# Patient Record
Sex: MALE | Race: WHITE
[De-identification: names, ages, dates, MRNs, and addresses within clinical notes are randomized per-mention and may not be internally consistent; named-entity substitution may affect disease eponyms.]

---

## 2017-01-03 ENCOUNTER — HOSPITAL ENCOUNTER (OUTPATIENT)
Dept: HOSPITAL 75 - SDC | Age: 72
Discharge: HOME | End: 2017-01-03
Attending: SURGERY
Payer: MEDICARE

## 2017-01-03 VITALS — DIASTOLIC BLOOD PRESSURE: 77 MMHG | SYSTOLIC BLOOD PRESSURE: 126 MMHG

## 2017-01-03 VITALS — DIASTOLIC BLOOD PRESSURE: 86 MMHG | SYSTOLIC BLOOD PRESSURE: 103 MMHG

## 2017-01-03 VITALS — HEIGHT: 70 IN | WEIGHT: 208.2 LBS | BODY MASS INDEX: 29.81 KG/M2

## 2017-01-03 VITALS — DIASTOLIC BLOOD PRESSURE: 91 MMHG | SYSTOLIC BLOOD PRESSURE: 126 MMHG

## 2017-01-03 DIAGNOSIS — R19.5: Primary | ICD-10-CM

## 2017-01-03 DIAGNOSIS — Z86.010: ICD-10-CM

## 2017-01-03 DIAGNOSIS — K59.00: ICD-10-CM

## 2017-01-03 PROCEDURE — 74176 CT ABD & PELVIS W/O CONTRAST: CPT

## 2017-01-03 NOTE — DIAGNOSTIC IMAGING REPORT
PROCEDURE: CT abdomen and pelvis without contrast.



TECHNIQUE: Multiple contiguous axial images were obtained

through the abdomen and pelvis without the use of intravenous

contrast.



INDICATION: Incomplete colonoscopy. History of constipation.

Occult positive stool.



COMPARISON: 07/04/2014.



FINDINGS: Included portions of the lung bases show areas of

air-trapping consistent with background of COPD. There are

subtle micronodular densities within the posterior left lower

lobe. The appearance is suggestive of tree-in-bud distribution.



CT abdomen: Rectal contrast was administered. There is

opacification of the colon to the cecum. Intraluminal filling

defects are seen within the cecum with supine positioning. This

is however felt to be on the basis of intraluminal stool

content. There is otherwise no focal high-grade stricture of the

colon. There is no extravasation of contrast to suggest leak.

Please note, small polyps or small sessile masses may be

inconspicuous with routine CT technique.



There is moderate hiatal hernia. Small bowel loops are

nondistended. Normal appendix cannot be adequately identified,

but there is no pericecal inflammation.



Multiple nonobstructive renal calculi are noted, bilaterally.

There is also probable hypodense cyst on the right. No ureteral

calculi are seen on either side. Additionally, there is no

hydroureteronephrosis or other evidence of obstruction.



The liver, spleen, pancreas, and adrenal glands have an

unremarkable noncontrast CT appearance. There is no loculated

fluid collection, free fluid, or free air within the abdomen. No

abnormal mesenteric or retroperitoneal adenopathy is seen. There

is diffuse calcified aortic and arterial atherosclerosis. Bony

structures show no acute abnormalities.



CT pelvis: Urinary bladder is unopacified. No calculi are seen

within the urinary bladder. There is no loculated fluid

collection, free fluid, or free air within the pelvis. No

abnormal lymph nodes are seen. Bony structures show no acute

abnormalities.



IMPRESSION:

1. Filling defects within the cecum. Given their differences in

positioning between supine and prone positioning, this is felt

to most likely represent intraluminal stool. There is otherwise

no focal stricture, obstruction, or evidence of leak. Please

note, small polyps or sessile masses may be inconspicuous with

CT imaging.

2. Moderate hiatal hernia.

3. Multiple nonobstructive bilateral renal calculi.

4. Diffuse calcified aortic and arterial atherosclerosis.



Dictated by:



Dictated on workstation # [id number]

## 2017-01-03 NOTE — PROGRESS NOTE-POST OPERATIVE
Post-Operative Progess Note


Pre-Operative Diagnosis


occult positive stools, history of colon polyps





Post-Operative Diagnosis





incomplete colonoscopy





Post-Op Procedure Note


Date of Procedure:  Francisco 3, 2017


Name of Procedure:  


incomplete colonoscopy


Procedure Note/Findings


see note


Anesthesia Type


per crna


Estimated blood loss (mL):  minimal


Specimen(s) collected


none








MICH COLLIER DO Francisoc 3, 2017 2:18 pm

## 2017-01-03 NOTE — DISCHARGE INST-SIMPLE/STANDARD
Discharge Inst-Standard


Patient Instructions/Follow Up


Plan of Care/Instructions/FU:  


Follow up with Dr. Ramachandran in 2-3 weeks


Activity as Tolerated:  Yes


Discharge Diet:  No Restrictions








TALIB GASTON Francisco 3, 2017 15:26

## 2017-01-03 NOTE — OPERATIVE REPORT
PROCEDURE PHYSICIAN:   MICH COLLIER

 

DATE OF PROCEDURE:  

01/03/2017

 

PREOPERATIVE DIAGNOSIS:  

1.   Occult positive stool. 

2.   History of polyps. 

 

POSTOPERATIVE DIAGNOSIS:

Incomplete colonoscopy. 

 

PROCEDURE:

Incomplete colonoscopy. 

 

SURGEON:

Madie. 

 

ANESTHESIA:

Per CRNA. 

 

ESTIMATED BLOOD LOSS:

None. 

 

COMPLICATIONS:

None. 

 

INDICATIONS:

The patient is a 71-year-old male with occult positive stools. He

has a questionable history of history of colon polyps. He was

explained risk and benefits of the procedure and wishes to

proceed with procedure. Consent was signed on the chart. 

 

PROCEDURE:

The patient was taken to the endoscopy suite, placed in left

lower recumbent position. Timeout was performed. Digital rectal

exam was performed. There were no palpable polyps, masses,

ulcerations. The scope was inserted in the rectum and advanced

all the way to the hepatic flexure.  There was still a

significant amount of soft and liquid stool present within the

colon. Copious amounts of irrigation were used to irrigate and

suction.  The scope was not able to be further past the hepatic

flexure. The patient was repositioned multiple times in order to

try to advance the scope and had the scope withdrawn a portion

and advanced several times without ever being able to get past

the hepatic flexure. It was decided to discontinue the

colonoscopy at that time. The scope was then slowly retracted

continuously trying to avoid soft and liquid stool. This was

continued be suctioned and irrigated. There were no polyps,

masses or ulcerations visualized on the mucosal surface that was

able to be visualized. The scope was then slowly retracted until

completely removed noting no further pathology. 

 

RECOMMENDATIONS:

The patient will go for CT of the abdomen and pelvis with rectal

contrast to further evaluate the colon.  After this is performed,

would reevaluate colon and repeat colonoscopy in 5 years. If he

has any problems prior to that, he should be reevaluated at that

time. Further recommendations pending. CT abdomen and pelvis with

rectal contrast.

 

 

Job ID: [id number]

Dictated Date: 01/03/2017 14:22:21 

Transcription Date: 01/03/2017 14:30:01 / isaias
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
## 2017-01-03 NOTE — PROGRESS NOTE-PRE OPERATIVE
Pre-Operative Progress Note


H&P Reviewed


The H&P was reviewed, patient examined and no changes noted.


Date H&P Reviewed:  Francisco 3, 2017


Time H&P Reviewed:  11:11


Pre-Operative Diagnosis:  occult positive stools, history of colon polyps








MICH COLLIER DO Francisco 3, 2017 11:11 am

## 2017-02-08 ENCOUNTER — HOSPITAL ENCOUNTER (OUTPATIENT)
Dept: HOSPITAL 75 - CARD | Age: 72
End: 2017-02-08
Attending: PHYSICIAN ASSISTANT
Payer: MEDICARE

## 2017-02-08 VITALS — SYSTOLIC BLOOD PRESSURE: 102 MMHG | DIASTOLIC BLOOD PRESSURE: 66 MMHG

## 2017-02-08 DIAGNOSIS — I25.10: Primary | ICD-10-CM

## 2017-02-08 DIAGNOSIS — G45.9: ICD-10-CM

## 2017-02-08 DIAGNOSIS — G47.33: ICD-10-CM

## 2017-02-08 DIAGNOSIS — I65.23: ICD-10-CM

## 2017-02-08 PROCEDURE — 78452 HT MUSCLE IMAGE SPECT MULT: CPT

## 2017-02-08 PROCEDURE — 93017 CV STRESS TEST TRACING ONLY: CPT
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
## 2017-02-09 NOTE — STRESS TEST
PROCEDURE PHYSICIAN:   NOHELIA AUGUST

 

DATE OF PROCEDURE:  02/08/2017

 

LEXISCAN MYOVIEW STRESS TEST REPORT: 

 

REFERRING PHYSICIAN:

Dr. Velasquez. 

 

INDICATION:

Coronary artery disease. 

 

BASELINE HEART RATE: 

65 

 

BASELINE BLOOD PRESSURE: 

129/60  

 

BASELINE EKG: 

Sinus rhythm with no ischemic changes. 

 

IN SUMMARY:

The patient was injected with 10.36 mCi of technetium 99 Myoview

and the resting images were obtained. Then the patient received

0.4 mg of Lexiscan followed by 28.7 mCi of technetium 99 Myoview.

Throughout the test, there were minimal nondiagnostic EKG

changes. 

 

The resting and stress images were reviewed and compared in the

short axis, horizontal long axis, and vertical long axis views.

Review of the images showed good radiotracer uptake with no

significant ischemia or infarction on SPECT images. SSS is 2, SDS

1, TID value 1.21. On the gated images, the left ventricle

appeared to be normal size with normal contractility. Calculated

ejection fraction 57%. 

 

IN CONCLUSION:

1. The patient tolerated Lexiscan well. 

2. TID value is in the upper limits of 1.21. 

3. Normal left ventricular size with normal contractility.

Calculated ejection fraction 57%. 

 

 

 

 

Job ID: [id number]

Dictated Date: 02/08/2017 16:02:57 

Transcription Date: 02/09/2017 08:42:56 / sharonda

## 2017-02-22 ENCOUNTER — HOSPITAL ENCOUNTER (OUTPATIENT)
Dept: HOSPITAL 75 - CATH | Age: 72
Discharge: HOME | End: 2017-02-22
Attending: INTERNAL MEDICINE
Payer: MEDICARE

## 2017-02-22 VITALS — DIASTOLIC BLOOD PRESSURE: 91 MMHG | SYSTOLIC BLOOD PRESSURE: 151 MMHG

## 2017-02-22 VITALS — DIASTOLIC BLOOD PRESSURE: 74 MMHG | SYSTOLIC BLOOD PRESSURE: 111 MMHG

## 2017-02-22 VITALS — DIASTOLIC BLOOD PRESSURE: 85 MMHG | SYSTOLIC BLOOD PRESSURE: 137 MMHG

## 2017-02-22 VITALS — DIASTOLIC BLOOD PRESSURE: 92 MMHG | SYSTOLIC BLOOD PRESSURE: 144 MMHG

## 2017-02-22 VITALS — WEIGHT: 207 LBS | BODY MASS INDEX: 28.98 KG/M2 | HEIGHT: 71 IN

## 2017-02-22 VITALS — SYSTOLIC BLOOD PRESSURE: 124 MMHG | DIASTOLIC BLOOD PRESSURE: 85 MMHG

## 2017-02-22 VITALS — DIASTOLIC BLOOD PRESSURE: 87 MMHG | SYSTOLIC BLOOD PRESSURE: 134 MMHG

## 2017-02-22 VITALS — SYSTOLIC BLOOD PRESSURE: 140 MMHG | DIASTOLIC BLOOD PRESSURE: 89 MMHG

## 2017-02-22 VITALS — DIASTOLIC BLOOD PRESSURE: 79 MMHG | SYSTOLIC BLOOD PRESSURE: 140 MMHG

## 2017-02-22 VITALS — DIASTOLIC BLOOD PRESSURE: 86 MMHG | SYSTOLIC BLOOD PRESSURE: 141 MMHG

## 2017-02-22 VITALS — SYSTOLIC BLOOD PRESSURE: 145 MMHG | DIASTOLIC BLOOD PRESSURE: 85 MMHG

## 2017-02-22 DIAGNOSIS — Z86.73: ICD-10-CM

## 2017-02-22 DIAGNOSIS — Z79.899: ICD-10-CM

## 2017-02-22 DIAGNOSIS — I34.0: ICD-10-CM

## 2017-02-22 DIAGNOSIS — Z72.0: ICD-10-CM

## 2017-02-22 DIAGNOSIS — E78.5: ICD-10-CM

## 2017-02-22 DIAGNOSIS — Z95.5: ICD-10-CM

## 2017-02-22 DIAGNOSIS — R94.39: Primary | ICD-10-CM

## 2017-02-22 DIAGNOSIS — G47.33: ICD-10-CM

## 2017-02-22 DIAGNOSIS — I25.10: ICD-10-CM

## 2017-02-22 DIAGNOSIS — I10: ICD-10-CM

## 2017-02-22 DIAGNOSIS — I65.23: ICD-10-CM

## 2017-02-22 DIAGNOSIS — I25.84: ICD-10-CM

## 2017-02-22 LAB
ALBUMIN SERPL-MCNC: 4.1 G/DL (ref 3.2–4.5)
ALT SERPL-CCNC: 22 U/L (ref 0–55)
ANION GAP SERPL CALC-SCNC: 11 MMOL/L (ref 5–14)
APTT BLD: 29 SEC (ref 24–35)
AST SERPL-CCNC: 17 U/L (ref 5–34)
BILIRUB SERPL-MCNC: 0.4 MG/DL (ref 0.1–1)
BILIRUB UR QL STRIP: NEGATIVE
BUN SERPL-MCNC: 18 MG/DL (ref 7–18)
BUN/CREAT SERPL: 14
CALCIUM SERPL-MCNC: 9.6 MG/DL (ref 8.5–10.1)
CHLORIDE SERPL-SCNC: 109 MMOL/L (ref 98–107)
CHOLEST SERPL-MCNC: 150 MG/DL (ref ?–200)
CO2 SERPL-SCNC: 21 MMOL/L (ref 21–32)
CREAT SERPL-MCNC: 1.32 MG/DL (ref 0.6–1.3)
ERYTHROCYTE [DISTWIDTH] IN BLOOD BY AUTOMATED COUNT: 14.9 % (ref 10–14.5)
GFR SERPLBLD BASED ON 1.73 SQ M-ARVRAT: 54 ML/MIN
GLUCOSE SERPL-MCNC: 100 MG/DL (ref 70–105)
INR PPP: 1.1 (ref 0.8–1.4)
KETONES UR QL STRIP: NEGATIVE
LDLC SERPL DIRECT ASSAY-MCNC: 78 MG/DL (ref 1–129)
LEUKOCYTE ESTERASE UR QL STRIP: NEGATIVE
MCH RBC QN AUTO: 34 PG (ref 25–34)
MCHC RBC AUTO-ENTMCNC: 34 G/DL (ref 32–36)
MCV RBC AUTO: 99 FL (ref 80–99)
NITRITE UR QL STRIP: NEGATIVE
PH UR STRIP: 5 [PH] (ref 5–9)
PLATELET # BLD: 230 10^3/UL (ref 130–400)
PMV BLD AUTO: 8.9 FL (ref 7.4–10.4)
POTASSIUM SERPL-SCNC: 4.1 MMOL/L (ref 3.6–5)
PROT SERPL-MCNC: 6.8 G/DL (ref 6.4–8.2)
PROT UR QL STRIP: NEGATIVE
PROTHROMBIN TIME: 14.1 SEC (ref 12.2–14.7)
RBC # BLD AUTO: 4.06 10^6/UL (ref 4.35–5.85)
SODIUM SERPL-SCNC: 141 MMOL/L (ref 135–145)
SP GR UR STRIP: 1.02 (ref 1.02–1.02)
SQUAMOUS #/AREA URNS HPF: (no result) /HPF
TRIGL SERPL-MCNC: 88 MG/DL (ref ?–150)
UROBILINOGEN UR-MCNC: NORMAL MG/DL
VLDLC SERPL CALC-MCNC: 18 MG/DL (ref 5–40)
WBC # BLD AUTO: 6.8 10^3/UL (ref 4.3–11)
WBC #/AREA URNS HPF: (no result) /HPF

## 2017-02-22 PROCEDURE — 93458 L HRT ARTERY/VENTRICLE ANGIO: CPT

## 2017-02-22 PROCEDURE — 36415 COLL VENOUS BLD VENIPUNCTURE: CPT

## 2017-02-22 PROCEDURE — 71010: CPT

## 2017-02-22 PROCEDURE — 81000 URINALYSIS NONAUTO W/SCOPE: CPT

## 2017-02-22 PROCEDURE — 85730 THROMBOPLASTIN TIME PARTIAL: CPT

## 2017-02-22 PROCEDURE — 85027 COMPLETE CBC AUTOMATED: CPT

## 2017-02-22 PROCEDURE — 80061 LIPID PANEL: CPT

## 2017-02-22 PROCEDURE — 80053 COMPREHEN METABOLIC PANEL: CPT

## 2017-02-22 PROCEDURE — 36221 PLACE CATH THORACIC AORTA: CPT

## 2017-02-22 PROCEDURE — 87081 CULTURE SCREEN ONLY: CPT

## 2017-02-22 PROCEDURE — 85610 PROTHROMBIN TIME: CPT
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
## 2017-02-22 NOTE — DIAGNOSTIC IMAGING REPORT
INDICATION: Coronary artery disease.



Frontal chest obtained at 9:29 a.m. and compared to 12/28/15.



FINDINGS: Heart is borderline enlarged. Mediastinal silhouette

is unremarkable. There are mild chronic appearing increased

basilar markings. There is resolution of previous bibasilar

infiltrate seen on 12/28/15. There is no pneumothorax or pleural

fluid.



IMPRESSION:



Mild cardiomegaly with chronic appearing increased interstitial

markings. Resolution of bibasilar infiltrates compared with

12/28/15.



Dictated by:



Dictated on workstation # [id number]
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
## 2017-02-22 NOTE — DISCHARGE INST-POST CATH
Discharge Inst-CATH


Post Cardiac Cath D/C Inst


Follow Up/Plan


Appointment with Dr Stewart's office in 2-4 weeks


CARDIAC CATH DISCHARGE INSTRUCTIONS





*Hold Metformin for 48 hours post heart cath.





ACTIVITY





* Go Home directly and rest.


* Limit activity of the leg (or wrist if it was used) for 7 days including 

aerobics, swimming,


   jogging, bicycling, etc.


* Restrict stair-climbing for 7 days if possible, if not, climb up with your non

-cath leg, then


   bring together on the same step.


* Avoid lifting, pushing, pulling or excessive movement of the affected 

extremity for 7 days.


* Customary sexual activity may be resumed after 2 days-use caution not to use 

a position  


   that strains or causes pain to the affected extremity.


* No driving for 24 hours.


* NO SMOKING. 


* Avoid straining for bowel movements for 7 days.


* Gentle walking on level ground is allowed.


* Returning to work will depend on the type of procedure and the results. Your 

doctor will discuss


   this with you.





CALL YOUR DOCTOR FOR ANY OF THE FOLLOWING:





*If bleeding from the puncture site occurs- Apply gentle pressure to site with 

clean cloth and call


   your doctor or EMS.


* If a knot or lump forms under the skin, increases in size, or causes pain.


* If bruising appears to be worsening or moving further down your leg instead 

of disappearing.


* Temperature above 101 F.





CARE OF YOUR GROIN INCISION;





* Bruising or purple discoloration of the skin near the puncture site is common.


* You may shower only, no bathtub bathing for 5 days.  Be careful to avoid 

slipping as your


   leg may feel stiff.


* If a closure device was used on your femoral artery, please see the attached 

guide regarding


   care of the device and your leg.


* REMOVE the dressing from your groin the next day after your procedure in the 

shower.





CARE OF YOUR WRIST INCISION;





* Bruising or purple discoloration of the skin near the puncture site is common.


* You may shower.


* DO NOT submerge wrist.


* Remove dressing in 24 hours.








NOHELIA STEWART MD Feb 22, 2017 11:13

## 2017-02-23 NOTE — CARDIAC CATHETERIZATION
PROCEDURE PHYSICIAN:   NOHELIA AUGUST

 

DATE OF PROCEDURE:  

02/22/2017 

 

INDICATION:

Coronary artery disease. 

 

SUMMARY:

Mr. Coles is a 71-year-old gentleman with a history of coronary

artery disease. He had a history of stent in the past.  He had an

abnormal stress test, scheduled for left heart catheterization,

possible PTCA. 

 

PROCEDURE NOTE:

After explaining the procedure to the patient, all pros and cons

were explained. All questions were answered. The patient signed a

consent, then he was placed on the cardiac catheterization

laboratory. The right groin was prepped in a sterile fashion.

Local anesthesia applied to the right groin. 6-Turkish sheath was

placed in the right femoral artery. Garcia left 4.0 was used; I

had to upgrade it to 4.5 diagnostic catheter, advanced to the

left coronary system. Angiogram was done in multiple views then

Garcia right was placed in the right coronary artery. Multiple

views were obtained. Pigtail catheter advanced to the left

ventricular cavity. Pressure was measured. No left ventriculogram

was done. Pullback LV to aorta was done. The aortic arch

angiogram was done. At the end of the procedure, sheath was

removed. Mynx device deployed. Hemostasis achieved. 

 

FINDINGS:

 

HEMODYNAMICS:

LV pressure 127/11, end-diastolic pressure of 11, aortic pressure

130/73, mean of 94. No significant gradient across the aortic

valve.

 

ANATOMY:  

1.   Left main coronary artery is bifurcating to left anterior

descending artery and left circumflex artery. Mildly calcified

with no obstructive disease. 

2.   Left anterior descending artery, calcified artery with 50 to

60% stenosis proximally. First diagonal branch has patent stent,

small vessel disease distally. 

3.   Left circumflex artery is moderate in size with no

significant obstructive disease. 

4.   Right coronary artery is dominant artery, moderate in size

50% stenosis at the midportion distally. The right posterior

descending artery has 70% stenosis in a smaller artery about 1.5

mm in diameter.

5.   No left ventriculogram was done. Normal left ventricular end

diastolic pressure. 

6.   Aortic arch angiogram appears to be prominent in size.

Origin of the innominate artery, carotid artery and subclavian

artery appeared normal. 

 

CONCLUSION:

1.   Calcified coronary system with 50 to 60% proximal LAD

stenosis, nonobstructive disease patent stent in the first

diagonal branch. 50 to 60% stenosis at the mid right coronary

artery and 70% stenosis at the mid right posterior descending

branch. That branch is a fairly small about 1.5 mm. 

2.   Normal left ventricular end diastolic pressure. 

3.   Prominent aortic arch with normal great neck vessels. 

 

DISCUSSION AND RECOMMENDATION:  

I recommend evaluating CT angiogram of the chest as an

outpatient, continue maximizing medical therapy. No intervention

is warranted at this time.

 

 

 

 

Job ID: [id number]

Dictated Date: 02/22/2017 11:18:07 

Transcription Date: 02/23/2017 11:23:15 / isaias

## 2017-02-23 NOTE — DISCHARGE SUMMARY
PROCEDURE PHYSICIAN:   NOHELIA AUGUST

 

DATE OF PROCEDURE:  

02/22/2017 

 

INDICATION:

Coronary artery disease. 

 

SUMMARY:

Mr. Coles is a 71-year-old gentleman with a history of coronary

artery disease. He had a history of stent in the past.  He had an

abnormal stress test, scheduled for left heart catheterization,

possible PTCA. 

 

PROCEDURE NOTE:

After explaining the procedure to the patient, all pros and cons

were explained. All questions were answered. The patient signed a

consent, then he was placed on the cardiac catheterization

laboratory. The right groin was prepped in a sterile fashion.

Local anesthesia applied to the right groin. 6-Setswana sheath was

placed in the right femoral artery. Garcia left 4.0 was used; I

had to upgrade it to 4.5 diagnostic catheter, advanced to the

left coronary system. Angiogram was done in multiple views then

Garcia right was placed in the right coronary artery. Multiple

views were obtained. Pigtail catheter advanced to the left

ventricular cavity. Pressure was measured. No left ventriculogram

was done. Pullback LV to aorta was done. The aortic arch

angiogram was done. At the end of the procedure, sheath was

removed. Mynx device deployed. Hemostasis achieved. 

 

FINDINGS:

 

HEMODYNAMICS:

LV pressure 127/11, end-diastolic pressure of 11, aortic pressure

130/73, mean of 94. No significant gradient across the aortic

valve.

 

ANATOMY:  

1.   Left main coronary artery is bifurcating to left anterior

descending artery and left circumflex artery. Mildly calcified

with no obstructive disease. 

2.   Left anterior descending artery, calcified artery with 50 to

60% stenosis proximally. First diagonal branch has patent stent,

small vessel disease distally. 

3.   Left circumflex artery is moderate in size with no

significant obstructive disease. 

4.   Right coronary artery is dominant artery, moderate in size

50% stenosis at the midportion distally. The right posterior

descending artery has 70% stenosis in a smaller artery about 1.5

mm in diameter.

5.   No left ventriculogram was done. Normal left ventricular end

diastolic pressure. 

6.   Aortic arch angiogram appears to be prominent in size.

Origin of the innominate artery, carotid artery and subclavian

artery appeared normal. 

 

CONCLUSION:

1.   Calcified coronary system with 50 to 60% proximal LAD

stenosis, nonobstructive disease patent stent in the first

diagonal branch. 50 to 60% stenosis at the mid right coronary

artery and 70% stenosis at the mid right posterior descending

branch. That branch is a fairly small about 1.5 mm. 

2.   Normal left ventricular end diastolic pressure. 

3.   Prominent aortic arch with normal great neck vessels. 

 

DISCUSSION AND RECOMMENDATION:  

I recommend evaluating CT angiogram of the chest as an

outpatient, continue maximizing medical therapy. No intervention

is warranted at this time.

 

FINAL DIAGNOSIS:

1.   Coronary artery disease. 

2.   Hypertension. 

3.   Hyperlipidemia. 

4.   Prominent aortic arch. 

 

 

 

Job ID: [id number] 

Dictated Date: 02/22/2017 11:18:07 

Transcription Date: 02/23/2017 11:29:49/isaias

## 2017-07-19 ENCOUNTER — HOSPITAL ENCOUNTER (OUTPATIENT)
Dept: HOSPITAL 75 - RT | Age: 72
End: 2017-07-19
Attending: INTERNAL MEDICINE
Payer: MEDICARE

## 2017-07-19 DIAGNOSIS — R06.02: Primary | ICD-10-CM

## 2017-07-19 DIAGNOSIS — Z87.891: ICD-10-CM

## 2017-07-19 PROCEDURE — 94729 DIFFUSING CAPACITY: CPT

## 2017-07-19 PROCEDURE — 94640 AIRWAY INHALATION TREATMENT: CPT

## 2017-07-19 PROCEDURE — 94060 EVALUATION OF WHEEZING: CPT

## 2017-07-19 PROCEDURE — 94726 PLETHYSMOGRAPHY LUNG VOLUMES: CPT

## 2018-04-06 ENCOUNTER — HOSPITAL ENCOUNTER (OUTPATIENT)
Dept: HOSPITAL 75 - RAD | Age: 73
End: 2018-04-06
Attending: INTERNAL MEDICINE
Payer: MEDICARE

## 2018-04-06 DIAGNOSIS — N20.0: ICD-10-CM

## 2018-04-06 DIAGNOSIS — J43.9: ICD-10-CM

## 2018-04-06 DIAGNOSIS — I34.0: ICD-10-CM

## 2018-04-06 DIAGNOSIS — Z72.0: ICD-10-CM

## 2018-04-06 DIAGNOSIS — I71.2: Primary | ICD-10-CM

## 2018-04-06 DIAGNOSIS — R41.0: ICD-10-CM

## 2018-04-06 DIAGNOSIS — I25.10: ICD-10-CM

## 2018-04-06 DIAGNOSIS — G47.33: ICD-10-CM
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
## 2018-04-06 LAB
ALBUMIN SERPL-MCNC: 4.2 GM/DL (ref 3.2–4.5)
ALP SERPL-CCNC: 86 U/L (ref 40–136)
ALT SERPL-CCNC: 22 U/L (ref 0–55)
BILIRUB SERPL-MCNC: 0.3 MG/DL (ref 0.1–1)
BUN/CREAT SERPL: 16
CALCIUM SERPL-MCNC: 11.4 MG/DL (ref 8.5–10.1)
CHLORIDE SERPL-SCNC: 106 MMOL/L (ref 98–107)
CHOLEST SERPL-MCNC: 125 MG/DL (ref ?–200)
CO2 SERPL-SCNC: 30 MMOL/L (ref 21–32)
CREAT SERPL-MCNC: 1.4 MG/DL (ref 0.6–1.3)
GFR SERPLBLD BASED ON 1.73 SQ M-ARVRAT: 50 ML/MIN
GLUCOSE SERPL-MCNC: 105 MG/DL (ref 70–105)
HDLC SERPL-MCNC: 52 MG/DL (ref 40–60)
POTASSIUM SERPL-SCNC: 4.4 MMOL/L (ref 3.6–5)
PROT SERPL-MCNC: 6.9 GM/DL (ref 6.4–8.2)
SODIUM SERPL-SCNC: 142 MMOL/L (ref 135–145)
TRIGL SERPL-MCNC: 74 MG/DL (ref ?–150)
VLDLC SERPL CALC-MCNC: 15 MG/DL (ref 5–40)

## 2018-04-06 PROCEDURE — 80061 LIPID PANEL: CPT

## 2018-04-06 PROCEDURE — 71275 CT ANGIOGRAPHY CHEST: CPT

## 2018-04-06 PROCEDURE — 36415 COLL VENOUS BLD VENIPUNCTURE: CPT

## 2018-04-06 PROCEDURE — 80053 COMPREHEN METABOLIC PANEL: CPT

## 2018-04-06 NOTE — DIAGNOSTIC IMAGING REPORT
PROCEDURE: CT angiography of the chest with contrast.



TECHNIQUE: Multiple contiguous axial images were obtained through

the chest after uneventful bolus administration of intravenous

contrast. Reconstructed CTA MIP acquisitions were also performed.

 

INDICATION: Recent fall. Headache. Nausea. Dizziness.



COMPARISON: 1/1/2016



FINDINGS: There is no evidence of acute pulmonary embolus to the

segmental division of the pulmonary arteries. Evaluation beyond

this is suboptimal secondary to suboptimal opacification by the

contrast bolus. Note is made that the bilateral pulmonary

arteries are somewhat prominent in appearance suggestive of

underlying pulmonary arterial hypertension.



Heart size is within normal limits. Ascending thoracic aorta is

aneurysmally dilated measuring 4.3 cm in diameter. This is stable

compared to 1/1/2016. Descending thoracic aorta is within normal

limits at 3.1 cm in diameter. There is moderate diffuse calcified

and noncalcified aortic atherosclerosis. Note is also made of

significant calcified coronary atherosclerosis. There is no large

pericardial effusion. Prominent right hilar lymph node measures

2.8 x 1.5 cm. This is stable compared to 1/1/2016. No other

pathologically enlarged or morphologically abnormal adenopathy is

seen within the mediastinum, left theodore, nor bilateral axillae.



Note is also made of hiatal hernia with somewhat prominent

thickened appearance of the wall of the distal esophagus.

Esophageal wall measures approximately 1 cm in thickness.



Evaluation of lung fields demonstrates mild to moderate air

trapping consistent with background of emphysematous disease.

There is no focal consolidation, pleural effusion, nor

pneumothorax. No suspicious pulmonary nodules or masses are

identified.



Osseous structures show no acute abnormalities. No lytic or

blastic bony lesions are seen.



Included portions of the upper abdomen show early excretion of

contrast versus partially visualized renal calculi on the left.



IMPRESSION:

1. No evidence of acute pulmonary embolus to the segmental

division of the pulmonary arteries.

2. Findings suspicious for pulmonary arterial hypertension.

3. Stable aneurysmal dilatation of the ascending thoracic aorta.

4. Significant calcified coronary and moderate aortic

atherosclerosis.

5. Thickened appearance to the wall of distal esophagus. This

does raise suspicion for potential esophagitis. Esophageal

neoplasm, however, could not be excluded. Not mentioned above,

there is a prominent paraesophageal lymph node near the hiatus

that measures 1.3 x 1.1 cm. Further evaluation with direct

visualization is recommended.

6. Mild to moderate emphysematous disease.

7. Prominent, yet stable right hilar lymph node.

8. There is excretion of contrast versus partially visualized

left renal calculi.



Dictated by: 



  Dictated on workstation # [id number]

## 2018-04-09 ENCOUNTER — HOSPITAL ENCOUNTER (OUTPATIENT)
Dept: HOSPITAL 75 - 4TH | Age: 73
Setting detail: OBSERVATION
LOS: 1 days | Discharge: HOME | End: 2018-04-10
Attending: INTERNAL MEDICINE | Admitting: INTERNAL MEDICINE
Payer: MEDICARE

## 2018-04-09 VITALS — WEIGHT: 207 LBS | HEIGHT: 71 IN | BODY MASS INDEX: 28.98 KG/M2

## 2018-04-09 VITALS — DIASTOLIC BLOOD PRESSURE: 74 MMHG | SYSTOLIC BLOOD PRESSURE: 121 MMHG

## 2018-04-09 VITALS — SYSTOLIC BLOOD PRESSURE: 121 MMHG | DIASTOLIC BLOOD PRESSURE: 74 MMHG

## 2018-04-09 DIAGNOSIS — E78.5: ICD-10-CM

## 2018-04-09 DIAGNOSIS — I25.10: ICD-10-CM

## 2018-04-09 DIAGNOSIS — R19.7: ICD-10-CM

## 2018-04-09 DIAGNOSIS — I10: ICD-10-CM

## 2018-04-09 DIAGNOSIS — I65.29: ICD-10-CM

## 2018-04-09 DIAGNOSIS — G47.9: ICD-10-CM

## 2018-04-09 DIAGNOSIS — N28.9: ICD-10-CM

## 2018-04-09 DIAGNOSIS — R06.02: ICD-10-CM

## 2018-04-09 DIAGNOSIS — R21: ICD-10-CM

## 2018-04-09 DIAGNOSIS — J44.9: ICD-10-CM

## 2018-04-09 DIAGNOSIS — R53.83: ICD-10-CM

## 2018-04-09 DIAGNOSIS — Z79.899: ICD-10-CM

## 2018-04-09 DIAGNOSIS — F17.210: ICD-10-CM

## 2018-04-09 DIAGNOSIS — D64.9: ICD-10-CM

## 2018-04-09 DIAGNOSIS — Z86.73: ICD-10-CM

## 2018-04-09 DIAGNOSIS — K63.89: Primary | ICD-10-CM

## 2018-04-09 LAB
ALBUMIN SERPL-MCNC: 3.8 GM/DL (ref 3.2–4.5)
ALP SERPL-CCNC: 56 U/L (ref 40–136)
ALT SERPL-CCNC: 16 U/L (ref 0–55)
APTT BLD: 29 SEC (ref 24–35)
BILIRUB SERPL-MCNC: 0.5 MG/DL (ref 0.1–1)
BUN/CREAT SERPL: 38
CALCIUM SERPL-MCNC: 9.3 MG/DL (ref 8.5–10.1)
CHLORIDE SERPL-SCNC: 111 MMOL/L (ref 98–107)
CO2 SERPL-SCNC: 18 MMOL/L (ref 21–32)
CREAT SERPL-MCNC: 1.48 MG/DL (ref 0.6–1.3)
ERYTHROCYTE [DISTWIDTH] IN BLOOD BY AUTOMATED COUNT: 13.9 % (ref 10–14.5)
GFR SERPLBLD BASED ON 1.73 SQ M-ARVRAT: 47 ML/MIN
GLUCOSE SERPL-MCNC: 94 MG/DL (ref 70–105)
HCT VFR BLD CALC: 32 % (ref 40–54)
HGB BLD-MCNC: 11 G/DL (ref 13.3–17.7)
INR PPP: 1.2 (ref 0.8–1.4)
MCH RBC QN AUTO: 36 PG (ref 25–34)
MCHC RBC AUTO-ENTMCNC: 34 G/DL (ref 32–36)
MCV RBC AUTO: 104 FL (ref 80–99)
PLATELET # BLD: 223 10^3/UL (ref 130–400)
PMV BLD AUTO: 9.5 FL (ref 7.4–10.4)
POTASSIUM SERPL-SCNC: 4.1 MMOL/L (ref 3.6–5)
PROT SERPL-MCNC: 5.9 GM/DL (ref 6.4–8.2)
PROTHROMBIN TIME: 15.2 SEC (ref 12.2–14.7)
RBC # BLD AUTO: 3.1 10^6/UL (ref 4.35–5.85)
SODIUM SERPL-SCNC: 140 MMOL/L (ref 135–145)
WBC # BLD AUTO: 7.6 10^3/UL (ref 4.3–11)

## 2018-04-09 PROCEDURE — 85027 COMPLETE CBC AUTOMATED: CPT

## 2018-04-09 PROCEDURE — 94760 N-INVAS EAR/PLS OXIMETRY 1: CPT

## 2018-04-09 PROCEDURE — 94640 AIRWAY INHALATION TREATMENT: CPT

## 2018-04-09 PROCEDURE — 94761 N-INVAS EAR/PLS OXIMETRY MLT: CPT

## 2018-04-09 PROCEDURE — 85730 THROMBOPLASTIN TIME PARTIAL: CPT

## 2018-04-09 PROCEDURE — 85018 HEMOGLOBIN: CPT

## 2018-04-09 PROCEDURE — 84443 ASSAY THYROID STIM HORMONE: CPT

## 2018-04-09 PROCEDURE — 36415 COLL VENOUS BLD VENIPUNCTURE: CPT

## 2018-04-09 PROCEDURE — 85014 HEMATOCRIT: CPT

## 2018-04-09 PROCEDURE — 83880 ASSAY OF NATRIURETIC PEPTIDE: CPT

## 2018-04-09 PROCEDURE — 80053 COMPREHEN METABOLIC PANEL: CPT

## 2018-04-09 PROCEDURE — 93005 ELECTROCARDIOGRAM TRACING: CPT

## 2018-04-09 PROCEDURE — 99211 OFF/OP EST MAY X REQ PHY/QHP: CPT

## 2018-04-09 PROCEDURE — 84484 ASSAY OF TROPONIN QUANT: CPT

## 2018-04-09 PROCEDURE — 85610 PROTHROMBIN TIME: CPT

## 2018-04-09 RX ADMIN — IPRATROPIUM BROMIDE AND ALBUTEROL SULFATE SCH ML: .5; 3 SOLUTION RESPIRATORY (INHALATION) at 21:32

## 2018-04-09 RX ADMIN — SODIUM CHLORIDE SCH MLS/HR: 900 INJECTION, SOLUTION INTRAVENOUS at 15:30

## 2018-04-09 RX ADMIN — FLUTICASONE PROPIONATE AND SALMETEROL XINAFOATE SCH PUFF: 115; 21 AEROSOL, METERED RESPIRATORY (INHALATION) at 21:32

## 2018-04-09 RX ADMIN — GABAPENTIN SCH MG: 300 CAPSULE ORAL at 21:09

## 2018-04-09 RX ADMIN — FAMOTIDINE SCH MG: 20 TABLET, FILM COATED ORAL at 21:09
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
## 2018-04-09 NOTE — XMS REPORT
Continuity of Care Document

 Created on: 2018



EDUAR OSORIO

External Reference #: [id number]

: 1945

Sex: Male



Demographics







 Address  [address] [place]  [address]

 

 Home Phone  [phone] x

 

 Preferred Language  Unknown

 

 Marital Status  Unknown

 

 Anglican Affiliation  Unknown

 

 Race  Unknown

 

 Ethnic Group  Unknown





Author







 Author  [facility]

 

 Organization  [facility]

 

 Address  Unknown

 

 Phone  Unavailable



              



Allergies

      





 Active            Description            Code            Type            
Severity            Reaction            Onset            Reported/Identified   
         Relationship to Patient            Clinical Status        

 

 Yes            No Known Drug Allergies            [id number]            Drug 
Allergy            Unknown            N/A                         2017   
                               



                  



Medications

      



There is no data.                  



Problems

      





 Date Dx Coded            Attending            Type            Code            
Diagnosis            Diagnosed By        

 

 2014            EDUAR JOHNSON DO            Ot            038.9   
         SEPTICEMIA NOS                     

 

 2014            EDUAR JOHNSON DO            Ot            250.00  
          DIAB NIEVES WO COMPL, TYPE II OR UNSPEC TY                     

 

 2014            EDUAR JOHNSON DO            Ot            272.4   
         HYPERLIPIDEMIA NEC/NOS                     

 

 2014            EDUAR JOHNSON DO            Ot            276.52  
          HYPOVOLEMIA                     

 

 2014            EDUAR JOHNSON DO            Ot            305.1   
         TOBACCO USE DISORDER                     

 

 2014            EDUAR JOHNSON DO            Ot            414.01  
          CORONARY ATHEROSCLEROSIS OF NATIVE CORON                     

 

 2014            EDUAR JOHNSON DO            Ot            486     
       PNEUMONIA, ORGANISM NOS                     

 

 2014            EDUAR JOHNSON DO            Ot            510.9   
         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                     

 

 2014            EDUAR JOHNSON DO            Ot            511.9   
         PLEURAL EFFUSION NOS                     

 

 2014            EDUAR JOHNSON DO            Ot            518.81  
          ACUTE RESPIRATORY FAILURE                     

 

 2014            EDUAR JOHNSON DO            Ot            584.9   
         ACUTE RENAL FAILURE, UNSPECIFIED                     

 

 2014            EDUAR JOHNSON DO            Ot            785.50  
          SHOCK NOS                     

 

 2014            EDUAR JOHNSON DO            Ot            785.52  
          SEPTIC SHOCK                     

 

 2014            EDUAR JOHNSON DO            Ot            995.92  
          SEVERE SEPSIS                     

 

 2014            EDUAR JOHNSON DO, Ot            V45.82  
          PERCUTANEOUS TRANSLUM CORON ANGIOPLASTY                      

 

 2014            HARRY MATIAS, SHIRLENE DAVID            Ot            266.2        
    B-COMPLEX DEFIC NEC                     

 

 2014            SHIRLENE MCDONALD MD            Ot            268.9        
    VITAMIN D DEFICIENCY NOS                     

 

 2014            SHIRLENE MCDONALD MD            Ot            272.4        
    HYPERLIPIDEMIA NEC/NOS                     

 

 2014            SHIRLENE MCDONALD MD E            Ot            274.9        
    GOUT NOS                     

 

 2014            SIHRLENE MCDONALD MD            Ot            305.1        
    TOBACCO USE DISORDER                     

 

 2014            SHIRLENE MCDONALD MD            Ot            401.9        
    HYPERTENSION NOS                     

 

 2014            HARRY MATIAS, SHIRLENE E            Ot            564.00       
     UNSPEC CONSTIPATION                     

 

 2014            SHIRLENE MCDONALD MD E            Ot            722.11       
     THORACIC DISC DISPLACMNT                     

 

 2014            SHIRLENE MCDONALD MD            Ot            733.00       
     OSTEOPOROSIS NOS                     

 

 2014            SHIRLENE MCDONALD MD E            Ot            780.79       
     OTH MALAISE FATIGUE                     

 

 2014            SHIRLENE MCDONALD MD E            Ot            790.29       
     OTHER ABNORMAL GLUCOSE                     

 

 2014            SHIRLENE MCDONALD MD            Ot            V57.89       
     REHABILITATION PROC NEC                     

 

 2014            LETTY DO ARLENE F            Ot            726.0    
                              

 

 2014            LETTY DO, ARLENE F            Ot            718.91   
                               

 

 2014            LETTY DO ARLENE F            Ot            726.0    
                              

 

 2014            LETTY DAVIS ARLENE F            Ot            726.0    
        ADHESIVE CAPSULIT SHLDER                     

 

 2014            LETTY DAVIS ARLENE F            Ot            V57.1    
        PHYSICAL THERAPY NEC                     

 

 2014            LETTY DAVIS ARLENE F            Ot            718.91   
                               

 

 2014            LETTY DAVIS ARLENE F            Ot            726.0    
                              

 

 2014            EDUAR JOHNSON DO            Ot            250.00  
          DIAB NIEEVS WO COMPL, TYPE II OR UNSPEC TY                     

 

 2014            EDUAR JOHNSON DO            Ot            266.2   
         B-COMPLEX DEFIC NEC                     

 

 2014            EDUAR JOHNSON DO            Ot            268.9   
         VITAMIN D DEFICIENCY NOS                     

 

 2014            EDUAR JOHNSON DO            Ot            272.4   
         HYPERLIPIDEMIA NEC/NOS                     

 

 2014            EDUAR JOHNSON DO            Ot            403.90  
          HYPTNSV CHR KID DIS, UNSPEC, W CHR KD ST                     

 

 2014            EDUAR JOHNSON DO            Ot            414.01  
          CORONARY ATHEROSCLEROSIS OF NATIVE CORON                     

 

 2014            EDUAR JOHNSON DO            Ot            435.9   
         TRANS CEREB ISCHEMIA NOS                     

 

 2014            EDUAR JOHNSON DO            Ot            496     
       CHR AIRWAY OBSTRUCT NEC                     

 

 2014            EDUAR JOHNSON DO            Ot            585.2   
         CHRONIC KIDNEY DISEASE, STAGE II (MILD)                     

 

 2014            EDUAR JOHNSON DO            Ot            722.11  
          THORACIC DISC DISPLACMNT                     

 

 2014            EDUAR JOHNSON DO            Ot            799.02  
          HYPOXEMIA                     

 

 2014            EDUAR JOHNSON DO            Ot            V45.82  
          PERCUTANEOUS TRANSLUM CORON ANGIOPLASTY                      

 

 2014            LETTY DAVIS ARLENE MCKEON            Ot            718.91   
                               

 

 2014            LETTY DAVIS ARLENE MCKEON            Ot            726.0    
                              

 

 2015                         Ot            733.00                       
           

 

 2015                         Ot            733.00                       
           

 

 2015                         Ot            733.00                       
           

 

 2015            EDUAR JOHNSON DO            Ot            733.00  
                                

 

 2015            DANA DILLON DO            Ot            492.8       
                           

 

 2015            DANA DILLON DO            Ot            511.9       
                           

 

 2015            DANA DILLON DO            Ot            586         
                         

 

 2015            LETTY DAVIS ARLENE MCKEON            Ot            726.0    
                              

 

 2015            LETTY DAVIS ARLENE MCKEON            Ot            718.91   
                               

 

 2015            LETTY DAVIS ARLENE MCKEON            Ot            726.0    
                              

 

 2015            LETTY  ARLENE MCKEON            Ot            840.4    
                              

 

 2015            LETTY  ARLENE MCKEON            Ot            E000.8   
                               

 

 2015            LETTY  ARLENE MCKEON            Ot            E928.9   
                               

 

 2015            LETTY DAVIS ARLENE MCKEON            Ot            V72.84   
                               

 

 2015            LETTY ARLENE DAVIS            Ot            840.4    
        SPRAIN ROTATOR CUFF                     

 

 2015            LETTY DAVIS ARLENE MCKEON            Ot            E000.8   
         OTHER EXTERNAL CAUSE STATUS                     

 

 2015            LETTY DAVIS ARLENE MCKEON            Ot            E928.9   
         ACCIDENT NOS                     

 

 2015            LETTY DAVIS ARLENE MCKEON            Ot            V58.61   
         ANTICOAGULANTS,LT,CURRENT USE                     

 

 2015            LETTY  ARLENE MCKEON            Ot            V64.1    
        NO PROC/CONTRAINDICATION                     

 

 2015            LETTY DAVIS ARLENE MCKEON            Ot            715.31   
         LOC OSTEOARTH NOS-SHLDER                     

 

 2015            LETTY DAVIS ARLENE MCKEON            Ot            727.61   
         ROTATOR CUFF RUPTURE                     

 

 2015            LETTY DAVIS ARLENE MCKEON            Ot            840.7    
        (SLAP) SUPERIOR GLENOID LABRUM LESIONS                     

 

 2015            NOHELIA AUGUST MD            Ot            414.01      
                            

 

 2015            NOHELIA AUGUST MD            Ot            786.09      
                            

 

 2015                         Ot            733.00                       
           

 

 2015                         Ot            733.00                       
           

 

 2015                         Ot            733.00                       
           

 

 2015            EDUAR JOHNSON DO            Ot            733.00  
                                

 

 2015            DANA DILLON DO            Ot            492.8       
                           

 

 2015            WILMA DANA DAVIS            Ot            511.9       
                           

 

 2015            WILMA DAVIS DANA M            Ot            586         
                         

 

 2015            LETTY DO, ARLENE MCKEON            Ot            726.0    
                              

 

 2015            LETTY DO, ARLENE F            Ot            718.91   
                               

 

 2015            LETTY DO, ARLENE F            Ot            726.0    
                              

 

 2015            LETTY DO, ARLENE F            Ot            840.4    
                              

 

 2015            LETTY DO, ARLENE MCKEON            Ot            E000.8   
                               

 

 2015            LETTY DO ARLENE MCKEON            Ot            E928.9   
                               

 

 2015            LETTY DO ARLENE MCKEON            Ot            V72.84   
                               

 

 2015            LETTY DO ARLENE MCKEON            Ot            727.61   
                               

 

 2015            LETTY DO ARLENE MCKEON            Ot            V72.84   
                               

 

 2015            NOHELIA AUGUST MD            Ot            414.00      
                            

 

 2015            MATA MATIAS, NOHELIA CLARK            Ot            424.0       
                           

 

 2015            NOHELIA AUGUST MD            Ot            433.10      
                            

 

 2015            NOHELIA AUGUST MD            Ot            786.09      
                            

 

 2015            NOHELIA AUGUST MD            Ot            414.01      
                            

 

 2015            NOHELIA AUGUST MD            Ot            786.09      
                            

 

 2015            LETTY DO ARLENE MCKEON            Ot            840.4    
                              

 

 2015            LETTY DO ARLENE MCKEON            Ot            E000.8   
                               

 

 2015            LETTY DO ARLENE MCKEON            Ot            E928.9   
                               

 

 2015            LETTY DO, ARLENE F            Ot            V72.84   
                               

 

 2015            NOHELIA AGUUST MD            Ot            414.01      
                            

 

 2015            NOHELIA AUGUST MD            Ot            786.09      
                            

 

 2015            LETTY DO ARLENE MCKEON            Ot            719.41   
         JOINT PAIN-SHLDER                     

 

 2015            LETTY DO ARLENE F            Ot            V57.1    
        PHYSICAL THERAPY NEC                     

 

 2015            LETTY DO ARLENE F            Ot            V58.49   
         OTHER SPECIFIED AFTERCARE FOLLOWING SURG                     

 

 2015            LETTY DO, ARLENE MCKEON            Ot            840.4    
                              

 

 2015            LETTY DO, ARLNEE MCKEON            Ot            E000.8   
                               

 

 2015            LETTY DO, ARLENE MCKEON            Ot            E928.9   
                               

 

 2015            LETTY DO, ARLENE MCKEON            Ot            V72.84   
                               

 

 2015            LETTY DO, ARLENE MCKEON            Ot            840.4    
                              

 

 2015            LETTY DO, ARLENE MCKEON            Ot            E000.8   
                               

 

 2015            LETTY DO, ARLENE MCKEON            Ot            E928.9   
                               

 

 2015            LETTY DO, ARLENE MCKEON            Ot            V72.84   
                               

 

 2015            LETTY DO, ARLENE MCKEON            Ot            727.61   
                               

 

 2015            LETTY DO, ARLENE MCKEON            Ot            V72.84   
                               

 

 2015            LETTY DO, ARLENE MCKEON            Ot            727.61   
                               

 

 2015            LETTY DO, ARLENE MCKEON            Ot            V72.84   
                               

 

 2015                         Ot            733.00                       
           

 

 2015                         Ot            733.00                       
           

 

 2015            LETTY DO, ARLENE MCKEON            Ot            722.4    
                              

 

 2015            LETTY DO, ARLENE MCKEON            Ot            722.4    
                              

 

 2015            BELKIS WEST MD            Ot            721.0        
                          

 

 2015            BELKIS WEST MD            Ot            V57.1        
                          

 

 2015            BELKIS WEST MD            Ot            721.0        
                          

 

 2015            BELKIS WEST MD            Ot            V57.1        
                          

 

 2015            BELKIS WEST MD            Ot            721.0        
    CERVICAL SPONDYLOSIS                     

 

 2015            BELKIS WEST MD            Ot            V57.1        
    PHYSICAL THERAPY NEC                     

 

 2015            EDUAR JOHNSON DO            Ot            A41.9   
                               

 

 2015            JOHNSONEDUAR HILL DO            Ot            E53.8   
                               

 

 2015            JOHNSONEDUAR HILL DO            Ot            E55.9   
                               

 

 2015            JOHNSONEDUAR HILL DO            Ot            E78.5   
                               

 

 2015            EDUAR JOHNSON DO            Ot            F17.200 
                                 

 

 2015            EDUAR JOHNSON DO            Ot            I12.9   
                               

 

 2015            JOHNSONEDUAR HILL DO            Ot            J15.1   
                               

 

 2015            JOHNSONEDUAR HILL DO            Ot            J15.4   
                               

 

 2015            EDUAR JOHNSON DO            Ot            J44.0   
                               

 

 2015            JOHNSON DO, EDUAR CLARK            Ot            J44.1   
                               

 

 2015            JOHNSON DO, EDUAR CLARK            Ot            M51.14  
                                

 

 2015            JOHNSON DO, EDUAR CLARK            Ot            N18.2   
                               

 

 2015            JOHNSON DO, EDUAR CLARK            Ot            R90.82  
                                

 

 2015            JOHNSON DO, EDUAR CLARK            Ot            Z66     
                             

 

 2015            JOHNSON DO, EDUAR CLARK            Ot            A41.9   
                               

 

 2015            JOHNSON DO, EDUAR CLARK            Ot            E53.8   
                               

 

 2015            JOHNSON DO, EDUAR CLARK            Ot            E55.9   
                               

 

 2015            JOHNSON DO, EDUAR CLARK            Ot            E78.5   
                               

 

 2015            JOHNSON DO, EDUAR CLARK            Ot            F17.200 
                                 

 

 2015            JOHNSON DO, EDUAR CLARK            Ot            I12.9   
                               

 

 2015            JOHNSON DO, EDUAR CLARK            Ot            J15.1   
                               

 

 2015            JOHNSON DO, EDUAR CLARK            Ot            J15.4   
                               

 

 2015            JOHNSON DO, EDUAR CLARK            Ot            J44.0   
                               

 

 2015            JOHNSON DO, EDUAR CLARK            Ot            J44.1   
                               

 

 2015            JOHNSON DO, EDUAR CLARK            Ot            M51.14  
                                

 

 2015            JOHNSON DO, EDUAR CLARK            Ot            N18.2   
                               

 

 2015            JOHNSON DO, EDUAR CLARK            Ot            R90.82  
                                

 

 2015            JOHNSON DO, EDUAR CLARK            Ot            Z66     
                             

 

 2016            JOHNSON DO, EDUAR CLARK            Ot            A41.9   
                               

 

 2016            JOHNSON DO, EDUAR CLARK            Ot            E53.8   
                               

 

 2016            JOHNSON DO, EDUAR CLARK            Ot            E55.9   
                               

 

 2016            JOHNSON DO, EDUAR CLARK            Ot            E78.5   
                               

 

 2016            JOHNSON DO, EDUAR CLARK            Ot            F17.200 
                                 

 

 2016            JOHNSON DO, EDUAR CLARK            Ot            I12.9   
                               

 

 2016            JOHNSON DO, EDUAR CLARK            Ot            J15.1   
                               

 

 2016            JOHNSON DO EDUAR CLARK            Ot            J15.4   
                               

 

 2016            JOHNSON DO, EDUAR CLARK            Ot            J44.0   
                               

 

 2016            JOHNSON DO, EDUAR CLARK            Ot            J44.1   
                               

 

 2016            JOHNSON DO, EDUAR CLARK            Ot            M51.14  
                                

 

 2016            JOHNSON DO, EDUAR CLARK            Ot            N18.2   
                               

 

 2016            JOHNSON DO, EDUAR CLARK            Ot            R90.82  
                                

 

 2016            JOHNSON DO, EDUAR CLARK            Ot            Z66     
                             

 

 2016            JOHNSON DO, EDUAR CLARK            Ot            A41.9   
                               

 

 2016            JOHNSON DO, EDUAR CLARK            Ot            E53.8   
                               

 

 2016            JOHNSON DO, EDUAR CLARK            Ot            E55.9   
                               

 

 2016            JOHNSON DO, EDUAR CLARK            Ot            E78.5   
                               

 

 2016            JOHNSON DO, EDUAR CLARK            Ot            F17.200 
                                 

 

 2016            JOHNSON DO, EDUAR CLARK            Ot            I12.9   
                               

 

 2016            JOHNSON DO, EDUAR CLARK            Ot            J15.1   
                               

 

 2016            JOHNSON DO, EDUAR CLARK            Ot            J15.4   
                               

 

 2016            JOHNSON DO, EDUAR CLARK            Ot            J44.0   
                               

 

 2016            JOHNSON DO, EDUAR CLARK            Ot            J44.1   
                               

 

 2016            JOHNSON DO, EDUAR CLARK            Ot            M51.14  
                                

 

 2016            JOHNSON DO, EDUAR CLARK            Ot            N18.2   
                               

 

 2016            JOHNSON DO, EDUAR CLARK            Ot            R90.82  
                                

 

 2016            JOHNSON DO, EDUAR CLARK            Ot            Z66     
                             

 

 2016            JOHNSON DO, EDUAR CLARK            Ot            A41.9   
                               

 

 2016            JOHNSON DO, EDUAR CLARK            Ot            E53.8   
                               

 

 2016            JOHNSON DO, EDUAR CLARK            Ot            E55.9   
                               

 

 2016            JOHNSON DO, EDUAR CLARK            Ot            E78.5   
                               

 

 2016            JOHNSON DO, EDUAR CLARK            Ot            F17.200 
                                 

 

 2016            JOHNSON DO, EDUAR CLARK            Ot            I12.9   
                               

 

 2016            JOHNSON DO, EDUAR CLARK            Ot            J15.1   
                               

 

 2016            JOHNSON DO, EDUAR CLARK            Ot            J15.4   
                               

 

 2016            JOHNSON DO, EDUAR CLARK            Ot            J44.0   
                               

 

 2016            JOHNSON DO, EDUAR CLARK            Ot            J44.1   
                               

 

 2016            JOHNSON DO, EDUAR CLARK            Ot            M51.14  
                                

 

 2016            JOHNSON DO, EDUAR CLARK            Ot            N18.2   
                               

 

 2016            JOHNSON DO, EDUAR CLARK            Ot            R90.82  
                                

 

 2016            JOHNSON DO, EDUAR CLARK            Ot            Z66     
                             

 

 2016            JOHNSON DO, EDUAR CLARK            Ot            A41.9   
                               

 

 2016            JOHNSON DO, EDUAR CLARK            Ot            E53.8   
                               

 

 2016            JOHNSON DO, EDUAR CLARK            Ot            E55.9   
                               

 

 2016            JOHNSON DO, EDUAR CLARK            Ot            E78.5   
                               

 

 2016            JOHNSON DO, EDUAR CLARK            Ot            F17.200 
                                 

 

 2016            JOHNSON DO, EDUAR CLARK            Ot            I12.9   
                               

 

 2016            JOHNSON DO, EDUAR CLARK            Ot            J15.1   
                               

 

 2016            JOHNSON DO, EDUAR CLARK            Ot            J15.4   
                               

 

 2016            JOHNSON DO, EDUAR CLARK            Ot            J44.0   
                               

 

 2016            JOHNSON DO, EDUAR CLARK            Ot            J44.1   
                               

 

 2016            JOHNSON DO, EDUAR CLARK            Ot            M51.14  
                                

 

 2016            JOHNSON DO, EDUAR CLARK            Ot            N18.2   
                               

 

 2016            JOHNSON DO, EDUAR CLARK            Ot            R90.82  
                                

 

 2016            JOHNSON DO, EDUAR CLARK            Ot            Z66     
                             

 

 2016            JOHNSON DO, EDUAR CLARK            Ot            A41.9   
                               

 

 2016            JOHNSON DO, EDUAR CLARK            Ot            E53.8   
                               

 

 2016            JOHNSON DO, EDUAR CLARK            Ot            E55.9   
                               

 

 2016            JOHNSON DO, EDUAR CLARK            Ot            E78.5   
                               

 

 2016            JOHNSON DO, EDUAR CLARK            Ot            F17.200 
                                 

 

 2016            JOHNSON DO, EDUAR CLARK            Ot            I12.9   
                               

 

 2016            JOHNSON DO, EDUAR CLARK            Ot            J15.1   
                               

 

 2016            JOHNSON DO, EDUAR CLARK            Ot            J15.4   
                               

 

 2016            JOHNSON DO, EDUAR CLARK            Ot            J44.0   
                               

 

 2016            JOHNSON DO, EDUAR CLARK            Ot            J44.1   
                               

 

 2016            JOHNSON DO, EDUAR CLARK            Ot            M51.14  
                                

 

 2016            JOHNSON DO, EDUAR CLARK            Ot            N18.2   
                               

 

 2016            JOHNSON DO, EDUAR CLARK            Ot            R90.82  
                                

 

 2016            JOHNSON DO, EDUAR CLARK            Ot            Z66     
                             

 

 2016            JOHNSON DO, EDUAR CLARK            Ot            A41.9   
                               

 

 2016            JOHNSON DO, EDUAR CLARK            Ot            E53.8   
                               

 

 2016            JOHNSON DO, EDUAR CLARK            Ot            E55.9   
                               

 

 2016            JOHNSON DO, EDUAR CLARK            Ot            E78.5   
                               

 

 2016            JOHNSON DO, EDUAR CLARK            Ot            F17.200 
                                 

 

 2016            JOHNSON DO, EDUAR CLARK            Ot            I12.9   
                               

 

 2016            JOHNSON DO, EDUAR CLARK            Ot            J15.1   
                               

 

 2016            JOHNSON DO, EDUAR CLARK            Ot            J15.4   
                               

 

 2016            JOHNSON DO, EDUAR CLARK            Ot            J44.0   
                               

 

 2016            JOHNSON DO, EDUAR CLARK            Ot            J44.1   
                               

 

 2016            JOHNSON DO, EDUAR CLARK            Ot            M51.14  
                                

 

 2016            JOHNSON DO, EDUAR CLARK            Ot            N18.2   
                               

 

 2016            JOHNSON DO, EDUAR CLARK            Ot            R90.82  
                                

 

 2016            JOHNSON DO, EDUAR CLARK            Ot            Z66     
                             

 

 2016            JOHNSON DO, EDUAR CLARK            Ot            A41.9   
                               

 

 2016            JOHNSON DO, EDUAR CLARK            Ot            E53.8   
                               

 

 2016            JOHNSON DO, EDUAR CLARK            Ot            E55.9   
                               

 

 2016            JOHNSON DO, EDUAR CLARK            Ot            E78.5   
                               

 

 2016            JOHNSON DO, EDUAR CLARK            Ot            F17.200 
                                 

 

 2016            JOHNSON DO, EDUAR CLARK            Ot            I12.9   
                               

 

 2016            JOHNSON DO, EDUAR CLARK            Ot            J15.1   
                               

 

 2016            JOHNSON DO, EDUAR CLARK            Ot            J15.4   
                               

 

 2016            JOHNSON DO, EDUAR CLARK            Ot            J44.0   
                               

 

 2016            JOHNSON DO, EDUAR CLARK            Ot            J44.1   
                               

 

 2016            JOHNSON DO, EDUAR CLARK            Ot            M51.14  
                                

 

 2016            JOHNSON DO, EDUAR CLARK            Ot            N18.2   
                               

 

 2016            JOHNSON DO, EDUAR CLARK            Ot            R90.82  
                                

 

 2016            JOHNSON DO, EDUAR CLARK            Ot            Z66     
                             

 

 2016            JOHNSON DO, EDUAR CLARK            Ot            A41.9   
         SEPSIS, UNSPECIFIED ORGANISM                     

 

 2016            EDUAR JOHNSON DO, Ot            E11.22  
          TYPE 2 DIABETES MELLITUS W DIABETIC                      

 

 2016            EDUAR JOHNSON DO, Ot            E53.8   
         DEFICIENCY OF OTHER SPECIFIED B GROUP VI                     

 

 2016            EDUAR JOHNSON DO, Ot            E55.9   
         VITAMIN D DEFICIENCY, UNSPECIFIED                     

 

 2016            EDUAR JOHNSON DO, Ot            E78.5   
         HYPERLIPIDEMIA, UNSPECIFIED                     

 

 2016            EDUAR JOHNSON DO, Ot            F17.210 
           NICOTINE DEPENDENCE, CIGARETTES, UNCOMPL                     

 

 2016            EDUAR JOHNSON DO, Ot            I12.9   
         HYPERTENSIVE CHRONIC KIDNEY DISEASE W ST                     

 

 2016            EDUAR JOHNSON DO, Ot            I25.10  
          ATHSCL HEART DISEASE OF NATIVE CORONARY                      

 

 2016            EDUAR JOHNSON DO, Ot            J15.1   
         PNEUMONIA DUE TO PSEUDOMONAS                     

 

 2016            EDUAR JOHNSON DO, Ot            J15.4   
         PNEUMONIA DUE TO OTHER STREPTOCOCCI                     

 

 2016            EDUAR JOHNSON DO, Ot            J44.0   
         CHRONIC OBSTRUCTIVE PULMON DISEASE W ACU                     

 

 2016            EDUAR JOHNSON DO, Ot            J44.1   
         CHRONIC OBSTRUCTIVE PULMONARY DISEASE W                      

 

 2016            EDUAR JOHNSON DO, Ot            M51.14  
          INTVRT DISC DISORDERS W RADICULOPATHY, T                     

 

 2016            EDUAR JOHNSON DO, Ot            N18.2   
         CHRONIC KIDNEY DISEASE, STAGE 2 (MILD)                     

 

 2016            EDUAR JOHNSON DO, Ot            R90.82  
          WHITE MATTER DISEASE, UNSPECIFIED                     

 

 2016            EDUAR JOHNSON DO, Ot            Z66     
       DO NOT RESUSCITATE                     

 

 2016                         Ot            733.00                       
           

 

 2016                         Ot            733.00                       
           

 

 2016                         Ot            733.00                       
           

 

 2016            EDUAR JOHNSON DO, Ot            733.00  
                                

 

 2016            DANA DILLON DO            Ot            492.8       
                           

 

 2016            DANA DILLON DO            Ot            511.9       
                           

 

 2016            DANA DILLON DO            Ot            586         
                         

 

 2016            ARLENE SAENZ DO            Ot            726.0    
                              

 

 2016            ARLENE SAENZ DO            Ot            718.91   
                               

 

 2016            ARLENE SAENZ DO            Ot            726.0    
                              

 

 2016            ARLENE SAENZ DO            Ot            840.4    
                              

 

 2016            LETTY DO, ARLENE MCKEON            Ot            E000.8   
                               

 

 2016            LETTY DO, ARLENE MCKEON            Ot            E928.9   
                               

 

 2016            LETTY DO, ARLENE MCKEON            Ot            V72.84   
                               

 

 2016            LETTY DO, ARLENE MCKEON            Ot            727.61   
                               

 

 2016            LETTY DO, ARLENE MCKEON            Ot            V72.84   
                               

 

 2016            MATA MATIAS, NOHELIA CLARK            Ot            414.00      
                            

 

 2016            MATA MATIAS, NOHELIA J            Ot            424.0       
                           

 

 2016            MATA MATIAS, NOHELIA J            Ot            433.10      
                            

 

 2016            MATA MATIAS, NOHELIA J            Ot            786.09      
                            

 

 2016            MATA MATIAS, NOHELIA CLARK            Ot            414.01      
                            

 

 2016            MATA MATIAS, NOHELIA CLARK            Ot            786.09      
                            

 

 2016            LETTY DO, ARLENE MCKEON            Ot            840.4    
                              

 

 2016            LETTY DO, ARLENE MCKEON            Ot            E000.8   
                               

 

 2016            LETTY DO, ARLENE MCKEON            Ot            E928.9   
                               

 

 2016            LETTY DO, ARLENE MCKEON            Ot            V72.84   
                               

 

 2016                         Ot            733.00                       
           

 

 2016            LETTY DO, ARLENE MCKEON            Ot            722.4    
                              

 

 2016                         Ot            J44.9                        
          

 

 2016            EDUAR JOHNSON DO            Ot            J44.9   
                               

 

 2016                         Ot            J44.9                        
          

 

 2016            JOHNSONEDUAR HILL DO            Ot            K59.00  
                                

 

 2016                         Ot            J44.9                        
          

 

 2016            EDUAR JOHNSON DO            Ot            K59.00  
                                

 

 2016            EDUAR JOHNSON DO            Ot            J44.9   
                               

 

 2016            EDUAR JOHNSON DO            Ot            J44.9   
                               

 

 2016            JOHNSONEDUAR HILL DO            Ot            J44.9   
         CHRONIC OBSTRUCTIVE PULMONARY DISEASE, U                     

 

 2016            EDUAR JOHNSON DO            Ot            J44.9   
         CHRONIC OBSTRUCTIVE PULMONARY DISEASE, U                     

 

 2016            MICH COLLIER DO            Ot            R19.5       
     OTHER FECAL ABNORMALITIES                     

 

 2016            MICH COLLIER DO            Ot            Z01.818     
       ENCOUNTER FOR OTHER PREPROCEDURAL EXAMIN                     

 

 2016            MICH COLLIER DO            Ot            R19.5       
     OTHER FECAL ABNORMALITIES                     

 

 2016            MICH COLLIER DO            Ot            Z01.818     
       ENCOUNTER FOR OTHER PREPROCEDURAL EXAMIN                     

 

 2017                         Ot            733.00            
OSTEOPOROSIS NOS                     

 

 2017                         Ot            733.00            
OSTEOPOROSIS NOS                     

 

 2017            EDUAR JOHNSON DO            Ot            733.00  
          OSTEOPOROSIS NOS                     

 

 2017            DANA DILLON DO            Ot            492.8       
     EMPHYSEMA NEC                     

 

 2017            DANA DILLON DO            Ot            511.9       
     PLEURAL EFFUSION NOS                     

 

 2017            DANA DILLON DO            Ot            586         
   RENAL FAILURE NOS                     

 

 2017            LETTY DO ARLENE MCKEON            Ot            726.0    
        ADHESIVE CAPSULIT SHLDER                     

 

 2017            LETTY DO ARLENE MCKEON            Ot            718.91   
         JT DERANGMENT NOS-SHLDER                     

 

 2017            LETTY DO ARLENE MCKEON            Ot            726.0    
        ADHESIVE CAPSULIT SHLDER                     

 

 2017            LETTY DAVIS ARLENE MCKEON            Ot            840.4    
        SPRAIN ROTATOR CUFF                     

 

 2017            LETTY DAVIS ARLENE MCKEON            Ot            E000.8   
         OTHER EXTERNAL CAUSE STATUS                     

 

 2017            LETTY DAVIS ARLENE MCKEON            Ot            E928.9   
         ACCIDENT NOS                     

 

 2017            LETTY DO, ARLENE MCKEON            Ot            V72.84   
         EXAM PRE-OPERATIVE NOS                     

 

 2017            LETTY DAVIS ARLENE MCKEON            Ot            727.61   
         ROTATOR CUFF RUPTURE                     

 

 2017            LETTY DAVIS ARLENE MCKEON            Ot            V72.84   
         EXAM PRE-OPERATIVE NOS                     

 

 2017            MATA MATIAS, NOHELIA CLARK            Ot            414.00      
      CORON ATHEROSCLER NOS TYPE VESSEL, NATIV                     

 

 2017            NOHELIA AUGUST MD            Ot            424.0       
     MITRAL VALVE DISORDER                     

 

 2017            NOHELIA AUGUST MD            Ot            433.10      
      CAROTID ARTERY OCCLUSION W O CEREBRAL IN                     

 

 2017            NOHELIA AUGUST MD            Ot            786.09      
      RESPIRATORY ABNORM NEC                     

 

 2017            NOHELIA AUGUST MD            Ot            414.01      
      CORONARY ATHEROSCLEROSIS OF NATIVE CORON                     

 

 2017            NOHELIA AUGUST MD            Ot            786.09      
      RESPIRATORY ABNORM NEC                     

 

 2017            LETTY DAVIS ARLENE MCKEON            Ot            840.4    
        SPRAIN ROTATOR CUFF                     

 

 2017            LETTY DAVIS ARLENE MCKEON            Ot            E000.8   
         OTHER EXTERNAL CAUSE STATUS                     

 

 2017            LETTY DAVIS ARLENE MCKEON            Ot            E928.9   
         ACCIDENT NOS                     

 

 2017            ARLENE SAENZ DO            Ot            V72.84   
         EXAM PRE-OPERATIVE NOS                     

 

 2017                         Ot            733.00            
OSTEOPOROSIS NOS                     

 

 2017            ARLENE SAENZ DO            Ot            722.4    
        CERVICAL DISC DEGEN                     

 

 2017                         Ot            J44.9            CHRONIC 
OBSTRUCTIVE PULMONARY DISEASE, U                     

 

 2017            JOHNSONLIZ DAVIS EDUAR EDUARDO            Ot            K59.00  
          CONSTIPATION, UNSPECIFIED                     

 

 2017            JOHNSONEDUAR HILL DO            Ot            J44.9   
         CHRONIC OBSTRUCTIVE PULMONARY DISEASE, U                     

 

 2017            COLLIERMICH GALEANO DO            Ot            K59.00      
      CONSTIPATION, UNSPECIFIED                     

 

 2017            COLLIER MICH DAVIS            Ot            R19.5       
     OTHER FECAL ABNORMALITIES                     

 

 2017            COLLIERMICH GALEANO DO            Ot            Z86.010     
       PERSONAL HISTORY OF COLONIC POLYPS                     

 

 2017            COLLIERMICH GALEANO DO            Ot            K59.00      
      CONSTIPATION, UNSPECIFIED                     

 

 2017            COLLIER MICH DAVIS            Ot            R19.5       
     OTHER FECAL ABNORMALITIES                     

 

 2017            MICH COLLIER DO            Ot            Z86.010     
       PERSONAL HISTORY OF COLONIC POLYPS                     

 

 2017            TAE CHRISTIANSEN            Ot            
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                     

 

 2017            TAE CHRISTIANSEN            Ot            
G47.33            OBSTRUCTIVE SLEEP APNEA (ADULT) (PEDIATR                     

 

 2017            TAE CHRISTIANSEN            Ot            
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                      

 

 2017            TAE CHRISTIANSEN            Ot            
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                     

 

 2017            TAE CHRISTIANSEN            Ot            
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                     

 

 2017            TAE CHRISTIANSEN            Ot            
G47.33            OBSTRUCTIVE SLEEP APNEA (ADULT) (PEDIATR                     

 

 2017            TAE CHRISTIANSEN            Ot            
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                      

 

 2017            TAE CHRISTIANSEN            Ot            
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                     

 

 2017            MATA MATIAS, NOHELIA CLARK            Ot            E78.5       
     HYPERLIPIDEMIA, UNSPECIFIED                     

 

 2017            NOHELIA AUGUST MD            Ot            G47.33      
      OBSTRUCTIVE SLEEP APNEA (ADULT) (PEDIATR                     

 

 2017            NOHELIA AUGUST MD            Ot            I10         
   ESSENTIAL (PRIMARY) HYPERTENSION                     

 

 2017            NOHELIA AUGUST MD, Ot            I25.10      
      ATHSCL HEART DISEASE OF NATIVE CORONARY                      

 

 2017            NOHELIA AUGUST MD, Ot            I25.84      
      CORONARY ATHEROSCLEROSIS DUE TO CALCIFIE                     

 

 2017            NOHELIA AUGUST MD            Ot            I34.0       
     NONRHEUMATIC MITRAL (VALVE) INSUFFICIENC                     

 

 2017            NOHELIA AUGUST MD            Ot            I65.23      
      OCCLUSION AND STENOSIS OF BILATERAL MCKEON                     

 

 2017            NOHELIA AUGUST MD            Ot            R94.39      
      ABNORMAL RESULT OF OTHER CARDIOVASCULAR                      

 

 2017            NOHELIA AUGUST MD, Ot            Z72.0       
     TOBACCO USE                     

 

 2017            NOHELIA AUGUST MD, Ot            Z79.899     
       OTHER LONG TERM (CURRENT) DRUG THERAPY                     

 

 2017            NOHELIA AUGUST MD, Ot            Z86.73      
      PRSNL HX OF TIA (TIA), AND CEREB INFRC W                     

 

 2017            NOHELIA AUGUST MD            Ot            Z95.5       
     PRESENCE OF CORONARY ANGIOPLASTY IMPLANT                     

 

 2017            TAE CHRISTIANSEN            Ot            
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                     

 

 2017            TAE CHRISTIANSEN            Ot            
G47.33            OBSTRUCTIVE SLEEP APNEA (ADULT) (PEDIATR                     

 

 2017            TAE CHRISTIANSEN            Ot            
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                      

 

 2017            TAE CHRISTIANSEN            Ot            
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                     

 

 2017            TAE CHRISTIANSEN            Ot            
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                     

 

 2017            TAE CHRISTIANSEN            Ot            
G47.33            OBSTRUCTIVE SLEEP APNEA (ADULT) (PEDIATR                     

 

 2017            TAE CHRISTIANSEN            Ot            
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                      

 

 2017            TAE CHRISTIANSEN            Ot            
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                     

 

 2017            NOHELIA AUGUST MD            Ot            E78.5       
     HYPERLIPIDEMIA, UNSPECIFIED                     

 

 2017            NOHELIA AUGUST MD, Ot            G47.33      
      OBSTRUCTIVE SLEEP APNEA (ADULT) (PEDIATR                     

 

 2017            NOHELIA AUGUST MD            Ot            I10         
   ESSENTIAL (PRIMARY) HYPERTENSION                     

 

 2017            NOHELIA AUGUST MD, Ot            I25.10      
      ATHSCL HEART DISEASE OF NATIVE CORONARY                      

 

 2017            NOHELIA AUGUST MD, Ot            I25.84      
      CORONARY ATHEROSCLEROSIS DUE TO CALCIFIE                     

 

 2017            NOHELIA AUGUST MD            Ot            I34.0       
     NONRHEUMATIC MITRAL (VALVE) INSUFFICIENC                     

 

 2017            NOHELIA AUGUST MD, Ot            I65.23      
      OCCLUSION AND STENOSIS OF BILATERAL MCKEON                     

 

 2017            NOHELIA AUGUST MD            Ot            R94.39      
      ABNORMAL RESULT OF OTHER CARDIOVASCULAR                      

 

 2017            NOHELIA AUGUST MD, Ot            Z72.0       
     TOBACCO USE                     

 

 2017            NOHELIA AUGUST MD, Ot            Z79.899     
       OTHER LONG TERM (CURRENT) DRUG THERAPY                     

 

 2017            NOHELIA AUGUST MD, Ot            Z86.73      
      PRSNL HX OF TIA (TIA), AND CEREB INFRC W                     

 

 2017            NOHELIA AUGUST MD            Ot            Z95.5       
     PRESENCE OF CORONARY ANGIOPLASTY IMPLANT                     

 

 2017            DANA DILLON DO            Ot            R06.02      
      SHORTNESS OF BREATH                     

 

 2017            DANA DILLON DO            Ot            Z87.891     
       PERSONAL HISTORY OF NICOTINE DEPENDENCE                     

 

 2017            DANA DILLON DO            Ot            R06.02      
      SHORTNESS OF BREATH                     

 

 2017            DANA DILLON DO            Ot            Z87.891     
       PERSONAL HISTORY OF NICOTINE DEPENDENCE                     

 

 2017            DANA DILLON DO            Ot            R06.02      
      SHORTNESS OF BREATH                     

 

 2017            DANA DILLON DO            Ot            Z87.891     
       PERSONAL HISTORY OF NICOTINE DEPENDENCE                     

 

 2017            DANA DILLON DO            Ot            R06.02      
      SHORTNESS OF BREATH                     

 

 2017            DANA DILLON DO            Ot            Z87.891     
       PERSONAL HISTORY OF NICOTINE DEPENDENCE                     

 

 2017            DANA DILLON DO            Ot            R06.02      
      SHORTNESS OF BREATH                     

 

 2017            DANA DILLON DO            Ot            Z87.891     
       PERSONAL HISTORY OF NICOTINE DEPENDENCE                     

 

 2017            DANA DILLON DO            Ot            R06.02      
      SHORTNESS OF BREATH                     

 

 2017            DANA DILLON DO            Ot            Z87.891     
       PERSONAL HISTORY OF NICOTINE DEPENDENCE                     

 

 2017            DANA DILLON DO            Ot            R06.02      
      SHORTNESS OF BREATH                     

 

 2017            DANA DILLON DO            Ot            Z87.891     
       PERSONAL HISTORY OF NICOTINE DEPENDENCE                     

 

 2017            DANA DILLON DO            Ot            R06.02      
      SHORTNESS OF BREATH                     

 

 2017            DANA DILLON DO            Ot            Z87.891     
       PERSONAL HISTORY OF NICOTINE DEPENDENCE                     

 

 2018                         Ot            733.00            
OSTEOPOROSIS NOS                     

 

 2018            JOHNSONEDUAR HILL DO            Ot            733.00  
          OSTEOPOROSIS NOS                     

 

 2018            DANA DILLON DO            Ot            492.8       
     EMPHYSEMA NEC                     

 

 2018            DANA DILLON DO            Ot            511.9       
     PLEURAL EFFUSION NOS                     

 

 2018            WILMADANA BELTRÁN DO            Ot            586         
   RENAL FAILURE NOS                     

 

 2018            LETTY DO ARLENE MCKEON            Ot            726.0    
        ADHESIVE CAPSULIT SHLDER                     

 

 2018            LETTY DO ARLENE MCKEON            Ot            718.91   
         JT DERANGMENT NOS-SHLDER                     

 

 2018            LETTY DO ARLENE MCKEON            Ot            726.0    
        ADHESIVE CAPSULIT SHLDER                     

 

 2018            LETTY DO ARLENE MCKEON            Ot            840.4    
        SPRAIN ROTATOR CUFF                     

 

 2018            LETTY DO ARLENE MCKEON            Ot            E000.8   
         OTHER EXTERNAL CAUSE STATUS                     

 

 2018            LETTY DO ARLENE MCKEON            Ot            E928.9   
         ACCIDENT NOS                     

 

 2018            LETTY DAVIS ARLENE MCKEON            Ot            V72.84   
         EXAM PRE-OPERATIVE NOS                     

 

 2018            LETTY DO ARLENE MCKEON            Ot            727.61   
         ROTATOR CUFF RUPTURE                     

 

 2018            LETTY DAVIS ARLENE MCKEON            Ot            V72.84   
         EXAM PRE-OPERATIVE NOS                     

 

 2018            MATA MATIAS, NOHELIA CLARK            Ot            414.00      
      CORON ATHEROSCLER NOS TYPE VESSEL, NATIV                     

 

 2018            NOHELIA AUGUST MD            Ot            424.0       
     MITRAL VALVE DISORDER                     

 

 2018            NOHELIA AUGUST MD            Ot            433.10      
      CAROTID ARTERY OCCLUSION W O CEREBRAL IN                     

 

 2018            NOHELIA AUGUST MD            Ot            786.09      
      RESPIRATORY ABNORM NEC                     

 

 2018            NOHELIA AUGUST MD            Ot            414.01      
      CORONARY ATHEROSCLEROSIS OF NATIVE CORON                     

 

 2018            NOHELIA AUGUST MD            Ot            786.09      
      RESPIRATORY ABNORM NEC                     

 

 2018            ARLENE SAENZ DO            Ot            840.4    
        SPRAIN ROTATOR CUFF                     

 

 2018            ARLENE SAENZ DO            Ot            E000.8   
         OTHER EXTERNAL CAUSE STATUS                     

 

 2018            ARLENE SAENZ DO            Ot            E928.9   
         ACCIDENT NOS                     

 

 2018            ARLENE SAENZ DO            Ot            V72.84   
         EXAM PRE-OPERATIVE NOS                     

 

 2018                         Ot            733.00            
OSTEOPOROSIS NOS                     

 

 2018            ARLENE SAENZ DO            Ot            722.4    
        CERVICAL DISC DEGEN                     

 

 2018                         Ot            J44.9            CHRONIC 
OBSTRUCTIVE PULMONARY DISEASE, U                     

 

 2018            EDUAR JOHNSON DO            Ot            K59.00  
          CONSTIPATION, UNSPECIFIED                     

 

 2018            EDUAR JOHNSON DO, Ot            J44.9   
         CHRONIC OBSTRUCTIVE PULMONARY DISEASE, U                     

 

 2018            TAE CHRISTIANSEN Ot            
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                     

 

 2018            TAE CHRISTIANSEN Ot            
G47.33            OBSTRUCTIVE SLEEP APNEA (ADULT) (PEDIATR                     

 

 2018            TAE CHRISTIANSEN Ot            
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                      

 

 2018            TAE CHRISTIANSEN Ot            
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                     

 

 2018            DANA DILLON DO, Ot            R06.02      
      SHORTNESS OF BREATH                     

 

 2018            DANA DILLON DO, Ot            Z87.891     
       PERSONAL HISTORY OF NICOTINE DEPENDENCE                     



                                                                               
                                                                               
                                                                               
                                                                               
                                                                               
                                                                               
                                                                               
                                                                               
                                                                               
                                                                               
                                                                               
                   



Procedures

      





 Code            Description            Performed By            Performed On   
     

 

             38.93                                  VENOUS CATHETERIZATION NEC 
                                  2014        

 

             96.04                                  INSERT ENDOTRACHEAL TUBE   
                                2014        

 

             96.71                                  CONTINUOUS INVASIVE 
MECHANICAL VENTILATI                                   2014        



                      



Results

      





 Test            Result            Range        









 Complete urinalysis with reflex to culture - 17 09:12         









 Urine color determination            YELLOW             NRG        

 

 Urine clarity determination            CLEAR             NRG        

 

 Urine pH measurement by test strip            5             5-9        

 

 Specific gravity of urine by test strip            1.025             1.016-
1.022        

 

 Urine protein assay by test strip, semi-quantitative            NEGATIVE      
       NEGATIVE        

 

 Urine glucose detection by automated test strip            NEGATIVE           
  NEGATIVE        

 

 Erythrocytes detection in urine sediment by light microscopy            
NEGATIVE             NEGATIVE        

 

 Urine ketones detection by automated test strip            NEGATIVE           
  NEGATIVE        

 

 Urine nitrite detection by test strip            NEGATIVE             NEGATIVE
        

 

 Urine total bilirubin detection by test strip            NEGATIVE             
NEGATIVE        

 

 Urine urobilinogen measurement by automated test strip (mass/volume)          
  NORMAL             NORMAL        

 

 Urine leukocyte esterase detection by dipstick            NEGATIVE             
NEGATIVE        

 

 Automated urine sediment erythrocyte count by microscopy (number/high power 
field)            NONE             NRG        

 

 Automated urine sediment leukocyte count by microscopy (number/high power field
)            NONE             NRG        

 

 Bacteria detection in urine sediment by light microscopy            NEGATIVE  
           NRG        

 

 Squamous epithelial cells detection in urine sediment by light microscopy     
       2-5             NRG        

 

 Crystals detection in urine sediment by light microscopy            NONE      
       NRG        

 

 Casts detection in urine sediment by light microscopy            NONE         
    NRG        

 

 Mucus detection in urine sediment by light microscopy            NEGATIVE     
        NRG        

 

 Complete urinalysis with reflex to culture            NO             NRG      
  









 Automated blood complete blood count (hemogram) panel - 17 09:25         









 Blood leukocytes automated count (number/volume)            6.8 10*3/uL       
     4.3-11.0        

 

 Blood erythrocytes automated count (number/volume)            4.06 10*6/uL    
        4.35-5.85        

 

 Venous blood hemoglobin measurement (mass/volume)            13.7 g/dL        
    13.3-17.7        

 

 Blood hematocrit (volume fraction)            40 %            40-54        

 

 Automated erythrocyte mean corpuscular volume            99 [foz_us]          
  80-99        

 

 Automated erythrocyte mean corpuscular hemoglobin (mass per erythrocyte)      
      34 pg            25-34        

 

 Automated erythrocyte mean corpuscular hemoglobin concentration measurement (
mass/volume)            34 g/dL            32-36        

 

 Automated erythrocyte distribution width ratio            14.9 %            
10.0-14.5        

 

 Automated blood platelet count (count/volume)            230 10*3/uL          
  130-400        

 

 Automated blood platelet mean volume measurement            8.9 [foz_us]      
      7.4-10.4        









 PT panel in platelet poor plasma by coagulation assay - 17 09:25         









 Prothrombin time (PT) in platelet poor plasma by coagulation assay            
14.1 s            12.2-14.7        

 

 INR in platelet poor plasma or blood by coagulation assay            1.1      
       0.8-1.4        









 Activated partial thromboplastin time (aPTT) in platelet poor plasma 
bycoagulation assay - 17 09:25         









 Activated partial thromboplastin time (aPTT) in platelet poor plasma 
bycoagulation assay            29 s            24-35        









 Comprehensive metabolic panel - 17 09:25         









 Serum or plasma sodium measurement (moles/volume)            141 mmol/L       
     135-145        

 

 Serum or plasma potassium measurement (moles/volume)            4.1 mmol/L    
        3.6-5.0        

 

 Serum or plasma chloride measurement (moles/volume)            109 mmol/L     
               

 

 Carbon dioxide            21 mmol/L            21-32        

 

 Serum or plasma anion gap determination (moles/volume)            11 mmol/L   
         5-14        

 

 Serum or plasma urea nitrogen measurement (mass/volume)            18 mg/dL   
         7-18        

 

 Serum or plasma creatinine measurement (mass/volume)            1.32 mg/dL    
        0.60-1.30        

 

 Serum or plasma urea nitrogen/creatinine mass ratio            14             
NRG        

 

 Serum or plasma creatinine measurement with calculation of estimated 
glomerular filtration rate            54             NRG        

 

 Serum or plasma glucose measurement (mass/volume)            100 mg/dL        
            

 

 Serum or plasma calcium measurement (mass/volume)            9.6 mg/dL        
    8.5-10.1        

 

 Serum or plasma total bilirubin measurement (mass/volume)            0.4 mg/dL
            0.1-1.0        

 

 Serum or plasma alkaline phosphatase measurement (enzymatic activity/volume)  
          75 U/L                    

 

 Serum or plasma aspartate aminotransferase measurement (enzymatic activity/
volume)            17 U/L            5-34        

 

 Serum or plasma alanine aminotransferase measurement (enzymatic activity/volume
)            22 U/L            0-55        

 

 Serum or plasma protein measurement (mass/volume)            6.8 g/dL         
   6.4-8.2        

 

 Serum or plasma albumin measurement (mass/volume)            4.1 g/dL         
   3.2-4.5        









 Lipid 1996 panel - 17 09:25         









 Serum or plasma triglyceride measurement (mass/volume)            88 mg/dL    
        <150        

 

 Serum or plasma cholesterol measurement (mass/volume)            150 mg/dL    
        < 200        

 

 Serum or plasma cholesterol in HDL measurement (mass/volume)            56 mg/
dL            40-60        

 

 Cholesterol in LDL [mass/volume] in serum or plasma by direct assay            
78 mg/dL            1-129        

 

 Serum or plasma cholesterol in VLDL measurement (mass/volume)            18 mg/
dL            5-40        









 Methicillin resistant Staphylococcus aureus (MRSA) screening culture -  09:25         









 MRSA SCREEN RESULT            MRSA ISOLATED             NRG        



                            



Encounters

      





 ACCT No.            Visit Date/Time            Discharge            Status    
        Pt. Type            Provider            Facility            Loc./Unit  
          Complaint        

 

 [id number]            2018 10:39:00            2018 23:59:59    
        CLS            Outpatient            NOHELIA AUGUST MD            [facility]            RAD            CAD,CONFUSION        

 

 [id number]            2017 15:06:00            2017 23:59:59    
        CLS            Outpatient            DANA DILLON DO            [facility]            RT            DYSPNEA R06.02        

 

 [id number]            2017 16:45:00            2017 16:45:00    
        CAN            Preadmit            DANA DILLON DO            [facility]            RT            DYSPNEA,HX OF TOBACCO USE,
MARYANA ON CPAP        

 

 [id number]            2017 08:48:00            2017 15:30:00    
        DIS            Outpatient            NOHELIA AUGUST MD            [facility]            CATH            ANM STRESS TEST,CAD    
    

 

 [id number]            2017 08:13:00            2017 23:59:59    
        CLS            Outpatient            TAE CHRISTIANSEN    
        [facility]            CARD            CAD,MARYANA ON 
CPAP,TIA        

 

 [id number]            2017 08:51:00            2017 15:30:00    
        DIS            Outpatient            MICH COLLIER DO            [facility]            SDC            OCCULT STOOLS        

 

 [id number]            2016 05:47:00            2016 13:50:00    
        DIS            Outpatient            MICH COLLIER DO            [facility]            PREOP            OCCULT STOOLS        

 

 [id number]            2016 10:15:00            2016 23:59:59    
        CLS            Preadmit            EDUAR JOHNSON DO            
[facility]            PULM            COPD        

 

 [id number]            2016 10:15:00            2016 00:01:00    
        DIS            Outpatient            EDUAR JOHNSON DO            
[facility]            PULM            COPD        

 

 [id number]            2016 14:31:00            2016 23:59:59    
        CLS            Outpatient            EDUAR JOHNSON DO            
[facility]            RAD            ABD PAIN        

 

 [id number]            2015 14:00:00            2016 13:50:00    
        DIS            Inpatient            EDUAR JOHNSON DO            
[facility]            4TH            PNEUMONIA        

 

 [id number]            2015 09:57:00            2015 10:38:00    
        DIS            Outpatient            BELKIS WEST MD            [facility]            REHAB            CERVICAL SPONDYLOSIS  
      

 

 [id number]            06/15/2015 14:36:00            06/15/2015 23:59:59    
        CLS            Outpatient            ARLENE SAENZ DO            
[facility]            RAD            DDD        

 

 [id number]            2015 09:53:00            2015 11:51:00    
        DIS            Outpatient            ARLENE SAENZ DO            
[facility]            REHAB            S/P R SHLD SCOPE 
WITH SAD AND DEBRIDEMENT        

 

 [id number]            2015 12:27:00            2015 23:59:59    
        CLS            Outpatient            ARLENE SAENZ DO            
[facility]            RIGHT SHOULDER TORN 
ROTATOR CUFF        

 

 [id number]            2015 06:15:00            2015 23:59:59    
        CLS            Outpatient            LETTY ARLENE DAVIS            
[facility]            PREOP            RIGHT SHOULDER 
TORN ROTATOR CUFF        

 

 [id number]            2015 08:58:00            2015 11:36:00    
        DIS            Outpatient            LETTY ARLENE DAVIS            
[facility]            RIGHT SHOULDER 
ROTATOR CUFF TEAR        

 

 [id number]            2015 05:50:00            2015 23:59:59    
        CLS            Outpatient            LETTY ARLENE DAVIS            
[facility]            PREOP            RIGHT SHOULDER 
ROTATOR CUFF TEAR        

 

 [id number]            2015 12:14:00            2015 23:59:59    
        CLS            Outpatient            NOHELIA AUGUST MD            [facility]            CARD            CAD,MATT,DYSPNEA        

 

 [id number]            2015 09:05:00            2015 23:59:59    
        CLS            Outpatient            NOHELIA AUGUST MD            [facility]            CARD            CAD        

 

 [id number]            12/10/2014 05:50:00            12/10/2014 23:59:59    
        CLS            Outpatient            LETTY DAVIS ARLENE MCKEON            
[facility]            PREOP            RIGHT SHOULDER 
ROTATOR  CUFF TEAR        

 

 [id number]            2014 17:15:00            2014 10:00:00    
        DIS            Inpatient            EDUAR JOHNSON DO            
[facility]            4TH            TIA CONFUSION 
DIFFICULT WORD FINDING        

 

 [id number]            2014 13:45:00            2014 11:55:00    
        DIS            Outpatient            ARLENE SAENZ DO            
[facility]            REHAB            R SHOULDER 
ADHESIVE CAPSULITIS        

 

 [id number]            2014 07:52:00            2014 23:59:59    
        CLS            Outpatient            ARLENE SAENZ DO            
[facility]            RAD            RT SHOULDER 
ADHEISIVE CAPSULITIS        

 

 [id number]            11/10/2014 12:37:00            11/10/2014 23:59:59    
        CLS            Outpatient            ARLENE SAENZ DO            
[facility]            RAD            RT SHOULDER 
ADHEISIVE CAPSULITIS        

 

 [id number]            09/10/2014 10:59:00            09/10/2014 23:59:59    
        CLS            Outpatient            DANA DILLON DO            [facility]            RAD            DYSPNEA,PE,RENAL FAILURE
        

 

 [id number]            2014 13:57:00            2014 15:35:00    
        DIS            Inpatient            SHIRLENE MCDONALD MD            [facility]            IRF            WEAKNESS        

 

 [id number]            2014 10:19:00            2014 13:15:00    
        DIS            Inpatient            EDUAR JOHNSON DO            
[facility]            ICU            PROBABLE RL 
PNEUMONIA        

 

 [id number]            2014 12:50:00            2014 23:59:59    
        CLS            Outpatient            EDUAR JOHNSON DO            
[facility]            SDC            OSTEO        

 

 [id number]            2016 10:15:00                                   
   Document Registration                                                       
     

 

 [id number]            2015 13:24:00                                   
   Document Registration                                                       
     

 

 [id number]            2013 13:05:00                                   
   Document Registration                                                       
     

 

 [id number]            2012 12:32:00                                   
   Document Registration                                                       
     

 

 [id number]            03/15/2011 12:55:00                                   
   Document Registration                                                       
     

 

 [id number]            2015 09:57:51                         [place]            
Document Registration
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
## 2018-04-10 VITALS — SYSTOLIC BLOOD PRESSURE: 100 MMHG | DIASTOLIC BLOOD PRESSURE: 65 MMHG

## 2018-04-10 VITALS — SYSTOLIC BLOOD PRESSURE: 111 MMHG | DIASTOLIC BLOOD PRESSURE: 65 MMHG

## 2018-04-10 VITALS — SYSTOLIC BLOOD PRESSURE: 105 MMHG | DIASTOLIC BLOOD PRESSURE: 70 MMHG

## 2018-04-10 VITALS — SYSTOLIC BLOOD PRESSURE: 106 MMHG | DIASTOLIC BLOOD PRESSURE: 67 MMHG

## 2018-04-10 VITALS — DIASTOLIC BLOOD PRESSURE: 69 MMHG | SYSTOLIC BLOOD PRESSURE: 106 MMHG

## 2018-04-10 LAB
ALBUMIN SERPL-MCNC: 3.4 GM/DL (ref 3.2–4.5)
ALP SERPL-CCNC: 46 U/L (ref 40–136)
ALT SERPL-CCNC: 14 U/L (ref 0–55)
BILIRUB SERPL-MCNC: 0.5 MG/DL (ref 0.1–1)
BUN/CREAT SERPL: 30
CALCIUM SERPL-MCNC: 8.6 MG/DL (ref 8.5–10.1)
CHLORIDE SERPL-SCNC: 113 MMOL/L (ref 98–107)
CO2 SERPL-SCNC: 24 MMOL/L (ref 21–32)
CREAT SERPL-MCNC: 1.2 MG/DL (ref 0.6–1.3)
ERYTHROCYTE [DISTWIDTH] IN BLOOD BY AUTOMATED COUNT: 13.9 % (ref 10–14.5)
GFR SERPLBLD BASED ON 1.73 SQ M-ARVRAT: 60 ML/MIN
GLUCOSE SERPL-MCNC: 94 MG/DL (ref 70–105)
HCT VFR BLD CALC: 27 % (ref 40–54)
HCT VFR BLD CALC: 28 % (ref 40–54)
HGB BLD-MCNC: 8.9 G/DL (ref 13.3–17.7)
HGB BLD-MCNC: 9.3 G/DL (ref 13.3–17.7)
MCH RBC QN AUTO: 35 PG (ref 25–34)
MCHC RBC AUTO-ENTMCNC: 34 G/DL (ref 32–36)
MCV RBC AUTO: 105 FL (ref 80–99)
PLATELET # BLD: 200 10^3/UL (ref 130–400)
PMV BLD AUTO: 9.7 FL (ref 7.4–10.4)
POTASSIUM SERPL-SCNC: 4 MMOL/L (ref 3.6–5)
PROT SERPL-MCNC: 5.3 GM/DL (ref 6.4–8.2)
RBC # BLD AUTO: 2.66 10^6/UL (ref 4.35–5.85)
SODIUM SERPL-SCNC: 141 MMOL/L (ref 135–145)
WBC # BLD AUTO: 6.2 10^3/UL (ref 4.3–11)

## 2018-04-10 RX ADMIN — SODIUM CHLORIDE SCH MLS/HR: 900 INJECTION, SOLUTION INTRAVENOUS at 12:00

## 2018-04-10 RX ADMIN — SODIUM CHLORIDE SCH MLS/HR: 900 INJECTION, SOLUTION INTRAVENOUS at 01:40

## 2018-04-10 RX ADMIN — IPRATROPIUM BROMIDE AND ALBUTEROL SULFATE SCH ML: .5; 3 SOLUTION RESPIRATORY (INHALATION) at 09:59

## 2018-04-10 RX ADMIN — FLUTICASONE PROPIONATE AND SALMETEROL XINAFOATE SCH PUFF: 115; 21 AEROSOL, METERED RESPIRATORY (INHALATION) at 10:04

## 2018-04-10 RX ADMIN — FAMOTIDINE SCH MG: 20 TABLET, FILM COATED ORAL at 09:16

## 2018-04-10 RX ADMIN — IPRATROPIUM BROMIDE AND ALBUTEROL SULFATE SCH ML: .5; 3 SOLUTION RESPIRATORY (INHALATION) at 02:46

## 2018-04-10 RX ADMIN — GABAPENTIN SCH MG: 300 CAPSULE ORAL at 09:16

## 2018-04-10 RX ADMIN — IPRATROPIUM BROMIDE AND ALBUTEROL SULFATE SCH ML: .5; 3 SOLUTION RESPIRATORY (INHALATION) at 15:58
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
## 2018-04-10 NOTE — PROGRESS NOTE
Subjective


Date Seen by Provider:  Apr 10, 2018


Time Seen by Provider:  11:12


Subjective/Events-last exam


Patient feeling better today.  No abdominal pain or discomfort.  Hgb slight 

drop likely dilutional.


Tolerating clear liquids.  Denies n/v fever sweats chills shortness of breath 

or chest pain.





Objective


Exam





Vital Signs








  Date Time  Temp Pulse Resp B/P (MAP) Pulse Ox O2 Delivery O2 Flow Rate FiO2


 


4/10/18 12:00 98.5 71 20 105/70 (82) 95 Nasal Cannula 2.00 


 


4/10/18 09:59     83 Room Air  


 


4/10/18 08:15      NIV CPAP  


 


4/10/18 08:00 98.8 64 18 100/65 (77) 94 NIV CPAP  


 


4/10/18 04:00 98.1 61 18 106/69 (81) 98 NIV CPAP  


 


4/10/18 02:46     92 NIV CPAP  


 


4/10/18 00:00 97.7 66 18 106/67 (80) 96 NIV CPAP  


 


18 21:32     90 NIV CPAP  


 


18 21:32     90 Room Air  


 


18 21:00      NIV CPAP  


 


18 19:04 99.3 77 24 121/74 (90) 97 Nasal Cannula 2.00 


 


18 16:51      Nasal Cannula 2.00 


 


18 15:43     88 Room Air  


 


18 15:05  89   94   


 


18 14:58 99.4 88 24 121/74 (90) 95 Nasal Cannula 2.00 














I & O 


 


 4/10/18





 07:00


 


Intake Total 745 ml


 


Output Total 1150 ml


 


Balance -405 ml





Capillary Refill : Less Than 3 Seconds


General Appearance:  No Apparent Distress, WD/WN


HEENT:  PERRL/EOMI, TMs Normal, Normal ENT Inspection, Pharynx Normal


Neck:  Full Range of Motion, Normal Inspection, Non Tender


Respiratory:  Chest Non Tender, No Accessory Muscle Use, No Respiratory Distress


Cardiovascular:  Regular Rate, Rhythm


Gastrointestinal:  non tender, soft, no organomegaly


Extremity:  Normal Capillary Refill, Normal Inspection, Normal Range of Motion, 

Non Tender, No Calf Tenderness, No Pedal Edema


Neurologic/Psychiatric:  Alert, Oriented x3, No Motor/Sensory Deficits, Normal 

Mood/Affect


Skin:  Normal Color, Warm/Dry


Lymphatic:  No Adenopathy





Results


Lab


Laboratory Tests


18 15:25: 


White Blood Count 7.6, Red Blood Count 3.10L, Hemoglobin 11.0L, Hematocrit 32L, 

Mean Corpuscular Volume 104H, Mean Corpuscular Hemoglobin 36H, Mean Corpuscular 

Hemoglobin Concent 34, Red Cell Distribution Width 13.9, Platelet Count 223, 

Mean Platelet Volume 9.5, Prothrombin Time 15.2H, INR Comment 1.2, Activated 

Partial Thromboplast Time 29, Sodium Level 140, Potassium Level 4.1, Chloride 

Level 111H, Carbon Dioxide Level 18L, Anion Gap 11, Blood Urea Nitrogen 56H, 

Creatinine 1.48H, Estimat Glomerular Filtration Rate 47, BUN/Creatinine Ratio 38

, Glucose Level 94, Calcium Level 9.3, Total Bilirubin 0.5, Aspartate Amino 

Transf (AST/SGOT) 13, Alanine Aminotransferase (ALT/SGPT) 16, Alkaline 

Phosphatase 56, Troponin I < 0.30, B-Type Natriuretic Peptide 23.9, Total 

Protein 5.9L, Albumin 3.8, Thyroid Stimulating Hormone (TSH) 1.75


4/10/18 05:46: 


White Blood Count 6.2, Red Blood Count 2.66L, Hemoglobin 9.3L, Hematocrit 28L, 

Mean Corpuscular Volume 105H, Mean Corpuscular Hemoglobin 35H, Mean Corpuscular 

Hemoglobin Concent 34, Red Cell Distribution Width 13.9, Platelet Count 200, 

Mean Platelet Volume 9.7, Sodium Level 141, Potassium Level 4.0, Chloride Level 

113H, Carbon Dioxide Level 24, Anion Gap 4L, Blood Urea Nitrogen 36H, 

Creatinine 1.20, Estimat Glomerular Filtration Rate 60, BUN/Creatinine Ratio 30

, Glucose Level 94, Calcium Level 8.6, Total Bilirubin 0.5, Aspartate Amino 

Transf (AST/SGOT) 13, Alanine Aminotransferase (ALT/SGPT) 14, Alkaline 

Phosphatase 46, Total Protein 5.3L, Albumin 3.4


4/10/18 11:45: 


Hemoglobin 8.9L, Hematocrit 27L





Assessment/Plan


melena


abnormal cta chest


patient with thickening and node near GE junction.  Will need EGD to further 

investigate.


Patient on plavix and asa which is on hold at this time.  Will plan egd on 

friday.


Patient plavix asa on hold


repeat hgb if stable okay with dc home and egd on Friday.





Clinical Quality Measures


DVT/VTE Risk/Contraindication:


Risk Factor Score Per Nursin


RFS Level Per Nursing on Admit:  4+=Very High











MICH COLLIER DO Apr 10, 2018 13:14

## 2018-04-11 ENCOUNTER — HOSPITAL ENCOUNTER (OUTPATIENT)
Dept: HOSPITAL 75 - PREOP | Age: 73
End: 2018-04-11
Attending: SURGERY
Payer: MEDICARE

## 2018-04-11 VITALS — WEIGHT: 207 LBS | BODY MASS INDEX: 28.98 KG/M2 | HEIGHT: 71 IN

## 2018-04-11 DIAGNOSIS — Z01.818: Primary | ICD-10-CM

## 2018-04-11 DIAGNOSIS — R93.5: ICD-10-CM

## 2018-04-13 ENCOUNTER — HOSPITAL ENCOUNTER (OUTPATIENT)
Dept: HOSPITAL 75 - ENDO | Age: 73
Discharge: HOME | End: 2018-04-13
Attending: SURGERY
Payer: MEDICARE

## 2018-04-13 VITALS — DIASTOLIC BLOOD PRESSURE: 87 MMHG | SYSTOLIC BLOOD PRESSURE: 133 MMHG

## 2018-04-13 VITALS — BODY MASS INDEX: 28.98 KG/M2 | HEIGHT: 71 IN | WEIGHT: 207 LBS

## 2018-04-13 VITALS — SYSTOLIC BLOOD PRESSURE: 115 MMHG | DIASTOLIC BLOOD PRESSURE: 71 MMHG

## 2018-04-13 VITALS — SYSTOLIC BLOOD PRESSURE: 123 MMHG | DIASTOLIC BLOOD PRESSURE: 75 MMHG

## 2018-04-13 DIAGNOSIS — Z79.899: ICD-10-CM

## 2018-04-13 DIAGNOSIS — F17.210: ICD-10-CM

## 2018-04-13 DIAGNOSIS — N18.6: ICD-10-CM

## 2018-04-13 DIAGNOSIS — K44.9: ICD-10-CM

## 2018-04-13 DIAGNOSIS — I25.10: ICD-10-CM

## 2018-04-13 DIAGNOSIS — E03.9: ICD-10-CM

## 2018-04-13 DIAGNOSIS — K21.9: Primary | ICD-10-CM

## 2018-04-13 DIAGNOSIS — I12.0: ICD-10-CM

## 2018-04-13 DIAGNOSIS — Z95.5: ICD-10-CM

## 2018-04-13 DIAGNOSIS — Z99.2: ICD-10-CM

## 2018-04-13 DIAGNOSIS — J43.9: ICD-10-CM

## 2018-04-13 DIAGNOSIS — E11.22: ICD-10-CM

## 2018-04-13 DIAGNOSIS — F32.9: ICD-10-CM

## 2018-04-13 DIAGNOSIS — Z86.73: ICD-10-CM

## 2018-04-13 DIAGNOSIS — G47.33: ICD-10-CM

## 2018-04-13 PROCEDURE — 88312 SPECIAL STAINS GROUP 1: CPT

## 2018-04-13 PROCEDURE — 88305 TISSUE EXAM BY PATHOLOGIST: CPT

## 2018-04-13 NOTE — OPERATIVE REPORT
DATE OF SERVICE:  04/13/2018



PREOPERATIVE DIAGNOSIS:

Abnormal finding on CT scan of the GE junction, occult positive stool.



POSTOPERATIVE DIAGNOSES:

Hiatal hernia and esophageal thickening and erythema.



PROCEDURE:

EGD with biopsies.



SURGEON:

Mich Ramachandran DO



ANESTHESIA:

Per CRNA.



ESTIMATED BLOOD LOSS:

Scant.



INDICATIONS:

The patient is a 72-year-old male, who had an abnormal CT scan demonstrating

thickening near the GE junction and also lymph node present.  He is also found

to have occult positive stool.  He understands risks and benefits of procedure

and wished to proceed with procedure.  Consent was on chart.



DESCRIPTION OF PROCEDURE:

The patient was taken to the endoscopy suite, placed in left lateral recumbent

position.  Timeout was performed.  Scope inserted into the mouth, down the

esophagus, stomach and into the duodenum without difficulty.  There were no

polyps mass or ulcerations within the duodenum.  Scope was then slowly retracted

back into the stomach was further insufflated.  A biopsy of the antrum was

obtained.  The body had some mucosal changes, but no polyps, masses or

ulcerations or erythema.  Biopsy of this area was obtained.  Scope was

retroflexed noting a hiatal hernia, no other pathology.  Scope was returned to

its normal position, slowly withdrawn into the distal esophagus.  In the distal

esophagus there was an area of thickening and erythema noted.  Multiple biopsies

of this area was obtained.  It felt slightly hardened by biopsy could be either

mass versus chronic inflammation.  Scope was then slowly retracted back until

completely removed, noting no other pathology.  The patient tolerated procedure

well without any complications.



RECOMMENDATIONS:

The patient will start will be started on Carafate 1 gram four times a day.  We

will follow up in approximately 2 weeks to discuss pathology results.  Further

recommendations pending.





Job ID: [id number]

DocumentID: [id number]

Dictated Date:  04/13/2018 15:11:46

Transcription Date: 04/13/2018 21:39:02

Dictated By: MICH RAMACHANDRAN DO

## 2018-04-13 NOTE — PROGRESS NOTE-PRE OPERATIVE
Pre-Operative Progress Note


H&P Reviewed


The H&P was reviewed, patient examined and no changes noted.


Date Seen by Provider:  Apr 13, 2018


Time Seen by Provider:  13:29


Date H&P Reviewed:  Apr 13, 2018


Time H&P Reviewed:  13:29


Pre-Operative Diagnosis:  abnormal ct scan GE junction, occult + stool











MICH COLLIER DO Apr 13, 2018 13:30

## 2018-04-13 NOTE — ANESTHESIA-GENERAL POST-OP
MAC


Patient Condition


Mental Status/LOC:  Same as Preop


Cardiovascular:  Satisfactory


Nausea/Vomiting:  Absent


Respiratory:  Satisfactory


Pain:  Controlled


Complications:  Absent





Post Op Complications


Complications


None





Follow Up Care/Instructions


Patient Instructions


None needed.





Anesthesiology Discharge Order


Discharge Order


Patient is doing well, no complaints, stable vital signs, no apparent adverse 

anesthesia problems.   


No complications reported per nursing.











LEWIS CAMPOS CRNA Apr 13, 2018 14:40

## 2018-04-13 NOTE — DISCHARGE INST-SIMPLE/STANDARD
Discharge Inst-Standard


Discharge Medications


New, Converted or Re-Newed RX:  RX on Chart





Patient Instructions/Follow Up


Plan of Care/Instructions/FU:  


2 weeks Madie





Restart palvix in 2 days


Activity as Tolerated:  Yes


Discharge Diet:  Regular Diet











MICH COLLIER DO Apr 13, 2018 15:07

## 2018-04-13 NOTE — PROGRESS NOTE-POST OPERATIVE
Post-Operative Progess Note


Surgeon (s)/Assistant (s)


Surgeon


MICH COLLIER DO


Assistant:  na





Pre-Operative Diagnosis


abnormal ct scan GE junction, occult + stool





Post-Operative Diagnosis





esophageal thickening and erythema, hiatal hernia





Procedure & Operative Findings


Date of Procedure


4/13/18


Procedure Performed/Findings


egd c biopsies


Anesthesia Type


per crna





Estimated Blood Loss


Estimated blood loss (mL):  scant





Specimens/Packing


Specimens Removed


antrum, body, distal esophagus











MICH COLLIER DO Apr 13, 2018 15:04

## 2018-04-15 NOTE — XMS REPORT
Continuity of Care Document

 Created on: 04/15/2018



EDUAR OSORIO

External Reference #: [id number]

: 1945

Sex: Male



Demographics







 Address  [address] [place]  [address]

 

 Home Phone  [phone]

 

 Preferred Language  Unknown

 

 Marital Status  Unknown

 

 Jehovah's witness Affiliation  Unknown

 

 Race  Unknown

 

 Ethnic Group  Unknown





Author







 Author  [facility]

 

 Organization  [facility]

 

 Address  Unknown

 

 Phone  Unavailable



              



Allergies

      





 Active            Description            Code            Type            
Severity            Reaction            Onset            Reported/Identified   
         Relationship to Patient            Clinical Status        

 

 Yes            No Known Drug Allergies            [id number]            Drug 
Allergy            Unknown            N/A                         2018   
                               



                  



Medications

      



There is no data.                  



Problems

      





 Date Dx Coded            Attending            Type            Code            
Diagnosis            Diagnosed By        

 

 2014            EDUAR JOHSNON DO            Ot            038.9   
         SEPTICEMIA NOS                     

 

 2014            EDUAR JOHNSON DO            Ot            250.00  
          DIAB NIEVES WO COMPL, TYPE II OR UNSPEC TY                     

 

 2014            EDUAR JOHNSON DO            Ot            272.4   
         HYPERLIPIDEMIA NEC/NOS                     

 

 2014            EDUAR JOHNSON DO            Ot            276.52  
          HYPOVOLEMIA                     

 

 2014            EDUAR JOHNSON DO            Ot            305.1   
         TOBACCO USE DISORDER                     

 

 2014            EDUAR JOHNSON DO            Ot            414.01  
          CORONARY ATHEROSCLEROSIS OF NATIVE CORON                     

 

 2014            EDURA JOHNSON DO            Ot            486     
       PNEUMONIA, ORGANISM NOS                     

 

 2014            EDUAR JOHNSON DO            Ot            510.9   
         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                     

 

 2014            EDUAR JOHNSON DO            Ot            511.9   
         PLEURAL EFFUSION NOS                     

 

 2014            EDUAR JOHNSON DO            Ot            518.81  
          ACUTE RESPIRATORY FAILURE                     

 

 2014            EDUAR JOHNSON DO            Ot            584.9   
         ACUTE RENAL FAILURE, UNSPECIFIED                     

 

 2014            EDUAR JOHNSON DO            Ot            785.50  
          SHOCK NOS                     

 

 2014            EDUAR JOHNSON DO, Ot            785.52  
          SEPTIC SHOCK                     

 

 2014            EDUAR JOHNSON DO            Ot            995.92  
          SEVERE SEPSIS                     

 

 2014            EDUAR JOHNSON DO, Ot            V45.82  
          PERCUTANEOUS TRANSLUM CORON ANGIOPLASTY                      

 

 2014            HARRY MATIAS, SHIRLENE DAVID            Ot            266.2        
    B-COMPLEX DEFIC NEC                     

 

 2014            SHIRLENE MCDONALD MD            Ot            268.9        
    VITAMIN D DEFICIENCY NOS                     

 

 2014            SHIRLENE MCDONALD MD            Ot            272.4        
    HYPERLIPIDEMIA NEC/NOS                     

 

 2014            SHIRLENE MCDONALD MD E            Ot            274.9        
    GOUT NOS                     

 

 2014            SHIRLENE MCDONALD MD            Ot            305.1        
    TOBACCO USE DISORDER                     

 

 2014            SHIRLENE MCDONALD MD            Ot            401.9        
    HYPERTENSION NOS                     

 

 2014            HARRY MATIAS, SHIRLENE E            Ot            564.00       
     UNSPEC CONSTIPATION                     

 

 2014            SHIRLENE MCDONALD MD E            Ot            722.11       
     THORACIC DISC DISPLACMNT                     

 

 2014            SHIRLENE MCDONALD MD            Ot            733.00       
     OSTEOPOROSIS NOS                     

 

 2014            SHIRLENE MCDONALD MD E            Ot            780.79       
     OTH MALAISE FATIGUE                     

 

 2014            SHIRLENE MCDONALD MD E            Ot            790.29       
     OTHER ABNORMAL GLUCOSE                     

 

 2014            SHIRLENE MCDONALD MD            Ot            V57.89       
     REHABILITATION PROC NEC                     

 

 2014            LETTY DO ARLENE F            Ot            726.0    
                              

 

 2014            LETTY DO, ARLENE F            Ot            718.91   
                               

 

 2014            LETTY DO ARLENE F            Ot            726.0    
                              

 

 2014            LETTY DAVIS ARLENE F            Ot            726.0    
        ADHESIVE CAPSULIT SHLDER                     

 

 2014            LETTY DAVIS ARLENE F            Ot            V57.1    
        PHYSICAL THERAPY NEC                     

 

 2014            LETTY DAVIS ARLENE F            Ot            718.91   
                               

 

 2014            LETTY DAVIS ARLENE F            Ot            726.0    
                              

 

 2014            EDUAR JOHNSON DO            Ot            250.00  
          DIAB NIEVES WO COMPL, TYPE II OR UNSPEC TY                     

 

 2014            EDUAR JOHNSON DO            Ot            266.2   
         B-COMPLEX DEFIC NEC                     

 

 2014            EDUAR JOHNSON DO            Ot            268.9   
         VITAMIN D DEFICIENCY NOS                     

 

 2014            EDUAR JOHNSON DO            Ot            272.4   
         HYPERLIPIDEMIA NEC/NOS                     

 

 2014            EDUAR JOHNSON DO            Ot            403.90  
          HYPTNSV CHR KID DIS, UNSPEC, W CHR KD ST                     

 

 2014            EDUAR JOHNSON DO            Ot            414.01  
          CORONARY ATHEROSCLEROSIS OF NATIVE CORON                     

 

 2014            EDUAR JOHNSON DO            Ot            435.9   
         TRANS CEREB ISCHEMIA NOS                     

 

 2014            EDUAR JOHNSON DO            Ot            496     
       CHR AIRWAY OBSTRUCT NEC                     

 

 2014            EDUAR JOHNSON DO            Ot            585.2   
         CHRONIC KIDNEY DISEASE, STAGE II (MILD)                     

 

 2014            EDUAR JOHNSON DO            Ot            722.11  
          THORACIC DISC DISPLACMNT                     

 

 2014            EDUAR JOHNSON DO            Ot            799.02  
          HYPOXEMIA                     

 

 2014            EDUAR JOHNSON DO            Ot            V45.82  
          PERCUTANEOUS TRANSLUM CORON ANGIOPLASTY                      

 

 2014            LETTY DAVIS ARLENE MCKEON            Ot            718.91   
                               

 

 2014            LETTY DAVIS ARLENE MCKEON            Ot            726.0    
                              

 

 2015                         Ot            733.00                       
           

 

 2015                         Ot            733.00                       
           

 

 2015                         Ot            733.00                       
           

 

 2015            EDUAR JOHNSON DO            Ot            733.00  
                                

 

 2015            DANA DILLON DO            Ot            492.8       
                           

 

 2015            DANA DILLON DO            Ot            511.9       
                           

 

 2015            DANA DILLON DO            Ot            586         
                         

 

 2015            LETTY DAVIS ARLENE MCKEON            Ot            726.0    
                              

 

 2015            LETTY DAVIS ARLENE MCKEON            Ot            718.91   
                               

 

 2015            LETTY DAVIS ARLENE MCKEON            Ot            726.0    
                              

 

 2015            LETTY  ARLENE MCKEON            Ot            840.4    
                              

 

 2015            LETTY  ARLENE MCKEON            Ot            E000.8   
                               

 

 2015            LETTY  ARLENE MCKEON            Ot            E928.9   
                               

 

 2015            LETTY DAVIS ARLENE MCKEON            Ot            V72.84   
                               

 

 2015            LETTY ARLENE DAVIS            Ot            840.4    
        SPRAIN ROTATOR CUFF                     

 

 2015            LETTY DAVIS ARLENE MCKEON            Ot            E000.8   
         OTHER EXTERNAL CAUSE STATUS                     

 

 2015            LETTY DAVIS ARLENE MCKEON            Ot            E928.9   
         ACCIDENT NOS                     

 

 2015            LETTY DAVIS ARLENE MCKEON            Ot            V58.61   
         ANTICOAGULANTS,LT,CURRENT USE                     

 

 2015            LETTY  ARLENE MCKEON            Ot            V64.1    
        NO PROC/CONTRAINDICATION                     

 

 2015            LETTY DAVIS ARLENE MCKEON            Ot            715.31   
         LOC OSTEOARTH NOS-SHLDER                     

 

 2015            LETTY DAVIS ARLENE MCKEON            Ot            727.61   
         ROTATOR CUFF RUPTURE                     

 

 2015            LETTY DAVIS ARLENE MCKEON            Ot            840.7    
        (SLAP) SUPERIOR GLENOID LABRUM LESIONS                     

 

 2015            NOHELIA AUGUST MD            Ot            414.01      
                            

 

 2015            NOHELIA AUGUST MD            Ot            786.09      
                            

 

 2015                         Ot            733.00                       
           

 

 2015                         Ot            733.00                       
           

 

 2015                         Ot            733.00                       
           

 

 2015            EDUAR JOHNSON DO            Ot            733.00  
                                

 

 2015            DANA DILLON DO            Ot            492.8       
                           

 

 2015            WILMA DANA DAVIS            Ot            511.9       
                           

 

 2015            WILMA DAVIS DANA M            Ot            586         
                         

 

 2015            LETTY DO, ARLENE MCKEON            Ot            726.0    
                              

 

 2015            LETTY DO, ARLENE F            Ot            718.91   
                               

 

 2015            LETTY DO, ARLENE F            Ot            726.0    
                              

 

 2015            LETTY DO, ARLENE F            Ot            840.4    
                              

 

 2015            LETTY DO, ARLENE MCKEON            Ot            E000.8   
                               

 

 2015            LETTY DO ARLENE MCEKON            Ot            E928.9   
                               

 

 2015            LETTY DO ARLENE MCKEON            Ot            V72.84   
                               

 

 2015            LETTY DO ARLENE MCKEON            Ot            727.61   
                               

 

 2015            LETTY DO ARLENE MCKEON            Ot            V72.84   
                               

 

 2015            NOHELIA AUGUST MD            Ot            414.00      
                            

 

 2015            MATA MATIAS, NOHELIA CLARK            Ot            424.0       
                           

 

 2015            NOHELIA AUGUST MD            Ot            433.10      
                            

 

 2015            NOHELIA AUGUST MD            Ot            786.09      
                            

 

 2015            NOHELIA AUGUST MD            Ot            414.01      
                            

 

 2015            NOHELIA AUGUST MD            Ot            786.09      
                            

 

 2015            LETTY DO ARLENE MCKEON            Ot            840.4    
                              

 

 2015            LETTY DO ARLENE MCKEON            Ot            E000.8   
                               

 

 2015            LETTY DO ARLENE MCKEON            Ot            E928.9   
                               

 

 2015            LETTY DO, ARLENE F            Ot            V72.84   
                               

 

 2015            NOHELIA AUGUST MD            Ot            414.01      
                            

 

 2015            NOHELIA AUGUST MD            Ot            786.09      
                            

 

 2015            LETTY DO ARLENE MCKEON            Ot            719.41   
         JOINT PAIN-SHLDER                     

 

 2015            LETTY DO ARLENE F            Ot            V57.1    
        PHYSICAL THERAPY NEC                     

 

 2015            LETTY DO ARLENE F            Ot            V58.49   
         OTHER SPECIFIED AFTERCARE FOLLOWING SURG                     

 

 2015            LETTY DO, ARLENE MCKEON            Ot            840.4    
                              

 

 2015            LETTY DO, ARLENE MCKEON            Ot            E000.8   
                               

 

 2015            LETTY DO, ARLENE MCKEON            Ot            E928.9   
                               

 

 2015            LETTY DO, ARLENE MCKEON            Ot            V72.84   
                               

 

 2015            LETTY DO, ARLENE MCKEON            Ot            840.4    
                              

 

 2015            LETTY DO, ARLENE MCKEON            Ot            E000.8   
                               

 

 2015            LETTY DO, ARLENE MCKEON            Ot            E928.9   
                               

 

 2015            LETTY DO, ARLENE MCKEON            Ot            V72.84   
                               

 

 2015            LETTY DO, ARLENE MCKEON            Ot            727.61   
                               

 

 2015            LETTY DO, ARLENE MCKEON            Ot            V72.84   
                               

 

 2015            LETTY DO, ARLENE MCKEON            Ot            727.61   
                               

 

 2015            LETTY DO, ARLENE MCKEON            Ot            V72.84   
                               

 

 2015                         Ot            733.00                       
           

 

 2015                         Ot            733.00                       
           

 

 2015            LETTY DO, ARLENE MCKEON            Ot            722.4    
                              

 

 2015            LETTY DO, ARLENE MCKEON            Ot            722.4    
                              

 

 2015            BELKIS WEST MD            Ot            721.0        
                          

 

 2015            BELKIS WEST MD            Ot            V57.1        
                          

 

 2015            BELKIS WEST MD            Ot            721.0        
                          

 

 2015            BELKIS WEST MD            Ot            V57.1        
                          

 

 2015            BELKIS WEST MD            Ot            721.0        
    CERVICAL SPONDYLOSIS                     

 

 2015            BELKIS WEST MD            Ot            V57.1        
    PHYSICAL THERAPY NEC                     

 

 2015            EDUAR JOHNSON DO            Ot            A41.9   
                               

 

 2015            JOHNSONEDUAR HILL DO            Ot            E53.8   
                               

 

 2015            JOHNSONEDUAR HILL DO            Ot            E55.9   
                               

 

 2015            JOHNSONEDUAR HILL DO            Ot            E78.5   
                               

 

 2015            EDUAR JOHNSON DO            Ot            F17.200 
                                 

 

 2015            EDUAR JOHNSON DO            Ot            I12.9   
                               

 

 2015            JOHNSONEDUAR HILL DO            Ot            J15.1   
                               

 

 2015            JOHNSONEDUAR HILL DO            Ot            J15.4   
                               

 

 2015            EDUAR JOHNSON DO            Ot            J44.0   
                               

 

 2015            JOHNSON DO, EDUAR CLARK            Ot            J44.1   
                               

 

 2015            JOHNSON DO, EDUAR CLARK            Ot            M51.14  
                                

 

 2015            JOHNSON DO, EDUAR CLARK            Ot            N18.2   
                               

 

 2015            JOHNSON DO, EDUAR CLARK            Ot            R90.82  
                                

 

 2015            JOHNSON DO, EDUAR CLARK            Ot            Z66     
                             

 

 2015            JOHNSON DO, EDUAR CLARK            Ot            A41.9   
                               

 

 2015            JOHNSON DO, EDUAR CLARK            Ot            E53.8   
                               

 

 2015            JOHNSON DO, EDUAR CLARK            Ot            E55.9   
                               

 

 2015            JOHNSON DO, EDUAR CLARK            Ot            E78.5   
                               

 

 2015            JOHNSON DO, EDUAR CLARK            Ot            F17.200 
                                 

 

 2015            JOHNSON DO, EDUAR CLARK            Ot            I12.9   
                               

 

 2015            JOHNSON DO, EDUAR CLARK            Ot            J15.1   
                               

 

 2015            JOHNSON DO, EDUAR CLARK            Ot            J15.4   
                               

 

 2015            JOHNSON DO, EDUAR CLARK            Ot            J44.0   
                               

 

 2015            JOHNSON DO, EDUAR CLARK            Ot            J44.1   
                               

 

 2015            JOHNSON DO, EDUAR CLARK            Ot            M51.14  
                                

 

 2015            JOHNSON DO, EDUAR CLARK            Ot            N18.2   
                               

 

 2015            JOHNSON DO, EDUAR CLARK            Ot            R90.82  
                                

 

 2015            JOHNSON DO, EDUAR CLARK            Ot            Z66     
                             

 

 2016            JOHNSON DO, EDUAR CLARK            Ot            A41.9   
                               

 

 2016            JOHNSON DO, EDUAR CLARK            Ot            E53.8   
                               

 

 2016            JOHNSON DO, EDUAR CLARK            Ot            E55.9   
                               

 

 2016            JOHNSON DO, EDUAR CLARK            Ot            E78.5   
                               

 

 2016            JOHNSON DO, EDUAR CLARK            Ot            F17.200 
                                 

 

 2016            JOHNSON DO, EDUAR CLARK            Ot            I12.9   
                               

 

 2016            JOHNSON DO, EDUAR CLARK            Ot            J15.1   
                               

 

 2016            JOHNSON DO EDUAR CLARK            Ot            J15.4   
                               

 

 2016            JOHNSON DO, EDUAR CLARK            Ot            J44.0   
                               

 

 2016            JOHNSON DO, EDUAR CLARK            Ot            J44.1   
                               

 

 2016            JOHNSON DO, EDUAR CLARK            Ot            M51.14  
                                

 

 2016            JOHNSON DO, EDUAR CALRK            Ot            N18.2   
                               

 

 2016            JOHNSON DO, EDUAR CLARK            Ot            R90.82  
                                

 

 2016            JOHNSON DO, EDUAR CLARK            Ot            Z66     
                             

 

 2016            JOHNSON DO, EDUAR CLARK            Ot            A41.9   
                               

 

 2016            JOHNSON DO, EDUAR CLARK            Ot            E53.8   
                               

 

 2016            JOHNSON DO, EDUAR CLARK            Ot            E55.9   
                               

 

 2016            JOHNSON DO, EDUAR CLARK            Ot            E78.5   
                               

 

 2016            JOHNSON DO, EDUAR CLARK            Ot            F17.200 
                                 

 

 2016            JOHNSON DO, EDUAR CLARK            Ot            I12.9   
                               

 

 2016            JOHNSON DO, EDUAR CLARK            Ot            J15.1   
                               

 

 2016            JOHNSON DO, EDUAR CLARK            Ot            J15.4   
                               

 

 2016            JOHNSON DO, EDUAR CLARK            Ot            J44.0   
                               

 

 2016            JOHNSON DO, EDUAR CLARK            Ot            J44.1   
                               

 

 2016            JOHNSON DO, EDUAR CLARK            Ot            M51.14  
                                

 

 2016            JOHNSON DO, EDUAR CLARK            Ot            N18.2   
                               

 

 2016            JOHNSON DO, EDUAR CLARK            Ot            R90.82  
                                

 

 2016            JOHNSON DO, EDUAR CLARK            Ot            Z66     
                             

 

 2016            JOHNSON DO, EDUAR CLARK            Ot            A41.9   
                               

 

 2016            JOHNSON DO, EDUAR CLARK            Ot            E53.8   
                               

 

 2016            JOHNSON DO, EDUAR CLARK            Ot            E55.9   
                               

 

 2016            JOHNSON DO, EDUAR CLARK            Ot            E78.5   
                               

 

 2016            JOHNSON DO, EDUAR CLARK            Ot            F17.200 
                                 

 

 2016            JOHNSON DO, EDUAR CLARK            Ot            I12.9   
                               

 

 2016            JOHNSON DO, EDUAR CLARK            Ot            J15.1   
                               

 

 2016            JOHNSON DO, EDUAR CLARK            Ot            J15.4   
                               

 

 2016            JOHNSON DO, EDUAR CLARK            Ot            J44.0   
                               

 

 2016            JOHNSON DO, EDUAR CLARK            Ot            J44.1   
                               

 

 2016            JOHNSON DO, EDUAR CLARK            Ot            M51.14  
                                

 

 2016            JOHNSON DO, EDUAR CLARK            Ot            N18.2   
                               

 

 2016            JOHNSON DO, EDUAR CLARK            Ot            R90.82  
                                

 

 2016            JOHNSON DO, EDUAR CLARK            Ot            Z66     
                             

 

 2016            JOHNSON DO, EDUAR CLARK            Ot            A41.9   
                               

 

 2016            JOHNSON DO, EDUAR CLARK            Ot            E53.8   
                               

 

 2016            JOHNSON DO, EDUAR CLARK            Ot            E55.9   
                               

 

 2016            JOHNSON DO, EDUAR CLARK            Ot            E78.5   
                               

 

 2016            JOHNSON DO, EDUAR CLARK            Ot            F17.200 
                                 

 

 2016            JOHNSON DO, EDUAR CLARK            Ot            I12.9   
                               

 

 2016            JOHNSON DO, EDUAR CLARK            Ot            J15.1   
                               

 

 2016            JOHNSON DO, EDUAR CLARK            Ot            J15.4   
                               

 

 2016            JOHNSON DO, EDUAR CLARK            Ot            J44.0   
                               

 

 2016            JOHNSON DO, EDUAR CLARK            Ot            J44.1   
                               

 

 2016            JOHNSON DO, EDUAR CLARK            Ot            M51.14  
                                

 

 2016            JOHNSON DO, EDUAR CLARK            Ot            N18.2   
                               

 

 2016            JOHNSON DO, EDUAR CLARK            Ot            R90.82  
                                

 

 2016            JOHNSON DO, EDUAR CLARK            Ot            Z66     
                             

 

 2016            JOHNSON DO, EDUAR CLARK            Ot            A41.9   
                               

 

 2016            JOHNSON DO, EDUAR CLARK            Ot            E53.8   
                               

 

 2016            JOHNSON DO, EDUAR CLARK            Ot            E55.9   
                               

 

 2016            JOHNSON DO, EDUAR CLARK            Ot            E78.5   
                               

 

 2016            JOHNSON DO, EDUAR CLARK            Ot            F17.200 
                                 

 

 2016            JOHNSON DO, EDUAR CLARK            Ot            I12.9   
                               

 

 2016            JOHNSON DO, EDUAR CLARK            Ot            J15.1   
                               

 

 2016            JOHNSON DO, EDUAR CLARK            Ot            J15.4   
                               

 

 2016            JOHNSON DO, EDUAR CLARK            Ot            J44.0   
                               

 

 2016            JOHNSON DO, EDUAR CLARK            Ot            J44.1   
                               

 

 2016            JOHNSON DO, EDUAR CLARK            Ot            M51.14  
                                

 

 2016            JOHNSON DO, EDUAR CLARK            Ot            N18.2   
                               

 

 2016            JOHNSON DO, EDUAR CLARK            Ot            R90.82  
                                

 

 2016            JOHNSON DO, EDUAR CLARK            Ot            Z66     
                             

 

 2016            JOHNSON DO, EDUAR CLARK            Ot            A41.9   
                               

 

 2016            JOHNSON DO, EDUAR CLARK            Ot            E53.8   
                               

 

 2016            JOHNSON DO, EDUAR CLARK            Ot            E55.9   
                               

 

 2016            JOHNSON DO, EDUAR CLARK            Ot            E78.5   
                               

 

 2016            JOHNSON DO, EDUAR CLARK            Ot            F17.200 
                                 

 

 2016            JOHNSON DO, EDUAR CLARK            Ot            I12.9   
                               

 

 2016            JOHNSON DO, EDUAR CLARK            Ot            J15.1   
                               

 

 2016            JOHNSON DO, EDUAR CLARK            Ot            J15.4   
                               

 

 2016            JOHNSON DO, EDUAR CLARK            Ot            J44.0   
                               

 

 2016            JOHNSON DO, EDUAR CLARK            Ot            J44.1   
                               

 

 2016            JOHNSON DO, EDUAR CLARK            Ot            M51.14  
                                

 

 2016            JOHNSON DO, EDUAR CLARK            Ot            N18.2   
                               

 

 2016            JOHNSON DO, EDUAR CLARK            Ot            R90.82  
                                

 

 2016            JOHNSON DO, EDUAR CLARK            Ot            Z66     
                             

 

 2016            JOHNSON DO, EDUAR CLARK            Ot            A41.9   
                               

 

 2016            JOHNSON DO, EDUAR CLARK            Ot            E53.8   
                               

 

 2016            JOHNSON DO, EDUAR CLARK            Ot            E55.9   
                               

 

 2016            JOHNSON DO, EDUAR CLARK            Ot            E78.5   
                               

 

 2016            JOHNSON DO, EDUAR CLARK            Ot            F17.200 
                                 

 

 2016            JOHNSON DO, EDUAR CLARK            Ot            I12.9   
                               

 

 2016            JOHNSON DO, EDUAR CLARK            Ot            J15.1   
                               

 

 2016            JOHNSON DO, EDUAR CLARK            Ot            J15.4   
                               

 

 2016            JOHNSON DO, EDUAR CLARK            Ot            J44.0   
                               

 

 2016            JOHNSON DO, EDUAR CLARK            Ot            J44.1   
                               

 

 2016            JOHNSON DO, EDUAR CLARK            Ot            M51.14  
                                

 

 2016            JOHNSON DO, EDUAR CLARK            Ot            N18.2   
                               

 

 2016            JOHNSON DO, EDUAR CLARK            Ot            R90.82  
                                

 

 2016            JOHNSON DO, EDUAR CLARK            Ot            Z66     
                             

 

 2016            JOHNSON DO, EDUAR CLARK            Ot            A41.9   
         SEPSIS, UNSPECIFIED ORGANISM                     

 

 2016            EDAUR JOHNSON DO, Ot            E11.22  
          TYPE 2 DIABETES MELLITUS W DIABETIC                      

 

 2016            EDUAR JOHNSON DO, Ot            E53.8   
         DEFICIENCY OF OTHER SPECIFIED B GROUP VI                     

 

 2016            EDUAR JOHNSON DO, Ot            E55.9   
         VITAMIN D DEFICIENCY, UNSPECIFIED                     

 

 2016            EDUAR JOHNSON DO, Ot            E78.5   
         HYPERLIPIDEMIA, UNSPECIFIED                     

 

 2016            EDUAR JOHNSON DO, Ot            F17.210 
           NICOTINE DEPENDENCE, CIGARETTES, UNCOMPL                     

 

 2016            EDUAR JOHNSON DO, Ot            I12.9   
         HYPERTENSIVE CHRONIC KIDNEY DISEASE W ST                     

 

 2016            EDUAR JOHNSON DO, Ot            I25.10  
          ATHSCL HEART DISEASE OF NATIVE CORONARY                      

 

 2016            EDUAR JOHNSON DO, Ot            J15.1   
         PNEUMONIA DUE TO PSEUDOMONAS                     

 

 2016            EDUAR JOHNSON DO, Ot            J15.4   
         PNEUMONIA DUE TO OTHER STREPTOCOCCI                     

 

 2016            EDUAR JOHNSON DO, Ot            J44.0   
         CHRONIC OBSTRUCTIVE PULMON DISEASE W ACU                     

 

 2016            EDUAR JOHNSON DO, Ot            J44.1   
         CHRONIC OBSTRUCTIVE PULMONARY DISEASE W                      

 

 2016            EDUAR JOHNSON DO, Ot            M51.14  
          INTVRT DISC DISORDERS W RADICULOPATHY, T                     

 

 2016            EDUAR JOHNSON DO, Ot            N18.2   
         CHRONIC KIDNEY DISEASE, STAGE 2 (MILD)                     

 

 2016            EDUAR JOHNSON DO, Ot            R90.82  
          WHITE MATTER DISEASE, UNSPECIFIED                     

 

 2016            EDUAR JOHNSON DO, Ot            Z66     
       DO NOT RESUSCITATE                     

 

 2016                         Ot            733.00                       
           

 

 2016                         Ot            733.00                       
           

 

 2016                         Ot            733.00                       
           

 

 2016            EDUAR JOHNSON DO, Ot            733.00  
                                

 

 2016            DANA DILLON DO            Ot            492.8       
                           

 

 2016            DANA DILLON DO            Ot            511.9       
                           

 

 2016            DANA DILLON DO            Ot            586         
                         

 

 2016            ARLENE SAENZ DO            Ot            726.0    
                              

 

 2016            ARLENE SAENZ DO            Ot            718.91   
                               

 

 2016            ARLENE SAENZ DO            Ot            726.0    
                              

 

 2016            ARLENE SAENZ DO            Ot            840.4    
                              

 

 2016            LETTY DO, ARLENE MCKEON            Ot            E000.8   
                               

 

 2016            LETTY DO, ARLENE MCKEON            Ot            E928.9   
                               

 

 2016            LETTY DO, ARLENE MCKEON            Ot            V72.84   
                               

 

 2016            LETTY DO, ARLENE MCKEON            Ot            727.61   
                               

 

 2016            LETTY DO, ARLENE MCKEON            Ot            V72.84   
                               

 

 2016            MATA MATIAS, NOHELIA CLARK            Ot            414.00      
                            

 

 2016            MATA MATIAS, NOHELIA J            Ot            424.0       
                           

 

 2016            MATA MATIAS, NOHELIA J            Ot            433.10      
                            

 

 2016            MATA MATIAS, NOHELIA J            Ot            786.09      
                            

 

 2016            MATA MATIAS, NOHELIA CLARK            Ot            414.01      
                            

 

 2016            MATA MATIAS, NOHELIA CLARK            Ot            786.09      
                            

 

 2016            LETTY DO, ARLENE MCKEON            Ot            840.4    
                              

 

 2016            LETTY DO, ARLENE MCKEON            Ot            E000.8   
                               

 

 2016            LETTY DO, ARLENE MCKEON            Ot            E928.9   
                               

 

 2016            LETTY DO, ARLENE MCKEON            Ot            V72.84   
                               

 

 2016                         Ot            733.00                       
           

 

 2016            LETTY DO, ARLENE MCKEON            Ot            722.4    
                              

 

 2016                         Ot            J44.9                        
          

 

 2016            EDUAR JOHNSON DO            Ot            J44.9   
                               

 

 2016                         Ot            J44.9                        
          

 

 2016            JOHNSONEDUAR HILL DO            Ot            K59.00  
                                

 

 2016                         Ot            J44.9                        
          

 

 2016            EDUAR JOHNSON DO            Ot            K59.00  
                                

 

 2016            EDUAR JOHNSON DO            Ot            J44.9   
                               

 

 2016            EDUAR JOHNSON DO            Ot            J44.9   
                               

 

 2016            JOHNSONEDUAR HILL DO            Ot            J44.9   
         CHRONIC OBSTRUCTIVE PULMONARY DISEASE, U                     

 

 2016            EDUAR JOHNSON DO            Ot            J44.9   
         CHRONIC OBSTRUCTIVE PULMONARY DISEASE, U                     

 

 2016            MICH COLLIER DO            Ot            R19.5       
     OTHER FECAL ABNORMALITIES                     

 

 2016            MICH COLLIER DO            Ot            Z01.818     
       ENCOUNTER FOR OTHER PREPROCEDURAL EXAMIN                     

 

 2016            MICH COLLIER DO            Ot            R19.5       
     OTHER FECAL ABNORMALITIES                     

 

 2016            MICH COLLIER DO            Ot            Z01.818     
       ENCOUNTER FOR OTHER PREPROCEDURAL EXAMIN                     

 

 2017                         Ot            733.00            
OSTEOPOROSIS NOS                     

 

 2017                         Ot            733.00            
OSTEOPOROSIS NOS                     

 

 2017            EDUAR JOHNSON DO            Ot            733.00  
          OSTEOPOROSIS NOS                     

 

 2017            DANA DILLON DO            Ot            492.8       
     EMPHYSEMA NEC                     

 

 2017            DANA DILLON DO            Ot            511.9       
     PLEURAL EFFUSION NOS                     

 

 2017            DANA DILLON DO            Ot            586         
   RENAL FAILURE NOS                     

 

 2017            LETTY DO ARLENE MCKEON            Ot            726.0    
        ADHESIVE CAPSULIT SHLDER                     

 

 2017            LETTY DO ARLENE MCKEON            Ot            718.91   
         JT DERANGMENT NOS-SHLDER                     

 

 2017            LETTY DO ARLENE MCKEON            Ot            726.0    
        ADHESIVE CAPSULIT SHLDER                     

 

 2017            LETTY DAVIS ARLENE MCKEON            Ot            840.4    
        SPRAIN ROTATOR CUFF                     

 

 2017            LETTY DAVIS ARLENE MCKEON            Ot            E000.8   
         OTHER EXTERNAL CAUSE STATUS                     

 

 2017            LETTY DAVIS ARLENE MCKEON            Ot            E928.9   
         ACCIDENT NOS                     

 

 2017            LETTY DO, ARLENE MCKEON            Ot            V72.84   
         EXAM PRE-OPERATIVE NOS                     

 

 2017            LETTY DAVIS ARLENE MCKEON            Ot            727.61   
         ROTATOR CUFF RUPTURE                     

 

 2017            LETTY DAVIS RALENE MCKEON            Ot            V72.84   
         EXAM PRE-OPERATIVE NOS                     

 

 2017            MATA MATIAS, NOHELIA CLARK            Ot            414.00      
      CORON ATHEROSCLER NOS TYPE VESSEL, NATIV                     

 

 2017            NOHELIA AUGUST MD            Ot            424.0       
     MITRAL VALVE DISORDER                     

 

 2017            NOHELIA AUGUST MD            Ot            433.10      
      CAROTID ARTERY OCCLUSION W O CEREBRAL IN                     

 

 2017            NOHELIA AUGUST MD            Ot            786.09      
      RESPIRATORY ABNORM NEC                     

 

 2017            NOHELIA AUGUST MD            Ot            414.01      
      CORONARY ATHEROSCLEROSIS OF NATIVE CORON                     

 

 2017            NOHELIA AUGUST MD            Ot            786.09      
      RESPIRATORY ABNORM NEC                     

 

 2017            LETTY DAVIS ARLENE MCKEON            Ot            840.4    
        SPRAIN ROTATOR CUFF                     

 

 2017            LETTY DAVIS ARLENE MCKEON            Ot            E000.8   
         OTHER EXTERNAL CAUSE STATUS                     

 

 2017            LETTY DAVIS ARLENE MCKEON            Ot            E928.9   
         ACCIDENT NOS                     

 

 2017            ARLENE SAENZ DO            Ot            V72.84   
         EXAM PRE-OPERATIVE NOS                     

 

 2017                         Ot            733.00            
OSTEOPOROSIS NOS                     

 

 2017            ARLENE SAENZ DO            Ot            722.4    
        CERVICAL DISC DEGEN                     

 

 2017                         Ot            J44.9            CHRONIC 
OBSTRUCTIVE PULMONARY DISEASE, U                     

 

 2017            JOHNSONLIZ DAVIS EDUAR EDUARDO            Ot            K59.00  
          CONSTIPATION, UNSPECIFIED                     

 

 2017            JOHNSONEDUAR HILL DO            Ot            J44.9   
         CHRONIC OBSTRUCTIVE PULMONARY DISEASE, U                     

 

 2017            COLLIERMICH GALEANO DO            Ot            K59.00      
      CONSTIPATION, UNSPECIFIED                     

 

 2017            COLLIER MICH DAVIS            Ot            R19.5       
     OTHER FECAL ABNORMALITIES                     

 

 2017            COLLIERMICH GALEANO DO            Ot            Z86.010     
       PERSONAL HISTORY OF COLONIC POLYPS                     

 

 2017            COLLIERMICH GALEANO DO            Ot            K59.00      
      CONSTIPATION, UNSPECIFIED                     

 

 2017            COLLIER MICH DAVIS            Ot            R19.5       
     OTHER FECAL ABNORMALITIES                     

 

 2017            MICH COLLIER DO            Ot            Z86.010     
       PERSONAL HISTORY OF COLONIC POLYPS                     

 

 2017            TAE CHRISTIANSEN            Ot            
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                     

 

 2017            TAE CHRISTIANSEN            Ot            
G47.33            OBSTRUCTIVE SLEEP APNEA (ADULT) (PEDIATR                     

 

 2017            TAE CHRISTIANSEN            Ot            
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                      

 

 2017            TAE CHRISTIANSEN            Ot            
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                     

 

 2017            TAE CHRISTIANSEN            Ot            
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                     

 

 2017            TAE CHRISTIANSEN            Ot            
G47.33            OBSTRUCTIVE SLEEP APNEA (ADULT) (PEDIATR                     

 

 2017            TAE CHRISTIANSEN            Ot            
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                      

 

 2017            TAE CHRISTIANSEN            Ot            
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                     

 

 2017            MATA MATIAS, NOHELIA CLARK            Ot            E78.5       
     HYPERLIPIDEMIA, UNSPECIFIED                     

 

 2017            NOHELIA AUGUST MD            Ot            G47.33      
      OBSTRUCTIVE SLEEP APNEA (ADULT) (PEDIATR                     

 

 2017            NOHELIA AUGUST MD            Ot            I10         
   ESSENTIAL (PRIMARY) HYPERTENSION                     

 

 2017            NOHELIA AUGUST MD, Ot            I25.10      
      ATHSCL HEART DISEASE OF NATIVE CORONARY                      

 

 2017            NOHELIA AUGUST MD, Ot            I25.84      
      CORONARY ATHEROSCLEROSIS DUE TO CALCIFIE                     

 

 2017            NOHELIA AUGUST MD            Ot            I34.0       
     NONRHEUMATIC MITRAL (VALVE) INSUFFICIENC                     

 

 2017            NOHELIA AUGUST MD            Ot            I65.23      
      OCCLUSION AND STENOSIS OF BILATERAL MCKEON                     

 

 2017            NOHELIA AUGUST MD            Ot            R94.39      
      ABNORMAL RESULT OF OTHER CARDIOVASCULAR                      

 

 2017            NOHELIA AUGUST MD, Ot            Z72.0       
     TOBACCO USE                     

 

 2017            NOHELIA AUGUST MD, Ot            Z79.899     
       OTHER LONG TERM (CURRENT) DRUG THERAPY                     

 

 2017            NOHELIA AUGUST MD, Ot            Z86.73      
      PRSNL HX OF TIA (TIA), AND CEREB INFRC W                     

 

 2017            NOHELIA AUGUST MD            Ot            Z95.5       
     PRESENCE OF CORONARY ANGIOPLASTY IMPLANT                     

 

 2017            TAE CHRISTIANSEN            Ot            
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                     

 

 2017            TAE CHRISTIANSEN            Ot            
G47.33            OBSTRUCTIVE SLEEP APNEA (ADULT) (PEDIATR                     

 

 2017            TAE CHRISTIANSEN            Ot            
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                      

 

 2017            TAE CHRISTIANSEN            Ot            
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                     

 

 2017            TAE CHRISTIANSEN            Ot            
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                     

 

 2017            TAE CHRISTIANSEN            Ot            
G47.33            OBSTRUCTIVE SLEEP APNEA (ADULT) (PEDIATR                     

 

 2017            TAE CHRISTIANSEN            Ot            
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                      

 

 2017            TAE CHRISTIANSEN            Ot            
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                     

 

 2017            NOHELIA AUGUST MD            Ot            E78.5       
     HYPERLIPIDEMIA, UNSPECIFIED                     

 

 2017            NOHELIA AUGUST MD, Ot            G47.33      
      OBSTRUCTIVE SLEEP APNEA (ADULT) (PEDIATR                     

 

 2017            NOHELIA AUGUST MD            Ot            I10         
   ESSENTIAL (PRIMARY) HYPERTENSION                     

 

 2017            NOHELIA AUGUST MD, Ot            I25.10      
      ATHSCL HEART DISEASE OF NATIVE CORONARY                      

 

 2017            NOHELIA AUGUST MD, Ot            I25.84      
      CORONARY ATHEROSCLEROSIS DUE TO CALCIFIE                     

 

 2017            NOHELIA AUGUST MD            Ot            I34.0       
     NONRHEUMATIC MITRAL (VALVE) INSUFFICIENC                     

 

 2017            NOHELIA AUGUST MD, Ot            I65.23      
      OCCLUSION AND STENOSIS OF BILATERAL MCKEON                     

 

 2017            NOHELIA AUGUST MD            Ot            R94.39      
      ABNORMAL RESULT OF OTHER CARDIOVASCULAR                      

 

 2017            NOHELIA AUGUST MD, Ot            Z72.0       
     TOBACCO USE                     

 

 2017            NOHELIA AUGUST MD, Ot            Z79.899     
       OTHER LONG TERM (CURRENT) DRUG THERAPY                     

 

 2017            NOHELIA AUGUST MD, Ot            Z86.73      
      PRSNL HX OF TIA (TIA), AND CEREB INFRC W                     

 

 2017            NOHELIA AUGUST MD            Ot            Z95.5       
     PRESENCE OF CORONARY ANGIOPLASTY IMPLANT                     

 

 2017            DANA DILLON DO            Ot            R06.02      
      SHORTNESS OF BREATH                     

 

 2017            DANA DILLON DO            Ot            Z87.891     
       PERSONAL HISTORY OF NICOTINE DEPENDENCE                     

 

 2017            DANA DILLON DO            Ot            R06.02      
      SHORTNESS OF BREATH                     

 

 2017            DANA DILLON DO            Ot            Z87.891     
       PERSONAL HISTORY OF NICOTINE DEPENDENCE                     

 

 2017            DANA DILLON DO            Ot            R06.02      
      SHORTNESS OF BREATH                     

 

 2017            DANA DILLON DO            Ot            Z87.891     
       PERSONAL HISTORY OF NICOTINE DEPENDENCE                     

 

 2017            DANA DILLON DO            Ot            R06.02      
      SHORTNESS OF BREATH                     

 

 2017            DANA DILLON DO            Ot            Z87.891     
       PERSONAL HISTORY OF NICOTINE DEPENDENCE                     

 

 2017            DANA DILLON DO            Ot            R06.02      
      SHORTNESS OF BREATH                     

 

 2017            DANA DILLON DO            Ot            Z87.891     
       PERSONAL HISTORY OF NICOTINE DEPENDENCE                     

 

 2017            DANA DILLON DO            Ot            R06.02      
      SHORTNESS OF BREATH                     

 

 2017            DANA DILLON DO            Ot            Z87.891     
       PERSONAL HISTORY OF NICOTINE DEPENDENCE                     

 

 2017            DANA DILLON DO            Ot            R06.02      
      SHORTNESS OF BREATH                     

 

 2017            DANA DILLON DO            Ot            Z87.891     
       PERSONAL HISTORY OF NICOTINE DEPENDENCE                     

 

 2017            DANA DILLON DO            Ot            R06.02      
      SHORTNESS OF BREATH                     

 

 2017            DANA DILLON DO            Ot            Z87.891     
       PERSONAL HISTORY OF NICOTINE DEPENDENCE                     

 

 2018                         Ot            733.00            
OSTEOPOROSIS NOS                     

 

 2018            JOHNSONEDUAR HILL DO            Ot            733.00  
          OSTEOPOROSIS NOS                     

 

 2018            DANA DILLON DO            Ot            492.8       
     EMPHYSEMA NEC                     

 

 2018            DANA DILLON DO            Ot            511.9       
     PLEURAL EFFUSION NOS                     

 

 2018            WILMADANA BELTRÁN DO            Ot            586         
   RENAL FAILURE NOS                     

 

 2018            LETTY DO ARLENE MCKEON            Ot            726.0    
        ADHESIVE CAPSULIT SHLDER                     

 

 2018            LETTY DO ARLENE MCKEON            Ot            718.91   
         JT DERANGMENT NOS-SHLDER                     

 

 2018            LETTY DO ARLENE MCKEON            Ot            726.0    
        ADHESIVE CAPSULIT SHLDER                     

 

 2018            LETTY DO ARLENE MCKEON            Ot            840.4    
        SPRAIN ROTATOR CUFF                     

 

 2018            LETTY DO ARLENE MCKEON            Ot            E000.8   
         OTHER EXTERNAL CAUSE STATUS                     

 

 2018            LETTY DO ARLENE MCKEON            Ot            E928.9   
         ACCIDENT NOS                     

 

 2018            LETTY DAVIS ARLENE MCKEON            Ot            V72.84   
         EXAM PRE-OPERATIVE NOS                     

 

 2018            LETTY DO ARLENE MCKEON            Ot            727.61   
         ROTATOR CUFF RUPTURE                     

 

 2018            LETTY DAVIS ARLENE MCKEON            Ot            V72.84   
         EXAM PRE-OPERATIVE NOS                     

 

 2018            MATA MATIAS, NOHELIA CLARK            Ot            414.00      
      CORON ATHEROSCLER NOS TYPE VESSEL, NATIV                     

 

 2018            NOHELIA AUGUST MD            Ot            424.0       
     MITRAL VALVE DISORDER                     

 

 2018            NOHELIA AUGUST MD            Ot            433.10      
      CAROTID ARTERY OCCLUSION W O CEREBRAL IN                     

 

 2018            NOHELIA AUGUST MD            Ot            786.09      
      RESPIRATORY ABNORM NEC                     

 

 2018            NOHELIA AUGUST MD            Ot            414.01      
      CORONARY ATHEROSCLEROSIS OF NATIVE CORON                     

 

 2018            NOHELIA AUGUST MD            Ot            786.09      
      RESPIRATORY ABNORM NEC                     

 

 2018            ARLENE SAENZ DO            Ot            840.4    
        SPRAIN ROTATOR CUFF                     

 

 2018            ARLENE SAENZ DO            Ot            E000.8   
         OTHER EXTERNAL CAUSE STATUS                     

 

 2018            ARLENE SAENZ DO            Ot            E928.9   
         ACCIDENT NOS                     

 

 2018            ARLENE SAENZ DO            Ot            V72.84   
         EXAM PRE-OPERATIVE NOS                     

 

 2018                         Ot            733.00            
OSTEOPOROSIS NOS                     

 

 2018            ARLENE SAENZ DO            Ot            722.4    
        CERVICAL DISC DEGEN                     

 

 2018                         Ot            J44.9            CHRONIC 
OBSTRUCTIVE PULMONARY DISEASE, U                     

 

 2018            EDUAR JOHNSON DO            Ot            K59.00  
          CONSTIPATION, UNSPECIFIED                     

 

 2018            EDUAR JOHNSON DO, Ot            J44.9   
         CHRONIC OBSTRUCTIVE PULMONARY DISEASE, U                     

 

 2018            TAE CHRISTIANSEN Ot            
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                     

 

 2018            TAE CHRISTIANSEN Ot            
G47.33            OBSTRUCTIVE SLEEP APNEA (ADULT) (PEDIATR                     

 

 2018            TAE CHRISTIANSEN Ot            
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                      

 

 2018            TAE CHRISTIANSEN            Ot            
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                     

 

 2018            DANA DILLON DO            Ot            R06.02      
      SHORTNESS OF BREATH                     

 

 2018            DANA DILLON DO            Ot            Z87.891     
       PERSONAL HISTORY OF NICOTINE DEPENDENCE                     

 

 2018            NOHELIA AUGUST MD, Ot            G47.33      
      OBSTRUCTIVE SLEEP APNEA (ADULT) (PEDIATR                     

 

 2018            NOHELIA AUGUST MD, Ot            I25.10      
      ATHSCL HEART DISEASE OF NATIVE CORONARY                      

 

 2018            NOHELIA AUGUST MD, Ot            I34.0       
     NONRHEUMATIC MITRAL (VALVE) INSUFFICIENC                     

 

 2018            NOHELIA AUGUST MD, Ot            I71.2       
     THORACIC AORTIC ANEURYSM, WITHOUT RUPTUR                     

 

 2018            NOHELIA AUGUST MD            Ot            J43.9       
     EMPHYSEMA, UNSPECIFIED                     

 

 2018            NOHELIA AUGUST MD            Ot            N20.0       
     CALCULUS OF KIDNEY                     

 

 2018            NOHELIA AUGUST MD            Ot            R41.0       
     DISORIENTATION, UNSPECIFIED                     

 

 2018            NOHELIA AUGUST MD            Ot            W19.XXXA    
        UNSPECIFIED FALL, INITIAL ENCOUNTER                     

 

 2018            NOHELIA AUGUST MD, Ot            Z72.0       
     TOBACCO USE                     

 

 2018            EDUAR JOHNSON DO            Ot            J44.9   
         CHRONIC OBSTRUCTIVE PULMONARY DISEASE, U                     

 

 04/10/2018            NOHELIA AUGUST MD, Ot            D64.9       
     ANEMIA, UNSPECIFIED                     

 

 04/10/2018            NOHELIA AUGUST MD, Ot            E78.5       
     HYPERLIPIDEMIA, UNSPECIFIED                     

 

 04/10/2018            NOHELIA AUGUST MD            Ot            F17.210     
       NICOTINE DEPENDENCE, CIGARETTES, UNCOMPL                     

 

 04/10/2018            NOHELIA AUGUST MD, Ot            G47.9       
     SLEEP DISORDER, UNSPECIFIED                     

 

 04/10/2018            NOHELIA AUGUST MD            Ot            I10         
   ESSENTIAL (PRIMARY) HYPERTENSION                     

 

 04/10/2018            NOHELIA AUGUST MD, Ot            I25.10      
      ATHSCL HEART DISEASE OF NATIVE CORONARY                      

 

 04/10/2018            NOHELIA AUGUST MD, Ot            I65.29      
      OCCLUSION AND STENOSIS OF UNSPECIFIED CA                     

 

 04/10/2018            NOHELIA AUGUST MD, Ot            J44.9       
     CHRONIC OBSTRUCTIVE PULMONARY DISEASE, U                     

 

 04/10/2018            NOHELIA AUGUST MD            Ot            K63.89      
      OTHER SPECIFIED DISEASES OF INTESTINE                     

 

 04/10/2018            NOHELIA AUGUST MD            Ot            N28.9       
     DISORDER OF KIDNEY AND URETER, UNSPECIFI                     

 

 04/10/2018            NOHELIA AUGUST MD            Ot            R06.02      
      SHORTNESS OF BREATH                     

 

 04/10/2018            NOHELIA AUGUST MD            Ot            R19.7       
     DIARRHEA, UNSPECIFIED                     

 

 04/10/2018            NOHELIA AUGUST MD            Ot            R21         
   RASH AND OTHER NONSPECIFIC SKIN ERUPTION                     

 

 04/10/2018            NOHELIA AUGUST MD            Ot            R53.83      
      OTHER FATIGUE                     

 

 04/10/2018            NOHELIA AUGUST MD, Ot            Z79.899     
       OTHER LONG TERM (CURRENT) DRUG THERAPY                     

 

 04/10/2018            NOHELIA AUGUST MD            Ot            Z86.73      
      PRSNL HX OF TIA (TIA), AND CEREB INFRC W                     

 

 2018                         Ot            733.00            
OSTEOPOROSIS NOS                     

 

 2018            EDUAR JOHNSON DO            Ot            733.00  
          OSTEOPOROSIS NOS                     

 

 2018            DANA DILLON DO            Ot            492.8       
     EMPHYSEMA NEC                     

 

 2018            DANA DILLON DO            Ot            511.9       
     PLEURAL EFFUSION NOS                     

 

 2018            WILMA DO, DANA M            Ot            586         
   RENAL FAILURE NOS                     

 

 2018            ARLENE SAENZ DO            Ot            726.0    
        ADHESIVE CAPSULIT SHLDER                     

 

 2018            ARLENE SAENZ DO            Ot            718.91   
         JT DERANGMENT NOS-SHLDER                     

 

 2018            LETTY DAVIS, ARLENE MCKEON            Ot            726.0    
        ADHESIVE CAPSULIT SHLDER                     

 

 2018            ARLENE SAENZ DO            Ot            840.4    
        SPRAIN ROTATOR CUFF                     

 

 2018            ARLENE SAENZ DO            Ot            E000.8   
         OTHER EXTERNAL CAUSE STATUS                     

 

 2018            ARLENE SAENZ DO            Ot            E928.9   
         ACCIDENT NOS                     

 

 2018            ARLENE SAENZ DO            Ot            V72.84   
         EXAM PRE-OPERATIVE NOS                     

 

 2018            ARLENE SAENZ DO            Ot            727.61   
         ROTATOR CUFF RUPTURE                     

 

 2018            ARLENE SAENZ DO            Ot            V72.84   
         EXAM PRE-OPERATIVE NOS                     

 

 2018            MATA MATIAS, NOHELIA CLARK            Ot            414.00      
      CORON ATHEROSCLER NOS TYPE VESSEL, NATIV                     

 

 2018            NOHELIA AUGSUT MD            Ot            424.0       
     MITRAL VALVE DISORDER                     

 

 2018            MATA AMTIAS, NOHELIA CLARK            Ot            433.10      
      CAROTID ARTERY OCCLUSION W O CEREBRAL IN                     

 

 2018            MATA MATIAS, NOHELIA CLARK            Ot            786.09      
      RESPIRATORY ABNORM NEC                     

 

 2018            MATA MATIAS, NOHELIA CLARK            Ot            414.01      
      CORONARY ATHEROSCLEROSIS OF NATIVE CORON                     

 

 2018            NOHELIA AUGUST MD            Ot            786.09      
      RESPIRATORY ABNORM NEC                     

 

 2018            LETTY DAVIS ARLENE MCKEON            Ot            840.4    
        SPRAIN ROTATOR CUFF                     

 

 2018            LETTY DAVIS ARLENE MCKEON            Ot            E000.8   
         OTHER EXTERNAL CAUSE STATUS                     

 

 2018            ARLENE SAENZ DO            Ot            E928.9   
         ACCIDENT NOS                     

 

 2018            ARLENE SAENZ DO            Ot            V72.84   
         EXAM PRE-OPERATIVE NOS                     

 

 2018                         Ot            733.00            
OSTEOPOROSIS NOS                     

 

 2018            LETTY DAVIS ARLENE MCKEON            Ot            722.4    
        CERVICAL DISC DEGEN                     

 

 2018                         Ot            J44.9            CHRONIC 
OBSTRUCTIVE PULMONARY DISEASE, U                     

 

 2018            EDUAR JOHNSON DO            Ot            K59.00  
          CONSTIPATION, UNSPECIFIED                     

 

 2018            EDUAR JOHNSON DO            Ot            J44.9   
         CHRONIC OBSTRUCTIVE PULMONARY DISEASE, U                     

 

 2018            NIMESH NAILS, TAE COELLO            Ot            
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                     

 

 2018            TAE CHRISTIANSEN Ot            
G47.33            OBSTRUCTIVE SLEEP APNEA (ADULT) (PEDIATR                     

 

 2018            TAE CHRISTIANSEN            Ot            
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                      

 

 2018            TAE CHRISTIANSEN            Ot            
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                     

 

 2018            DANA DILLON DO            Ot            R06.02      
      SHORTNESS OF BREATH                     

 

 2018            DANA DILLON DO            Ot            Z87.891     
       PERSONAL HISTORY OF NICOTINE DEPENDENCE                     

 

 2018            NOHELIA AUGUST MD, Ot            G47.33      
      OBSTRUCTIVE SLEEP APNEA (ADULT) (PEDIATR                     

 

 2018            NOHELIA AUGUST MD, Ot            I25.10      
      ATHSCL HEART DISEASE OF NATIVE CORONARY                      

 

 2018            NOHELIA AUGUST MD            Ot            I34.0       
     NONRHEUMATIC MITRAL (VALVE) INSUFFICIENC                     

 

 2018            NOHELIA AUGUST MD            Ot            I71.2       
     THORACIC AORTIC ANEURYSM, WITHOUT RUPTUR                     

 

 2018            NOHELIA AUGUST MD, Ot            J43.9       
     EMPHYSEMA, UNSPECIFIED                     

 

 2018            NOHELIA AUGUST MD            Ot            N20.0       
     CALCULUS OF KIDNEY                     

 

 2018            NOHELIA AUGUST MD            Ot            R41.0       
     DISORIENTATION, UNSPECIFIED                     

 

 2018            NOHELIA AUGUST MD            Ot            W19.XXXA    
        UNSPECIFIED FALL, INITIAL ENCOUNTER                     

 

 2018            NOHELIA AUGUST MD            Ot            Z72.0       
     TOBACCO USE                     

 

 2018            MICH COLLIER DO            Ot            R93.5       
     ABN FINDINGS ON DX IMAGING OF ABD REGION                     

 

 2018            MICH COLLIER DO            Ot            Z01.818     
       ENCOUNTER FOR OTHER PREPROCEDURAL EXAMIN                     

 

 2018            NOHELIA AUGUST MD, Ot            G47.33      
      OBSTRUCTIVE SLEEP APNEA (ADULT) (PEDIATR                     

 

 2018            NOHELIA AUGUST MD            Ot            I25.10      
      ATHSCL HEART DISEASE OF NATIVE CORONARY                      

 

 2018            NOHELIA AUGUST MD            Ot            I34.0       
     NONRHEUMATIC MITRAL (VALVE) INSUFFICIENC                     

 

 2018            NOHELIA AUGUST MD            Ot            I71.2       
     THORACIC AORTIC ANEURYSM, WITHOUT RUPTUR                     

 

 2018            MATA MATIAS, NOHELIA CLARK            Ot            J43.9       
     EMPHYSEMA, UNSPECIFIED                     

 

 2018            NOHELIA AUGUST MD            Ot            N20.0       
     CALCULUS OF KIDNEY                     

 

 2018            NOHELIA AUGUST MD            Ot            R41.0       
     DISORIENTATION, UNSPECIFIED                     

 

 2018            NOHELIA AUGUST MD            Ot            W19.XXXA    
        UNSPECIFIED FALL, INITIAL ENCOUNTER                     

 

 2018            NOHELIA AUGUST MD            Ot            Z72.0       
     TOBACCO USE                     

 

 2018                         Ot            733.00            
OSTEOPOROSIS NOS                     

 

 2018            JOHNSONEDUAR HILL DO            Ot            733.00  
          OSTEOPOROSIS NOS                     

 

 2018            DANA DILLON DO            Ot            492.8       
     EMPHYSEMA NEC                     

 

 2018            DANA DILLON DO            Ot            511.9       
     PLEURAL EFFUSION NOS                     

 

 2018            DANA DILLON DO            Ot            586         
   RENAL FAILURE NOS                     

 

 2018            LETTY , ARLENE F            Ot            726.0    
        ADHESIVE CAPSULIT SHLDER                     

 

 2018            LETTY , ARLENE F            Ot            718.91   
         JT DERANGMENT NOS-SHLDER                     

 

 2018            LETTY , ARLENE LINDA            Ot            726.0    
        ADHESIVE CAPSULIT SHLDER                     

 

 2018            LETTY , ARLENE F            Ot            840.4    
        SPRAIN ROTATOR CUFF                     

 

 2018            LETTY DAVIS, ARLENE F            Ot            E000.8   
         OTHER EXTERNAL CAUSE STATUS                     

 

 2018            LETTY DO ARLENE LINDA            Ot            E928.9   
         ACCIDENT NOS                     

 

 2018            LETTY , ARLNEE MCKEON            Ot            V72.84   
         EXAM PRE-OPERATIVE NOS                     

 

 2018            LETTY DO ARLENE F            Ot            727.61   
         ROTATOR CUFF RUPTURE                     

 

 2018            LETTY DO ARLENE LINDA            Ot            V72.84   
         EXAM PRE-OPERATIVE NOS                     

 

 2018            NOHELIA AUGUST MD            Ot            414.00      
      CORON ATHEROSCLER NOS TYPE VESSEL, NATIV                     

 

 2018            NOHELIA AUGUST MD            Ot            424.0       
     MITRAL VALVE DISORDER                     

 

 2018            NOHELIA AUGUST MD            Ot            433.10      
      CAROTID ARTERY OCCLUSION W O CEREBRAL IN                     

 

 2018            NOHELIA AUGUST MD            Ot            786.09      
      RESPIRATORY ABNORM NEC                     

 

 2018            NOHELIA AUGUST MD            Ot            414.01      
      CORONARY ATHEROSCLEROSIS OF NATIVE CORON                     

 

 2018            NOHELIA AUGUST MD            Ot            786.09      
      RESPIRATORY ABNORM NEC                     

 

 2018            ARLENE SAENZ DO            Ot            840.4    
        SPRAIN ROTATOR CUFF                     

 

 2018            ARLENE SAENZ DO            Ot            E000.8   
         OTHER EXTERNAL CAUSE STATUS                     

 

 2018            ARLENE SAENZ DO            Ot            E928.9   
         ACCIDENT NOS                     

 

 2018            ARLENE SAENZ DO            Ot            V72.84   
         EXAM PRE-OPERATIVE NOS                     

 

 2018                         Ot            733.00            
OSTEOPOROSIS NOS                     

 

 2018            ARLENE SAENZ DO            Ot            722.4    
        CERVICAL DISC DEGEN                     

 

 2018                         Ot            J44.9            CHRONIC 
OBSTRUCTIVE PULMONARY DISEASE, U                     

 

 2018            EDUAR JOHNSON DO            Ot            K59.00  
          CONSTIPATION, UNSPECIFIED                     

 

 2018            EDUAR JOHNSON DO, Ot            J44.9   
         CHRONIC OBSTRUCTIVE PULMONARY DISEASE, U                     

 

 2018            TAE CHRISTIANSEN            Ot            
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                     

 

 2018            TAE CHRISTIANSEN Ot            
G47.33            OBSTRUCTIVE SLEEP APNEA (ADULT) (PEDIATR                     

 

 2018            TAE CHRISTIANSEN Ot            
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                      

 

 2018            TAE CHRISTIANSEN            Ot            
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                     

 

 2018            DANA DILLON DO            Ot            R06.02      
      SHORTNESS OF BREATH                     

 

 2018            DANA DILLON DO            Ot            Z87.891     
       PERSONAL HISTORY OF NICOTINE DEPENDENCE                     

 

 2018            NOHELIA AUGUST MD, Ot            G47.33      
      OBSTRUCTIVE SLEEP APNEA (ADULT) (PEDIATR                     

 

 2018            NOHELIA AUGUST MD, Ot            I25.10      
      ATHSCL HEART DISEASE OF NATIVE CORONARY                      

 

 2018            NOHELIA AUGUST MD            Ot            I34.0       
     NONRHEUMATIC MITRAL (VALVE) INSUFFICIENC                     

 

 2018            NHOELIA AUGUST MD            Ot            I71.2       
     THORACIC AORTIC ANEURYSM, WITHOUT RUPTUR                     

 

 2018            NOHELIA AUGUST MD            Ot            J43.9       
     EMPHYSEMA, UNSPECIFIED                     

 

 2018            NOHELIA AUGUST MD            Ot            N20.0       
     CALCULUS OF KIDNEY                     

 

 2018            NOHELIA AUGUST MD            Ot            R41.0       
     DISORIENTATION, UNSPECIFIED                     

 

 2018            MATA MATIAS, NOHELIA CLARK            Ot            W19.XXXA    
        UNSPECIFIED FALL, INITIAL ENCOUNTER                     

 

 2018            MATA MATIAS, NOHELIA CLARK            Ot            Z72.0       
     TOBACCO USE                     

 

 2018                         Ot            733.00            
OSTEOPOROSIS NOS                     

 

 2018            JOHNSON DO EDUAR CLARK            Ot            733.00  
          OSTEOPOROSIS NOS                     

 

 2018            DANA DILLON DO            Ot            492.8       
     EMPHYSEMA NEC                     

 

 2018            DANA DILLON DO M            Ot            511.9       
     PLEURAL EFFUSION NOS                     

 

 2018            DANA DILLON DO            Ot            586         
   RENAL FAILURE NOS                     

 

 2018            LETTY DO, ARLENE F            Ot            726.0    
        ADHESIVE CAPSULIT SHLDER                     

 

 2018            LETTY DO, ARLENE F            Ot            718.91   
         JT DERANGMENT NOS-SHLDER                     

 

 2018            LETTY DO, ARLENE F            Ot            726.0    
        ADHESIVE CAPSULIT SHLDER                     

 

 2018            LETTY DO, ARLENE F            Ot            840.4    
        SPRAIN ROTATOR CUFF                     

 

 2018            LETTY , ARLENE F            Ot            E000.8   
         OTHER EXTERNAL CAUSE STATUS                     

 

 2018            LETTY DO, ARLENE F            Ot            E928.9   
         ACCIDENT NOS                     

 

 2018            LETTY DO, ARLENE LINDA            Ot            V72.84   
         EXAM PRE-OPERATIVE NOS                     

 

 2018            LETTY , ARLENE F            Ot            727.61   
         ROTATOR CUFF RUPTURE                     

 

 2018            LETTY , ARLENE F            Ot            V72.84   
         EXAM PRE-OPERATIVE NOS                     

 

 2018            MATA MATIAS, NOHELIA CLARK            Ot            414.00      
      CORON ATHEROSCLER NOS TYPE VESSEL, NATIV                     

 

 2018            NOHELIA AUGUST MD            Ot            424.0       
     MITRAL VALVE DISORDER                     

 

 2018            NOHELIA AUGUST MD            Ot            433.10      
      CAROTID ARTERY OCCLUSION W O CEREBRAL IN                     

 

 2018            NOHELIA AUGUST MD            Ot            786.09      
      RESPIRATORY ABNORM NEC                     

 

 2018            NOHELIA AUGUST MD            Ot            414.01      
      CORONARY ATHEROSCLEROSIS OF NATIVE CORON                     

 

 2018            NOHELIA AUGUST MD            Ot            786.09      
      RESPIRATORY ABNORM NEC                     

 

 2018            LETTY DO ARLENE LINDA            Ot            840.4    
        SPRAIN ROTATOR CUFF                     

 

 2018            LETTY , ARLENE F            Ot            E000.8   
         OTHER EXTERNAL CAUSE STATUS                     

 

 2018            LETTY DO, ARLENE F            Ot            E928.9   
         ACCIDENT NOS                     

 

 2018            LETTY ARLENE            Ot            V72.84   
         EXAM PRE-OPERATIVE NOS                     

 

 2018                         Ot            733.00            
OSTEOPOROSIS NOS                     

 

 2018            ARLENE SAENZ DO            Ot            722.4    
        CERVICAL DISC DEGEN                     

 

 2018                         Ot            J44.9            CHRONIC 
OBSTRUCTIVE PULMONARY DISEASE, U                     

 

 2018            EDUAR JOHNSON DO            Ot            K59.00  
          CONSTIPATION, UNSPECIFIED                     

 

 2018            EDUAR JOHNSON DO            Ot            J44.9   
         CHRONIC OBSTRUCTIVE PULMONARY DISEASE, U                     

 

 2018            TAE CHRISTIANSEN Ot            
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                     

 

 2018            TAE CHRISTIANSEN Ot            
G47.33            OBSTRUCTIVE SLEEP APNEA (ADULT) (PEDIATR                     

 

 2018            TAE CHRISTIANSEN Ot            
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                      

 

 2018            TAE CHRISTIANSEN            Ot            
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                     

 

 2018            DANA DILLON DO            Ot            R06.02      
      SHORTNESS OF BREATH                     

 

 2018            DANA DILLON DO            Ot            Z87.891     
       PERSONAL HISTORY OF NICOTINE DEPENDENCE                     

 

 2018            NOHELIA AUGUST MD, Ot            G47.33      
      OBSTRUCTIVE SLEEP APNEA (ADULT) (PEDIATR                     

 

 2018            NOHELIA AUGUST MD, Ot            I25.10      
      ATHSCL HEART DISEASE OF NATIVE CORONARY                      

 

 2018            NOHELIA AUGUST MD            Ot            I34.0       
     NONRHEUMATIC MITRAL (VALVE) INSUFFICIENC                     

 

 2018            NOHELIA AUGUST MD, Ot            I71.2       
     THORACIC AORTIC ANEURYSM, WITHOUT RUPTUR                     

 

 2018            NOHELIA AUGUST MD, Ot            J43.9       
     EMPHYSEMA, UNSPECIFIED                     

 

 2018            NOHELIA AUGUST MD            Ot            N20.0       
     CALCULUS OF KIDNEY                     

 

 2018            NOHELIA AUGUST MD, Ot            R41.0       
     DISORIENTATION, UNSPECIFIED                     

 

 2018            NOHELIA AUGUST MD            Ot            W19.XXXA    
        UNSPECIFIED FALL, INITIAL ENCOUNTER                     

 

 2018            NOHELIA AUGUST MD, Ot            Z72.0       
     TOBACCO USE                     

 

 2018            MICH COLLIER DO            Ot            R93.5       
     ABN FINDINGS ON DX IMAGING OF ABD REGION                     

 

 2018            MICH COLLIER DO            Ot            Z01.818     
       ENCOUNTER FOR OTHER PREPROCEDURAL EXAMIN                     

 

 2018                         Ot            733.00            
OSTEOPOROSIS NOS                     

 

 2018            ELIZABETH DAVIS EDUAR CLARK            Ot            733.00  
          OSTEOPOROSIS NOS                     

 

 2018            DANA DILLON DO            Ot            492.8       
     EMPHYSEMA NEC                     

 

 2018            DANA DILLON DO            Ot            511.9       
     PLEURAL EFFUSION NOS                     

 

 2018            DANA DILLON DO            Ot            586         
   RENAL FAILURE NOS                     

 

 2018            LETTY , ARLENE MCKEON            Ot            726.0    
        ADHESIVE CAPSULIT SHLDER                     

 

 2018            LETTY , ARLENE MCKEON            Ot            718.91   
         JT DERANGMENT NOS-SHLDER                     

 

 2018            LETTY DO ARLENE [place]ON            Ot            726.0    
        ADHESIVE CAPSULIT SHLDER                     

 

 2018            LETTY , ARLENE MCKEON            Ot            840.4    
        SPRAIN ROTATOR CUFF                     

 

 2018            LETTY DO ARLENE MCKEON            Ot            E000.8   
         OTHER EXTERNAL CAUSE STATUS                     

 

 2018            LETTY DO ARLENE [place]ON            Ot            E928.9   
         ACCIDENT NOS                     

 

 2018            LETTY , ARLENE MCKEON            Ot            V72.84   
         EXAM PRE-OPERATIVE NOS                     

 

 2018            LETTY DO ARLENE [place]KEON            Ot            727.61   
         ROTATOR CUFF RUPTURE                     

 

 2018            LETTY DO ARLENE MCKEON            Ot            V72.84   
         EXAM PRE-OPERATIVE NOS                     

 

 2018            NOHELIA AUGUST MD            Ot            414.00      
      CORON ATHEROSCLER NOS TYPE VESSEL, NATIV                     

 

 2018            NOHELIA AUGUST MD            Ot            424.0       
     MITRAL VALVE DISORDER                     

 

 2018            NOHELIA AUGUST MD            Ot            433.10      
      CAROTID ARTERY OCCLUSION W O CEREBRAL IN                     

 

 2018            NOHELIA AUGUST MD            Ot            786.09      
      RESPIRATORY ABNORM NEC                     

 

 2018            NOHELIA AUGUST MD            Ot            414.01      
      CORONARY ATHEROSCLEROSIS OF NATIVE CORON                     

 

 2018            NOHELIA AUGUST MD            Ot            786.09      
      RESPIRATORY ABNORM NEC                     

 

 2018            ARLENE SAENZ DO            Ot            840.4    
        SPRAIN ROTATOR CUFF                     

 

 2018            LETTY DO ARLENE MCKEON            Ot            E000.8   
         OTHER EXTERNAL CAUSE STATUS                     

 

 2018            LETTY DO ARLENE [place]KEON            Ot            E928.9   
         ACCIDENT NOS                     

 

 2018            LETTY DO ARLENE [place]KEON            Ot            V72.84   
         EXAM PRE-OPERATIVE NOS                     

 

 2018                         Ot            733.00            
OSTEOPOROSIS NOS                     

 

 2018            LETTY ARLENE            Ot            722.4    
        CERVICAL DISC DEGEN                     

 

 2018                         Ot            J44.9            CHRONIC 
OBSTRUCTIVE PULMONARY DISEASE, U                     

 

 2018            EDUAR JOHNSON DO            Ot            K59.00  
          CONSTIPATION, UNSPECIFIED                     

 

 2018            EDUAR JOHNSON DO            Ot            J44.9   
         CHRONIC OBSTRUCTIVE PULMONARY DISEASE, U                     

 

 2018            TAE CHRISTIANSEN            Ot            
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                     

 

 2018            ATE CHRISTIANSEN Ot            
G47.33            OBSTRUCTIVE SLEEP APNEA (ADULT) (PEDIATR                     

 

 2018            TAE CHRISTIANSEN Ot            
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                      

 

 2018            TAE CHRISTIANSEN            Ot            
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                     

 

 2018            DANA DILLON DO            Ot            R06.02      
      SHORTNESS OF BREATH                     

 

 2018            DANA DILLON DO            Ot            Z87.891     
       PERSONAL HISTORY OF NICOTINE DEPENDENCE                     

 

 2018            NOHELIA AUGUST MD, Ot            G47.33      
      OBSTRUCTIVE SLEEP APNEA (ADULT) (PEDIATR                     

 

 2018            NOHELIA AUGUST MD, Ot            I25.10      
      ATHSCL HEART DISEASE OF NATIVE CORONARY                      

 

 2018            NOHELIA AUGUST MD, Ot            I34.0       
     NONRHEUMATIC MITRAL (VALVE) INSUFFICIENC                     

 

 2018            NOHELIA AUGUST MD, Ot            I71.2       
     THORACIC AORTIC ANEURYSM, WITHOUT RUPTUR                     

 

 2018            NOHELIA AUGUST MD, Ot            J43.9       
     EMPHYSEMA, UNSPECIFIED                     

 

 2018            NOHELIA AUGUST MD, Ot            N20.0       
     CALCULUS OF KIDNEY                     

 

 2018            NOHELIA AUGUST MD            Ot            R41.0       
     DISORIENTATION, UNSPECIFIED                     

 

 2018            NOHELIA AUGUST MD            Ot            W19.XXXA    
        UNSPECIFIED FALL, INITIAL ENCOUNTER                     

 

 2018            NOHELIA AUGUST MD, Ot            Z72.0       
     TOBACCO USE                     



                                                                               
                                                                               
                                                                               
                                                                               
                                                                               
                                                                               
                                                                               
                                                                               
                                                                               
                                                                               
                                                                               
                                                                               
                                                                               
                                                                               
                                                                               
                                                                               
                                                      



Procedures

      





 Code            Description            Performed By            Performed On   
     

 

             38.93                                  VENOUS CATHETERIZATION NEC 
                                  2014        

 

             96.04                                  INSERT ENDOTRACHEAL TUBE   
                                2014        

 

             96.71                                  CONTINUOUS INVASIVE 
MECHANICAL VENTILATI                                   2014        



                      



Results

      





 Test            Result            Range        









 Complete urinalysis with reflex to culture - 17 09:12         









 Urine color determination            YELLOW             NRG        

 

 Urine clarity determination            CLEAR             NRG        

 

 Urine pH measurement by test strip            5             5-9        

 

 Specific gravity of urine by test strip            1.025             1.016-
1.022        

 

 Urine protein assay by test strip, semi-quantitative            NEGATIVE      
       NEGATIVE        

 

 Urine glucose detection by automated test strip            NEGATIVE           
  NEGATIVE        

 

 Erythrocytes detection in urine sediment by light microscopy            
NEGATIVE             NEGATIVE        

 

 Urine ketones detection by automated test strip            NEGATIVE           
  NEGATIVE        

 

 Urine nitrite detection by test strip            NEGATIVE             NEGATIVE
        

 

 Urine total bilirubin detection by test strip            NEGATIVE             
NEGATIVE        

 

 Urine urobilinogen measurement by automated test strip (mass/volume)          
  NORMAL             NORMAL        

 

 Urine leukocyte esterase detection by dipstick            NEGATIVE             
NEGATIVE        

 

 Automated urine sediment erythrocyte count by microscopy (number/high power 
field)            NONE             NRG        

 

 Automated urine sediment leukocyte count by microscopy (number/high power field
)            NONE             NRG        

 

 Bacteria detection in urine sediment by light microscopy            NEGATIVE  
           NRG        

 

 Squamous epithelial cells detection in urine sediment by light microscopy     
       2-5             NRG        

 

 Crystals detection in urine sediment by light microscopy            NONE      
       NRG        

 

 Casts detection in urine sediment by light microscopy            NONE         
    NRG        

 

 Mucus detection in urine sediment by light microscopy            NEGATIVE     
        NRG        

 

 Complete urinalysis with reflex to culture            NO             NRG      
  









 Automated blood complete blood count (hemogram) panel - 17 09:25         









 Blood leukocytes automated count (number/volume)            6.8 10*3/uL       
     4.3-11.0        

 

 Blood erythrocytes automated count (number/volume)            4.06 10*6/uL    
        4.35-5.85        

 

 Venous blood hemoglobin measurement (mass/volume)            13.7 g/dL        
    13.3-17.7        

 

 Blood hematocrit (volume fraction)            40 %            40-54        

 

 Automated erythrocyte mean corpuscular volume            99 [foz_us]          
  80-99        

 

 Automated erythrocyte mean corpuscular hemoglobin (mass per erythrocyte)      
      34 pg            25-34        

 

 Automated erythrocyte mean corpuscular hemoglobin concentration measurement (
mass/volume)            34 g/dL            32-36        

 

 Automated erythrocyte distribution width ratio            14.9 %            
10.0-14.5        

 

 Automated blood platelet count (count/volume)            230 10*3/uL          
  130-400        

 

 Automated blood platelet mean volume measurement            8.9 [foz_us]      
      7.4-10.4        









 PT panel in platelet poor plasma by coagulation assay - 17 09:25         









 Prothrombin time (PT) in platelet poor plasma by coagulation assay            
14.1 s            12.2-14.7        

 

 INR in platelet poor plasma or blood by coagulation assay            1.1      
       0.8-1.4        









 Activated partial thromboplastin time (aPTT) in platelet poor plasma 
bycoagulation assay - 17 09:25         









 Activated partial thromboplastin time (aPTT) in platelet poor plasma 
bycoagulation assay            29 s            24-35        









 Comprehensive metabolic panel - 17 09:25         









 Serum or plasma sodium measurement (moles/volume)            141 mmol/L       
     135-145        

 

 Serum or plasma potassium measurement (moles/volume)            4.1 mmol/L    
        3.6-5.0        

 

 Serum or plasma chloride measurement (moles/volume)            109 mmol/L     
               

 

 Carbon dioxide            21 mmol/L            21-32        

 

 Serum or plasma anion gap determination (moles/volume)            11 mmol/L   
         5-14        

 

 Serum or plasma urea nitrogen measurement (mass/volume)            18 mg/dL   
         7-18        

 

 Serum or plasma creatinine measurement (mass/volume)            1.32 mg/dL    
        0.60-1.30        

 

 Serum or plasma urea nitrogen/creatinine mass ratio            14             
NRG        

 

 Serum or plasma creatinine measurement with calculation of estimated 
glomerular filtration rate            54             NRG        

 

 Serum or plasma glucose measurement (mass/volume)            100 mg/dL        
            

 

 Serum or plasma calcium measurement (mass/volume)            9.6 mg/dL        
    8.5-10.1        

 

 Serum or plasma total bilirubin measurement (mass/volume)            0.4 mg/dL
            0.1-1.0        

 

 Serum or plasma alkaline phosphatase measurement (enzymatic activity/volume)  
          75 U/L                    

 

 Serum or plasma aspartate aminotransferase measurement (enzymatic activity/
volume)            17 U/L            5-34        

 

 Serum or plasma alanine aminotransferase measurement (enzymatic activity/volume
)            22 U/L            0-55        

 

 Serum or plasma protein measurement (mass/volume)            6.8 g/dL         
   6.4-8.2        

 

 Serum or plasma albumin measurement (mass/volume)            4.1 g/dL         
   3.2-4.5        









 Lipid 1996 panel - 17 09:25         









 Serum or plasma triglyceride measurement (mass/volume)            88 mg/dL    
        <150        

 

 Serum or plasma cholesterol measurement (mass/volume)            150 mg/dL    
        < 200        

 

 Serum or plasma cholesterol in HDL measurement (mass/volume)            56 mg/
dL            40-60        

 

 Cholesterol in LDL [mass/volume] in serum or plasma by direct assay            
78 mg/dL            1-129        

 

 Serum or plasma cholesterol in VLDL measurement (mass/volume)            18 mg/
dL            5-40        









 Methicillin resistant Staphylococcus aureus (MRSA) screening culture -  09:25         









 MRSA SCREEN RESULT            MRSA ISOLATED             [facility]        









 Automated blood complete blood count (hemogram) panel - 18 15:25         









 Blood leukocytes automated count (number/volume)            7.6 10*3/uL       
     4.3-11.0        

 

 Blood erythrocytes automated count (number/volume)            3.10 10*6/uL    
        4.35-5.85        

 

 Venous blood hemoglobin measurement (mass/volume)            11.0 g/dL        
    13.3-17.7        

 

 Blood hematocrit (volume fraction)            32 %            40-54        

 

 Automated erythrocyte mean corpuscular volume            104 [foz_us]         
   80-99        

 

 Automated erythrocyte mean corpuscular hemoglobin (mass per erythrocyte)      
      36 pg            25-34        

 

 Automated erythrocyte mean corpuscular hemoglobin concentration measurement (
mass/volume)            34 g/dL            32-36        

 

 Automated erythrocyte distribution width ratio            13.9 %            
10.0-14.5        

 

 Automated blood platelet count (count/volume)            223 10*3/uL          
  130-400        

 

 Automated blood platelet mean volume measurement            9.5 [foz_us]      
      7.4-10.4        









 PT panel in platelet poor plasma by coagulation assay - 18 15:25         









 Prothrombin time (PT) in platelet poor plasma by coagulation assay            
15.2 s            12.2-14.7        

 

 INR in platelet poor plasma or blood by coagulation assay            1.2      
       0.8-1.4        









 Activated partial thromboplastin time (aPTT) in platelet poor plasma 
bycoagulation assay - 18 15:25         









 Activated partial thromboplastin time (aPTT) in platelet poor plasma 
bycoagulation assay            29 s            24-35        









 Comprehensive metabolic panel - 18 15:25         









 Serum or plasma sodium measurement (moles/volume)            140 mmol/L       
     135-145        

 

 Serum or plasma potassium measurement (moles/volume)            4.1 mmol/L    
        3.6-5.0        

 

 Serum or plasma chloride measurement (moles/volume)            111 mmol/L     
               

 

 Carbon dioxide            18 mmol/L            21-32        

 

 Serum or plasma anion gap determination (moles/volume)            11 mmol/L   
         5-14        

 

 Serum or plasma urea nitrogen measurement (mass/volume)            56 mg/dL   
         7-18        

 

 Serum or plasma creatinine measurement (mass/volume)            1.48 mg/dL    
        0.60-1.30        

 

 Serum or plasma urea nitrogen/creatinine mass ratio            38             
NRG        

 

 Serum or plasma creatinine measurement with calculation of estimated 
glomerular filtration rate            47             NRG        

 

 Serum or plasma glucose measurement (mass/volume)            94 mg/dL         
           

 

 Serum or plasma calcium measurement (mass/volume)            9.3 mg/dL        
    8.5-10.1        

 

 Serum or plasma total bilirubin measurement (mass/volume)            0.5 mg/dL
            0.1-1.0        

 

 Serum or plasma alkaline phosphatase measurement (enzymatic activity/volume)  
          56 U/L                    

 

 Serum or plasma aspartate aminotransferase measurement (enzymatic activity/
volume)            13 U/L            5-34        

 

 Serum or plasma alanine aminotransferase measurement (enzymatic activity/volume
)            16 U/L            0-55        

 

 Serum or plasma protein measurement (mass/volume)            5.9 g/dL         
   6.4-8.2        

 

 Serum or plasma albumin measurement (mass/volume)            3.8 g/dL         
   3.2-4.5        









 Serum or plasma lithium measurement (moles/volume) - 18 15:25         









 BNP level            23.9 pg/mL            <100.0        









 Serum or plasma troponin i.cardiac measurement (mass/volume) - 18 15:25 
        









 Serum or plasma troponin i.cardiac measurement (mass/volume)            < ng/
mL            <0.30        









 THYROID STIMULATING HORMONE - 18 15:25         









 THYROID STIMULATING HORMONE            1.75 u[iU]/mL            0.35-4.94     
   









 Automated blood complete blood count (hemogram) panel - 04/10/18 05:46         









 Blood leukocytes automated count (number/volume)            6.2 10*3/uL       
     4.3-11.0        

 

 Blood erythrocytes automated count (number/volume)            2.66 10*6/uL    
        4.35-5.85        

 

 Venous blood hemoglobin measurement (mass/volume)            9.3 g/dL         
   13.3-17.7        

 

 Blood hematocrit (volume fraction)            28 %            40-54        

 

 Automated erythrocyte mean corpuscular volume            105 [foz_us]         
   80-99        

 

 Automated erythrocyte mean corpuscular hemoglobin (mass per erythrocyte)      
      35 pg            25-34        

 

 Automated erythrocyte mean corpuscular hemoglobin concentration measurement (
mass/volume)            34 g/dL            32-36        

 

 Automated erythrocyte distribution width ratio            13.9 %            
10.0-14.5        

 

 Automated blood platelet count (count/volume)            200 10*3/uL          
  130-400        

 

 Automated blood platelet mean volume measurement            9.7 [foz_us]      
      7.4-10.4        









 Comprehensive metabolic panel - 04/10/18 05:46         









 Serum or plasma sodium measurement (moles/volume)            141 mmol/L       
     135-145        

 

 Serum or plasma potassium measurement (moles/volume)            4.0 mmol/L    
        3.6-5.0        

 

 Serum or plasma chloride measurement (moles/volume)            113 mmol/L     
               

 

 Carbon dioxide            24 mmol/L            21-32        

 

 Serum or plasma anion gap determination (moles/volume)            4 mmol/L    
        5-14        

 

 Serum or plasma urea nitrogen measurement (mass/volume)            36 mg/dL   
         7-18        

 

 Serum or plasma creatinine measurement (mass/volume)            1.20 mg/dL    
        0.60-1.30        

 

 Serum or plasma urea nitrogen/creatinine mass ratio            30             
NRG        

 

 Serum or plasma creatinine measurement with calculation of estimated 
glomerular filtration rate            60             NRG        

 

 Serum or plasma glucose measurement (mass/volume)            94 mg/dL         
           

 

 Serum or plasma calcium measurement (mass/volume)            8.6 mg/dL        
    8.5-10.1        

 

 Serum

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
## 2018-05-24 ENCOUNTER — HOSPITAL ENCOUNTER (OUTPATIENT)
Dept: HOSPITAL 75 - PREOP | Age: 73
Discharge: HOME | End: 2018-05-24
Attending: SURGERY
Payer: MEDICARE

## 2018-05-24 VITALS — BODY MASS INDEX: 30.8 KG/M2 | WEIGHT: 220 LBS | HEIGHT: 71 IN

## 2018-05-24 VITALS — DIASTOLIC BLOOD PRESSURE: 84 MMHG | SYSTOLIC BLOOD PRESSURE: 129 MMHG

## 2018-05-24 DIAGNOSIS — Z11.2: ICD-10-CM

## 2018-05-24 DIAGNOSIS — Z01.812: Primary | ICD-10-CM

## 2018-05-24 DIAGNOSIS — K42.9: ICD-10-CM

## 2018-05-24 LAB
BASOPHILS # BLD AUTO: 0 10^3/UL (ref 0–0.1)
BASOPHILS NFR BLD AUTO: 0 % (ref 0–10)
BUN/CREAT SERPL: 11
CALCIUM SERPL-MCNC: 10.5 MG/DL (ref 8.5–10.1)
CHLORIDE SERPL-SCNC: 108 MMOL/L (ref 98–107)
CO2 SERPL-SCNC: 22 MMOL/L (ref 21–32)
CREAT SERPL-MCNC: 1.29 MG/DL (ref 0.6–1.3)
EOSINOPHIL # BLD AUTO: 0.3 10^3/UL (ref 0–0.3)
EOSINOPHIL NFR BLD AUTO: 4 % (ref 0–10)
ERYTHROCYTE [DISTWIDTH] IN BLOOD BY AUTOMATED COUNT: 14.7 % (ref 10–14.5)
GFR SERPLBLD BASED ON 1.73 SQ M-ARVRAT: 55 ML/MIN
GLUCOSE SERPL-MCNC: 97 MG/DL (ref 70–105)
HCT VFR BLD CALC: 34 % (ref 40–54)
HGB BLD-MCNC: 11.2 G/DL (ref 13.3–17.7)
LYMPHOCYTES # BLD AUTO: 1.2 X 10^3 (ref 1–4)
LYMPHOCYTES NFR BLD AUTO: 17 % (ref 12–44)
MANUAL DIFFERENTIAL PERFORMED BLD QL: NO
MCH RBC QN AUTO: 33 PG (ref 25–34)
MCHC RBC AUTO-ENTMCNC: 33 G/DL (ref 32–36)
MCV RBC AUTO: 99 FL (ref 80–99)
MONOCYTES # BLD AUTO: 0.6 X 10^3 (ref 0–1)
MONOCYTES NFR BLD AUTO: 8 % (ref 0–12)
NEUTROPHILS # BLD AUTO: 4.8 X 10^3 (ref 1.8–7.8)
NEUTROPHILS NFR BLD AUTO: 70 % (ref 42–75)
PLATELET # BLD: 268 10^3/UL (ref 130–400)
PMV BLD AUTO: 9.1 FL (ref 7.4–10.4)
POTASSIUM SERPL-SCNC: 4.4 MMOL/L (ref 3.6–5)
RBC # BLD AUTO: 3.44 10^6/UL (ref 4.35–5.85)
SODIUM SERPL-SCNC: 140 MMOL/L (ref 135–145)
WBC # BLD AUTO: 6.9 10^3/UL (ref 4.3–11)

## 2018-05-24 PROCEDURE — 36415 COLL VENOUS BLD VENIPUNCTURE: CPT

## 2018-05-24 PROCEDURE — 80048 BASIC METABOLIC PNL TOTAL CA: CPT

## 2018-05-24 PROCEDURE — 87081 CULTURE SCREEN ONLY: CPT

## 2018-05-24 PROCEDURE — 85025 COMPLETE CBC W/AUTO DIFF WBC: CPT

## 2018-05-31 ENCOUNTER — HOSPITAL ENCOUNTER (OUTPATIENT)
Dept: HOSPITAL 75 - SDC | Age: 73
Discharge: HOME | End: 2018-05-31
Attending: SURGERY
Payer: MEDICARE

## 2018-05-31 VITALS — DIASTOLIC BLOOD PRESSURE: 76 MMHG | SYSTOLIC BLOOD PRESSURE: 130 MMHG

## 2018-05-31 VITALS — WEIGHT: 220 LBS | BODY MASS INDEX: 30.8 KG/M2 | HEIGHT: 71 IN

## 2018-05-31 VITALS — DIASTOLIC BLOOD PRESSURE: 95 MMHG | SYSTOLIC BLOOD PRESSURE: 131 MMHG

## 2018-05-31 VITALS — DIASTOLIC BLOOD PRESSURE: 73 MMHG | SYSTOLIC BLOOD PRESSURE: 119 MMHG

## 2018-05-31 VITALS — SYSTOLIC BLOOD PRESSURE: 129 MMHG | DIASTOLIC BLOOD PRESSURE: 88 MMHG

## 2018-05-31 VITALS — SYSTOLIC BLOOD PRESSURE: 131 MMHG | DIASTOLIC BLOOD PRESSURE: 95 MMHG

## 2018-05-31 VITALS — DIASTOLIC BLOOD PRESSURE: 77 MMHG | SYSTOLIC BLOOD PRESSURE: 134 MMHG

## 2018-05-31 DIAGNOSIS — I08.1: ICD-10-CM

## 2018-05-31 DIAGNOSIS — Z79.899: ICD-10-CM

## 2018-05-31 DIAGNOSIS — J44.9: ICD-10-CM

## 2018-05-31 DIAGNOSIS — Z79.02: ICD-10-CM

## 2018-05-31 DIAGNOSIS — I25.10: ICD-10-CM

## 2018-05-31 DIAGNOSIS — K42.0: Primary | ICD-10-CM

## 2018-05-31 DIAGNOSIS — G47.33: ICD-10-CM

## 2018-05-31 DIAGNOSIS — J45.909: ICD-10-CM

## 2018-05-31 DIAGNOSIS — F17.210: ICD-10-CM

## 2018-05-31 DIAGNOSIS — I10: ICD-10-CM

## 2018-05-31 PROCEDURE — 94664 DEMO&/EVAL PT USE INHALER: CPT

## 2018-05-31 RX ADMIN — ALBUTEROL SULFATE NR MG: 2.5 SOLUTION RESPIRATORY (INHALATION) at 09:27

## 2018-05-31 RX ADMIN — SODIUM CHLORIDE, SODIUM LACTATE, POTASSIUM CHLORIDE, AND CALCIUM CHLORIDE PRN MLS/HR: 600; 310; 30; 20 INJECTION, SOLUTION INTRAVENOUS at 08:45

## 2018-05-31 RX ADMIN — ALBUTEROL SULFATE NR MG: 2.5 SOLUTION RESPIRATORY (INHALATION) at 10:11

## 2018-05-31 RX ADMIN — SODIUM CHLORIDE, SODIUM LACTATE, POTASSIUM CHLORIDE, AND CALCIUM CHLORIDE PRN MLS/HR: 600; 310; 30; 20 INJECTION, SOLUTION INTRAVENOUS at 06:47

## 2018-05-31 NOTE — DISCHARGE INST-SIMPLE/STANDARD
Discharge Inst-Standard


Discharge Medications


New, Converted or Re-Newed RX:  RX on Chart





Patient Instructions/Follow Up


Plan of Care/Instructions/FU:  


2 weeks Madie


Activity as Tolerated:  No


Discharge Diet:  Regular Diet


Other Inst to Patient


Follow up Appt:


Make appointment for 2 week.





Instructions:


No lifting greater than 10 pounds.


No strenuous activity. 


May shower in 24 hours, no tub bath or soaking.


Use incentive spirometer at home as directed.


No Smoking





Skin/Wound Care:


May remove bandages in 48 hours.  You have special glue over incisions it will 

fall off on its own.





Symptoms to Report:


Appetite Changes, Extremity Discoloration, Numbness/Tingling, Swelling Increased

, Bleeding Excessive, Eyesight Changes, Pain Increased, Urine Color Change, 

Constipation(Persistent), Fever over 101 degree F, Pain/Pressure in chest, 

Urinating Difficulty, Cough Up/Vomit Blood, Heart Beat Irreg/Pounding, Pain/

Pressure in jaw, Vaginal Bleeding Increase, Cramps in feet or legs, 

Lightheadedness, Pain/Pressure in shoulder, Diarrhea(Persistent), Memory 

Changes Suddenly, Questions/Concerns, Weight gain consecutive days, Dizziness/

Fainting, Nausea/Vomiting, Shortness of Breath, Weight gain over 2 pounds








If questions or concerns contact your physician 


Or seek help at emergency department.











MICH COLLIER DO May 31, 2018 08:59

## 2018-05-31 NOTE — ANESTHESIA-GENERAL POST-OP
General


Patient Condition


Mental Status/LOC:  Same as Preop


Cardiovascular:  Satisfactory


Nausea/Vomiting:  Absent


Respiratory:  Satisfactory


Pain:  Controlled


Complications:  Absent





Post Op Complications


Complications


None





Follow Up Care/Instructions


Patient Instructions


None needed.





Anesthesia/Patient Condition


Patient Condition


Patient is doing well, no complaints, stable vital signs, no apparent adverse 

anesthesia problems.   


No complications reported per nursing.











LEWIS CAMPOS CRNA May 31, 2018 10:12

## 2018-05-31 NOTE — PROGRESS NOTE-POST OPERATIVE
Post-Operative Progess Note


Surgeon (s)/Assistant (s)


Surgeon


MICH COLLIER DO


Assistant:  Dr. Mckeon





Pre-Operative Diagnosis


umbilical hernia





Post-Operative Diagnosis





incarcerated umbilical hernia





Procedure & Operative Findings


Date of Procedure


5/31/18


Procedure Performed/Findings


laparoscopic incarcerated umbilical hernia repair with 4.5 " ventralight echo 

mesh


Anesthesia Type


gen





Estimated Blood Loss


Estimated blood loss (mL):  min





Specimens/Packing


Specimens Removed


none











MICH COLLIER DO May 31, 2018 09:01

## 2018-05-31 NOTE — OPERATIVE REPORT
DATE OF SERVICE:  05/31/2018



PREOPERATIVE DIAGNOSIS:

Umbilical hernia.



POSTOPERATIVE DIAGNOSIS:

Incarcerated umbilical hernia.



PROCEDURE:

Laparoscopic incarcerated umbilical hernia repair.



SURGEON:

Martínez Ramachandran DO.



ASSISTANT:

Dr. Mckeon, assisted in retraction, dissection and closure.



ANESTHESIA:

General.



ESTIMATED BLOOD LOSS:

Minimal.



COMPLICATIONS:

None.



INDICATIONS:

The patient is a 72-year-old male with an umbilical hernia.  He was explained

risks and benefits of procedure and wished to proceed with procedure.  Consent

was signed in the chart.



PROCEDURE:

The patient was taken to the operating suite, was prepped and draped in sterile

fashion.  Surgical pause was performed.  A 15 blade scalpel was used to make a

stab incision in the left upper quadrant.  Veress needle inserted into the

abdomen.  Pneumoperitoneum was achieved.  Under direct visualization of the

laparoscope, a 5 mm trocar was then placed.  The patient had multiple adhesions

along the right portion of the abdomen.  The umbilical hernia had incarcerated

omentum through it.  A 12 mm trocar was placed in left lower quadrant.  A 5 mm

trocar was placed in the right lower quadrant, all under direct visualization. 

A LigaSure was then used to take down the incarcerated fat from the umbilical

hernia.  The adhesions were then begun to be taken down for mesh placement.  The

fat pad at the umbilicus was removed.  A stab incision was made at the

umbilicus.  A 0 Vicryl was placed in a figure-of-eight fashion to close the

defect.  An ECHO 4-1/2 inch [place] Ventralight mesh was inserted in the abdomen

and grasped with the Devyn-Jovi and then a circumferential tacking was

performed on the periphery using SecureStrap Tacker.  The balloon was then

removed and the inner crown was created as well.  The mesh had adequate coverage

and adherence.  The 12 mm trocar was then removed.  The fascial defect was

closed using 0 Vicryl with Devyn-Jovi.  The abdomen was then desufflated,

the trocars were removed.  The skin was then closed using 4-0 Monocryl in a

subcuticular fashion.  The incisions had a SwiftSet placed over the incisions. 

The patient tolerated the procedure well without any complications.  He was

taken to recovery room in stable condition.





Job ID: [id number]

DocumentID: [id number]

Dictated Date:  05/31/2018 09:14:42

Transcription Date: 05/31/2018 15:07:28

Dictated By: DO MONY CASH

## 2018-06-18 ENCOUNTER — HOSPITAL ENCOUNTER (OUTPATIENT)
Dept: HOSPITAL 75 - PULM | Age: 73
LOS: 90 days | Discharge: HOME | End: 2018-09-16
Attending: NURSE PRACTITIONER
Payer: MEDICARE

## 2018-06-18 DIAGNOSIS — R09.02: ICD-10-CM

## 2018-06-18 DIAGNOSIS — J44.9: Primary | ICD-10-CM

## 2018-06-18 PROCEDURE — 99211 OFF/OP EST MAY X REQ PHY/QHP: CPT

## 2018-06-20 ENCOUNTER — HOSPITAL ENCOUNTER (OUTPATIENT)
Dept: HOSPITAL 75 - RAD | Age: 73
End: 2018-06-20
Attending: NURSE PRACTITIONER
Payer: MEDICARE

## 2018-06-20 DIAGNOSIS — J44.9: ICD-10-CM

## 2018-06-20 DIAGNOSIS — R59.0: Primary | ICD-10-CM

## 2018-06-20 LAB
BUN/CREAT SERPL: 11
CREAT SERPL-MCNC: 1.59 MG/DL (ref 0.6–1.3)
GFR SERPLBLD BASED ON 1.73 SQ M-ARVRAT: 43 ML/MIN

## 2018-06-20 PROCEDURE — 82565 ASSAY OF CREATININE: CPT

## 2018-06-20 PROCEDURE — 84520 ASSAY OF UREA NITROGEN: CPT

## 2018-06-20 PROCEDURE — 36415 COLL VENOUS BLD VENIPUNCTURE: CPT

## 2018-06-20 PROCEDURE — 71260 CT THORAX DX C+: CPT

## 2018-06-20 NOTE — DIAGNOSTIC IMAGING REPORT
PROCEDURE: CT chest with contrast only.



TECHNIQUE: Multiple contiguous axial images were obtained through

the chest after administration of intravenous contrast.



INDICATION: Lymph node enlargement.



COMPARISON: Correlation is made with prior CT from 04/06/2018.



FINDINGS: No axillary lymphadenopathy is detected. Mild soft

tissue fullness in the right hilum is stable at 2.9 x 2.0 cm

compared with 2.8 x 2.1 cm when measured by the same technique.

Left hilum is unremarkable. The mediastinum is unremarkable.

Coronary arterial calcifications are noted. The paraesophageal

lymph node near the hiatus previously described appears stable.

No pericardial or pleural fluid is identified. Parenchymal

evaluation does show some centrilobular emphysematous changes in

both lungs. There is some scarring or atelectasis in the lingula.

Otherwise, the lungs appear to be clear. Upper abdomen is

unremarkable.



IMPRESSION:

1. Stable right hilar and left paraesophageal lymph nodes when

compared with prior CT from 04/06/2018. There is also stable

atelectasis or scarring in the lingula. No new abnormality is

detected.



Dictated by: 



  Dictated on workstation # [id number]

## 2018-07-20 ENCOUNTER — HOSPITAL ENCOUNTER (OUTPATIENT)
Dept: HOSPITAL 75 - RAD | Age: 73
End: 2018-07-20
Attending: INTERNAL MEDICINE
Payer: MEDICARE

## 2018-07-20 DIAGNOSIS — M16.0: Primary | ICD-10-CM

## 2018-07-20 DIAGNOSIS — M47.817: ICD-10-CM

## 2018-07-20 PROCEDURE — 73523 X-RAY EXAM HIPS BI 5/> VIEWS: CPT

## 2018-07-20 NOTE — DIAGNOSTIC IMAGING REPORT
EXAMINATION: Pelvis, single view. Right hip, two additional

views. Left hip, two additional views.



COMPARISON: CT abdomen and pelvis January 3, 2017. 



HISTORY: 72-year-old male, bilateral hip pain. 



FINDINGS: There is a right-sided os acetabula. There are

degenerative changes at the pubic symphysis. There are disc and

facet degenerative changes at L4-L5 and L5-S1. Bowel gas

overlying the sacrum does limit its evaluation. There are

vascular calcifications. The right hip is not dislocated. There

is mild joint space loss of the right hip. There is no prominent

osteophyte formation. There is no subchondral cystic change. The

left hip is not dislocated. There is mild joint space loss of the

left hip. There is no prominent osteophyte formation or

subchondral cystic change. There is no identified acute fracture.

There is no radiographically apparent bone lesion. There are mild

bilateral sacroiliac degenerative changes.



IMPRESSION: 

1. Mild osteoarthritis of both hips.

2. Mild bilateral sacroiliac degenerative changes.

3. Disc and facet degenerative changes at L4-L5 and L5-S1. 



Dictated by: 



  Dictated on workstation # [id number]

## 2018-07-24 VITALS — DIASTOLIC BLOOD PRESSURE: 60 MMHG | SYSTOLIC BLOOD PRESSURE: 130 MMHG

## 2018-07-24 VITALS — DIASTOLIC BLOOD PRESSURE: 65 MMHG | SYSTOLIC BLOOD PRESSURE: 110 MMHG

## 2018-07-26 VITALS — DIASTOLIC BLOOD PRESSURE: 60 MMHG | SYSTOLIC BLOOD PRESSURE: 145 MMHG

## 2018-07-26 VITALS — DIASTOLIC BLOOD PRESSURE: 60 MMHG | SYSTOLIC BLOOD PRESSURE: 128 MMHG

## 2018-08-02 VITALS — DIASTOLIC BLOOD PRESSURE: 67 MMHG | SYSTOLIC BLOOD PRESSURE: 100 MMHG

## 2018-08-02 VITALS — SYSTOLIC BLOOD PRESSURE: 130 MMHG | DIASTOLIC BLOOD PRESSURE: 60 MMHG

## 2018-08-07 VITALS — DIASTOLIC BLOOD PRESSURE: 70 MMHG | SYSTOLIC BLOOD PRESSURE: 123 MMHG

## 2018-08-07 VITALS — DIASTOLIC BLOOD PRESSURE: 60 MMHG | SYSTOLIC BLOOD PRESSURE: 160 MMHG

## 2018-08-09 VITALS — DIASTOLIC BLOOD PRESSURE: 60 MMHG | SYSTOLIC BLOOD PRESSURE: 118 MMHG

## 2018-08-09 VITALS — DIASTOLIC BLOOD PRESSURE: 60 MMHG | SYSTOLIC BLOOD PRESSURE: 140 MMHG

## 2018-08-14 VITALS — SYSTOLIC BLOOD PRESSURE: 130 MMHG | DIASTOLIC BLOOD PRESSURE: 60 MMHG

## 2018-08-14 VITALS — DIASTOLIC BLOOD PRESSURE: 60 MMHG | SYSTOLIC BLOOD PRESSURE: 150 MMHG

## 2018-08-16 VITALS — SYSTOLIC BLOOD PRESSURE: 162 MMHG | DIASTOLIC BLOOD PRESSURE: 60 MMHG

## 2018-08-16 VITALS — SYSTOLIC BLOOD PRESSURE: 140 MMHG | DIASTOLIC BLOOD PRESSURE: 60 MMHG

## 2018-08-21 VITALS — SYSTOLIC BLOOD PRESSURE: 130 MMHG | DIASTOLIC BLOOD PRESSURE: 50 MMHG

## 2018-08-21 VITALS — DIASTOLIC BLOOD PRESSURE: 60 MMHG | SYSTOLIC BLOOD PRESSURE: 140 MMHG

## 2018-08-23 VITALS — SYSTOLIC BLOOD PRESSURE: 140 MMHG | DIASTOLIC BLOOD PRESSURE: 60 MMHG

## 2018-08-23 VITALS — SYSTOLIC BLOOD PRESSURE: 141 MMHG | DIASTOLIC BLOOD PRESSURE: 60 MMHG

## 2018-08-28 VITALS — DIASTOLIC BLOOD PRESSURE: 60 MMHG | SYSTOLIC BLOOD PRESSURE: 127 MMHG

## 2018-08-28 VITALS — DIASTOLIC BLOOD PRESSURE: 70 MMHG | SYSTOLIC BLOOD PRESSURE: 119 MMHG

## 2018-08-30 VITALS — SYSTOLIC BLOOD PRESSURE: 119 MMHG | DIASTOLIC BLOOD PRESSURE: 60 MMHG

## 2018-08-30 VITALS — DIASTOLIC BLOOD PRESSURE: 60 MMHG | SYSTOLIC BLOOD PRESSURE: 141 MMHG

## 2018-09-06 VITALS — SYSTOLIC BLOOD PRESSURE: 100 MMHG | DIASTOLIC BLOOD PRESSURE: 58 MMHG

## 2018-09-06 VITALS — SYSTOLIC BLOOD PRESSURE: 130 MMHG | DIASTOLIC BLOOD PRESSURE: 60 MMHG

## 2018-09-11 VITALS — SYSTOLIC BLOOD PRESSURE: 120 MMHG | DIASTOLIC BLOOD PRESSURE: 60 MMHG

## 2018-09-11 VITALS — SYSTOLIC BLOOD PRESSURE: 128 MMHG | DIASTOLIC BLOOD PRESSURE: 60 MMHG

## 2018-09-13 VITALS — DIASTOLIC BLOOD PRESSURE: 76 MMHG | SYSTOLIC BLOOD PRESSURE: 120 MMHG

## 2018-09-13 VITALS — DIASTOLIC BLOOD PRESSURE: 60 MMHG | SYSTOLIC BLOOD PRESSURE: 130 MMHG

## 2018-09-17 ENCOUNTER — HOSPITAL ENCOUNTER (OUTPATIENT)
Dept: HOSPITAL 75 - PULM | Age: 73
LOS: 90 days | Discharge: HOME | End: 2018-12-16
Attending: NURSE PRACTITIONER
Payer: MEDICARE

## 2018-09-17 DIAGNOSIS — J44.9: Primary | ICD-10-CM

## 2018-09-17 DIAGNOSIS — R09.02: ICD-10-CM

## 2018-09-18 VITALS — SYSTOLIC BLOOD PRESSURE: 150 MMHG | DIASTOLIC BLOOD PRESSURE: 50 MMHG

## 2018-09-18 VITALS — DIASTOLIC BLOOD PRESSURE: 68 MMHG | SYSTOLIC BLOOD PRESSURE: 122 MMHG

## 2018-09-20 VITALS — SYSTOLIC BLOOD PRESSURE: 161 MMHG | DIASTOLIC BLOOD PRESSURE: 45 MMHG

## 2018-09-20 VITALS — SYSTOLIC BLOOD PRESSURE: 160 MMHG | DIASTOLIC BLOOD PRESSURE: 60 MMHG

## 2018-12-10 ENCOUNTER — HOSPITAL ENCOUNTER (OUTPATIENT)
Dept: HOSPITAL 75 - RAD | Age: 73
End: 2018-12-10
Attending: NURSE PRACTITIONER
Payer: MEDICARE

## 2018-12-10 DIAGNOSIS — Z87.891: ICD-10-CM

## 2018-12-10 DIAGNOSIS — I25.10: ICD-10-CM

## 2018-12-10 DIAGNOSIS — I71.9: ICD-10-CM

## 2018-12-10 DIAGNOSIS — J43.9: ICD-10-CM

## 2018-12-10 DIAGNOSIS — R59.9: Primary | ICD-10-CM

## 2018-12-10 LAB
BUN/CREAT SERPL: 13
CREAT SERPL-MCNC: 1.34 MG/DL (ref 0.6–1.3)
GFR SERPLBLD BASED ON 1.73 SQ M-ARVRAT: 52 ML/MIN

## 2018-12-10 PROCEDURE — 36415 COLL VENOUS BLD VENIPUNCTURE: CPT

## 2018-12-10 PROCEDURE — 82565 ASSAY OF CREATININE: CPT

## 2018-12-10 PROCEDURE — 84520 ASSAY OF UREA NITROGEN: CPT

## 2018-12-10 PROCEDURE — 71260 CT THORAX DX C+: CPT

## 2018-12-10 NOTE — DIAGNOSTIC IMAGING REPORT
PROCEDURE: CT chest with contrast only.



TECHNIQUE: Multiple contiguous axial images were obtained through

the chest after administration of intravenous contrast.



INDICATION:  Enlarged lymph nodes.



FINDINGS: The CT of the chest exam performed on 06/20/2018 noted

a 2.0 x 2.9 cm lymph node in the right hilum as well as a 1.0 x

1.3 cm lymph node in the paraesophageal region on the left near

the gastroesophageal junction. On this exam, those nodes do not

appear to have changed significantly. The height of the right

hilar node now measures 1.7 x 1.0 x 2.3 cm while the

paraesophageal node is estimated to be 1.1 x 1.3 cm. These

findings also seem similar to the prior CT of the chest exam of

01/01/2016. Consequently I do suspect that these nodes are not

related to a neoplastic process.



There is no new mediastinal or hilar adenopathy. The heart size

is stable. Extensive coronary artery calcifications are again

noted. As noted on the previous exam of 01/01/2016 there is mild

aneurysmal dilatation of the ascending aorta. The aorta measures

4.1 x 4.2 cm in maximum transverse and AP diameters. There is no

evidence for a dissection. There is no definite defect within the

pulmonary arteries to indicate a pulmonary embolus. The thyroid

gland was not well visualized.



There are emphysematous changes involving both lungs. The scar

formation in the lingula seen previously is again evident and no

different.



The sections through the upper abdomen show a 6 mm nonobstructive

calculus within the left kidney. The gallbladder is also

surgically absent. The bone windows are unremarkable for an acute

fracture. There is anterior wedging of two of the lower thoracic

vertebrae. This finding is stable when compared to the prior

exam.



IMPRESSION:



1. The right hilar node and left paraesophageal node seen

previously are again evident and no different. The stability of

these findings over a nearly three year period would suggest that

they are not related to an aggressive process.



2. There is no acute cardiopulmonary abnormality identified.



3. There is coronary artery disease and mild stable aneurysmal

dilatation of the aorta.



4. There are emphysematous changes involving both lungs.





Dictated by: 



  Dictated on workstation # [id number]

## 2019-01-03 ENCOUNTER — HOSPITAL ENCOUNTER (INPATIENT)
Dept: HOSPITAL 75 - 4TH | Age: 74
LOS: 2 days | Discharge: HOME | DRG: 871 | End: 2019-01-05
Attending: FAMILY MEDICINE | Admitting: FAMILY MEDICINE
Payer: MEDICARE

## 2019-01-03 VITALS — WEIGHT: 218.03 LBS | HEIGHT: 71 IN | BODY MASS INDEX: 30.52 KG/M2

## 2019-01-03 VITALS — SYSTOLIC BLOOD PRESSURE: 137 MMHG | DIASTOLIC BLOOD PRESSURE: 75 MMHG

## 2019-01-03 VITALS — DIASTOLIC BLOOD PRESSURE: 60 MMHG | SYSTOLIC BLOOD PRESSURE: 122 MMHG

## 2019-01-03 VITALS — DIASTOLIC BLOOD PRESSURE: 79 MMHG | SYSTOLIC BLOOD PRESSURE: 176 MMHG

## 2019-01-03 VITALS — SYSTOLIC BLOOD PRESSURE: 158 MMHG | DIASTOLIC BLOOD PRESSURE: 78 MMHG

## 2019-01-03 DIAGNOSIS — J18.9: ICD-10-CM

## 2019-01-03 DIAGNOSIS — Z91.5: ICD-10-CM

## 2019-01-03 DIAGNOSIS — Z66: ICD-10-CM

## 2019-01-03 DIAGNOSIS — M54.9: ICD-10-CM

## 2019-01-03 DIAGNOSIS — I10: ICD-10-CM

## 2019-01-03 DIAGNOSIS — Z95.5: ICD-10-CM

## 2019-01-03 DIAGNOSIS — F17.210: ICD-10-CM

## 2019-01-03 DIAGNOSIS — E89.0: ICD-10-CM

## 2019-01-03 DIAGNOSIS — N17.9: ICD-10-CM

## 2019-01-03 DIAGNOSIS — G47.30: ICD-10-CM

## 2019-01-03 DIAGNOSIS — I25.10: ICD-10-CM

## 2019-01-03 DIAGNOSIS — H93.19: ICD-10-CM

## 2019-01-03 DIAGNOSIS — K46.9: ICD-10-CM

## 2019-01-03 DIAGNOSIS — K59.09: ICD-10-CM

## 2019-01-03 DIAGNOSIS — Z86.73: ICD-10-CM

## 2019-01-03 DIAGNOSIS — F32.9: ICD-10-CM

## 2019-01-03 DIAGNOSIS — K21.9: ICD-10-CM

## 2019-01-03 DIAGNOSIS — J30.2: ICD-10-CM

## 2019-01-03 DIAGNOSIS — A41.9: Primary | ICD-10-CM

## 2019-01-03 DIAGNOSIS — J43.9: ICD-10-CM

## 2019-01-03 DIAGNOSIS — M81.0: ICD-10-CM

## 2019-01-03 DIAGNOSIS — H40.9: ICD-10-CM

## 2019-01-03 DIAGNOSIS — R09.02: ICD-10-CM

## 2019-01-03 DIAGNOSIS — M19.91: ICD-10-CM

## 2019-01-03 LAB
ALBUMIN SERPL-MCNC: 3.8 GM/DL (ref 3.2–4.5)
ALP SERPL-CCNC: 86 U/L (ref 40–136)
ALT SERPL-CCNC: 17 U/L (ref 0–55)
ANISOCYTOSIS BLD QL SMEAR: SLIGHT
ARTERIAL PATENCY WRIST A: (no result)
BASE EXCESS STD BLDA CALC-SCNC: -0.9 MMOL/L (ref -2.5–2.5)
BASOPHILS # BLD AUTO: 0 10^3/UL (ref 0–0.1)
BASOPHILS NFR BLD AUTO: 0 % (ref 0–10)
BASOPHILS NFR BLD MANUAL: 1 %
BDY SITE: (no result)
BILIRUB SERPL-MCNC: 1.1 MG/DL (ref 0.1–1)
BODY TEMPERATURE: 102.3
BUN/CREAT SERPL: 14
CALCIUM SERPL-MCNC: 9.3 MG/DL (ref 8.5–10.1)
CHLORIDE SERPL-SCNC: 105 MMOL/L (ref 98–107)
CO2 BLDA CALC-SCNC: 23.5 MMOL/L (ref 21–31)
CO2 SERPL-SCNC: 24 MMOL/L (ref 21–32)
CREAT SERPL-MCNC: 1.33 MG/DL (ref 0.6–1.3)
EOSINOPHIL # BLD AUTO: 0 10^3/UL (ref 0–0.3)
EOSINOPHIL NFR BLD AUTO: 0 % (ref 0–10)
EOSINOPHIL NFR BLD MANUAL: 0 %
ERYTHROCYTE [DISTWIDTH] IN BLOOD BY AUTOMATED COUNT: 16.2 % (ref 10–14.5)
GFR SERPLBLD BASED ON 1.73 SQ M-ARVRAT: 53 ML/MIN
GLUCOSE SERPL-MCNC: 111 MG/DL (ref 70–105)
HCT VFR BLD CALC: 38 % (ref 40–54)
HGB BLD-MCNC: 12.6 G/DL (ref 13.3–17.7)
INHALED O2 FLOW RATE: (no result) L/MIN
LYMPHOCYTES # BLD AUTO: 1.1 X 10^3 (ref 1–4)
LYMPHOCYTES NFR BLD AUTO: 7 % (ref 12–44)
MANUAL DIFFERENTIAL PERFORMED BLD QL: YES
MCH RBC QN AUTO: 32 PG (ref 25–34)
MCHC RBC AUTO-ENTMCNC: 33 G/DL (ref 32–36)
MCV RBC AUTO: 98 FL (ref 80–99)
MONOCYTES # BLD AUTO: 0.9 X 10^3 (ref 0–1)
MONOCYTES NFR BLD AUTO: 5 % (ref 0–12)
MONOCYTES NFR BLD: 6 %
NEUTROPHILS # BLD AUTO: 14.2 X 10^3 (ref 1.8–7.8)
NEUTROPHILS NFR BLD AUTO: 88 % (ref 42–75)
NEUTS BAND NFR BLD MANUAL: 77 %
NEUTS BAND NFR BLD: 12 %
PCO2 BLDA: 36 MMHG (ref 35–45)
PH BLDA: 7.42 [PH] (ref 7.37–7.43)
PLATELET # BLD: 207 10^3/UL (ref 130–400)
PMV BLD AUTO: 9.6 FL (ref 7.4–10.4)
PO2 BLDA: 74 MMHG (ref 79–93)
POTASSIUM SERPL-SCNC: 4 MMOL/L (ref 3.6–5)
PROT SERPL-MCNC: 6.8 GM/DL (ref 6.4–8.2)
RBC # BLD AUTO: 3.89 10^6/UL (ref 4.35–5.85)
SAO2 % BLDA FROM PO2: 91 % (ref 94–100)
SODIUM SERPL-SCNC: 137 MMOL/L (ref 135–145)
VARIANT LYMPHS NFR BLD MANUAL: 4 %
VENTILATION MODE VENT: NO
WBC # BLD AUTO: 16.2 10^3/UL (ref 4.3–11)

## 2019-01-03 PROCEDURE — 80048 BASIC METABOLIC PNL TOTAL CA: CPT

## 2019-01-03 PROCEDURE — 83605 ASSAY OF LACTIC ACID: CPT

## 2019-01-03 PROCEDURE — 85610 PROTHROMBIN TIME: CPT

## 2019-01-03 PROCEDURE — 87205 SMEAR GRAM STAIN: CPT

## 2019-01-03 PROCEDURE — 71045 X-RAY EXAM CHEST 1 VIEW: CPT

## 2019-01-03 PROCEDURE — 94640 AIRWAY INHALATION TREATMENT: CPT

## 2019-01-03 PROCEDURE — 94760 N-INVAS EAR/PLS OXIMETRY 1: CPT

## 2019-01-03 PROCEDURE — 87077 CULTURE AEROBIC IDENTIFY: CPT

## 2019-01-03 PROCEDURE — 82805 BLOOD GASES W/O2 SATURATION: CPT

## 2019-01-03 PROCEDURE — 94761 N-INVAS EAR/PLS OXIMETRY MLT: CPT

## 2019-01-03 PROCEDURE — 85025 COMPLETE CBC W/AUTO DIFF WBC: CPT

## 2019-01-03 PROCEDURE — 85027 COMPLETE CBC AUTOMATED: CPT

## 2019-01-03 PROCEDURE — 87040 BLOOD CULTURE FOR BACTERIA: CPT

## 2019-01-03 PROCEDURE — 36415 COLL VENOUS BLD VENIPUNCTURE: CPT

## 2019-01-03 PROCEDURE — 87804 INFLUENZA ASSAY W/OPTIC: CPT

## 2019-01-03 PROCEDURE — 80053 COMPREHEN METABOLIC PANEL: CPT

## 2019-01-03 PROCEDURE — 36600 WITHDRAWAL OF ARTERIAL BLOOD: CPT

## 2019-01-03 PROCEDURE — 85007 BL SMEAR W/DIFF WBC COUNT: CPT

## 2019-01-03 PROCEDURE — 87186 SC STD MICRODIL/AGAR DIL: CPT

## 2019-01-03 PROCEDURE — 87070 CULTURE OTHR SPECIMN AEROBIC: CPT

## 2019-01-03 RX ADMIN — ACETAMINOPHEN PRN MG: 325 TABLET ORAL at 21:15

## 2019-01-03 RX ADMIN — ALUMINUM HYDROXIDE, MAGNESIUM HYDROXIDE, AND DIMETHICONE PRN ML: 400; 400; 40 SUSPENSION ORAL at 21:15

## 2019-01-03 RX ADMIN — Medication SCH ML: at 22:27

## 2019-01-03 RX ADMIN — IPRATROPIUM BROMIDE AND ALBUTEROL SULFATE SCH ML: .5; 3 SOLUTION RESPIRATORY (INHALATION) at 20:57

## 2019-01-03 RX ADMIN — BENZONATATE PRN MG: 100 CAPSULE ORAL at 15:42

## 2019-01-03 RX ADMIN — BENZONATATE PRN MG: 100 CAPSULE ORAL at 21:15

## 2019-01-03 RX ADMIN — SODIUM CHLORIDE SCH MLS/HR: 900 INJECTION INTRAVENOUS at 21:15

## 2019-01-03 RX ADMIN — ACETAMINOPHEN PRN MG: 325 TABLET ORAL at 11:58

## 2019-01-03 RX ADMIN — ALUMINUM HYDROXIDE, MAGNESIUM HYDROXIDE, AND DIMETHICONE PRN ML: 400; 400; 40 SUSPENSION ORAL at 15:42

## 2019-01-03 RX ADMIN — IPRATROPIUM BROMIDE AND ALBUTEROL SULFATE SCH ML: .5; 3 SOLUTION RESPIRATORY (INHALATION) at 21:20

## 2019-01-03 RX ADMIN — Medication SCH ML: at 15:31

## 2019-01-03 NOTE — XMS REPORT
Continuity of Care Document

 Created on: 2019



EDUAR OSORIO

External Reference #: [id number]

: 1945

Sex: Male



Demographics







 Address  [address] [place]  [address]

 

 Home Phone  [phone]

 

 Preferred Language  Unknown

 

 Marital Status  Unknown

 

 Sabianist Affiliation  Unknown

 

 Race  Unknown

 

 Ethnic Group  Unknown





Author







 Author  [facility]

 

 Organization  [facility]

 

 Address  Unknown

 

 Phone  Unavailable



              



Allergies

      





 Active            Description            Code            Type            
Severity            Reaction            Onset            Reported/Identified   
         Relationship to Patient            Clinical Status        

 

 Yes            No Known Drug Allergies            [id number]            Drug 
Allergy            Unknown            N/A                         2018   
                               



                  



Medications

      



There is no data.                  



Problems

      





 Date Dx Coded            Attending            Type            Code            
Diagnosis            Diagnosed By        

 

 2014            EDUAR JOHNSON DO            Ot            038.9   
         SEPTICEMIA NOS                     

 

 2014            EDUAR JOHNSON DO            Ot            250.00  
          DIAB NIEVES WO COMPL, TYPE II OR UNSPEC TY                     

 

 2014            EDUAR JOHNSON DO            Ot            272.4   
         HYPERLIPIDEMIA NEC/NOS                     

 

 2014            EDUAR JOHNSON DO            Ot            276.52  
          HYPOVOLEMIA                     

 

 2014            EDUAR JOHNSON DO            Ot            305.1   
         TOBACCO USE DISORDER                     

 

 2014            EDUAR JOHNSON DO            Ot            414.01  
          CORONARY ATHEROSCLEROSIS OF NATIVE CORON                     

 

 2014            EDUAR JOHNSON DO            Ot            486     
       PNEUMONIA, ORGANISM NOS                     

 

 2014            EDUAR JOHNSON DO            Ot            510.9   
         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                     

 

 2014            EDUAR JOHNSON DO            Ot            511.9   
         PLEURAL EFFUSION NOS                     

 

 2014            EDUAR JOHNSON DO            Ot            518.81  
          ACUTE RESPIRATORY FAILURE                     

 

 2014            EDUAR JOHNSON DO            Ot            584.9   
         ACUTE RENAL FAILURE, UNSPECIFIED                     

 

 2014            EDUAR JOHNSON DO            Ot            785.50  
          SHOCK NOS                     

 

 2014            EDUAR JOHNSON DO, Ot            785.52  
          SEPTIC SHOCK                     

 

 2014            EDUAR JOHNSON DO            Ot            995.92  
          SEVERE SEPSIS                     

 

 2014            EDUAR JOHNSON DO, Ot            V45.82  
          PERCUTANEOUS TRANSLUM CORON ANGIOPLASTY                      

 

 2014            HRARY MATIAS, SHIRLENE DAVID            Ot            266.2        
    B-COMPLEX DEFIC NEC                     

 

 2014            SHIRLENE MCDONALD MD            Ot            268.9        
    VITAMIN D DEFICIENCY NOS                     

 

 2014            SHIRLENE MCDONALD MD            Ot            272.4        
    HYPERLIPIDEMIA NEC/NOS                     

 

 2014            SHIRLENE MCDONALD MD E            Ot            274.9        
    GOUT NOS                     

 

 2014            SHIRLENE MCDONALD MD            Ot            305.1        
    TOBACCO USE DISORDER                     

 

 2014            SHIRLENE MCDONALD MD            Ot            401.9        
    HYPERTENSION NOS                     

 

 2014            HARRY MATIAS, SHIRLENE E            Ot            564.00       
     UNSPEC CONSTIPATION                     

 

 2014            SHIRLENE MCDONALD MD E            Ot            722.11       
     THORACIC DISC DISPLACMNT                     

 

 2014            SHIRLENE MCDONALD MD            Ot            733.00       
     OSTEOPOROSIS NOS                     

 

 2014            SHIRLENE MCDONALD MD E            Ot            780.79       
     OTH MALAISE FATIGUE                     

 

 2014            SHIRLENE MCDONALD MD E            Ot            790.29       
     OTHER ABNORMAL GLUCOSE                     

 

 2014            SHIRLENE MCDONALD MD            Ot            V57.89       
     REHABILITATION PROC NEC                     

 

 2014            LETTY DO ARLENE F            Ot            726.0    
                              

 

 2014            LETTY DO, ARLENE F            Ot            718.91   
                               

 

 2014            LETTY DO ARLENE F            Ot            726.0    
                              

 

 2014            LETTY DAVIS ARLENE F            Ot            726.0    
        ADHESIVE CAPSULIT SHLDER                     

 

 2014            LETTY DAVIS ARLENE F            Ot            V57.1    
        PHYSICAL THERAPY NEC                     

 

 2014            LETTY DAVIS ARLENE F            Ot            718.91   
                               

 

 2014            LETTY DAVIS ARLENE F            Ot            726.0    
                              

 

 2014            EDUAR JOHNSON DO            Ot            250.00  
          DIAB NIEVES WO COMPL, TYPE II OR UNSPEC TY                     

 

 2014            EDUAR JOHNSON DO            Ot            266.2   
         B-COMPLEX DEFIC NEC                     

 

 2014            EDUAR JOHNSON DO            Ot            268.9   
         VITAMIN D DEFICIENCY NOS                     

 

 2014            EDUAR JOHNSON DO            Ot            272.4   
         HYPERLIPIDEMIA NEC/NOS                     

 

 2014            EDUAR JOHNSON DO            Ot            403.90  
          HYPTNSV CHR KID DIS, UNSPEC, W CHR KD ST                     

 

 2014            EDUAR JOHNSON DO            Ot            414.01  
          CORONARY ATHEROSCLEROSIS OF NATIVE CORON                     

 

 2014            EDUAR JOHNSON DO            Ot            435.9   
         TRANS CEREB ISCHEMIA NOS                     

 

 2014            EDUAR JOHNSON DO            Ot            496     
       CHR AIRWAY OBSTRUCT NEC                     

 

 2014            EDUAR JOHNSON DO            Ot            585.2   
         CHRONIC KIDNEY DISEASE, STAGE II (MILD)                     

 

 2014            EDUAR JOHNSON DO            Ot            722.11  
          THORACIC DISC DISPLACMNT                     

 

 2014            EDUAR JOHNSON DO            Ot            799.02  
          HYPOXEMIA                     

 

 2014            EDUAR JOHNSON DO            Ot            V45.82  
          PERCUTANEOUS TRANSLUM CORON ANGIOPLASTY                      

 

 2014            LETTY DAVIS ARLENE MCKEON            Ot            718.91   
                               

 

 2014            LETTY DAVIS ARLENE MCKEON            Ot            726.0    
                              

 

 2015                         Ot            733.00                       
           

 

 2015                         Ot            733.00                       
           

 

 2015                         Ot            733.00                       
           

 

 2015            EDUAR JOHNSON DO            Ot            733.00  
                                

 

 2015            DANA DILLON DO            Ot            492.8       
                           

 

 2015            DANA DILLON DO            Ot            511.9       
                           

 

 2015            DNAA DILLON DO            Ot            586         
                         

 

 2015            LETTY DAVIS ARLENE MCKEON            Ot            726.0    
                              

 

 2015            LETTY DAVIS ARLENE MCKEON            Ot            718.91   
                               

 

 2015            LETTY DAVIS ARLENE MCKEON            Ot            726.0    
                              

 

 2015            LETTY  ARLENE MCKEON            Ot            840.4    
                              

 

 2015            LETTY  ARLENE MCKEON            Ot            E000.8   
                               

 

 2015            LETTY  ARLENE MCKEON            Ot            E928.9   
                               

 

 2015            LETTY DAVIS ARLENE MCKEON            Ot            V72.84   
                               

 

 2015            LETTY ARLENE DAVIS            Ot            840.4    
        SPRAIN ROTATOR CUFF                     

 

 2015            LETTY DAVIS ARLENE MCKEON            Ot            E000.8   
         OTHER EXTERNAL CAUSE STATUS                     

 

 2015            LETTY DAVIS ARLENE MCKEON            Ot            E928.9   
         ACCIDENT NOS                     

 

 2015            LETTY DAVIS ARLENE MCKEON            Ot            V58.61   
         ANTICOAGULANTS,LT,CURRENT USE                     

 

 2015            LETTY  ARLENE MCKEON            Ot            V64.1    
        NO PROC/CONTRAINDICATION                     

 

 2015            LETTY DAVIS ARLENE MCKEON            Ot            715.31   
         LOC OSTEOARTH NOS-SHLDER                     

 

 2015            LETTY DAVIS ARLENE MCKEON            Ot            727.61   
         ROTATOR CUFF RUPTURE                     

 

 2015            LETTY DAVIS ARLENE MCKEON            Ot            840.7    
        (SLAP) SUPERIOR GLENOID LABRUM LESIONS                     

 

 2015            NOHELIA AUGUST MD            Ot            414.01      
                            

 

 2015            NOHELIA AUGUST MD            Ot            786.09      
                            

 

 2015                         Ot            733.00                       
           

 

 2015                         Ot            733.00                       
           

 

 2015                         Ot            733.00                       
           

 

 2015            EDUAR JOHNSON DO            Ot            733.00  
                                

 

 2015            DANA DILLON DO            Ot            492.8       
                           

 

 2015            WILMA DANA DAVIS            Ot            511.9       
                           

 

 2015            WILMA DAVIS DANA M            Ot            586         
                         

 

 2015            LETTY DO, ARLENE MCKEON            Ot            726.0    
                              

 

 2015            LETTY DO, ARLENE F            Ot            718.91   
                               

 

 2015            LETTY DO, ARLENE F            Ot            726.0    
                              

 

 2015            LETTY DO, ARLENE F            Ot            840.4    
                              

 

 2015            LETTY DO, ARLENE MCKEON            Ot            E000.8   
                               

 

 2015            LETTY DO ARLENE MCKEON            Ot            E928.9   
                               

 

 2015            LETTY DO ARLENE MCKEON            Ot            V72.84   
                               

 

 2015            LETTY DO ARLENE MCKEON            Ot            727.61   
                               

 

 2015            LETTY DO ARLENE MCKEON            Ot            V72.84   
                               

 

 2015            NOHELIA AUGUST MD            Ot            414.00      
                            

 

 2015            MATA MATIAS, NOHELIA CLARK            Ot            424.0       
                           

 

 2015            NOHELIA AUGUST MD            Ot            433.10      
                            

 

 2015            NOHELIA AUGUST MD            Ot            786.09      
                            

 

 2015            NOHELIA AUGUST MD            Ot            414.01      
                            

 

 2015            NOHELIA AUGUST MD            Ot            786.09      
                            

 

 2015            LETTY DO ARLENE MCKEON            Ot            840.4    
                              

 

 2015            LETTY DO ARLENE MCKEON            Ot            E000.8   
                               

 

 2015            LETTY DO ARLENE MCKEON            Ot            E928.9   
                               

 

 2015            LETTY DO, ARLENE F            Ot            V72.84   
                               

 

 2015            NOHELIA AUGUST MD            Ot            414.01      
                            

 

 2015            NOHELIA AUGUST MD            Ot            786.09      
                            

 

 2015            LETTY DO ARLENE MCKEON            Ot            719.41   
         JOINT PAIN-SHLDER                     

 

 2015            LETTY DO ARLENE F            Ot            V57.1    
        PHYSICAL THERAPY NEC                     

 

 2015            LETTY DO ARLENE F            Ot            V58.49   
         OTHER SPECIFIED AFTERCARE FOLLOWING SURG                     

 

 2015            LETTY DO, ARLENE MCKEON            Ot            840.4    
                              

 

 2015            LETTY DO, ARLENE MCKEON            Ot            E000.8   
                               

 

 2015            LETTY DO, ARLENE MCKEON            Ot            E928.9   
                               

 

 2015            LETTY DO, ARLENE MCKEON            Ot            V72.84   
                               

 

 2015            LETTY DO, ARLENE MCKEON            Ot            840.4    
                              

 

 2015            LETTY DO, ARLENE MCKEON            Ot            E000.8   
                               

 

 2015            LETTY DO, ARLENE MCKEON            Ot            E928.9   
                               

 

 2015            LETTY DO, ARLENE MCKEON            Ot            V72.84   
                               

 

 2015            LETTY DO, ARLENE MCKEON            Ot            727.61   
                               

 

 2015            LETTY DO, ARLENE MCKEON            Ot            V72.84   
                               

 

 2015            LETTY DO, ARLENE MCKEON            Ot            727.61   
                               

 

 2015            LETTY DO, ARLENE MCKEON            Ot            V72.84   
                               

 

 2015                         Ot            733.00                       
           

 

 2015                         Ot            733.00                       
           

 

 2015            LETTY DO, ARLENE MCKEON            Ot            722.4    
                              

 

 2015            LETTY DO, ARLENE MCKEON            Ot            722.4    
                              

 

 2015            BELKIS WEST MD            Ot            721.0        
                          

 

 2015            BELKIS WEST MD            Ot            V57.1        
                          

 

 2015            BELKIS WEST MD            Ot            721.0        
                          

 

 2015            BELKIS WEST MD            Ot            V57.1        
                          

 

 2015            BELKIS WEST MD            Ot            721.0        
    CERVICAL SPONDYLOSIS                     

 

 2015            BELKIS WEST MD            Ot            V57.1        
    PHYSICAL THERAPY NEC                     

 

 2015            EDUAR JOHNSON DO            Ot            A41.9   
                               

 

 2015            JOHNSONEDUAR HILL DO            Ot            E53.8   
                               

 

 2015            JOHNSONEDUAR HILL DO            Ot            E55.9   
                               

 

 2015            JOHNSONEDUAR HILL DO            Ot            E78.5   
                               

 

 2015            EDUAR JOHNSON DO            Ot            F17.200 
                                 

 

 2015            EDUAR JOHNSON DO            Ot            I12.9   
                               

 

 2015            JOHNSONEDUAR HILL DO            Ot            J15.1   
                               

 

 2015            JOHNSONEDUAR HILL DO            Ot            J15.4   
                               

 

 2015            EDUAR JOHNSON DO            Ot            J44.0   
                               

 

 2015            JOHNSON DO, EDUAR CLARK            Ot            J44.1   
                               

 

 2015            JOHNSON DO, EDUAR CLARK            Ot            M51.14  
                                

 

 2015            JOHNSON DO, EDUAR CLARK            Ot            N18.2   
                               

 

 2015            JOHNSON DO, EDUAR CLARK            Ot            R90.82  
                                

 

 2015            JOHNSON DO, EDUAR CLARK            Ot            Z66     
                             

 

 2015            JOHNSON DO, EDUAR CLARK            Ot            A41.9   
                               

 

 2015            JOHNSON DO, EDUAR CLARK            Ot            E53.8   
                               

 

 2015            JOHNSON DO, EDUAR CLARK            Ot            E55.9   
                               

 

 2015            JOHNSON DO, EDUAR CLARK            Ot            E78.5   
                               

 

 2015            JOHNSON DO, EDUAR CLARK            Ot            F17.200 
                                 

 

 2015            JOHNSON DO, EDUAR CLARK            Ot            I12.9   
                               

 

 2015            JOHNSON DO, EDUAR CLARK            Ot            J15.1   
                               

 

 2015            JOHNSON DO, EDUAR CLARK            Ot            J15.4   
                               

 

 2015            JOHNSON DO, EDUAR CLARK            Ot            J44.0   
                               

 

 2015            JOHNSON DO, EDUAR CLARK            Ot            J44.1   
                               

 

 2015            JOHNSON DO, EDUAR CLARK            Ot            M51.14  
                                

 

 2015            JOHNSON DO, EDUAR CLARK            Ot            N18.2   
                               

 

 2015            JOHNSON DO, EDUAR CLARK            Ot            R90.82  
                                

 

 2015            JOHNSON DO, EDUAR CLARK            Ot            Z66     
                             

 

 2016            JOHNSON DO, EDUAR CLARK            Ot            A41.9   
                               

 

 2016            JOHNSON DO, EDUAR CLARK            Ot            E53.8   
                               

 

 2016            JOHNSON DO, EDUAR CLARK            Ot            E55.9   
                               

 

 2016            JOHNSON DO, EDUAR CLARK            Ot            E78.5   
                               

 

 2016            JOHNSON DO, EDUAR CLARK            Ot            F17.200 
                                 

 

 2016            JOHNSON DO, EDUAR CLARK            Ot            I12.9   
                               

 

 2016            JOHNSON DO, EDUAR CLARK            Ot            J15.1   
                               

 

 2016            JOHNSON DO EDUAR CLARK            Ot            J15.4   
                               

 

 2016            JOHNSON DO, EDUAR CLARK            Ot            J44.0   
                               

 

 2016            JOHNSON DO, EDUAR CLARK            Ot            J44.1   
                               

 

 2016            JOHNSON DO, EDUAR CLARK            Ot            M51.14  
                                

 

 2016            JOHNSON DO, EDUAR CLARK            Ot            N18.2   
                               

 

 2016            JOHNSON DO, EDUAR CLARK            Ot            R90.82  
                                

 

 2016            JOHNSON DO, EDUAR CLARK            Ot            Z66     
                             

 

 2016            JOHNSON DO, EDUAR CLARK            Ot            A41.9   
                               

 

 2016            JOHNSON DO, EDUAR CLARK            Ot            E53.8   
                               

 

 2016            JOHNSON DO, EDUAR CLARK            Ot            E55.9   
                               

 

 2016            JOHNSON DO, EDUAR CLARK            Ot            E78.5   
                               

 

 2016            JOHNOSN DO, EDUAR CLARK            Ot            F17.200 
                                 

 

 2016            JOHNSON DO, EDUAR CLARK            Ot            I12.9   
                               

 

 2016            JOHNSON DO, EDUAR CLARK            Ot            J15.1   
                               

 

 2016            JOHNSON DO, EDUAR CLARK            Ot            J15.4   
                               

 

 2016            JOHNSON DO, EDUAR CLARK            Ot            J44.0   
                               

 

 2016            JOHNSON DO, EDUAR CLARK            Ot            J44.1   
                               

 

 2016            JOHNSON DO, EDUAR CLARK            Ot            M51.14  
                                

 

 2016            JOHNSON DO, EDUAR CLARK            Ot            N18.2   
                               

 

 2016            JOHNSON DO, EDUAR CLARK            Ot            R90.82  
                                

 

 2016            JOHNSON DO, EDUAR CLARK            Ot            Z66     
                             

 

 2016            JOHNSON DO, EDUAR CLARK            Ot            A41.9   
                               

 

 2016            JOHNSON DO, EDUAR CLARK            Ot            E53.8   
                               

 

 2016            JOHNSON DO, EDUAR CLARK            Ot            E55.9   
                               

 

 2016            JOHNSON DO, EDUAR CLARK            Ot            E78.5   
                               

 

 2016            JOHNSON DO, EDUAR CLARK            Ot            F17.200 
                                 

 

 2016            JOHNSON DO, EDUAR CLARK            Ot            I12.9   
                               

 

 2016            JOHNSON DO, EDUAR CLARK            Ot            J15.1   
                               

 

 2016            JOHNSON DO, EDUAR CLARK            Ot            J15.4   
                               

 

 2016            JOHNSON DO, EDUAR CLARK            Ot            J44.0   
                               

 

 2016            JOHNSON DO, EDUAR CLARK            Ot            J44.1   
                               

 

 2016            JOHNSON DO, EDUAR CLARK            Ot            M51.14  
                                

 

 2016            JOHNSON DO, EDUAR CLARK            Ot            N18.2   
                               

 

 2016            JOHNSON DO, EDUAR CLARK            Ot            R90.82  
                                

 

 2016            JOHNSON DO, EDUAR CLARK            Ot            Z66     
                             

 

 2016            JOHNSON DO, EDUAR CLARK            Ot            A41.9   
                               

 

 2016            JOHNSON DO, EDUAR CLARK            Ot            E53.8   
                               

 

 2016            JOHNSON DO, EDUAR CLARK            Ot            E55.9   
                               

 

 2016            JOHNSON DO, EDUAR CLARK            Ot            E78.5   
                               

 

 2016            JOHNSON DO, EDUAR LCARK            Ot            F17.200 
                                 

 

 2016            JOHNSON DO, EDUAR CLARK            Ot            I12.9   
                               

 

 2016            JOHNSON DO, EDUAR CLARK            Ot            J15.1   
                               

 

 2016            JOHNSON DO, EDUAR CLARK            Ot            J15.4   
                               

 

 2016            JOHNSON DO, EDUAR CLARK            Ot            J44.0   
                               

 

 2016            JOHNSON DO, EDUAR CLARK            Ot            J44.1   
                               

 

 2016            JOHNSON DO, EDUAR CLARK            Ot            M51.14  
                                

 

 2016            JOHNSON DO, EDUAR CLARK            Ot            N18.2   
                               

 

 2016            JOHNSON DO, EDUAR CLARK            Ot            R90.82  
                                

 

 2016            JOHNSON DO, EDUAR CLARK            Ot            Z66     
                             

 

 2016            JOHNSON DO, EDUAR CLARK            Ot            A41.9   
                               

 

 2016            JOHNSON DO, EDUAR CLARK            Ot            E53.8   
                               

 

 2016            JOHNSON DO, EDUAR CLARK            Ot            E55.9   
                               

 

 2016            JOHNSON DO, EDUAR CLARK            Ot            E78.5   
                               

 

 2016            JOHNSON DO, EDUAR CLARK            Ot            F17.200 
                                 

 

 2016            JOHNSON DO, EDUAR CLARK            Ot            I12.9   
                               

 

 2016            JOHNSON DO, EDUAR CLARK            Ot            J15.1   
                               

 

 2016            JOHNSON DO, EDUAR CLARK            Ot            J15.4   
                               

 

 2016            JOHNSON DO, EDUAR CLARK            Ot            J44.0   
                               

 

 2016            JOHNSON DO, EDUAR CLARK            Ot            J44.1   
                               

 

 2016            JOHNSON DO, EDUAR CLARK            Ot            M51.14  
                                

 

 2016            JOHNSON DO, EDUAR CLARK            Ot            N18.2   
                               

 

 2016            JOHNSON DO, EDUAR CLARK            Ot            R90.82  
                                

 

 2016            JOHNSON DO, EDUAR CLARK            Ot            Z66     
                             

 

 2016            JOHNSON DO, EDUAR CLARK            Ot            A41.9   
                               

 

 2016            JOHNSON DO, EDUAR CLARK            Ot            E53.8   
                               

 

 2016            JOHNSON DO, EDUAR CLARK            Ot            E55.9   
                               

 

 2016            JOHNSON DO, EDUAR CLARK            Ot            E78.5   
                               

 

 2016            JOHNSON DO, EDUAR CLARK            Ot            F17.200 
                                 

 

 2016            JOHNSON DO, EDUAR CLARK            Ot            I12.9   
                               

 

 2016            JOHNSON DO, EDUAR CLARK            Ot            J15.1   
                               

 

 2016            JOHNSON DO, EDUAR CLARK            Ot            J15.4   
                               

 

 2016            JOHNSON DO, EDUAR CLARK            Ot            J44.0   
                               

 

 2016            JOHNSON DO, EDUAR CLARK            Ot            J44.1   
                               

 

 2016            JOHNSON DO, EDUAR CLARK            Ot            M51.14  
                                

 

 2016            JOHNSON DO, EDUAR CLARK            Ot            N18.2   
                               

 

 2016            JOHNSON DO, EDUAR CLARK            Ot            R90.82  
                                

 

 2016            JOHNSON DO, EDUAR CLARK            Ot            Z66     
                             

 

 2016            JOHNSON DO, EDUAR CLARK            Ot            A41.9   
                               

 

 2016            JOHNSON DO, EDUAR CLARK            Ot            E53.8   
                               

 

 2016            JOHNSON DO, EDUAR CLARK            Ot            E55.9   
                               

 

 2016            JOHNSON DO, EDUAR CLARK            Ot            E78.5   
                               

 

 2016            JOHNSON DO, EDUAR CLARK            Ot            F17.200 
                                 

 

 2016            JOHNSON DO, EDUAR CLARK            Ot            I12.9   
                               

 

 2016            JOHNSON DO, EDUAR CLARK            Ot            J15.1   
                               

 

 2016            JOHNSON DO, EDUAR CLARK            Ot            J15.4   
                               

 

 2016            JOHNSON DO, EDUAR CLARK            Ot            J44.0   
                               

 

 2016            JOHNSON DO, EDUAR CLARK            Ot            J44.1   
                               

 

 2016            JOHNSON DO, EDUAR CLARK            Ot            M51.14  
                                

 

 2016            JOHNSON DO, EDUAR CLARK            Ot            N18.2   
                               

 

 2016            JOHNSON DO, EDUAR CLARK            Ot            R90.82  
                                

 

 2016            JOHNSON DO, EDUAR CLARK            Ot            Z66     
                             

 

 2016            JOHNSON DO, EDUAR CLARK            Ot            A41.9   
         SEPSIS, UNSPECIFIED ORGANISM                     

 

 2016            EDUAR JOHNSON DO, Ot            E11.22  
          TYPE 2 DIABETES MELLITUS W DIABETIC                      

 

 2016            EDUAR JOHNSON DO, Ot            E53.8   
         DEFICIENCY OF OTHER SPECIFIED B GROUP VI                     

 

 2016            EDUAR JOHNSON DO, Ot            E55.9   
         VITAMIN D DEFICIENCY, UNSPECIFIED                     

 

 2016            EDUAR JOHNSON DO, Ot            E78.5   
         HYPERLIPIDEMIA, UNSPECIFIED                     

 

 2016            EDUAR JOHNSON DO, Ot            F17.210 
           NICOTINE DEPENDENCE, CIGARETTES, UNCOMPL                     

 

 2016            EDUAR JOHNSON DO, Ot            I12.9   
         HYPERTENSIVE CHRONIC KIDNEY DISEASE W ST                     

 

 2016            EDUAR JOHNSON DO, Ot            I25.10  
          ATHSCL HEART DISEASE OF NATIVE CORONARY                      

 

 2016            EDUAR JOHNSON DO, Ot            J15.1   
         PNEUMONIA DUE TO PSEUDOMONAS                     

 

 2016            EDUAR JOHNSON DO, Ot            J15.4   
         PNEUMONIA DUE TO OTHER STREPTOCOCCI                     

 

 2016            EDUAR JOHNSON DO, Ot            J44.0   
         CHRONIC OBSTRUCTIVE PULMON DISEASE W ACU                     

 

 2016            EDUAR JOHNSON DO, Ot            J44.1   
         CHRONIC OBSTRUCTIVE PULMONARY DISEASE W                      

 

 2016            EDUAR JOHNSON DO, Ot            M51.14  
          INTVRT DISC DISORDERS W RADICULOPATHY, T                     

 

 2016            EDUAR JOHNSON DO, Ot            N18.2   
         CHRONIC KIDNEY DISEASE, STAGE 2 (MILD)                     

 

 2016            EDUAR JOHNSON DO, Ot            R90.82  
          WHITE MATTER DISEASE, UNSPECIFIED                     

 

 2016            EDUAR JOHNSON DO, Ot            Z66     
       DO NOT RESUSCITATE                     

 

 2016                         Ot            733.00                       
           

 

 2016                         Ot            733.00                       
           

 

 2016                         Ot            733.00                       
           

 

 2016            EDUAR JOHNSON DO, Ot            733.00  
                                

 

 2016            DANA DILLON DO            Ot            492.8       
                           

 

 2016            DANA DILLON DO            Ot            511.9       
                           

 

 2016            DANA DILLON DO            Ot            586         
                         

 

 2016            ARLENE SAENZ DO            Ot            726.0    
                              

 

 2016            ARLENE SAENZ DO            Ot            718.91   
                               

 

 2016            ARLENE SAENZ DO            Ot            726.0    
                              

 

 2016            ARLENE SAENZ DO            Ot            840.4    
                              

 

 2016            LETTY DO, ARLENE MCKEON            Ot            E000.8   
                               

 

 2016            LETTY DO, ARLENE MCKEON            Ot            E928.9   
                               

 

 2016            LETTY DO, ARLENE MCKEON            Ot            V72.84   
                               

 

 2016            LETTY DO, ARLENE MCKEON            Ot            727.61   
                               

 

 2016            LETTY DO, ARLENE MCKEON            Ot            V72.84   
                               

 

 2016            MATA MATIAS, NOHELIA CLARK            Ot            414.00      
                            

 

 2016            MATA MATIAS, NOHELIA J            Ot            424.0       
                           

 

 2016            MATA MATIAS, NOHELIA J            Ot            433.10      
                            

 

 2016            MATA MATIAS, NOHELIA J            Ot            786.09      
                            

 

 2016            MATA MATIAS, NOHELIA CLARK            Ot            414.01      
                            

 

 2016            MATA MATIAS, NOHELIA CLARK            Ot            786.09      
                            

 

 2016            LETTY DO, ARLENE MCKEON            Ot            840.4    
                              

 

 2016            LETTY DO, ARLENE MCKEON            Ot            E000.8   
                               

 

 2016            LETTY DO, ARLENE MCKEON            Ot            E928.9   
                               

 

 2016            LETTY DO, ARLENE MCKEON            Ot            V72.84   
                               

 

 2016                         Ot            733.00                       
           

 

 2016            LETTY DO, ARLENE MCKEON            Ot            722.4    
                              

 

 2016                         Ot            J44.9                        
          

 

 2016            EDUAR JOHNSON DO            Ot            J44.9   
                               

 

 2016                         Ot            J44.9                        
          

 

 2016            JOHNSONEDUAR HILL DO            Ot            K59.00  
                                

 

 2016                         Ot            J44.9                        
          

 

 2016            EDUAR JOHNSON DO            Ot            K59.00  
                                

 

 2016            EDUAR JOHNSON DO            Ot            J44.9   
                               

 

 2016            EDUAR JOHNSON DO            Ot            J44.9   
                               

 

 2016            JOHNSONEDUAR HILL DO            Ot            J44.9   
         CHRONIC OBSTRUCTIVE PULMONARY DISEASE, U                     

 

 2016            EDUAR JOHNSON DO            Ot            J44.9   
         CHRONIC OBSTRUCTIVE PULMONARY DISEASE, U                     

 

 2016            MICH COLLIER DO            Ot            R19.5       
     OTHER FECAL ABNORMALITIES                     

 

 2016            MICH COLLIER DO            Ot            Z01.818     
       ENCOUNTER FOR OTHER PREPROCEDURAL EXAMIN                     

 

 2016            MICH COLLIER DO            Ot            R19.5       
     OTHER FECAL ABNORMALITIES                     

 

 2016            MICH COLLIER DO            Ot            Z01.818     
       ENCOUNTER FOR OTHER PREPROCEDURAL EXAMIN                     

 

 2017                         Ot            733.00            
OSTEOPOROSIS NOS                     

 

 2017                         Ot            733.00            
OSTEOPOROSIS NOS                     

 

 2017            EDUAR JOHNSON DO            Ot            733.00  
          OSTEOPOROSIS NOS                     

 

 2017            DANA DILLON DO            Ot            492.8       
     EMPHYSEMA NEC                     

 

 2017            DANA DILLON DO            Ot            511.9       
     PLEURAL EFFUSION NOS                     

 

 2017            DANA DILLON DO            Ot            586         
   RENAL FAILURE NOS                     

 

 2017            LETTY DO ARLENE MCKEON            Ot            726.0    
        ADHESIVE CAPSULIT SHLDER                     

 

 2017            LETTY DO ARLENE MCKEON            Ot            718.91   
         JT DERANGMENT NOS-SHLDER                     

 

 2017            LETTY DO ARLENE MCKEON            Ot            726.0    
        ADHESIVE CAPSULIT SHLDER                     

 

 2017            LETTY DAVIS ARLENE MCKEON            Ot            840.4    
        SPRAIN ROTATOR CUFF                     

 

 2017            LETTY DAVIS ARLENE MCKEON            Ot            E000.8   
         OTHER EXTERNAL CAUSE STATUS                     

 

 2017            LETTY DAVIS ARLENE MCKEON            Ot            E928.9   
         ACCIDENT NOS                     

 

 2017            LETTY DO, ARLENE MCKEON            Ot            V72.84   
         EXAM PRE-OPERATIVE NOS                     

 

 2017            LETTY DAVIS ARLENE MCKEON            Ot            727.61   
         ROTATOR CUFF RUPTURE                     

 

 2017            LETTY DAVIS ARLENE MCKEON            Ot            V72.84   
         EXAM PRE-OPERATIVE NOS                     

 

 2017            MATA MATIAS, NOHELIA CLARK            Ot            414.00      
      CORON ATHEROSCLER NOS TYPE VESSEL, NATIV                     

 

 2017            NOHELIA AUGUST MD            Ot            424.0       
     MITRAL VALVE DISORDER                     

 

 2017            NOHELIA AUGUST MD            Ot            433.10      
      CAROTID ARTERY OCCLUSION W O CEREBRAL IN                     

 

 2017            NOHELIA AUGUST MD            Ot            786.09      
      RESPIRATORY ABNORM NEC                     

 

 2017            NOHELIA AUGUST MD            Ot            414.01      
      CORONARY ATHEROSCLEROSIS OF NATIVE CORON                     

 

 2017            NOHELIA AUGUST MD            Ot            786.09      
      RESPIRATORY ABNORM NEC                     

 

 2017            LETTY DAVIS ARLENE MCKEON            Ot            840.4    
        SPRAIN ROTATOR CUFF                     

 

 2017            LETTY DAVIS ARLENE MCKEON            Ot            E000.8   
         OTHER EXTERNAL CAUSE STATUS                     

 

 2017            LETTY DAVIS ARLENE MCKEON            Ot            E928.9   
         ACCIDENT NOS                     

 

 2017            ARLENE SAENZ DO            Ot            V72.84   
         EXAM PRE-OPERATIVE NOS                     

 

 2017                         Ot            733.00            
OSTEOPOROSIS NOS                     

 

 2017            ARLENE SAENZ DO            Ot            722.4    
        CERVICAL DISC DEGEN                     

 

 2017                         Ot            J44.9            CHRONIC 
OBSTRUCTIVE PULMONARY DISEASE, U                     

 

 2017            JOHNSONLIZ DAVIS EDUAR EDUARDO            Ot            K59.00  
          CONSTIPATION, UNSPECIFIED                     

 

 2017            JOHNSONEDUAR HILL DO            Ot            J44.9   
         CHRONIC OBSTRUCTIVE PULMONARY DISEASE, U                     

 

 2017            COLLIERMICH GALEANO DO            Ot            K59.00      
      CONSTIPATION, UNSPECIFIED                     

 

 2017            COLLIER MICH DAVIS            Ot            R19.5       
     OTHER FECAL ABNORMALITIES                     

 

 2017            COLILERMICH GALEANO DO            Ot            Z86.010     
       PERSONAL HISTORY OF COLONIC POLYPS                     

 

 2017            COLLIERMICH GALEANO DO            Ot            K59.00      
      CONSTIPATION, UNSPECIFIED                     

 

 2017            COLLIER MICH DAVIS            Ot            R19.5       
     OTHER FECAL ABNORMALITIES                     

 

 2017            MICH COLLIER DO            Ot            Z86.010     
       PERSONAL HISTORY OF COLONIC POLYPS                     

 

 2017            TAE CHRISTIANSEN            Ot            
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                     

 

 2017            TAE CHRISTIANSEN            Ot            
G47.33            OBSTRUCTIVE SLEEP APNEA (ADULT) (PEDIATR                     

 

 2017            TAE CHRISTIANSEN            Ot            
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                      

 

 2017            TAE CHRISTIANSEN            Ot            
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                     

 

 2017            TAE CHRISTIANSEN            Ot            
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                     

 

 2017            TAE CHRISTIANSEN            Ot            
G47.33            OBSTRUCTIVE SLEEP APNEA (ADULT) (PEDIATR                     

 

 2017            TAE CHRISTIANSEN            Ot            
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                      

 

 2017            TAE CHRISTIANSEN            Ot            
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                     

 

 2017            MATA MATIAS, NOHELIA CLARK            Ot            E78.5       
     HYPERLIPIDEMIA, UNSPECIFIED                     

 

 2017            NOHELIA AUGUST MD            Ot            G47.33      
      OBSTRUCTIVE SLEEP APNEA (ADULT) (PEDIATR                     

 

 2017            NOHELIA AUGUST MD            Ot            I10         
   ESSENTIAL (PRIMARY) HYPERTENSION                     

 

 2017            NOHELIA AUGUST MD, Ot            I25.10      
      ATHSCL HEART DISEASE OF NATIVE CORONARY                      

 

 2017            NOHELIA AUGUST MD, Ot            I25.84      
      CORONARY ATHEROSCLEROSIS DUE TO CALCIFIE                     

 

 2017            NOHELIA AUGUST MD            Ot            I34.0       
     NONRHEUMATIC MITRAL (VALVE) INSUFFICIENC                     

 

 2017            NOHELIA AUGUST MD            Ot            I65.23      
      OCCLUSION AND STENOSIS OF BILATERAL MCKEON                     

 

 2017            NOHELIA AUGUST MD            Ot            R94.39      
      ABNORMAL RESULT OF OTHER CARDIOVASCULAR                      

 

 2017            NOHELIA AUGUST MD, Ot            Z72.0       
     TOBACCO USE                     

 

 2017            NOHELIA AUGUST MD, Ot            Z79.899     
       OTHER LONG TERM (CURRENT) DRUG THERAPY                     

 

 2017            NOHELIA AUGUST MD, Ot            Z86.73      
      PRSNL HX OF TIA (TIA), AND CEREB INFRC W                     

 

 2017            NOHELIA AUGUST MD            Ot            Z95.5       
     PRESENCE OF CORONARY ANGIOPLASTY IMPLANT                     

 

 2017            TAE CHRISTIANSEN            Ot            
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                     

 

 2017            TAE CHRISTIANSEN            Ot            
G47.33            OBSTRUCTIVE SLEEP APNEA (ADULT) (PEDIATR                     

 

 2017            TAE CHRISTIANSEN            Ot            
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                      

 

 2017            TAE CHRISTIANSEN            Ot            
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                     

 

 2017            TAE CHRISTIANSEN            Ot            
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                     

 

 2017            TAE CHRISTIANSEN            Ot            
G47.33            OBSTRUCTIVE SLEEP APNEA (ADULT) (PEDIATR                     

 

 2017            TAE CHRISTIANSEN            Ot            
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                      

 

 2017            TAE CHRISTIANSEN            Ot            
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                     

 

 2017            NOHELIA AUGUST MD            Ot            E78.5       
     HYPERLIPIDEMIA, UNSPECIFIED                     

 

 2017            NOHELIA AUGUST MD, Ot            G47.33      
      OBSTRUCTIVE SLEEP APNEA (ADULT) (PEDIATR                     

 

 2017            NOHELIA AUGUST MD            Ot            I10         
   ESSENTIAL (PRIMARY) HYPERTENSION                     

 

 2017            NOHELIA AUGUST MD, Ot            I25.10      
      ATHSCL HEART DISEASE OF NATIVE CORONARY                      

 

 2017            NOHELIA AUGUST MD, Ot            I25.84      
      CORONARY ATHEROSCLEROSIS DUE TO CALCIFIE                     

 

 2017            NOHELIA AUGUST MD            Ot            I34.0       
     NONRHEUMATIC MITRAL (VALVE) INSUFFICIENC                     

 

 2017            NOHELIA AUGUST MD, Ot            I65.23      
      OCCLUSION AND STENOSIS OF BILATERAL MCKEON                     

 

 2017            NOHELIA AUGUST MD            Ot            R94.39      
      ABNORMAL RESULT OF OTHER CARDIOVASCULAR                      

 

 2017            NOHELIA AUGUST MD, Ot            Z72.0       
     TOBACCO USE                     

 

 2017            NOHELIA AUGUST MD, Ot            Z79.899     
       OTHER LONG TERM (CURRENT) DRUG THERAPY                     

 

 2017            NOHELIA AUGUST MD, Ot            Z86.73      
      PRSNL HX OF TIA (TIA), AND CEREB INFRC W                     

 

 2017            NOHELIA AUGUST MD            Ot            Z95.5       
     PRESENCE OF CORONARY ANGIOPLASTY IMPLANT                     

 

 2017            DANA DILLON DO            Ot            R06.02      
      SHORTNESS OF BREATH                     

 

 2017            DANA DILLON DO            Ot            Z87.891     
       PERSONAL HISTORY OF NICOTINE DEPENDENCE                     

 

 2017            DANA DILLON DO            Ot            R06.02      
      SHORTNESS OF BREATH                     

 

 2017            DANA DILLON DO            Ot            Z87.891     
       PERSONAL HISTORY OF NICOTINE DEPENDENCE                     

 

 2017            DANA DILLON DO            Ot            R06.02      
      SHORTNESS OF BREATH                     

 

 2017            DANA DILLON DO            Ot            Z87.891     
       PERSONAL HISTORY OF NICOTINE DEPENDENCE                     

 

 2017            DANA DILLON DO            Ot            R06.02      
      SHORTNESS OF BREATH                     

 

 2017            DANA DILLON DO            Ot            Z87.891     
       PERSONAL HISTORY OF NICOTINE DEPENDENCE                     

 

 2017            DANA DILLON DO            Ot            R06.02      
      SHORTNESS OF BREATH                     

 

 2017            DANA DILLON DO            Ot            Z87.891     
       PERSONAL HISTORY OF NICOTINE DEPENDENCE                     

 

 2017            DANA DILLON DO            Ot            R06.02      
      SHORTNESS OF BREATH                     

 

 2017            DANA DILLON DO            Ot            Z87.891     
       PERSONAL HISTORY OF NICOTINE DEPENDENCE                     

 

 2017            DANA DILLON DO            Ot            R06.02      
      SHORTNESS OF BREATH                     

 

 2017            DANA DILLON DO            Ot            Z87.891     
       PERSONAL HISTORY OF NICOTINE DEPENDENCE                     

 

 2017            DANA DILLON DO            Ot            R06.02      
      SHORTNESS OF BREATH                     

 

 2017            DANA DILLON DO            Ot            Z87.891     
       PERSONAL HISTORY OF NICOTINE DEPENDENCE                     

 

 2018                         Ot            733.00            
OSTEOPOROSIS NOS                     

 

 2018            JOHNSONEDUAR HILL DO            Ot            733.00  
          OSTEOPOROSIS NOS                     

 

 2018            DANA DILLON DO            Ot            492.8       
     EMPHYSEMA NEC                     

 

 2018            DANA DILLON DO            Ot            511.9       
     PLEURAL EFFUSION NOS                     

 

 2018            WILMADANA BELTRÁN DO            Ot            586         
   RENAL FAILURE NOS                     

 

 2018            LETTY DO ARLENE MCKEON            Ot            726.0    
        ADHESIVE CAPSULIT SHLDER                     

 

 2018            LETTY DO ARLENE MCKEON            Ot            718.91   
         JT DERANGMENT NOS-SHLDER                     

 

 2018            LETTY DO ARLENE MCKEON            Ot            726.0    
        ADHESIVE CAPSULIT SHLDER                     

 

 2018            LETTY DO ARLENE MCKEON            Ot            840.4    
        SPRAIN ROTATOR CUFF                     

 

 2018            LETTY DO ARLENE MCKEON            Ot            E000.8   
         OTHER EXTERNAL CAUSE STATUS                     

 

 2018            LETTY DO ARLENE MCKEON            Ot            E928.9   
         ACCIDENT NOS                     

 

 2018            LETTY DAVIS ARLENE MCKEON            Ot            V72.84   
         EXAM PRE-OPERATIVE NOS                     

 

 2018            LETTY DO ARLENE MCKEON            Ot            727.61   
         ROTATOR CUFF RUPTURE                     

 

 2018            LETTY DAVIS ARLENE MCKEON            Ot            V72.84   
         EXAM PRE-OPERATIVE NOS                     

 

 2018            MATA MATIAS, NOHELIA CLARK            Ot            414.00      
      CORON ATHEROSCLER NOS TYPE VESSEL, NATIV                     

 

 2018            NOHELIA AUGUST MD            Ot            424.0       
     MITRAL VALVE DISORDER                     

 

 2018            NOHELIA AUGUST MD            Ot            433.10      
      CAROTID ARTERY OCCLUSION W O CEREBRAL IN                     

 

 2018            NOHELIA AUGUST MD            Ot            786.09      
      RESPIRATORY ABNORM NEC                     

 

 2018            NOHELIA AUGUST MD            Ot            414.01      
      CORONARY ATHEROSCLEROSIS OF NATIVE CORON                     

 

 2018            NOHELIA AUGUST MD            Ot            786.09      
      RESPIRATORY ABNORM NEC                     

 

 2018            ARLENE SAENZ DO            Ot            840.4    
        SPRAIN ROTATOR CUFF                     

 

 2018            ARLENE SAENZ DO            Ot            E000.8   
         OTHER EXTERNAL CAUSE STATUS                     

 

 2018            ARLENE SAENZ DO            Ot            E928.9   
         ACCIDENT NOS                     

 

 2018            ARLENE SAENZ DO            Ot            V72.84   
         EXAM PRE-OPERATIVE NOS                     

 

 2018                         Ot            733.00            
OSTEOPOROSIS NOS                     

 

 2018            ARLENE SAENZ DO            Ot            722.4    
        CERVICAL DISC DEGEN                     

 

 2018                         Ot            J44.9            CHRONIC 
OBSTRUCTIVE PULMONARY DISEASE, U                     

 

 2018            EDUAR JOHNSON DO            Ot            K59.00  
          CONSTIPATION, UNSPECIFIED                     

 

 2018            EDUAR JOHNSON DO, Ot            J44.9   
         CHRONIC OBSTRUCTIVE PULMONARY DISEASE, U                     

 

 2018            TAE CHRISTIANSEN Ot            
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                     

 

 2018            TAE CHRISTIANSEN Ot            
G47.33            OBSTRUCTIVE SLEEP APNEA (ADULT) (PEDIATR                     

 

 2018            TAE CHRISTIANSEN Ot            
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                      

 

 2018            TAE CHRISTIANSEN            Ot            
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                     

 

 2018            DANA DILLON DO            Ot            R06.02      
      SHORTNESS OF BREATH                     

 

 2018            DANA DILLON DO            Ot            Z87.891     
       PERSONAL HISTORY OF NICOTINE DEPENDENCE                     

 

 2018            NOHELIA AUGUST MD, Ot            G47.33      
      OBSTRUCTIVE SLEEP APNEA (ADULT) (PEDIATR                     

 

 2018            NOHELIA AUGUST MD, Ot            I25.10      
      ATHSCL HEART DISEASE OF NATIVE CORONARY                      

 

 2018            NOHELIA AUGUST MD, Ot            I34.0       
     NONRHEUMATIC MITRAL (VALVE) INSUFFICIENC                     

 

 2018            NOHELIA AUGUST MD, Ot            I71.2       
     THORACIC AORTIC ANEURYSM, WITHOUT RUPTUR                     

 

 2018            NOHELIA AUGUST MD            Ot            J43.9       
     EMPHYSEMA, UNSPECIFIED                     

 

 2018            NOHELIA AUGUST MD            Ot            N20.0       
     CALCULUS OF KIDNEY                     

 

 2018            NOHELIA AUGUST MD            Ot            R41.0       
     DISORIENTATION, UNSPECIFIED                     

 

 2018            NOHELIA AUGUST MD            Ot            W19.XXXA    
        UNSPECIFIED FALL, INITIAL ENCOUNTER                     

 

 2018            NOHELIA AUGUST MD, Ot            Z72.0       
     TOBACCO USE                     

 

 2018            EDUAR JOHNSON DO            Ot            J44.9   
         CHRONIC OBSTRUCTIVE PULMONARY DISEASE, U                     

 

 04/10/2018            NOHELIA AUGUST MD, Ot            D64.9       
     ANEMIA, UNSPECIFIED                     

 

 04/10/2018            NOHELIA AUGUST MD, Ot            E78.5       
     HYPERLIPIDEMIA, UNSPECIFIED                     

 

 04/10/2018            NOHELIA AUGUST MD            Ot            F17.210     
       NICOTINE DEPENDENCE, CIGARETTES, UNCOMPL                     

 

 04/10/2018            NOHELIA AUGUST MD, Ot            G47.9       
     SLEEP DISORDER, UNSPECIFIED                     

 

 04/10/2018            NOHELIA AUGUST MD            Ot            I10         
   ESSENTIAL (PRIMARY) HYPERTENSION                     

 

 04/10/2018            NOHELIA AUGUST MD, Ot            I25.10      
      ATHSCL HEART DISEASE OF NATIVE CORONARY                      

 

 04/10/2018            NOHELIA AUGUST MD, Ot            I65.29      
      OCCLUSION AND STENOSIS OF UNSPECIFIED CA                     

 

 04/10/2018            NOHELIA AUGUST MD, Ot            J44.9       
     CHRONIC OBSTRUCTIVE PULMONARY DISEASE, U                     

 

 04/10/2018            NOHELIA AUGUST MD            Ot            K63.89      
      OTHER SPECIFIED DISEASES OF INTESTINE                     

 

 04/10/2018            NOHELIA AUGUST MD            Ot            N28.9       
     DISORDER OF KIDNEY AND URETER, UNSPECIFI                     

 

 04/10/2018            NOHELIA AUGUST MD            Ot            R06.02      
      SHORTNESS OF BREATH                     

 

 04/10/2018            NOEHLIA AUGUST MD            Ot            R19.7       
     DIARRHEA, UNSPECIFIED                     

 

 04/10/2018            NOHELIA AUGUST MD            Ot            R21         
   RASH AND OTHER NONSPECIFIC SKIN ERUPTION                     

 

 04/10/2018            NOHELIA AUGUST MD            Ot            R53.83      
      OTHER FATIGUE                     

 

 04/10/2018            NOHELIA AUGUST MD, Ot            Z79.899     
       OTHER LONG TERM (CURRENT) DRUG THERAPY                     

 

 04/10/2018            NOHELIA AUGUST MD            Ot            Z86.73      
      PRSNL HX OF TIA (TIA), AND CEREB INFRC W                     

 

 2018                         Ot            733.00            
OSTEOPOROSIS NOS                     

 

 2018            EDUAR JOHNSON DO            Ot            733.00  
          OSTEOPOROSIS NOS                     

 

 2018            DANA DILLON DO            Ot            492.8       
     EMPHYSEMA NEC                     

 

 2018            DANA DILLON DO            Ot            511.9       
     PLEURAL EFFUSION NOS                     

 

 2018            WILMA DO, DANA M            Ot            586         
   RENAL FAILURE NOS                     

 

 2018            ARLENE SAENZ DO            Ot            726.0    
        ADHESIVE CAPSULIT SHLDER                     

 

 2018            ARLENE SAENZ DO            Ot            718.91   
         JT DERANGMENT NOS-SHLDER                     

 

 2018            LETTY DAVIS, ARLENE MCKEON            Ot            726.0    
        ADHESIVE CAPSULIT SHLDER                     

 

 2018            ARLENE SAENZ DO            Ot            840.4    
        SPRAIN ROTATOR CUFF                     

 

 2018            ARLENE SAENZ DO            Ot            E000.8   
         OTHER EXTERNAL CAUSE STATUS                     

 

 2018            ARLENE SAENZ DO            Ot            E928.9   
         ACCIDENT NOS                     

 

 2018            ARLENE SAENZ DO            Ot            V72.84   
         EXAM PRE-OPERATIVE NOS                     

 

 2018            ARLENE SAENZ DO            Ot            727.61   
         ROTATOR CUFF RUPTURE                     

 

 2018            ARLENE SAENZ DO            Ot            V72.84   
         EXAM PRE-OPERATIVE NOS                     

 

 2018            MATA MATAIS, NOHELIA CLARK            Ot            414.00      
      CORON ATHEROSCLER NOS TYPE VESSEL, NATIV                     

 

 2018            NOHELIA AUGUST MD            Ot            424.0       
     MITRAL VALVE DISORDER                     

 

 2018            MATA MATIAS, NOHELIA CLARK            Ot            433.10      
      CAROTID ARTERY OCCLUSION W O CEREBRAL IN                     

 

 2018            MATA MATIAS, NOHELIA CLARK            Ot            786.09      
      RESPIRATORY ABNORM NEC                     

 

 2018            MATA MATIAS, NOHELIA CLARK            Ot            414.01      
      CORONARY ATHEROSCLEROSIS OF NATIVE CORON                     

 

 2018            NOHELIA AUGUST MD            Ot            786.09      
      RESPIRATORY ABNORM NEC                     

 

 2018            LETTY DAVIS ARLENE MCKEON            Ot            840.4    
        SPRAIN ROTATOR CUFF                     

 

 2018            LETTY DAVIS ARLENE MCKEON            Ot            E000.8   
         OTHER EXTERNAL CAUSE STATUS                     

 

 2018            ARLENE SAENZ DO            Ot            E928.9   
         ACCIDENT NOS                     

 

 2018            ARLENE SAENZ DO            Ot            V72.84   
         EXAM PRE-OPERATIVE NOS                     

 

 2018                         Ot            733.00            
OSTEOPOROSIS NOS                     

 

 2018            LETTY DAVIS ARLENE MCKEON            Ot            722.4    
        CERVICAL DISC DEGEN                     

 

 2018                         Ot            J44.9            CHRONIC 
OBSTRUCTIVE PULMONARY DISEASE, U                     

 

 2018            EDUAR JOHNSON DO            Ot            K59.00  
          CONSTIPATION, UNSPECIFIED                     

 

 2018            EDUAR JOHNSON DO            Ot            J44.9   
         CHRONIC OBSTRUCTIVE PULMONARY DISEASE, U                     

 

 2018            NIMESH NAILS, TAE COELLO            Ot            
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                     

 

 2018            TAE CHRISTIANSEN Ot            
G47.33            OBSTRUCTIVE SLEEP APNEA (ADULT) (PEDIATR                     

 

 2018            TAE CHRISTIANSEN            Ot            
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                      

 

 2018            TAE CHRISTIANSEN            Ot            
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                     

 

 2018            DANA DILLON DO            Ot            R06.02      
      SHORTNESS OF BREATH                     

 

 2018            DANA DILLON DO            Ot            Z87.891     
       PERSONAL HISTORY OF NICOTINE DEPENDENCE                     

 

 2018            NOHELIA AUGUST MD, Ot            G47.33      
      OBSTRUCTIVE SLEEP APNEA (ADULT) (PEDIATR                     

 

 2018            NOHELIA AUGUST MD, Ot            I25.10      
      ATHSCL HEART DISEASE OF NATIVE CORONARY                      

 

 2018            NOHELIA AUGUST MD            Ot            I34.0       
     NONRHEUMATIC MITRAL (VALVE) INSUFFICIENC                     

 

 2018            NOHELIA AUGUTS MD            Ot            I71.2       
     THORACIC AORTIC ANEURYSM, WITHOUT RUPTUR                     

 

 2018            NOHELIA AUGUST MD, Ot            J43.9       
     EMPHYSEMA, UNSPECIFIED                     

 

 2018            NOHELIA AUGUST MD            Ot            N20.0       
     CALCULUS OF KIDNEY                     

 

 2018            NOHELIA AUGUST MD            Ot            R41.0       
     DISORIENTATION, UNSPECIFIED                     

 

 2018            NOHELIA AUGUST MD            Ot            W19.XXXA    
        UNSPECIFIED FALL, INITIAL ENCOUNTER                     

 

 2018            NOHELIA AUGUST MD            Ot            Z72.0       
     TOBACCO USE                     

 

 2018            MICH COLLIER DO            Ot            R93.5       
     ABN FINDINGS ON DX IMAGING OF ABD REGION                     

 

 2018            MICH COLLIER DO            Ot            Z01.818     
       ENCOUNTER FOR OTHER PREPROCEDURAL EXAMIN                     

 

 2018            NOHELIA AUGUST MD, Ot            G47.33      
      OBSTRUCTIVE SLEEP APNEA (ADULT) (PEDIATR                     

 

 2018            NOHELIA AUGUST MD            Ot            I25.10      
      ATHSCL HEART DISEASE OF NATIVE CORONARY                      

 

 2018            NOHELIA AUUGST MD            Ot            I34.0       
     NONRHEUMATIC MITRAL (VALVE) INSUFFICIENC                     

 

 2018            NOHELIA AUGUST MD            Ot            I71.2       
     THORACIC AORTIC ANEURYSM, WITHOUT RUPTUR                     

 

 2018            MATA MATIAS, NOHELIA CLARK            Ot            J43.9       
     EMPHYSEMA, UNSPECIFIED                     

 

 2018            NOHELIA AUGUST MD            Ot            N20.0       
     CALCULUS OF KIDNEY                     

 

 2018            NOHELIA AUGUST MD            Ot            R41.0       
     DISORIENTATION, UNSPECIFIED                     

 

 2018            NOHELIA AUGUST MD            Ot            W19.XXXA    
        UNSPECIFIED FALL, INITIAL ENCOUNTER                     

 

 2018            NOHELIA AUGUST MD            Ot            Z72.0       
     TOBACCO USE                     

 

 2018                         Ot            733.00            
OSTEOPOROSIS NOS                     

 

 2018            JOHNSONEDUAR HILL DO            Ot            733.00  
          OSTEOPOROSIS NOS                     

 

 2018            DANA DILLON DO            Ot            492.8       
     EMPHYSEMA NEC                     

 

 2018            DANA DILLON DO            Ot            511.9       
     PLEURAL EFFUSION NOS                     

 

 2018            DANA DILLON DO            Ot            586         
   RENAL FAILURE NOS                     

 

 2018            LETTY , ARLENE F            Ot            726.0    
        ADHESIVE CAPSULIT SHLDER                     

 

 2018            LETTY , ARLENE F            Ot            718.91   
         JT DERANGMENT NOS-SHLDER                     

 

 2018            LETTY , ARLENE LINDA            Ot            726.0    
        ADHESIVE CAPSULIT SHLDER                     

 

 2018            LETTY , ARLENE F            Ot            840.4    
        SPRAIN ROTATOR CUFF                     

 

 2018            LETTY DAVIS, ARLENE F            Ot            E000.8   
         OTHER EXTERNAL CAUSE STATUS                     

 

 2018            LETTY DO ARLENE LINDA            Ot            E928.9   
         ACCIDENT NOS                     

 

 2018            LETTY , ARLENE MCKEON            Ot            V72.84   
         EXAM PRE-OPERATIVE NOS                     

 

 2018            LETTY DO ARLENE F            Ot            727.61   
         ROTATOR CUFF RUPTURE                     

 

 2018            LETTY DO ARLENE LINDA            Ot            V72.84   
         EXAM PRE-OPERATIVE NOS                     

 

 2018            NOHELIA AUGUST MD            Ot            414.00      
      CORON ATHEROSCLER NOS TYPE VESSEL, NATIV                     

 

 2018            NOHELIA AUGUST MD            Ot            424.0       
     MITRAL VALVE DISORDER                     

 

 2018            NOHLEIA AUGUST MD            Ot            433.10      
      CAROTID ARTERY OCCLUSION W O CEREBRAL IN                     

 

 2018            NOHELIA AUGUST MD            Ot            786.09      
      RESPIRATORY ABNORM NEC                     

 

 2018            NOHELIA AUGUST MD            Ot            414.01      
      CORONARY ATHEROSCLEROSIS OF NATIVE CORON                     

 

 2018            NOHELIA AUGUST MD            Ot            786.09      
      RESPIRATORY ABNORM NEC                     

 

 2018            ARLENE SAENZ DO            Ot            840.4    
        SPRAIN ROTATOR CUFF                     

 

 2018            ARLENE SAENZ DO            Ot            E000.8   
         OTHER EXTERNAL CAUSE STATUS                     

 

 2018            ARLENE SAENZ DO            Ot            E928.9   
         ACCIDENT NOS                     

 

 2018            ARLENE SAENZ DO            Ot            V72.84   
         EXAM PRE-OPERATIVE NOS                     

 

 2018                         Ot            733.00            
OSTEOPOROSIS NOS                     

 

 2018            ARLENE SAENZ DO            Ot            722.4    
        CERVICAL DISC DEGEN                     

 

 2018                         Ot            J44.9            CHRONIC 
OBSTRUCTIVE PULMONARY DISEASE, U                     

 

 2018            EDUAR JOHNSON DO            Ot            K59.00  
          CONSTIPATION, UNSPECIFIED                     

 

 2018            EDUAR JOHNSON DO, Ot            J44.9   
         CHRONIC OBSTRUCTIVE PULMONARY DISEASE, U                     

 

 2018            TAE CHRISTIANSEN            Ot            
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                     

 

 2018            TAE CHRISTIANSEN Ot            
G47.33            OBSTRUCTIVE SLEEP APNEA (ADULT) (PEDIATR                     

 

 2018            TAE CHRISTIANSEN Ot            
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                      

 

 2018            TAE CHRISTIANSEN            Ot            
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                     

 

 2018            DANA DILLON DO            Ot            R06.02      
      SHORTNESS OF BREATH                     

 

 2018            DANA DILLON DO            Ot            Z87.891     
       PERSONAL HISTORY OF NICOTINE DEPENDENCE                     

 

 2018            NOHELIA AUGUST MD, Ot            G47.33      
      OBSTRUCTIVE SLEEP APNEA (ADULT) (PEDIATR                     

 

 2018            NOHELIA AUGUST MD, Ot            I25.10      
      ATHSCL HEART DISEASE OF NATIVE CORONARY                      

 

 2018            NOHELIA AUGUST MD            Ot            I34.0       
     NONRHEUMATIC MITRAL (VALVE) INSUFFICIENC                     

 

 2018            NOHELIA AUGUST MD            Ot            I71.2       
     THORACIC AORTIC ANEURYSM, WITHOUT RUPTUR                     

 

 2018            NOHELIA AUGUST MD            Ot            J43.9       
     EMPHYSEMA, UNSPECIFIED                     

 

 2018            NOHELIA AUGUST MD            Ot            N20.0       
     CALCULUS OF KIDNEY                     

 

 2018            NOHELIA AUGUST MD            Ot            R41.0       
     DISORIENTATION, UNSPECIFIED                     

 

 2018            MATA MATIAS, NOHELIA CLARK            Ot            W19.XXXA    
        UNSPECIFIED FALL, INITIAL ENCOUNTER                     

 

 2018            MATA MATIAS, NOHELIA CLARK            Ot            Z72.0       
     TOBACCO USE                     

 

 2018                         Ot            733.00            
OSTEOPOROSIS NOS                     

 

 2018            JOHNSON DO EDUAR CLARK            Ot            733.00  
          OSTEOPOROSIS NOS                     

 

 2018            DANA DILLON DO            Ot            492.8       
     EMPHYSEMA NEC                     

 

 2018            DANA DILLON DO M            Ot            511.9       
     PLEURAL EFFUSION NOS                     

 

 2018            DANA DILLON DO            Ot            586         
   RENAL FAILURE NOS                     

 

 2018            LETTY DO, ARLENE F            Ot            726.0    
        ADHESIVE CAPSULIT SHLDER                     

 

 2018            LETTY DO, ARLENE F            Ot            718.91   
         JT DERANGMENT NOS-SHLDER                     

 

 2018            LETTY DO, ARLENE F            Ot            726.0    
        ADHESIVE CAPSULIT SHLDER                     

 

 2018            LETTY DO, ARLENE F            Ot            840.4    
        SPRAIN ROTATOR CUFF                     

 

 2018            LETTY , ARLENE F            Ot            E000.8   
         OTHER EXTERNAL CAUSE STATUS                     

 

 2018            LETTY DO, ARLENE F            Ot            E928.9   
         ACCIDENT NOS                     

 

 2018            LETTY DO, ARLENE LINDA            Ot            V72.84   
         EXAM PRE-OPERATIVE NOS                     

 

 2018            LETTY , ARLENE F            Ot            727.61   
         ROTATOR CUFF RUPTURE                     

 

 2018            LETTY , ARLENE F            Ot            V72.84   
         EXAM PRE-OPERATIVE NOS                     

 

 2018            MATA MATIAS, NOHELIA CLARK            Ot            414.00      
      CORON ATHEROSCLER NOS TYPE VESSEL, NATIV                     

 

 2018            NOHELIA AUGUST MD            Ot            424.0       
     MITRAL VALVE DISORDER                     

 

 2018            NOHELIA AUGUST MD            Ot            433.10      
      CAROTID ARTERY OCCLUSION W O CEREBRAL IN                     

 

 2018            NOHELIA AUGUST MD            Ot            786.09      
      RESPIRATORY ABNORM NEC                     

 

 2018            NOHELIA AUGUST MD            Ot            414.01      
      CORONARY ATHEROSCLEROSIS OF NATIVE CORON                     

 

 2018            NOHELIA AUGUST MD            Ot            786.09      
      RESPIRATORY ABNORM NEC                     

 

 2018            LETTY DO ARLENE LINDA            Ot            840.4    
        SPRAIN ROTATOR CUFF                     

 

 2018            LETTY , ARLENE F            Ot            E000.8   
         OTHER EXTERNAL CAUSE STATUS                     

 

 2018            LETTY DO, ARLENE F            Ot            E928.9   
         ACCIDENT NOS                     

 

 2018            LETTY ARLENE            Ot            V72.84   
         EXAM PRE-OPERATIVE NOS                     

 

 2018                         Ot            733.00            
OSTEOPOROSIS NOS                     

 

 2018            ARLENE SAENZ DO            Ot            722.4    
        CERVICAL DISC DEGEN                     

 

 2018                         Ot            J44.9            CHRONIC 
OBSTRUCTIVE PULMONARY DISEASE, U                     

 

 2018            EDUAR JOHNSON DO            Ot            K59.00  
          CONSTIPATION, UNSPECIFIED                     

 

 2018            EDUAR JOHNSON DO            Ot            J44.9   
         CHRONIC OBSTRUCTIVE PULMONARY DISEASE, U                     

 

 2018            TAE CHRISTIANSEN Ot            
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                     

 

 2018            TAE CHRISTIANSEN Ot            
G47.33            OBSTRUCTIVE SLEEP APNEA (ADULT) (PEDIATR                     

 

 2018            TAE CHRISTIANSEN Ot            
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                      

 

 2018            TAE CHRISTIANSEN            Ot            
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                     

 

 2018            DANA DILLON DO            Ot            R06.02      
      SHORTNESS OF BREATH                     

 

 2018            DANA DILLON DO            Ot            Z87.891     
       PERSONAL HISTORY OF NICOTINE DEPENDENCE                     

 

 2018            NOHELIA AUGUST MD, Ot            G47.33      
      OBSTRUCTIVE SLEEP APNEA (ADULT) (PEDIATR                     

 

 2018            NOHELIA AUGUST MD, Ot            I25.10      
      ATHSCL HEART DISEASE OF NATIVE CORONARY                      

 

 2018            NOHELIA AUGUST MD            Ot            I34.0       
     NONRHEUMATIC MITRAL (VALVE) INSUFFICIENC                     

 

 2018            NOHELIA AUGUST MD, Ot            I71.2       
     THORACIC AORTIC ANEURYSM, WITHOUT RUPTUR                     

 

 2018            NOHELIA AUGUST MD, Ot            J43.9       
     EMPHYSEMA, UNSPECIFIED                     

 

 2018            NOHELIA AUGUST MD            Ot            N20.0       
     CALCULUS OF KIDNEY                     

 

 2018            NOHELIA AUGUST MD, Ot            R41.0       
     DISORIENTATION, UNSPECIFIED                     

 

 2018            NOHELIA AUGUST MD            Ot            W19.XXXA    
        UNSPECIFIED FALL, INITIAL ENCOUNTER                     

 

 2018            NOHELIA AUGUST MD, Ot            Z72.0       
     TOBACCO USE                     

 

 2018            MICH COLLIER DO            Ot            R93.5       
     ABN FINDINGS ON DX IMAGING OF ABD REGION                     

 

 2018            MICH COLLIER DO            Ot            Z01.818     
       ENCOUNTER FOR OTHER PREPROCEDURAL EXAMIN                     

 

 2018                         Ot            733.00            
OSTEOPOROSIS NOS                     

 

 2018            ELIZABETH DAVIS EDUAR CLARK            Ot            733.00  
          OSTEOPOROSIS NOS                     

 

 2018            DANA DILLON DO            Ot            492.8       
     EMPHYSEMA NEC                     

 

 2018            DANA DILLON DO            Ot            511.9       
     PLEURAL EFFUSION NOS                     

 

 2018            DANA DILLON DO            Ot            586         
   RENAL FAILURE NOS                     

 

 2018            LETTY , ARLENE MCKEON            Ot            726.0    
        ADHESIVE CAPSULIT SHLDER                     

 

 2018            LETTY , ARLENE MCKEON            Ot            718.91   
         JT DERANGMENT NOS-SHLDER                     

 

 2018            LETTY DO ARLENE MCKEON            Ot            726.0    
        ADHESIVE CAPSULIT SHLDER                     

 

 2018            LETTY , ARLENE MCKEON            Ot            840.4    
        SPRAIN ROTATOR CUFF                     

 

 2018            LETTY DO ARLENE MCKEON            Ot            E000.8   
         OTHER EXTERNAL CAUSE STATUS                     

 

 2018            LETTY DO ARLENE MCKEON            Ot            E928.9   
         ACCIDENT NOS                     

 

 2018            LETTY , ARLENE MCKEON            Ot            V72.84   
         EXAM PRE-OPERATIVE NOS                     

 

 2018            LETTY DO ARLENE MCKEON            Ot            727.61   
         ROTATOR CUFF RUPTURE                     

 

 2018            LETYT DO ARLENE MCKEON            Ot            V72.84   
         EXAM PRE-OPERATIVE NOS                     

 

 2018            NOHELIA AUGUST MD            Ot            414.00      
      CORON ATHEROSCLER NOS TYPE VESSEL, NATIV                     

 

 2018            NOHELIA AUGUST MD            Ot            424.0       
     MITRAL VALVE DISORDER                     

 

 2018            NOHELIA AUGUST MD            Ot            433.10      
      CAROTID ARTERY OCCLUSION W O CEREBRAL IN                     

 

 2018            NOHELIA AUGUST MD            Ot            786.09      
      RESPIRATORY ABNORM NEC                     

 

 2018            NOHELIA AUGUST MD            Ot            414.01      
      CORONARY ATHEROSCLEROSIS OF NATIVE CORON                     

 

 2018            NOHELIA AUGUST MD            Ot            786.09      
      RESPIRATORY ABNORM NEC                     

 

 2018            ARLENE SAENZ DO            Ot            840.4    
        SPRAIN ROTATOR CUFF                     

 

 2018            LETTY DO ARLENE MCKEON            Ot            E000.8   
         OTHER EXTERNAL CAUSE STATUS                     

 

 2018            LETTY DO ARLENE MCKEON            Ot            E928.9   
         ACCIDENT NOS                     

 

 2018            LETTY DO ARLENE MCKEON            Ot            V72.84   
         EXAM PRE-OPERATIVE NOS                     

 

 2018                         Ot            733.00            
OSTEOPOROSIS NOS                     

 

 2018            ARLENE SAENZ DO            Ot            722.4    
        CERVICAL DISC DEGEN                     

 

 2018                         Ot            J44.9            CHRONIC 
OBSTRUCTIVE PULMONARY DISEASE, U                     

 

 2018            EDUAR JOHNSON DO            Ot            K59.00  
          CONSTIPATION, UNSPECIFIED                     

 

 2018            EDUAR JOHNSON DO            Ot            J44.9   
         CHRONIC OBSTRUCTIVE PULMONARY DISEASE, U                     

 

 2018            TAE CHRISTIANSEN            Ot            
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                     

 

 2018            TAE CHRISTIANSEN Ot            
G47.33            OBSTRUCTIVE SLEEP APNEA (ADULT) (PEDIATR                     

 

 2018            TAE CHRISTIANSEN Ot            
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                      

 

 2018            TAE CHRISTIANSEN            Ot            
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                     

 

 2018            DANA DILLON DO            Ot            R06.02      
      SHORTNESS OF BREATH                     

 

 2018            DANA DILLON DO            Ot            Z87.891     
       PERSONAL HISTORY OF NICOTINE DEPENDENCE                     

 

 2018            NOHELIA AUGUST MD, Ot            G47.33      
      OBSTRUCTIVE SLEEP APNEA (ADULT) (PEDIATR                     

 

 2018            NOHELIA AUGUST MD, Ot            I25.10      
      ATHSCL HEART DISEASE OF NATIVE CORONARY                      

 

 2018            NOHELIA AUGUST MD, Ot            I34.0       
     NONRHEUMATIC MITRAL (VALVE) INSUFFICIENC                     

 

 2018            NOHELIA AUGUST MD, Ot            I71.2       
     THORACIC AORTIC ANEURYSM, WITHOUT RUPTUR                     

 

 2018            NOHELIA AUGUST MD, Ot            J43.9       
     EMPHYSEMA, UNSPECIFIED                     

 

 2018            NOHELIA AUGUST MD, Ot            N20.0       
     CALCULUS OF KIDNEY                     

 

 2018            NOHELIA AUGUST MD, Ot            R41.0       
     DISORIENTATION, UNSPECIFIED                     

 

 2018            NOHELIA AUGUST MD            Ot            W19.XXXA    
        UNSPECIFIED FALL, INITIAL ENCOUNTER                     

 

 2018            NOHELIA AUGUST MD, Ot            Z72.0       
     TOBACCO USE                     

 

 2018            MICH COLLIER DO            Ot            E03.9       
     HYPOTHYROIDISM, UNSPECIFIED                     

 

 2018            MICH COLLIER DO            Ot            E11.22      
      TYPE 2 DIABETES MELLITUS W DIABETIC                      

 

 2018            MICH COLLIER DO            Ot            F17.210     
       NICOTINE DEPENDENCE, CIGARETTES, UNCOMPL                     

 

 2018            MICH COLLIER DO            Ot            F32.9       
     MAJOR DEPRESSIVE DISORDER, SINGLE EPISOD                     

 

 2018            MICH COLLIER DO            Ot            G47.33      
      OBSTRUCTIVE SLEEP APNEA (ADULT) (PEDIATR                     

 

 2018            MICH COLLIER DO            Ot            I12.0       
     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                     

 

 2018            MICH COLLIER DO            Ot            I25.10      
      ATHSCL HEART DISEASE OF NATIVE CORONARY                      

 

 2018            MICH COLLIER DO            Ot            J43.9       
     EMPHYSEMA, UNSPECIFIED                     

 

 2018            MICH COLLIER DO            Ot            K21.9       
     GASTRO-ESOPHAGEAL REFLUX DISEASE WITHOUT                     

 

 2018            IMCH COLLIER DO            Ot            K44.9       
     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                     

 

 2018            MICH COLLIER DO            Ot            N18.6       
     END STAGE RENAL DISEASE                     

 

 2018            MICH COLLIER DO            Ot            Z79.899     
       OTHER LONG TERM (CURRENT) DRUG THERAPY                     

 

 2018            MICH COLLIER DO            Ot            Z86.73      
      PRSNL HX OF TIA (TIA), AND CEREB INFRC W                     

 

 2018            MICH COLLIER DO            Ot            Z95.5       
     PRESENCE OF CORONARY ANGIOPLASTY IMPLANT                     

 

 2018            MICH COLLIER DO            Ot            Z99.2       
     DEPENDENCE ON RENAL DIALYSIS                     

 

 2018            MICH COLLIER DO            Ot            E03.9       
     HYPOTHYROIDISM, UNSPECIFIED                     

 

 2018            MICH COLLIER DO            Ot            E11.22      
      TYPE 2 DIABETES MELLITUS W DIABETIC                      

 

 2018            MICH COLLIER DO            Ot            F17.210     
       NICOTINE DEPENDENCE, CIGARETTES, UNCOMPL                     

 

 2018            MICH COLLIER DO            Ot            F32.9       
     MAJOR DEPRESSIVE DISORDER, SINGLE EPISOD                     

 

 2018            MICH COLLIER DO            Ot            G47.33      
      OBSTRUCTIVE SLEEP APNEA (ADULT) (PEDIATR                     

 

 2018            MICH COLLIER DO            Ot            I12.0       
     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                     

 

 2018            MICH COLLIER DO            Ot            I25.10      
      ATHSCL HEART DISEASE OF NATIVE CORONARY                      

 

 2018            MICH COLLIER DO            Ot            J43.9       
     EMPHYSEMA, UNSPECIFIED                     

 

 2018            MICH COLLIER DO            Ot            K21.9       
     GASTRO-ESOPHAGEAL REFLUX DISEASE WITHOUT                     

 

 2018            MICH COLLIER DO            Ot            K44.9       
     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                     

 

 2018            MICH COLLIER DO            Ot            N18.6       
     END STAGE RENAL DISEASE                     

 

 2018            COLLIER MICH DAVIS            Ot            Z79.899     
       OTHER LONG TERM (CURRENT) DRUG THERAPY                     

 

 2018            MICH COLLIER DO            Ot            Z86.73      
      PRSNL HX OF TIA (TIA), AND CEREB INFRC W                     

 

 2018            MICH COLLIER DO            Ot            Z95.5       
     PRESENCE OF CORONARY ANGIOPLASTY IMPLANT                     

 

 2018            MICH COLLIER DO            Ot            Z99.2       
     DEPENDENCE ON RENAL DIALYSIS                     

 

 2018            NOHELIA AUGUST MD            Ot            G47.33      
      OBSTRUCTIVE SLEEP APNEA (ADULT) (PEDIATR                     

 

 2018            NOHELIA AUGUST MD            Ot            I25.10      
      ATHSCL HEART DISEASE OF NATIVE CORONARY                      

 

 2018            NOHELIA AUGUST MD            Ot            I34.0       
     NONRHEUMATIC MITRAL (VALVE) INSUFFICIENC                     

 

 2018            NOHELIA AUGUST MD            Ot            I71.2       
     THORACIC AORTIC ANEURYSM, WITHOUT RUPTUR                     

 

 2018            NOHELIA AUGUST MD            Ot            J43.9       
     EMPHYSEMA, UNSPECIFIED                     

 

 2018            NOHELIA AUGUST MD            Ot            N20.0       
     CALCULUS OF KIDNEY                     

 

 2018            NOHELIA AUGUST MD            Ot            R41.0       
     DISORIENTATION, UNSPECIFIED                     

 

 2018            NOHELIA AUGUST MD            Ot            W19.XXXA    
        UNSPECIFIED FALL, INITIAL ENCOUNTER                     

 

 2018            NOHELIA AUGUST MD            Ot            Z72.0       
     TOBACCO USE                     

 

 2018            NOHELIA AUGUST MD            Ot            G47.33      
      OBSTRUCTIVE SLEEP APNEA (ADULT) (PEDIATR                     

 

 2018            NOHELIA AUGUST MD            Ot            I25.10      
      ATHSCL HEART DISEASE OF NATIVE CORONARY                      

 

 2018            NOHELIA AUGUST MD            Ot            I34.0       
     NONRHEUMATIC MITRAL (VALVE) INSUFFICIENC                     

 

 2018            NOHELIA AUGUST MD            Ot            I71.2       
     THORACIC AORTIC ANEURYSM, WITHOUT RUPTUR                     

 

 2018            NOHELIA AUGUST MD            Ot            J43.9       
     EMPHYSEMA, UNSPECIFIED                     

 

 2018            NOHELIA AUGUST MD            Ot            N20.0       
     CALCULUS OF KIDNEY                     

 

 2018            NOHELIA AUGUST MD            Ot            R41.0       
     DISORIENTATION, UNSPECIFIED                     

 

 2018            NOHELIA AUGUST MD            Ot            W19.XXXA    
        UNSPECIFIED FALL, INITIAL ENCOUNTER                     

 

 2018            NOHELIA AUGUST MD            Ot            Z72.0       
     TOBACCO USE                     

 

 2018                         Ot            733.00            
OSTEOPOROSIS NOS                     

 

 2018            JOHNSONEDUAR HILL DO            Ot            733.00  
          OSTEOPOROSIS NOS                     

 

 2018            DANA DILLON DO            Ot            492.8       
     EMPHYSEMA NEC                     

 

 2018            DANA DILLON DO            Ot            511.9       
     PLEURAL EFFUSION NOS                     

 

 2018            DANA DILLON DO            Ot            586         
   RENAL FAILURE NOS                     

 

 2018            LETTY DO, ARLENE MCKEON            Ot            726.0    
        ADHESIVE CAPSULIT SHLDER                     

 

 2018            LETTY DO, ARLENE MCKEON            Ot            718.91   
         JT DERANGMENT NOS-SHLDER                     

 

 2018            LETTY DO, ARLENE MCKEON            Ot            726.0    
        ADHESIVE CAPSULIT SHLDER                     

 

 2018            LETTY DO, ARLENE MCKEON            Ot            840.4    
        SPRAIN ROTATOR CUFF                     

 

 2018            LETTY DO, ARLENE MCKEON            Ot            E000.8   
         OTHER EXTERNAL CAUSE STATUS                     

 

 2018            LETTY  ARLENE MCKEON            Ot            E928.9   
         ACCIDENT NOS                     

 

 2018            LETTY DO, ARLENE MCKEON            Ot            V72.84   
         EXAM PRE-OPERATIVE NOS                     

 

 2018            LETTY  ARLENE MCKEON            Ot            727.61   
         ROTATOR CUFF RUPTURE                     

 

 2018            LETTY DO ARLENE             Ot            V72.84   
         EXAM PRE-OPERATIVE NOS                     

 

 2018            NOHELIA AUGUST MD            Ot            414.00      
      CORON ATHEROSCLER NOS TYPE VESSEL, NATIV                     

 

 2018            NOHELIA AUGUST MD            Ot            424.0       
     MITRAL VALVE DISORDER                     

 

 2018            NOHELIA AUGUST MD            Ot            433.10      
      CAROTID ARTERY OCCLUSION W O CEREBRAL IN                     

 

 2018            NOHELIA AUGUST MD            Ot            786.09      
      RESPIRATORY ABNORM NEC                     

 

 2018            NOHELIA AUGUST MD            Ot            414.01      
      CORONARY ATHEROSCLEROSIS OF NATIVE CORON                     

 

 2018            NOHELIA AUGUST MD            Ot            786.09      
      RESPIRATORY ABNORM NEC                     

 

 2018            ARLENE SAENZ DO            Ot            840.4    
        SPRAIN ROTATOR CUFF                     

 

 2018            ARLENE SAENZ DO            Ot            E000.8   
         OTHER EXTERNAL CAUSE STATUS                     

 

 2018            ARLENE SAENZ DO            Ot            E928.9   
         ACCIDENT NOS                     

 

 2018            ARLENE SAENZ DO            Ot            V72.84   
         EXAM PRE-OPERATIVE NOS                     

 

 2018                         Ot            733.00            
OSTEOPOROSIS NOS                     

 

 2018            ARLENE SAENZ DO            Ot            722.4    
        CERVICAL DISC DEGEN                     

 

 2018                         Ot            J44.9            CHRONIC 
OBSTRUCTIVE PULMONARY DISEASE, U                     

 

 2018            EDUAR JOHNSON DO            Ot            K59.00  
          CONSTIPATION, UNSPECIFIED                     

 

 2018            EDUAR JOHNSON DO            Ot            J44.9   
         CHRONIC OBSTRUCTIVE PULMONARY DISEASE, U                     

 

 2018            TAE CHRISTIANSEN            Ot            
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                     

 

 2018            TAE CHRISTIANSEN Ot            
G47.33            OBSTRUCTIVE SLEEP APNEA (ADULT) (PEDIATR                     

 

 2018            TAE CHRISTIANSEN Ot            
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                      

 

 2018            TAE CHRISTIANSEN            Ot            
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                     

 

 2018            DANA DILLON DO            Ot            R06.02      
      SHORTNESS OF BREATH                     

 

 2018            DANA DILLON DO            Ot            Z87.891     
       PERSONAL HISTORY OF NICOTINE DEPENDENCE                     

 

 2018            NOHELIA AUGUST MD, Ot            G47.33      
      OBSTRUCTIVE SLEEP APNEA (ADULT) (PEDIATR                     

 

 2018            NOHELIA AUGUST MD, Ot            I25.10      
      ATHSCL HEART DISEASE OF NATIVE CORONARY                      

 

 2018            NOHELIA AUGUST MD, Ot            I34.0       
     NONRHEUMATIC MITRAL (VALVE) INSUFFICIENC                     

 

 2018            NOHELIA AUGUST MD, Ot            I71.2       
     THORACIC AORTIC ANEURYSM, WITHOUT RUPTUR                     

 

 2018            NOHELIA AUGUST MD            Ot            J43.9       
     EMPHYSEMA, UNSPECIFIED                     

 

 2018            NOHELIA AUUGST MD, Ot            N20.0       
     CALCULUS OF KIDNEY                     

 

 2018            NOHELIA AUGUST MD, Ot            R41.0       
     DISORIENTATION, UNSPECIFIED                     

 

 2018            NOHELIA AUGUST MD            Ot            W19.XXXA    
        UNSPECIFIED FALL, INITIAL ENCOUNTER                     

 

 2018            MATA MATIAS, NOHELIA CLARK            Ot            Z72.0       
     TOBACCO USE                     

 

 2018            MICH COLLIER DO            Ot            K42.9       
     UMBILICAL HERNIA WITHOUT OBSTRUCTION OR                      

 

 2018            MICH COLLIER DO            Ot            Z01.812     
       ENCOUNTER FOR PREPROCEDURAL LABORATORY E                     

 

 2018            MICH COLLIER DO            Ot            Z11.2       
     ENCOUNTER FOR SCREENING FOR OTHER BACTER                     

 

 2018            MICH COLLIER DO            Ot            K42.9       
     UMBILICAL HERNIA WITHOUT OBSTRUCTION OR                      

 

 2018            MICH COLLIER DO            Ot            Z01.812     
       ENCOUNTER FOR PREPROCEDURAL LABORATORY E                     

 

 2018            MICH COLLIER DO            Ot            Z11.2       
     ENCOUNTER FOR SCREENING FOR OTHER BACTER                     

 

 2018            MICH COLLIER DO            Ot            F17.210     
       NICOTINE DEPENDENCE, CIGARETTES, UNCOMPL                     

 

 2018            MICH COLLIER DO            Ot            G47.33      
      OBSTRUCTIVE SLEEP APNEA (ADULT) (PEDIATR                     

 

 2018            MICH COLLIER DO            Ot            I08.1       
     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                     

 

 2018            MICH COLLIER DO            Ot            I10         
   ESSENTIAL (PRIMARY) HYPERTENSION                     

 

 2018            MICH COLLIER DO            Ot            I25.10      
      ATHSCL HEART DISEASE OF NATIVE CORONARY                      

 

 2018            MICH COLLIER DO            Ot            J44.9       
     CHRONIC OBSTRUCTIVE PULMONARY DISEASE, U                     

 

 2018            MICH COLLIER DO            Ot            J45.909     
       UNSPECIFIED ASTHMA, UNCOMPLICATED                     

 

 2018            MICH COLLIER DO            Ot            K42.0       
     UMBILICAL HERNIA WITH OBSTRUCTION, WITHO                     

 

 2018            MICH COLLIER DO            Ot            Z79.02      
      LONG TERM (CURRENT) USE OF ANTITHROMBOTI                     

 

 2018            MICH COLLIER DO            Ot            Z79.899     
       OTHER LONG TERM (CURRENT) DRUG THERAPY                     

 

 2018            MICH COLLIER DO            Ot            F17.210     
       NICOTINE DEPENDENCE, CIGARETTES, UNCOMPL                     

 

 2018            MICH COLLIER DO            Ot            G47.33      
      OBSTRUCTIVE SLEEP APNEA (ADULT) (PEDIATR                     

 

 2018            MICH COLLIER DO            Ot            I08.1       
     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                     

 

 2018            MICH COLLIER DO            Ot            I10         
   ESSENTIAL (PRIMARY) HYPERTENSION                     

 

 2018            MICH COLLIER DO            Ot            I25.10      
      ATHSCL HEART DISEASE OF NATIVE CORONARY                      

 

 2018            MICH COLLIER DO            Ot            J44.9       
     CHRONIC OBSTRUCTIVE PULMONARY DISEASE, U                     

 

 2018            MICH COLLIER DO            Ot            J45.909     
       UNSPECIFIED ASTHMA, UNCOMPLICATED                     

 

 2018            MICH COLLIER DO            Ot            K42.0       
     UMBILICAL HERNIA WITH OBSTRUCTION, WITHO                     

 

 2018            MICH COLLIER DO            Ot            Z79.02      
      LONG TERM (CURRENT) USE OF ANTITHROMBOTI                     

 

 2018            MICH COLLIER DO            Ot            Z79.899     
       OTHER LONG TERM (CURRENT) DRUG THERAPY                     

 

 2018            MICH COLLIER DO            Ot            F17.210     
       NICOTINE DEPENDENCE, CIGARETTES, UNCOMPL                     

 

 2018            MICH COLLIER DO            Ot            G47.33      
      OBSTRUCTIVE SLEEP APNEA (ADULT) (PEDIATR                     

 

 2018            MICH COLLIER DO            Ot            I08.1       
     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                     

 

 2018            MICH COLLIER DO            Ot            I10         
   ESSENTIAL (PRIMARY) HYPERTENSION                     

 

 2018            MICH COLLIER DO            Ot            I25.10      
      ATHSCL HEART DISEASE OF NATIVE CORONARY                      

 

 2018            MICH COLLIER DO            Ot            J44.9       
     CHRONIC OBSTRUCTIVE PULMONARY DISEASE, U                     

 

 2018            MICH COLLIER DO            Ot            J45.909     
       UNSPECIFIED ASTHMA, UNCOMPLICATED                     

 

 2018            MICH COLLIER DO            Ot            K42.0       
     UMBILICAL HERNIA WITH OBSTRUCTION, WITHO                     

 

 2018            MICH COLLIER DO            Ot            Z79.02      
      LONG TERM (CURRENT) USE OF ANTITHROMBOTI                     

 

 2018            MICH COLLIER DO            Ot            Z79.899     
       OTHER LONG TERM (CURRENT) DRUG THERAPY                     

 

 2018            ZACH DIEGO            Ot            J44.9 
           CHRONIC OBSTRUCTIVE PULMONARY DISEASE, U                     

 

 2018            ZACH DIEGO APRN            Ot            R59.0 
           LOCALIZED ENLARGED LYMPH NODES                     

 

 07/10/2018            ZACH DIEGO            Ot            J44.9 
           CHRONIC OBSTRUCTIVE PULMONARY DISEASE, U                     

 

 07/10/2018            ZACH DIEGO APRN            Ot            R59.0 
           LOCALIZED ENLARGED LYMPH NODES                     

 

 2018            ZACH DIEGO APRN            Ot            J44.9 
           CHRONIC OBSTRUCTIVE PULMONARY DISEASE, U                     

 

 2018            JOHNATHONZACH KRAFT EDWARDO            Ot            R59.0 
           LOCALIZED ENLARGED LYMPH NODES                     

 

 2018                         Ot            733.00            
OSTEOPOROSIS NOS                     

 

 2018            JOHNSON EDUAR DAVIS            Ot            733.00  
          OSTEOPOROSIS NOS                     

 

 2018            WILMA  DANA SMITH            Ot            492.8       
     EMPHYSEMA NEC                     

 

 2018            WILMA  DANA SMITH            Ot            511.9       
     PLEURAL EFFUSION NOS                     

 

 2018            WILMA DAVIS DANA SMITH            Ot            586         
   RENAL FAILURE NOS                     

 

 2018            LETTY DO, ARLENE LINDA            Ot            726.0    
        ADHESIVE CAPSULIT SHLDER                     

 

 2018            LETTY DO, ARLENE MCKEON            Ot            718.91   
         JT DERANGMENT NOS-SHLDER                     

 

 2018            LETTY , ARLENE MCKEON            Ot            726.0    
        ADHESIVE CAPSULIT SHLDER                     

 

 2018            LETTY DO, ARLENE MCKEON            Ot            840.4    
        SPRAIN ROTATOR CUFF                     

 

 2018            LETTY DO, ARLENE LINDA            Ot            E000.8   
         OTHER EXTERNAL CAUSE STATUS                     

 

 2018            LETTY DO, ARLENE MCKEON            Ot            E928.9   
         ACCIDENT NOS                     

 

 2018            [facility], ARLENE MCKEON            Ot            V72.84   
         EXAM PRE-OPERATIVE NOS                     

 

 2018            LETTY , ARLENE MCKEON            Ot            727.61   
         ROTATOR CUFF RUPTURE                     

 

 2018            [facility], ARLENE             Ot            V72.84   
         EXAM PRE-OPERATIVE NOS                     

 

 2018            MATA MATIAS, NOHELIA CLARK            Ot            414.00      
      CORON ATHEROSCLER NOS TYPE VESSEL, NATIV                     

 

 2018            NOHELIA AUGUST MD            Ot            424.0       
     MITRAL VALVE DISORDER                     

 

 2018            NOHELIA AUGUST MD            Ot            433.10      
      CAROTID ARTERY OCCLUSION W O CEREBRAL IN                     

 

 2018            NOHELIA AUGUST MD            Ot            786.09      
      RESPIRATORY ABNORM NEC                     

 

 2018            NOHELIA AUGUST MD            Ot            414.01      
      CORONARY ATHEROSCLEROSIS OF NATIVE CORON                     

 

 2018            NOHELIA AUGUST MD            Ot            786.09      
      RESPIRATORY ABNORM NEC                     

 

 2018            LETTY  ARLENE MCKEON            Ot            840.4    
        SPRAIN ROTATOR CUFF                     

 

 2018            LETTY  ARLENE MCKEON            Ot            E000.8   
         OTHER EXTERNAL CAUSE STATUS                     

 

 2018            LETTY , ARLENE LINDA            Ot            E928.9   
         ACCIDENT NOS                     

 

 2018            LETTY , ARLENE LINDA            Ot            V72.84   
         EXAM PRE-OPERATIVE NOS                     

 

 2018                         Ot            733.00            
OSTEOPOROSIS NOS                     

 

 2018            ARLENE SAENZ DO            Ot            722.4    
        CERVICAL DISC DEGEN                     

 

 2018                         Ot            J44.9            CHRONIC 
OBSTRUCTIVE PULMONARY DISEASE, U                     

 

 2018            EDUAR JOHNSON DO            Ot            K59.00  
          CONSTIPATION, UNSPECIFIED                     

 

 2018            EDUAR JOHNSON DO            Ot            J44.9   
         CHRONIC OBSTRUCTIVE PULMONARY DISEASE, U                     

 

 2018            TAE CHRISTIANSEN            Ot            
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                     

 

 2018            TAE CHRISTIANSEN            Ot            
G47.33            OBSTRUCTIVE SLEEP APNEA (ADULT) (PEDIATR                     

 

 2018            TAE CHRISTIANSEN            Ot            
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                      

 

 2018            TAE CHRISTIANSEN            Ot            
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                     

 

 2018            DANA DILLON DO            Ot            R06.02      
      SHORTNESS OF BREATH                     

 

 2018            DANA DILLON DO            Ot            Z87.891     
       PERSONAL HISTORY OF NICOTINE DEPENDENCE                     

 

 2018            NOHELIA AUGUST MD            Ot            G47.33      
      OBSTRUCTIVE SLEEP APNEA (ADULT) (PEDIATR                     

 

 2018            NOHELIA AUGUST MD            Ot            I25.10      
      ATHSCL HEART DISEASE OF NATIVE CORONARY                      

 

 2018            NOHELIA AUGUST MD            Ot            I34.0       
     NONRHEUMATIC MITRAL (VALVE) INSUFFICIENC                     

 

 2018            NOHELIA AUGUST MD            Ot            I71.2       
     THORACIC AORTIC ANEURYSM, WITHOUT RUPTUR                     

 

 2018            NOHELIA AUGUST MD            Ot            J43.9       
     EMPHYSEMA, UNSPECIFIED                     

 

 2018            NOHELIA AUGUST MD            Ot            N20.0       
     CALCULUS OF KIDNEY                     

 

 2018            NOHELIA AUGUST MD            Ot            R41.0       
     DISORIENTATION, UNSPECIFIED                     

 

 2018            NOHELIA AUGUST MD            Ot            W19.XXXA    
        UNSPECIFIED FALL, INITIAL ENCOUNTER                     

 

 2018            NOHELIA AUGUST MD            Ot            Z72.0       
     TOBACCO USE                     

 

 2018            ZACH DIEGO            Ot            J44.9 
           CHRONIC OBSTRUCTIVE PULMONARY DISEASE, U                     

 

 2018            ZACH DIEGO            Ot            R09.02
            HYPOXEMIA                     

 

 2018            ZACH DIEGO APRN            Ot            J44.9 
           CHRONIC OBSTRUCTIVE PULMONARY DISEASE, U                     

 

 2018            ZACH DIEGO APRN            Ot            R59.0 
           LOCALIZED ENLARGED LYMPH NODES                     

 

 2018            EDUAR JOHNSON DO            Ot            M16.0   
         BILATERAL PRIMARY OSTEOARTHRITIS OF HIP                     

 

 2018            ELIZABETH DAVIS, EDUAR CLARK            Ot            M47.817 
           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                     

 

 2018            EDUAR JOHNSON DO            Ot            M16.0   
         BILATERAL PRIMARY OSTEOARTHRITIS OF HIP                     

 

 2018            JOHNSON DO, EDUAR CLARK            Ot            M47.817 
           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                     

 

 2018            ZACH DIEGO APRN            Ot            J44.9 
           CHRONIC OBSTRUCTIVE PULMONARY DISEASE, U                     

 

 2018            ZACH DIEGO APRN            Ot            R09.02
            HYPOXEMIA                     

 

 2018            ZACH DIEGO APRN            Ot            J44.9 
           CHRONIC OBSTRUCTIVE PULMONARY DISEASE, U                     

 

 2018            ZACH DIEGO APRN            Ot            R09.02
            HYPOXEMIA                     

 

 2018            ZACH DIEGO APRN            Ot            J44.9 
           CHRONIC OBSTRUCTIVE PULMONARY DISEASE, U                     

 

 2018            ZACH DIEGO APRN            Ot            R09.02
            HYPOXEMIA                     

 

 2018            ZACH DIEGO APRN            Ot            J44.9 
           CHRONIC OBSTRUCTIVE PULMONARY DISEASE, U                     

 

 2018            ZACH DIEGO APRN            Ot            R09.02
            HYPOXEMIA                     

 

 2018            ZACH DIEGO APRN            Ot            J44.9 
           CHRONIC OBSTRUCTIVE PULMONARY DISEASE, U                     

 

 2018            ZACH DIEGO APRN            Ot            R09.02
            HYPOXEMIA                     

 

 2018            ZAHC DIEGO APRN            Ot            J44.9 
           CHRONIC OBSTRUCTIVE PULMONARY DISEASE, U                     

 

 2018            ZACH DIEGO APRN            Ot            R09.02
            HYPOXEMIA                     

 

 2018            ZACH DIEGO APRN            Ot            J44.9 
           CHRONIC OBSTRUCTIVE PULMONARY DISEASE, U                     

 

 2018            ZACH DIEGO APRN            Ot            R09.02
            HYPOXEMIA                     

 

 2018            ZACH DIEGO APRN            Ot            J44.9 
           CHRONIC OBSTRUCTIVE PULMONARY DISEASE, U                     

 

 2018            ZACH DIEGO APRN            Ot            R09.02
            HYPOXEMIA                     

 

 2018            JOHNATHON, ZACH E APRN            Ot            J44.9 
           CHRONIC OBSTRUCTIVE PULMONARY DISEASE, U                     

 

 2018            NEELAM DIEGOINE E APRN            Ot            R09.02
            HYPOXEMIA                     

 

 2018            NEELAM DIEGOINE E APRN            Ot            J44.9 
           CHRONIC OBSTRUCTIVE PULMONARY DISEASE, U                     

 

 2018            NEELAM DIEGOINE E APRN            Ot            R09.02
            HYPOXEMIA                     

 

 2018            NEELAM DIEGOINE E APRN            Ot            J44.9 
           CHRONIC OBSTRUCTIVE PULMONARY DISEASE, U                     

 

 2018            JOHNATHONNEELAM KRAFTINE E APRN            Ot            R09.02
            HYPOXEMIA                     

 

 2018            JOHNATHON, ZACH E APRN            Ot            J44.9 
           CHRONIC OBSTRUCTIVE PULMONARY DISEASE, U                     

 

 2018            NEELAM DIEGOINE E APRN            Ot            R09.02
            HYPOXEMIA                     

 

 2018            NEELAM DIEGOINE E APRN            Ot            J44.9 
           CHRONIC OBSTRUCTIVE PULMONARY DISEASE, U                     

 

 2018            NEELAM DIEGOINE E APRN            Ot            R09.02
            HYPOXEMIA                     

 

 2018            ZACH DIEGO E APRN            Ot            J44.9 
           CHRONIC OBSTRUCTIVE PULMONARY DISEASE, U                     

 

 2018            NEELAM DIEGOINE E APRN            Ot            R09.02
            HYPOXEMIA                     

 

 2018            NEELAM DIEGOINE E APRN            Ot            J44.9 
           CHRONIC OBSTRUCTIVE PULMONARY DISEASE, U                     

 

 2018            NEELAM DIEGOINE E APRN            Ot            R09.02
            HYPOXEMIA                     

 

 2018            NEELAM DIEGOINE E APRN            Ot            J44.9 
           CHRONIC OBSTRUCTIVE PULMONARY DISEASE, U                     

 

 2018            NELEAM DIEGOINE E APRN            Ot            R09.02
            HYPOXEMIA                     

 

 10/21/2018            ZACH DIEGO E APRN            Ot            J44.9 
           CHRONIC OBSTRUCTIVE PULMONARY DISEASE, U                     

 

 10/21/2018            NEELAM DIEGOINE E APRN            Ot            R09.02
            HYPOXEMIA                     

 

 2018            JOHNATHON ZACH E APRN            Ot            J44.9 
           CHRONIC OBSTRUCTIVE PULMONARY DISEASE, U                     

 

 2018            NEELAM DIEGOINE FRANKIE APRN            Ot            R09.02
            HYPOXEMIA                     

 

 2018            ZACH DIEGO APRN            Ot            I25.10
            ATHSCL HEART DISEASE OF NATIVE CORONARY                      

 

 2018            NEELAM DIEGOINE E APRN            Ot            I71.9 
           AORTIC ANEURYSM OF UNSPECIFIED SITE, WIT                     

 

 2018            ZACH DIEGO E APRN            Ot            J43.9 
           EMPHYSEMA, UNSPECIFIED                     

 

 2018            JOHNATHONNEELAM KRAFTINE FRANKIE EDWARDO            Ot            R59.9 
           ENLARGED LYMPH NODES, UNSPECIFIED                     

 

 2018            JOHNATHON, ZACH FRANKIE EDWARDO            Ot            
Z87.891            PERSONAL HISTORY OF NICOTINE DEPENDENCE                     

 

 2018            JOHNATHON ZACH FRANKIE EDWARDO            Ot            J44.9 
           CHRONIC OBSTRUCTIVE PULMONARY DISEASE, U                     

 

 2018            JOHNATHONNEELAM KRAFTINE FRANKIE EDWARDO            Ot            R09.02
            HYPOXEMIA                     

 

 2018            ZACH DIEGO FRANKIE EDWARDO            Ot            J44.9 
           CHRONIC OBSTRUCTIVE PULMONARY DISEASE, U                     

 

 2018            JOHNATHON, ZACH FRANKIE EDWARDO            Ot            R09.02
            HYPOXEMIA                     

 

 2018            NEELAM DIEGOINE FRANKIE EDWARDO            Ot            J44.9 
           CHRONIC OBSTRUCTIVE PULMONARY DISEASE, U                     

 

 2018            JOHNATHONNEELAM KRAFTINE FRANKIE EDWARDO            Ot            R09.02
            HYPOXEMIA                     



                                                                               
                                                                               
                                                                               
                                                                               
                                                                               
                                                                               
                                                                               
                                                                               
                                                                               
                                                                               
                                                                               
                                                                               
                                                                               
                                                                               
                                                                               
                                                                               
                                                                               
                                                                               
                                                                               
                                                                               
                                                                               
                                                                               
                                              



Procedures

      





 Code            Description            Performed By            Performed On   
     

 

             38.93                                  VENOUS CATHETERIZATION NEC 
                                  2014        

 

             96.04                                  INSERT ENDOTRACHEAL TUBE   
                                2014        

 

             96.71                                  CONTINUOUS INVASIVE 
MECHANICAL VENTILATI                                   2014        



                      



Results

      





 Test            Result            Range        









 Complete urinalysis with reflex to culture - 17 09:12         









 Urine color determination            YELLOW             NRG        

 

 Urine clarity determination            CLEAR             NRG        

 

 Urine pH measurement by test strip            5             5-9        

 

 Specific gravity of urine by test strip            1.025             1.016-
1.022        

 

 Urine protein assay by test strip, semi-quantitative            NEGATIVE      
       NEGATIVE        

 

 Urine glucose detection by automated test strip            NEGATIVE           
  NEGATIVE        

 

 Erythrocytes detection in urine sediment by light microscopy            
NEGATIVE             NEGATIVE        

 

 Urine ketones detection by automated test strip            NEGATIVE           
  NEGATIVE        

 

 Urine nitrite detection by test strip            NEGATIVE             NEGATIVE
        

 

 Urine total bilirubin detection by test strip            NEGATIVE             
NEGATIVE        

 

 Urine urobilinogen measurement by automated test strip (mass/volume)          
  NORMAL             NORMAL        

 

 Urine leukocyte esterase detection by dipstick            NEGATIVE             
NEGATIVE        

 

 Automated urine sediment erythrocyte count by microscopy (number/high power 
field)            NONE             NRG        

 

 Automated urine sediment leukocyte count by microscopy (number/high power field
)            NONE             NRG        

 

 Bacteria detection in urine sediment by light microscopy            NEGATIVE  
           NRG        

 

 Squamous epithelial cells detection in urine sediment by light microscopy     
       2-5             NRG        

 

 Crystals detection in urine sediment by light microscopy            NONE      
       NRG        

 

 Casts detection in urine sediment by light microscopy            NONE         
    NRG        

 

 Mucus detection in urine sediment by light microscopy            NEGATIVE     
        NRG        

 

 Complete urinalysis with reflex to culture            NO             NRG      
  









 Automated blood complete blood count (hemogram) panel - 17 09:25         









 Blood leukocytes automated count (number/volume)            6.8 10*3/uL       
     4.3-11.0        

 

 Blood erythrocytes automated count (number/volume)            4.06 10*6/uL    
        4.35-5.85        

 

 Venous blood hemoglobin measurement (mass/volume)            13.7 g/dL        
    13.3-17.7        

 

 Blood hematocrit (volume fraction)            40 %            40-54        

 

 Automated erythrocyte mean corpuscular volume            99 [foz_us]          
  80-99        

 

 Automated erythrocyte mean corpuscular hemoglobin (mass per erythrocyte)      
      34 pg            25-34        

 

 Automated erythrocyte mean corpuscular hemoglobin concentration measurement (
mass/volume)            34 g/dL            32-36        

 

 Automated erythrocyte distribution width ratio            14.9 %            
10.0-14.5        

 

 Automated blood platelet count (count/volume)            230 10*3/uL          
  130-400        

 

 Automated blood platelet mean volume measurement            8.9 [foz_us]      
      7.4-10.4        









 PT panel in platelet poor plasma by coagulation assay - 17 09:25         









 Prothrombin time (PT) in platelet poor plasma by coagulation assay            
14.1 s            12.2-14.7        

 

 INR in platelet poor plasma or blood by coagulation assay            1.1      
       0.8-1.4        









 Activated partial thromboplastin time (aPTT) in platelet poor plasma 
bycoagulation assay - 17 09:25         









 Activated partial thromboplastin time (aPTT) in platelet poor plasma 
bycoagulation assay            29 s            24-35        









 Comprehensive metabolic panel - 17 09:25         









 Serum or plasma sodium measurement (moles/volume)            141 mmol/L       
     135-145        

 

 Serum or plasma potassium measurement (moles/volume)            4.1 mmol/L    
        3.6-5.0        

 

 Serum or plasma chloride measurement (moles/volume)            109 mmol/L     
               

 

 Carbon dioxide            21 mmol/L            21-32        

 

 Serum or plasma anion gap determination (moles/volume)            11 mmol/L   
         5-14        

 

 Serum or plasma urea nitrogen measurement (mass/volume)            18 mg/dL   
         7-18        

 

 Serum or plasma creatinine measurement (mass/volume)            1.32 mg/dL    
        0.60-1.30        

 

 Serum or plasma urea nitrogen/creatinine mass ratio            14             
NRG        

 

 Serum or plasma creatinine measurement with calculation of estimated 
glomerular filtration rate            54             NRG        

 

 Serum or plasma glucose measurement (mass/volume)            100 mg/dL        
            

 

 Serum or plasma calcium measurement (mass/volume)            9.6 mg/dL        
    8.5-10.1        

 

 Serum or plasma total bilirubin measurement (mass/volume)            0.4 mg/dL
            0.1-1.0        

 

 Serum or plasma alkaline phosphatase measurement (enzymatic activity/volume)  
          75 U/L                    

 

 Serum or plasma aspartate aminotransferase measurement (enzymatic activity/
volume)            17 U/L            5-34        

 

 Serum or plasma alanine aminotransferase measurement (enzymatic activity/volume
)            22 U/L            0-55        

 

 Serum or plasma protein measurement (mass/volume)            6.8 g/dL         
   6.4-8.2        

 

 Serum or plasma albumin measurement (mass/volume)            4.1 g/dL         
   3.2-4.5        









 Lipid 1996 panel - 17 09:25         









 Serum or plasma triglyceride measurement (mass/volume)            88 mg/dL    
        <150        

 

 Serum or plasma cholesterol measurement (mass/volume)            150 mg/dL    
        < 200        

 

 Serum or plasma cholesterol in HDL measurement (mass/volume)            56 mg/
dL            40-60        

 

 Cholesterol in LDL [mass/volume] in serum or plasma by direct assay            
78 mg/dL            1-129        

 

 Serum or plasma cholesterol in VLDL measurement (mass/volume)            18 mg/
dL            5-40        









 Methicillin resistant Staphylococcus aureus (MRSA) screening culture -  09:25         









 MRSA SCREEN RESULT            MRSA ISOLATED             [facility]        









 Automated blood complete blood count (hemogram) panel - 18 15:25         









 Blood leukocytes automated count (number/volume)            7.6 10*3/uL       
     4.3-11.0        

 

 Blood erythrocytes automated count (number/volume)            3.10 10*6/uL    
        4.35-5.85        

 

 Venous blood hemoglobin measurement (mass/volume)            11.0 g/dL        
    13.3-17.7        

 

 Blood hematocrit (volume fraction)            32 %            40-54        

 

 Automated erythrocyte mean corpuscular volume            104 [foz_us]         
   80-99        

 

 Automated erythrocyte mean corpuscular hemoglobin (mass per erythrocyte)      
      36 pg            25-34        

 

 Automated erythrocyte mean corpuscular hemoglobin concentration measurement (
mass/volume)            34 g/dL            32-36        

 

 Automated erythrocyte distribution width ratio            13.9 %            
10.0-14.5        

 

 Automated blood platelet count (count/volume)            223 10*3/uL          
  130-400        

 

 Automated blood platelet mean volume measurement            9.5 [foz_us]      
      7.4-10.4        









 PT panel in platelet poor plasma by coagulation assay - 18 15:25         









 Prothrombin time (PT) in platelet poor plasma by coagulation assay            
15.2 s            12.2-14.7        

 

 INR in platelet poor plasma or blood by coagulation assay            1.2      
       0.8-1.4        









 Activated partial thromboplastin time (aPTT) in platelet poor plasma 
bycoagulation assay - 18 15:25         









 Activated partial thromboplastin time (aPTT) in platelet poor plasma 
bycoagulation assay            29 s            24-35        









 Comprehensive metabolic panel - 18 15:25         









 Serum or plasma sodium measurement (moles/volume)            140 mmol/L       
     135-145        

 

 Serum or plasma potassium measurement (moles/volume)            4.1 mmol/L    
        3.6-5.0        

 

 Serum or plasma chloride measurement (moles/volume)            111 mmol/L     
               

 

 Carbon dioxide            18 mmol/L            21-32        

 

 Serum or plasma anion gap determination (moles/volume)            11 mmol/L   
         5-14        

 

 Serum or plasma urea nitrogen measurement (mass/volume)            56 mg/dL   
         7-18        

 

 Serum or plasma creatinine measurement (mass/volume)            1.48 mg/dL    
        0.60-1.30        

 

 Serum or plasma urea nitrogen/creatinine mass ratio            38             
NRG        

 

 Serum or plasma creatinine measurement with calculation of estimated 
glomerular filtration rate            47             NRG        

 

 Serum or plasma glucose measurement (mass/volume)            94 mg/dL         
           

 

 Serum or plasma calcium measurement (mass/volume)            9.3 mg/dL        
    8.5-10.1        

 

 Serum or plasma total bilirubin measurement (mass/volume)            0.5 mg/dL
            0.1-1.0        

 

 Serum or plasma alkaline phosphatase measurement (enzymatic activity/volume)  
          56 U/L                    

 

 Serum or plasma aspartate aminotransferase measurement (enzymatic activity/
volume)            13 U/L            5-34        

 

 Serum or plasma alanine aminotransferase measurement (enzymatic activity/volume
)            16 U/L            0-55        

 

 Serum or plasma protein measurement (mass/volume)            5.9 g/dL         
   6.4-8.2        

 

 Serum or plasma albumin measurement (mass/volume)            3.8 g/dL         
   3.2-4.5        









 Serum or plasma lithium measurement (moles/volume) - 18 15:25         









 BNP level            23.9 pg/mL            <100.0        









 Serum or plasma troponin i.cardiac measurement (mass/volume) - 18 15:25 
        









 Serum or plasma troponin i.cardiac measurement (mass/volume)            < ng/
mL            <0.30        









 THYROID STIMULATING HORMONE - 18 15:25         









 THYROID STIMULATING HORMONE            1.75 u[iU]/mL            0.35-4.94     
   









 Automated blood complete blood count (hemogram) panel - 04/10/18 05:46         









 Blood leukocytes automated count (number/volume)            6.2 10*3/uL       
     4.3-11.0        

 

 Blood erythrocytes automated count (number/volume)            2.66 10*6/uL    
        4.35-5.85        

 

 Venous blood hemoglobin measurement (mass/volume)            9.3 g/dL         
   13.3-17.7        

 

 Blood hematocrit (volume fraction)            28 %            40-54        

 

 Automated erythrocyte mean corpuscular volume            105 [foz_us]         
   80-99        

 

 Automated erythrocyte mean corpuscular hemoglobin (mass per erythrocyte)      
      35 pg            25-34        

 

 Automated erythrocyte mean corpuscular hemoglobin concentration measurement (
mass/volume)            34 g/dL            32-36        

 

 Automated erythrocyte distribution width ratio            13.9 %            
10.0-14.5        

 

 Automated blood platelet count (count/volume)            200 10*3/uL          
  130-400        

 

 Automated blood platelet mean volume measurement            9.7 [foz_us]      
      7.4-10.4        









 Comprehensive metabolic panel - 04/10/18 05:46         









 Serum or plasma sodium measurement (moles/volume)            141 mmol/L       
     135-145        

 

 Serum or plasma potassium measurement (moles/volume)            4.0 mmol/L    
        3.6-5.0        

 

 Serum or plasma chloride measurement (moles/volume)            113 mmol/L     
               

 

 Carbon dioxide            24 mmol/L            21-32        

 

 Serum or plasma anion gap determination (moles/volume)            4 mmol/L    
        5-14        

 

 Serum or plasma urea nitrogen measurement (mass/volume)            36 mg/dL   
         7-18        

 

 Serum or plasma creatinine measurement (mass/volume)            1.20 mg/dL    
        0.60-1.30        

 

 Serum or plasma urea nitrogen/creatinine mass ratio            30             
NRG        

 

 Serum or plasma creatinine measurement with calculation of estimated 
glomerular filtration rate            60             NRG        

 

 Serum or plasma glucose measurement (mass/volume)            94 mg/dL         
           

 

 Serum or plasma calcium measurement (mass/volume)            8.6 mg/dL        
    8.5-10.1        

 

 Serum or plasma total bilirubin measurement (mass/volume)            0.5 mg/dL
            0.1-1.0        

 

 Serum or plasma alkaline phosphatase measurement (enzymatic activity/volume)  
          46 U/L                    

 

 Serum or plasma aspartate aminotransferase measurement (enzymatic activity/
volume)            13 U/L            5-34        

 

 Serum or plasma alanine aminotransferase measurement (enzymatic activity/volume
)            14 U/L            0-55        

 

 Serum or plasma protein measurement (mass/volume)            5.3 g/dL         
   6.4-8.2        

 

 Serum or plasma albumin measurement (mass/volume)            3.4 g/dL         
   3.2-4.5        









 Whole blood hemoglobin and hematocrit panel - 04/10/18 11:45         









 Venous blood hemoglobin measurement (mass/volume)            8.9 g/dL         
   13.3-17.7        

 

 Blood hematocrit (volume fraction)            27 %            40-54        









 Complete blood count (CBC) with automated white blood cell (WBC) differential 
- 18 10:15         









 Blood leukocytes automated count (number/volume)            6.9 10*3/uL       
     4.3-11.0        

 

 Blood erythrocytes automated count (number/volume)            3.44 10*6/uL    
        4.35-5.85        

 

 Venous blood hemoglobin measurement (mass/volume)            11.2 g/dL        
    13.3-17.7        

 

 Blood hematocrit (volume fraction)            34 %            40-54        

 

 Automated erythrocyte mean corpuscular volume            99 [foz_us]          
  80-99        

 

 Automated erythrocyte mean corpuscular hemoglobin (mass per erythrocyte)      
      33 pg            25-34        

 

 Automated erythrocyte mean corpuscular hemoglobin concentration measurement (
mass/volume)            33 g/dL            32-36        

 

 Automated erythrocyte distribution width ratio            14.7 %            
10.0-14.5        

 

 Automated blood platelet count (count/volume)            268 10*3/uL          
  130-400        

 

 Automated blood platelet mean volume measurement            9.1 [foz_us]      
      7.4-10.4        

 

 Automated blood neutrophils/100 leukocytes            70 %            42-75   
     

 

 Automated blood lymphocytes/100 leukocytes            17 %            12-44   
     

 

 Blood monocytes/100 leukocytes            8 %            0-12        

 

 Automated blood eosinophils/100 leukocytes            4 %            0-10     
   

 

 Automated blood basophils/100 leukocytes            0 %            0-10        

 

 Blood neutrophils automated count (number/volume)            4.8 10*3         
   1.8-7.8        

 

 Blood lymphocytes automated count (number/volume)            1.2 10*3         
   1.0-4.0        

 

 Blood monocytes automated count (number/volume)            0.6 10*3            
0.0-1.0        

 

 Automated eosinophil count            0.3 10*3/uL            0.0-0.3        

 

 Automated blood basophil count (count/volume)            0.0 10*3/uL          
  0.0-0.1        









 Whole blood basic metabolic panel - 18 10:15         









 Serum or plasma sodium measurement (moles/volume)            140 mmol/L       
     135-145        

 

 Serum or plasma potassium measurement (moles/volume)            4.4 mmol/L    
        3.6-5.0        

 

 Serum or plasma chloride measurement (moles/volume)            108 mmol/L     
               

 

 Carbon dioxide            22 mmol/L            21-32        

 

 Serum or plasma anion gap determination (moles/volume)            10 mmol/L   
         5-14        

 

 Serum or plasma urea nitrogen measurement (mass/volume)            14 mg/dL   
         7-18        

 

 Serum or plasma creatinine measurement (mass/volume)            1.29 mg/dL    
        0.60-1.30        

 

 Serum or plasma urea nitrogen/creatinine mass ratio            11             
NRG        

 

 Serum or plasma creatinine measurement with calculation of estimated 
glomerular filtration rate            55             NRG        

 

 Serum or plasma glucose measurement (mass/volume)            97 mg/dL         
           

 

 Serum or plasma calcium measurement (mass/volume)            10.5 mg/dL       
     8.5-10.1        









 Methicillin resistant Staphylococcus aureus (MRSA) screening culture -  10:15         









 MRSA SCREEN RESULT            MRSA ISOLATED             NRG        



                                                      



Encounters

      





 ACCT No.            Visit Date/Time            Discharge            Status    
        Pt. Type            Provider            Facility            Loc./Unit  
          Complaint        

 

 [id number]            2018 08:15:00            2018 23:59:59    
        CLS            Preadmit            ZACH DIEGO APRN            
[facility]            PUL            J44.9 COPD        

 

 [id number]            2018 08:00:00            2018 00:01:00    
        DIS            Outpatient            ZACH DIEGO APRN          
  [facility]            PUL            J44.9 COPD        

 

 [id number]            12/10/2018 09:13:00            12/10/2018 23:59:59    
        CLS            Outpatient            ZACH DIEGO APRN          
  [facility]            RAD            ENLARGED LYMPH 
NODES        

 

 [id number]            2018 08:04:00            2018 00:01:00    
        DIS            Outpatient            ZACH DIEGO APRN          
  [facility]            PUL            J44.9 COPD        

 

 [id number]            2018 11:07:00            2018 23:59:59    
        CLS            Outpatient            EDUAR JOHNSON DO            
[facility]            RAD            HIP PAIN        

 

 [id number]            2018 10:09:00            2018 23:59:59    
        CLS            Outpatient            ZACH DIEGO          
  [facility]            RAD            R59.9 LYMPH NODE 
ENLARGEMENT        

 

 [id number]            2018 06:00:00            2018 12:20:00    
        DIS            Outpatient            MICH COLLIER DO            [facility] 
[facility]            SDC            UMBILICAL HERNIA        

 

 [id number]            2018 09:33:00            2018 10:20:00    
        DIS            Outpatient            MICH COLLIER DO            [facility]            PREOP            UMBILICAL HERNIA      
  

 

 [id number]            2018 12:49:00            2018 15:30:00    
        DIS            Outpatient            MICH COLLIER DO            [facility]            ENDO            REFLUX, 
GASTROINTESTINAL BLEEDING        

 

 [id number]            2018 05:40:00            2018 12:13:00    
        DIS            Outpatient            MICH COLLIER DO            [facility]            PREOP            REFLUX,
GASTROINTESTINAL BLEEDING        

 

 [id number]            2018 14:58:00            04/10/2018 17:50:00    
        DIS            Inpatient            NOHELIA AUGUST MD            [facility]            4TH            SOB        

 

 [id number]            2018 10:39:00            2018 23:59:59    
        CLS            Outpatient            NOHELIA AUGUST MD            [facility]            RAD            CAD,CONFUSION        

 

 [id number]            2017 15:06:00            2017 23:59:59    
        CLS            Outpatient            DANA DILLON DO            [facility]            RT            DYSPNEA R06.02        

 

 [id number]            2017 16:45:00            2017 16:45:00    
        CAN            Preadmit            DANA DILLON DO            [facility]            RT            DYSPNEA,HX OF TOBACCO USE,
MARYANA ON CPAP        

 

 [id number]            2017 08:48:00            2017 15:30:00    
        DIS            Outpatient            NOHELIA AUGUST MD            [facility]            CATH            ANM STRESS TEST,CAD    
    

 

 [id number]            2017 08:13:00            2017 23:59:59    
        CLS            Outpatient            TAE CHRISTIANSEN    
        [facility]            CARD            CAD,MARYANA ON 
CPAP,TIA        

 

 [id number]            2017 08:51:00            2017 15:30:00    
        DIS            Outpatient            MICH COLLIER DO            [facility]            OCCULT STOOLS        

 

 [id number]            2016 05:47:00            2016 13:50:00    
        DIS            Outpatient            MICH COLLIER DO            [facility]            PREOP            OCCULT STOOLS        

 

 [id number]            2016 10:15:00            2016 23:59:59    
        CLS            Preadmit            EDUAR JOHNSON DO            
[facility]            PULM            COPD        

 

 [id number]            2016 10:15:00            2016 00:01:00    
        DIS            Outpatient            EDUAR JOHNSON DO            
[facility]            PULM            COPD        

 

 [id number]            2016 14:31:00            2016 23:59:59    
        CLS            Outpatient            EDUAR JOHNSON DO            
[facility]            RAD            ABD PAIN        

 

 [id number]            2015 14:00:00            2016 13:50:00    
        DIS            Inpatient            EDUAR JOHNSON DO            
[facility]            4TH            PNEUMONIA        

 

 [id number]            2015 09:57:00            2015 10:38:00    
        DIS            Outpatient            BELKIS WEST MD            [facility]            REHAB            CERVICAL SPONDYLOSIS  
      

 

 [id number]            06/15/2015 14:36:00            06/15/2015 23:59:59    
        CLS            Outpatient            ARLENE SAENZ DO            
[facility]            RAD            DDD        

 

 [id number]            2015 09:53:00            2015 11:51:00    
        DIS            Outpatient            ARLENE SAENZ DO            
[facility]            REHAB            S/P R SHLD SCOPE 
WITH SAD AND DEBRIDEMENT        

 

 [id number]            2015 12:27:00            2015 23:59:59    
        CLS            Outpatient            ARLENE SAENZ DO            
[facility]            RIGHT SHOULDER TORN 
ROTATOR CUFF        

 

 [id number]            2015 06:15:00            2015 23:59:59    
        CLS            Outpatient            LETTY DAVIS ARLENE LINDA            
[facility]            PREOP            RIGHT SHOULDER 
TORN ROTATOR CUFF        

 

 [id number]            2015 08:58:00            2015 11:36:00    
        DIS            Outpatient            ARLENE SAENZ DO            
[facility]            SDC            RIGHT SHOULDER 
ROTATOR CUFF TEAR        

 

 [id number]            2015 05:50:00            2015 23:59:59    
        CLS            Outpatient            LETTY DAVIS ARLENE LINDA            
[facility]            PREOP            RIGHT SHOULDER 
ROTATOR CUFF TEAR        

 

 [id number]            2015 12:14:00            2015 23:59:59    
        CLS            Outpatient            NOHELIA AUGUST MD            [facility]            CARD            CAD,MATT,DYSPNEA        

 

 [id number]            2015 09:05:00            2015 23:59:59    
        CLS            Outpatient            NOHELIA AUGUST MD            [facility]            CARD            CAD        

 

 [id number]            12/10/2014 05:50:00            12/10/2014 23:59:59    
        CLS            Outpatient            ARLENE SAENZ DO            
[facility]            PREOP            RIGHT SHOULDER 
ROTATOR  CUFF TEAR        

 

 [id number]            2014 17:15:00            2014 10:00:00    
        DIS            Inpatient            EDUAR JOHNSON DO            
[facility]            4TH            TIA CONFUSION 
DIFFICULT WORD FINDING        

 

 [id number]            2014 13:45:00            2014 11:55:00    
        DIS            Outpatient            ARLENE SAENZ DO            
[facility]            REHAB            R SHOULDER 
ADHESIVE CAPSULITIS        

 

 [id number]            2014 07:52:00            2014 23:59:59    
        CLS            Outpatient            ARLENE SAENZ DO            
[facility]            RAD            RT SHOULDER 
ADHEISIVE CAPSULITIS        

 

 [id number]            11/10/2014 12:37:00            11/10/2014 23:59:59    
        CLS            Outpatient            ARLENE SAENZ DO            
[facility]            RAD            RT SHOULDER 
ADHEISIVE CAPSULITIS        

 

 [id number]            09/10/2014 10:59:00            09/10/2014 23:59:59    
        CLS            Outpatient            DANA DILLON DO            [facility]            RAD            DYSPNEA,PE,RENAL FAILURE
        

 

 [id number]            2014 13:57:00            2014 15:35:00    
        DIS            Inpatient            SHIRLENE MCDONALD MD            [facility]            IRF            WEAKNESS        

 

 [id number]            2014 10:19:00            2014 13:15:00    
        DIS            Inpatient            EDUAR JOHNSON DO            
[facility]            ICU            PROBABLE RL 
PNEUMONIA        

 

 [id number]            2014 12:50:00            2014 23:59:59    
        CLS            Outpatient            EDUAR JOHNSON DO            
[facility]            SDC            OSTEO        

 

 [id number]            12/10/2019 10:15:00                         PEN       
     Preadmit            ZACH DIEGO            [facility]            RAD            DYSPNEA,NICOTINE DEPENDENCE,
PLEURAL EFFUSION        

 

 [id number]            2016 10:15:00                                   
   Document Registration                                                       
     

 

 [id number]            2015 13:24:00                                   
   Document Registration                                                       
     

 

 [id number]            2013 13:05:00                                   
   Document Registration                                                       
     

 

 [id number]            2012 12:32:00                                   
   Document Registration                                                       
     

 

 [id number]            03/15/2011 12:55:00                                   
   Document Registration                                                       
     

 

 [id number]            2015 09:57:51                         [place]            
Document Registration
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
## 2019-01-03 NOTE — NUR
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 Continue droplet 
Isolation. Notified RT, nursing and Dr Crowell.

## 2019-01-03 NOTE — NUR
HAD A LIST FAXED OVER FROM THE [facility] AND WENT OVER IT WITH THE PATIENT. HE VERIFIED HOW 
HE IS TAKING HIS MEDICATIONS.



VA FILLED:

12-27-18 SYMBICORT 160 2 PUFFS BID #3

12-10-18 GABAPENTIN 300MG BID #180

11-29-18 CYANOCOBALAMIN INJ 1000MCG MONTHLY #3

11-29-18 SUCRALFATE 1GM ACHS #120

11-14-18 AMLODIPINE 10MG 1/2 DAILY #45

11-1-18 LEVOTHYROXINE 0.137MG DAILY #90

10-15-18 LOVASTATIN 40MG 1/2 HS #45

10-15-18 LATANOPROST DROPS 1 DROP OU HS

5-1-18 PLAVIX 75MG DAILY #90 (NO LONGER TAKING)



OTC MEDS:

ASPIRIN 325MG DAILY

VITAMIN D DAILY

RANITIDINE BID

FISH OIL DAILY

COLACE 3 HS

PROBIOTIC DAILY

VITAMIN C DAILY

EXCEDRIN BID PRN



PATIENT STATES HE ALSO USES DUONEB TID WHICH HE RECEIVES THROUGH THE MAIL.
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
## 2019-01-03 NOTE — DIAGNOSTIC IMAGING REPORT
PATIENT HISTORY: Shortness of breath, cough, fever. 



TECHNIQUE: Single frontal view of the chest.



COMPARISON: 02/22/2017.



FINDINGS: Lung volumes are decreased compared to the prior exam.

There is central vascular congestion. There are bibasilar

airspace opacities. No pneumothorax or pleural effusion is seen.

The cardiac silhouette is upper normal in size. No acute osseous

abnormalities seen.



IMPRESSION: Mild central vascular congestion with new bibasilar

airspace opacities, which may represent atelectasis or

infiltrate.



Dictated by: 



  Dictated on workstation # [id number]

## 2019-01-03 NOTE — NUR
Eduar Osorio admitted to room 413-1, with an admitting diagnosis of influenza syndrome and 
respiratory distress , on 01/03/19 from Dr. Velasquez's office, accompanied by 
wife.EDAUR OSORIO introduced to surroundings, call light, bed controls, phone, TV, 
temperature control, lights, meal times, smoking policy, visitor policy, side rail policy, 
bathrooms and showers.  Patient Rights given to patient in the handbook.EDUAR OSORIO 
verbalizes understanding that [facility] is not responsible for the loss or damage to any 
personal effects or valuables that are kept in the patients possession during their 
hospitalization.Patient Care Plans were discussed with the patient along with Discharge 
Planning. EDUAR OSORIO verbalizes understanding of Interdisciplinary Patient Education. 
Patient and/or family were informed about the Rapid Response Team and its purpose.
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
## 2019-01-04 VITALS — DIASTOLIC BLOOD PRESSURE: 67 MMHG | SYSTOLIC BLOOD PRESSURE: 120 MMHG

## 2019-01-04 VITALS — DIASTOLIC BLOOD PRESSURE: 57 MMHG | SYSTOLIC BLOOD PRESSURE: 122 MMHG

## 2019-01-04 VITALS — SYSTOLIC BLOOD PRESSURE: 137 MMHG | DIASTOLIC BLOOD PRESSURE: 73 MMHG

## 2019-01-04 VITALS — DIASTOLIC BLOOD PRESSURE: 59 MMHG | SYSTOLIC BLOOD PRESSURE: 117 MMHG

## 2019-01-04 VITALS — SYSTOLIC BLOOD PRESSURE: 118 MMHG | DIASTOLIC BLOOD PRESSURE: 62 MMHG

## 2019-01-04 LAB
BASOPHILS # BLD AUTO: 0 10^3/UL (ref 0–0.1)
BASOPHILS NFR BLD AUTO: 0 % (ref 0–10)
BUN/CREAT SERPL: 14
CALCIUM SERPL-MCNC: 9.3 MG/DL (ref 8.5–10.1)
CHLORIDE SERPL-SCNC: 105 MMOL/L (ref 98–107)
CO2 SERPL-SCNC: 19 MMOL/L (ref 21–32)
CREAT SERPL-MCNC: 1.36 MG/DL (ref 0.6–1.3)
EOSINOPHIL # BLD AUTO: 0.1 10^3/UL (ref 0–0.3)
EOSINOPHIL NFR BLD AUTO: 1 % (ref 0–10)
ERYTHROCYTE [DISTWIDTH] IN BLOOD BY AUTOMATED COUNT: 16.6 % (ref 10–14.5)
GFR SERPLBLD BASED ON 1.73 SQ M-ARVRAT: 52 ML/MIN
GLUCOSE SERPL-MCNC: 98 MG/DL (ref 70–105)
HCT VFR BLD CALC: 37 % (ref 40–54)
HGB BLD-MCNC: 12.4 G/DL (ref 13.3–17.7)
INR PPP: 1.3 (ref 0.8–1.4)
LYMPHOCYTES # BLD AUTO: 1.3 X 10^3 (ref 1–4)
LYMPHOCYTES NFR BLD AUTO: 10 % (ref 12–44)
MANUAL DIFFERENTIAL PERFORMED BLD QL: NO
MCH RBC QN AUTO: 33 PG (ref 25–34)
MCHC RBC AUTO-ENTMCNC: 33 G/DL (ref 32–36)
MCV RBC AUTO: 99 FL (ref 80–99)
MONOCYTES # BLD AUTO: 0.8 X 10^3 (ref 0–1)
MONOCYTES NFR BLD AUTO: 7 % (ref 0–12)
NEUTROPHILS # BLD AUTO: 10.4 X 10^3 (ref 1.8–7.8)
NEUTROPHILS NFR BLD AUTO: 83 % (ref 42–75)
PLATELET # BLD: 217 10^3/UL (ref 130–400)
PMV BLD AUTO: 9.8 FL (ref 7.4–10.4)
POTASSIUM SERPL-SCNC: 4 MMOL/L (ref 3.6–5)
PROTHROMBIN TIME: 16.4 SEC (ref 12.2–14.7)
RBC # BLD AUTO: 3.79 10^6/UL (ref 4.35–5.85)
SODIUM SERPL-SCNC: 137 MMOL/L (ref 135–145)
WBC # BLD AUTO: 12.6 10^3/UL (ref 4.3–11)

## 2019-01-04 RX ADMIN — SUCRALFATE SCH GM: 1 TABLET ORAL at 20:29

## 2019-01-04 RX ADMIN — IPRATROPIUM BROMIDE AND ALBUTEROL SULFATE SCH ML: .5; 3 SOLUTION RESPIRATORY (INHALATION) at 11:00

## 2019-01-04 RX ADMIN — SODIUM CHLORIDE SCH MLS/HR: 900 INJECTION INTRAVENOUS at 12:38

## 2019-01-04 RX ADMIN — ACETAMINOPHEN PRN MG: 325 TABLET ORAL at 20:30

## 2019-01-04 RX ADMIN — ACETAMINOPHEN PRN MG: 325 TABLET ORAL at 10:04

## 2019-01-04 RX ADMIN — ACETAMINOPHEN PRN MG: 325 TABLET ORAL at 14:29

## 2019-01-04 RX ADMIN — SODIUM CHLORIDE SCH MLS/HR: 900 INJECTION INTRAVENOUS at 20:28

## 2019-01-04 RX ADMIN — FAMOTIDINE SCH MG: 20 TABLET, FILM COATED ORAL at 20:29

## 2019-01-04 RX ADMIN — IPRATROPIUM BROMIDE AND ALBUTEROL SULFATE SCH ML: .5; 3 SOLUTION RESPIRATORY (INHALATION) at 22:21

## 2019-01-04 RX ADMIN — Medication SCH ML: at 21:10

## 2019-01-04 RX ADMIN — IPRATROPIUM BROMIDE AND ALBUTEROL SULFATE SCH ML: .5; 3 SOLUTION RESPIRATORY (INHALATION) at 14:10

## 2019-01-04 RX ADMIN — FLUTICASONE PROPIONATE AND SALMETEROL XINAFOATE SCH PUFF: 115; 21 AEROSOL, METERED RESPIRATORY (INHALATION) at 19:11

## 2019-01-04 RX ADMIN — ALUMINUM HYDROXIDE, MAGNESIUM HYDROXIDE, AND DIMETHICONE PRN ML: 400; 400; 40 SUSPENSION ORAL at 05:53

## 2019-01-04 RX ADMIN — IPRATROPIUM BROMIDE AND ALBUTEROL SULFATE SCH ML: .5; 3 SOLUTION RESPIRATORY (INHALATION) at 02:39

## 2019-01-04 RX ADMIN — GABAPENTIN SCH MG: 300 CAPSULE ORAL at 20:29

## 2019-01-04 RX ADMIN — SODIUM CHLORIDE SCH MLS/HR: 900 INJECTION INTRAVENOUS at 05:45

## 2019-01-04 RX ADMIN — IPRATROPIUM BROMIDE AND ALBUTEROL SULFATE SCH ML: .5; 3 SOLUTION RESPIRATORY (INHALATION) at 19:11

## 2019-01-04 RX ADMIN — SUCRALFATE SCH GM: 1 TABLET ORAL at 15:57

## 2019-01-04 RX ADMIN — IPRATROPIUM BROMIDE AND ALBUTEROL SULFATE SCH ML: .5; 3 SOLUTION RESPIRATORY (INHALATION) at 07:06

## 2019-01-04 RX ADMIN — Medication SCH ML: at 12:39

## 2019-01-04 RX ADMIN — ACETAMINOPHEN PRN MG: 325 TABLET ORAL at 05:53

## 2019-01-04 RX ADMIN — Medication SCH ML: at 07:49
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
## 2019-01-04 NOTE — PULMONARY PROGRESS NOTE
Subjective


Time Seen by a Provider:  07:25


Subjective/Events-last exam


No complications noted.





Sepsis Event


Evaluation


Height, Weight, BMI


Height: 5'11.00"


Weight: 218lbs. 0.5oz. 98.938702ap; 30.4 BMI


Method:





Focused Exam


Lactate Level


1/3/19 13:15: Lactic Acid Level 0.80





Exam


Exam





Vital Signs








  Date Time  Temp Pulse Resp B/P (MAP) Pulse Ox O2 Delivery O2 Flow Rate FiO2


 


1/4/19 07:06     92 Nasal Cannula 2.00 


 


1/4/19 04:29 99.2 76 18 122/57 (78) 93 Room Air  


 


1/4/19 02:39     93 Nasal Cannula 2.00 


 


1/4/19 01:00  85      


 


1/3/19 23:11 100.0 82 18 122/60 (80) 91 Room Air  


 


1/3/19 21:20     91 Nasal Cannula 2.00 


 


1/3/19 20:00      Nasal Cannula 2.00 


 


1/3/19 19:44 100.9 82 18 158/78 (104) 93 Room Air  


 


1/3/19 19:00  84      


 


1/3/19 16:01 100.2 92 22 137/75 (95) 93 Room Air  


 


1/3/19 15:11     85 Room Air  


 


1/3/19 13:00  85      


 


1/3/19 12:28 101.9       


 


1/3/19 12:14  82   87   


 


1/3/19 12:11  81      


 


1/3/19 12:00 99.8 98 21 176/79 (111) 98 Room Air  


 


1/3/19 11:58 102.3       


 


1/3/19 10:50      Room Air  














I & O 


 


 1/4/19





 07:00


 


Intake Total 1560 ml


 


Output Total 550 ml


 


Balance 1010 ml








Height & Weight


Height: 5'11.00"


Weight: 218lbs. 0.5oz. 98.917480ac; 30.4 BMI


Method:


General Appearance:  Anxious, Mild Distress


HEENT:  PERRL/EOMI, Normal ENT Inspection, Pharynx Normal


Neck:  Full Range of Motion, Normal Inspection, Non Tender, Supple


Respiratory:  Chest Non Tender, No Accessory Muscle Use, No Respiratory Distress

, Crackles, Decreased Breath Sounds


Cardiovascular:  No Edema, No Gallop, Tachycardia


Capillary Refill:  Less Than 3 Seconds


Gastrointestinal:  normal bowel sounds, non tender, soft


Extremity:  Normal Capillary Refill, Normal Inspection


Neurologic/Psychiatric:  Alert, Oriented x3


Skin:  Normal Color, Warm/Dry


Lymphatic:  No Adenopathy





Results


Lab


Laboratory Tests


1/3/19 13:15








1/4/19 05:20











Assessment/Plan


Assessment/Plan


Pneumonia with sespsis - not severe 


   -Continue IVF


   -Zosyn


   -Pan cultures 


   -Will need out pt f/u imaging 


COPDAE with hypoxia 


   -SVNs


   -Oxygen 


Acute renal failure


   -IVF 


   -Monitor 


Hypothyroid











DANA DILLON DO Jan 4, 2019 07:25

## 2019-01-05 VITALS — SYSTOLIC BLOOD PRESSURE: 119 MMHG | DIASTOLIC BLOOD PRESSURE: 54 MMHG

## 2019-01-05 VITALS — SYSTOLIC BLOOD PRESSURE: 125 MMHG | DIASTOLIC BLOOD PRESSURE: 62 MMHG

## 2019-01-05 VITALS — SYSTOLIC BLOOD PRESSURE: 114 MMHG | DIASTOLIC BLOOD PRESSURE: 60 MMHG

## 2019-01-05 LAB
BASOPHILS # BLD AUTO: 0 10^3/UL (ref 0–0.1)
BASOPHILS NFR BLD AUTO: 0 % (ref 0–10)
BUN/CREAT SERPL: 14
CALCIUM SERPL-MCNC: 8.9 MG/DL (ref 8.5–10.1)
CHLORIDE SERPL-SCNC: 108 MMOL/L (ref 98–107)
CO2 SERPL-SCNC: 21 MMOL/L (ref 21–32)
CREAT SERPL-MCNC: 1.33 MG/DL (ref 0.6–1.3)
EOSINOPHIL # BLD AUTO: 0.2 10^3/UL (ref 0–0.3)
EOSINOPHIL NFR BLD AUTO: 2 % (ref 0–10)
ERYTHROCYTE [DISTWIDTH] IN BLOOD BY AUTOMATED COUNT: 16.2 % (ref 10–14.5)
GFR SERPLBLD BASED ON 1.73 SQ M-ARVRAT: 53 ML/MIN
GLUCOSE SERPL-MCNC: 147 MG/DL (ref 70–105)
HCT VFR BLD CALC: 37 % (ref 40–54)
HGB BLD-MCNC: 12.1 G/DL (ref 13.3–17.7)
LYMPHOCYTES # BLD AUTO: 1 X 10^3 (ref 1–4)
LYMPHOCYTES NFR BLD AUTO: 13 % (ref 12–44)
MANUAL DIFFERENTIAL PERFORMED BLD QL: NO
MCH RBC QN AUTO: 33 PG (ref 25–34)
MCHC RBC AUTO-ENTMCNC: 33 G/DL (ref 32–36)
MCV RBC AUTO: 100 FL (ref 80–99)
MONOCYTES # BLD AUTO: 0.7 X 10^3 (ref 0–1)
MONOCYTES NFR BLD AUTO: 8 % (ref 0–12)
NEUTROPHILS # BLD AUTO: 6.2 X 10^3 (ref 1.8–7.8)
NEUTROPHILS NFR BLD AUTO: 77 % (ref 42–75)
PLATELET # BLD: 209 10^3/UL (ref 130–400)
PMV BLD AUTO: 9.7 FL (ref 7.4–10.4)
POTASSIUM SERPL-SCNC: 4 MMOL/L (ref 3.6–5)
RBC # BLD AUTO: 3.67 10^6/UL (ref 4.35–5.85)
SODIUM SERPL-SCNC: 139 MMOL/L (ref 135–145)
WBC # BLD AUTO: 8.1 10^3/UL (ref 4.3–11)

## 2019-01-05 RX ADMIN — IPRATROPIUM BROMIDE AND ALBUTEROL SULFATE SCH ML: .5; 3 SOLUTION RESPIRATORY (INHALATION) at 02:32

## 2019-01-05 RX ADMIN — GABAPENTIN SCH MG: 300 CAPSULE ORAL at 08:45

## 2019-01-05 RX ADMIN — FAMOTIDINE SCH MG: 20 TABLET, FILM COATED ORAL at 08:45

## 2019-01-05 RX ADMIN — Medication SCH ML: at 05:55

## 2019-01-05 RX ADMIN — IPRATROPIUM BROMIDE AND ALBUTEROL SULFATE SCH ML: .5; 3 SOLUTION RESPIRATORY (INHALATION) at 06:26

## 2019-01-05 RX ADMIN — FLUTICASONE PROPIONATE AND SALMETEROL XINAFOATE SCH PUFF: 115; 21 AEROSOL, METERED RESPIRATORY (INHALATION) at 06:27

## 2019-01-05 RX ADMIN — SUCRALFATE SCH GM: 1 TABLET ORAL at 11:20

## 2019-01-05 RX ADMIN — SUCRALFATE SCH GM: 1 TABLET ORAL at 05:55

## 2019-01-05 RX ADMIN — SODIUM CHLORIDE SCH MLS/HR: 900 INJECTION INTRAVENOUS at 04:48

## 2019-01-05 RX ADMIN — SODIUM CHLORIDE SCH MLS/HR: 900 INJECTION INTRAVENOUS at 12:39

## 2019-01-05 NOTE — DISCHARGE SUMMARY-HOSPITALIST
Diagnosis/Chief Complaint


Date of Admission


Francisco 3, 2019 at 10:25


Date of Discharge





Discharge Date:  2019


Admission Diagnosis


Sepsis


Discharge Diagnosis





(1) Sepsis


Assessment & Plan:  


Resolved


Due to pneumonia


Continue on oral abx at discharge





(2) Fever


Assessment & Plan:  


Temperature of 104.2 at home- remains febrile here


No hypotension





(3) Hypothyroidism


Status:  Chronic


Assessment & Plan:  


Resume home supplement





(4) Essential (primary) hypertension


Status:  Chronic


Assessment & Plan:  


BP well controlled


Trend





(5) COPD (chronic obstructive pulmonary disease)


Status:  Chronic


Assessment & Plan:  


Follows with Dr Stapleton


Consulted


MAT protocol





(6) CAP (community acquired pneumonia)





Discharge Summary


Procedures/Consulations


Dr Stapleton- Pulm





Discharge Physical Exam


Allergies:  


Coded Allergies:  


     No Known Drug Allergies (Verified , 18)


Vitals & I&Os





Vital Signs








  Date Time  Temp Pulse Resp B/P (MAP) Pulse Ox O2 Delivery O2 Flow Rate FiO2


 


19 10:50     93 Nasal Cannula 4.00 


 


19 08:00 98.9 69 18 125/62 (83)    








General Appearance:  No Apparent Distress, WD/WN


HEENT:  PERRL/EOMI, TMs Normal, Normal ENT Inspection, Pharynx Normal


Respiratory:  No Accessory Muscle Use, No Respiratory Distress, Crackles


Cardiovascular:  Regular Rate, Rhythm, No JVD, No Murmur


Gastrointestinal:  Normal Bowel Sounds, No Organomegaly, No Pulsatile Mass, Non 

Tender, Soft


Extremity:  Normal Capillary Refill, Normal Inspection, Normal Range of Motion, 

Non Tender, No Calf Tenderness, No Pedal Edema


Skin:  Normal Color, Warm/Dry


Neurologic/Psychiatric:  Alert, Oriented x3, No Motor/Sensory Deficits, Normal 

Mood/Affect





Hospital Course


Pt was admitted for sepsis from pneumonia. He has was treated with IV abx and 

responded well. He did have an oxygne requirement which is likely not new 

because he had previously had oxygen at home but sent it back because he was 

not using it. He otherwise had an uneventful hospital course and recovered 

quickly. He was discharged home in stable condition and oxygen was arranged for 

him upon discharge. He is to follow up with his PCP and with Dr Stapleton to 

follow up this hospital stay.


Labs (last 24 hrs)





Microbiology


1/3/19 Blood Culture - Preliminary, Resulted


         No growth


1/3/19 Gram Stain - Final, Complete


         


1/3/19 Sputum Culture - Final, Complete


         Escherichia coli


         Usual upper respiratory eliana


Patient resulted labs reviewed.


Pending Labs





Imaging:  Reviewed Imaging Report





Discussion & Recommendations


Discharge Planning:  >30 minutes discharge planning





Discharge


Home Medications:





Active Scripts


Active


Augmentin 875-125 Tablet (Amoxicillin/Potassium Clav) 1 Each Tablet 1 Each PO 

BID


Reported


Aspirin EC (Aspirin) 325 Mg Tablet.dr 325 Mg PO DAILY


Zantac (Ranitidine HCl) 150 Mg Tablet 150 Mg PO BID


Excedrin Extra Strength Caplet (Aspirin/Acetaminophen/Caffeine) 1 Each Tablet 1 

Tab PO BID PRN


Cyanocobalamin Injection (Cyanocobalamin) 1,000 Mcg/Ml Inj 1,000 Mcg IM MONTHLY


Vitamin C (Ascorbate Calcium) 500 Mg Tablet 500 Mg PO DAILY


Probiotic (Lactobacillus Acidophilus) 1 Each Capsule 1 Cap PO DAILY


Vitamin D3 (Cholecalciferol (Vitamin D3)) 1,000 Unit Tablet 1,000 Unit PO DAILY


Fish Oil 1,200 mg Softgel (Omega-3 Fatty Acids/Fish Oil) 1 Each Capsule 1,200 

Mg PO DAILY


Lovastatin 40 Mg Tablet 20 Mg PO HS


     TAKE 1/2 OF 40MG TAB


Carafate (Sucralfate) 1 Gm Tablet 1 Gm PO ACHS


Gabapentin 300 Mg Capsule 300 Mg PO BID


Latanoprost 2.5 Ml Drops 1 Drop OU HS


Levothyroxine Sodium 137 Mcg Tablet 137 Mcg PO 1800


Iprat-Albut 0.5-3(2.5) mg/3 ml (Ipratropium/Albuterol Sulfate) 3 Ml Ampul.neb 3 

Ml NEB TID


Colace (Docusate Sodium) 100 Mg Capsule 300 Mg PO HS


Symbicort 160-4.5 Mcg Inhaler (Budesonide/Formoterol Fumarate) 10.2 Gm 

Hfa.aer.ad 2 Puff IH BID


Amlodipine Besylate 10 Mg Tablet 5 Mg PO DAILY


     TAKES 1/2 (10 MG) TABLET





Instructions to patient/family


Please see electronic discharge instructions given to patient.





Clinical Quality Measures


DVT/VTE Risk/Contraindication:


Risk Factor Score Per Nursin


RFS Level Per Nursing on Admit:  4+=Very High





Copy


Copies To 1:   EDUAR JOHNSON DO





Problem Qualifiers





(1) Sepsis:  


Sepsis type:  sepsis due to unspecified organism  Qualified Codes:  A41.9 - 

Sepsis, unspecified organism


(2) Hypothyroidism:  


Hypothyroidism type:  postablative  Qualified Codes:  E89.0 - Postprocedural 

hypothyroidism


(3) COPD (chronic obstructive pulmonary disease):  


COPD type:  unspecified COPD  Qualified Codes:  J44.9 - Chronic obstructive 

pulmonary disease, unspecified


(4) CAP (community acquired pneumonia):  


Laterality:  unspecified laterality  Qualified Codes:  J18.9 - Pneumonia, 

unspecified organism








DONALD BEATTY MD 2019 10:49

## 2019-01-05 NOTE — NUR
home oxygen study





pt at rest dropped on room air to 83%, placed back on 4 lpm nasal cannula and spo2 increased 
to 92%, pt will require 4 lpm nasal cannula at all times

## 2019-01-05 NOTE — DISCHARGE INST-SIMPLE/STANDARD
Discharge Inst-Standard


Discharge Medications


New, Converted or Re-Newed RX:  Transmitted to Pharmacy





Patient Instructions/Follow Up


Plan of Care/Instructions/FU:  


Please continue to take your medications as written. Plesase finish your


antibiotic even if you symptoms resolve. Please follow up with Dr Velasquez


and Dr Stapleton to follow up this hospital stay. It is also very important


to quit smoking to help prevent further complications.


Activity as Tolerated:  Yes


Discharge Diet:  Cardiac Diet


Return to The Hospital For:  


Shortness of breath, worsening fever, chest pain, if you feel you are


getting worse.











DONALD BEATTY MD Jan 5, 2019 09:46

## 2019-01-05 NOTE — NUR
EDUAR OSORIO demonstrates understanding of discharge instructions and accurately returns 
instructions upon questioning.  Copy of Post-Discharge Instructions and Medication Discharge 
Instructions given to patient. EDUAR OSORIO is able to manage continuing needs after 
discharge.  Patients belongings returned to patient. Skin dry and intact; no breakdown 
noted. Patient discharged from [facility] on 1/5/19 at 1345. EDUAR OSORIO left floor via 
wheel chair , accompanied by staff and wife.

## 2019-01-30 ENCOUNTER — HOSPITAL ENCOUNTER (OUTPATIENT)
Dept: HOSPITAL 75 - RAD | Age: 74
End: 2019-01-30
Attending: INTERNAL MEDICINE
Payer: MEDICARE

## 2019-01-30 DIAGNOSIS — K21.9: ICD-10-CM

## 2019-01-30 DIAGNOSIS — K44.9: Primary | ICD-10-CM

## 2019-01-30 DIAGNOSIS — K22.8: ICD-10-CM

## 2019-01-30 PROCEDURE — 74220 X-RAY XM ESOPHAGUS 1CNTRST: CPT

## 2019-01-30 NOTE — DIAGNOSTIC IMAGING REPORT
EXAMINATION: Barium swallow esophagram.



INDICATION: Epigastric pain.



COMPARISON: There are no prior esophagrams available for

comparison.



FINDINGS: A double-contrast exam was performed.



The patient swallowed the contrast material without difficulty.

There was no delay or obstruction of the passage of the barium

through the esophagus. Multiple tertiary contractions were noted.

These generally indicate ineffective peristalsis and are not

unusual in a patient of this age.



There is a fixed hiatal hernia and there was voluminous

gastroesophageal reflux on two occasions. There was no sign of

esophagitis, however.



A cursory examination of the stomach shows that the stomach shows

good distensibility and motility. There is no mass or ulceration

evident. The duodenal bulb and proximal small bowel were

unremarkable.



IMPRESSION:

1. There is a large fixed hiatal hernia with voluminous

gastroesophageal reflux. There is no sign of esophagitis.

2. Presbyesophagus.

3. The stomach, duodenum, and proximal small bowel are grossly

unremarkable.



  Dictated on workstation # [id number]

## 2019-02-27 ENCOUNTER — HOSPITAL ENCOUNTER (INPATIENT)
Dept: HOSPITAL 75 - ER | Age: 74
LOS: 3 days | Discharge: HOME | DRG: 193 | End: 2019-03-02
Attending: INTERNAL MEDICINE | Admitting: INTERNAL MEDICINE
Payer: MEDICARE

## 2019-02-27 VITALS — BODY MASS INDEX: 30.1 KG/M2 | WEIGHT: 215 LBS | HEIGHT: 71 IN

## 2019-02-27 VITALS — DIASTOLIC BLOOD PRESSURE: 58 MMHG | SYSTOLIC BLOOD PRESSURE: 108 MMHG

## 2019-02-27 VITALS — SYSTOLIC BLOOD PRESSURE: 126 MMHG | DIASTOLIC BLOOD PRESSURE: 70 MMHG

## 2019-02-27 VITALS — DIASTOLIC BLOOD PRESSURE: 70 MMHG | SYSTOLIC BLOOD PRESSURE: 126 MMHG

## 2019-02-27 DIAGNOSIS — J96.21: ICD-10-CM

## 2019-02-27 DIAGNOSIS — E11.9: ICD-10-CM

## 2019-02-27 DIAGNOSIS — N17.9: ICD-10-CM

## 2019-02-27 DIAGNOSIS — Z99.81: ICD-10-CM

## 2019-02-27 DIAGNOSIS — S04.011S: ICD-10-CM

## 2019-02-27 DIAGNOSIS — E83.42: ICD-10-CM

## 2019-02-27 DIAGNOSIS — E89.0: ICD-10-CM

## 2019-02-27 DIAGNOSIS — J18.1: Primary | ICD-10-CM

## 2019-02-27 DIAGNOSIS — G47.30: ICD-10-CM

## 2019-02-27 DIAGNOSIS — J43.9: ICD-10-CM

## 2019-02-27 DIAGNOSIS — I25.10: ICD-10-CM

## 2019-02-27 DIAGNOSIS — I10: ICD-10-CM

## 2019-02-27 DIAGNOSIS — E78.00: ICD-10-CM

## 2019-02-27 DIAGNOSIS — N39.0: ICD-10-CM

## 2019-02-27 DIAGNOSIS — Z87.820: ICD-10-CM

## 2019-02-27 DIAGNOSIS — Z86.73: ICD-10-CM

## 2019-02-27 DIAGNOSIS — H40.9: ICD-10-CM

## 2019-02-27 DIAGNOSIS — M81.0: ICD-10-CM

## 2019-02-27 DIAGNOSIS — F17.210: ICD-10-CM

## 2019-02-27 DIAGNOSIS — H93.19: ICD-10-CM

## 2019-02-27 DIAGNOSIS — K21.9: ICD-10-CM

## 2019-02-27 DIAGNOSIS — Z91.5: ICD-10-CM

## 2019-02-27 DIAGNOSIS — F32.9: ICD-10-CM

## 2019-02-27 DIAGNOSIS — M19.91: ICD-10-CM

## 2019-02-27 DIAGNOSIS — R41.0: ICD-10-CM
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
## 2019-02-27 DIAGNOSIS — K59.09: ICD-10-CM

## 2019-02-27 LAB
ALBUMIN SERPL-MCNC: 4.1 GM/DL (ref 3.2–4.5)
ALP SERPL-CCNC: 105 U/L (ref 40–136)
ALT SERPL-CCNC: 19 U/L (ref 0–55)
APTT BLD: 32 SEC (ref 24–35)
APTT PPP: YELLOW S
BACTERIA #/AREA URNS HPF: (no result) /HPF
BASOPHILS # BLD AUTO: 0 10^3/UL (ref 0–0.1)
BASOPHILS NFR BLD AUTO: 0 % (ref 0–10)
BILIRUB SERPL-MCNC: 0.4 MG/DL (ref 0.1–1)
BILIRUB UR QL STRIP: NEGATIVE
BUN/CREAT SERPL: 13
CALCIUM SERPL-MCNC: 9.9 MG/DL (ref 8.5–10.1)
CHLORIDE SERPL-SCNC: 105 MMOL/L (ref 98–107)
CO2 SERPL-SCNC: 21 MMOL/L (ref 21–32)
CREAT SERPL-MCNC: 1.42 MG/DL (ref 0.6–1.3)
EOSINOPHIL # BLD AUTO: 0.1 10^3/UL (ref 0–0.3)
EOSINOPHIL NFR BLD AUTO: 1 % (ref 0–10)
ERYTHROCYTE [DISTWIDTH] IN BLOOD BY AUTOMATED COUNT: 16.6 % (ref 10–14.5)
FIBRINOGEN PPP-MCNC: (no result) MG/DL
GFR SERPLBLD BASED ON 1.73 SQ M-ARVRAT: 49 ML/MIN
GLUCOSE SERPL-MCNC: 111 MG/DL (ref 70–105)
GLUCOSE UR STRIP-MCNC: NEGATIVE MG/DL
HCT VFR BLD CALC: 39 % (ref 40–54)
HGB BLD-MCNC: 13.2 G/DL (ref 13.3–17.7)
INR PPP: 1.2 (ref 0.8–1.4)
KETONES UR QL STRIP: NEGATIVE
LEUKOCYTE ESTERASE UR QL STRIP: (no result)
LYMPHOCYTES # BLD AUTO: 0.4 X 10^3 (ref 1–4)
LYMPHOCYTES NFR BLD AUTO: 5 % (ref 12–44)
MAGNESIUM SERPL-MCNC: 1.5 MG/DL (ref 1.8–2.4)
MANUAL DIFFERENTIAL PERFORMED BLD QL: YES
MCH RBC QN AUTO: 33 PG (ref 25–34)
MCHC RBC AUTO-ENTMCNC: 34 G/DL (ref 32–36)
MCV RBC AUTO: 98 FL (ref 80–99)
MONOCYTES # BLD AUTO: 0.7 X 10^3 (ref 0–1)
MONOCYTES NFR BLD AUTO: 7 % (ref 0–12)
MONOCYTES NFR BLD: 3 %
NEUTROPHILS # BLD AUTO: 8.3 X 10^3 (ref 1.8–7.8)
NEUTROPHILS NFR BLD AUTO: 87 % (ref 42–75)
NEUTS BAND NFR BLD MANUAL: 84 %
NEUTS BAND NFR BLD: 7 %
NITRITE UR QL STRIP: NEGATIVE
PH UR STRIP: 5 [PH] (ref 5–9)
PLATELET # BLD: 264 10^3/UL (ref 130–400)
PMV BLD AUTO: 9.3 FL (ref 7.4–10.4)
POTASSIUM SERPL-SCNC: 4.2 MMOL/L (ref 3.6–5)
PROT SERPL-MCNC: 7.4 GM/DL (ref 6.4–8.2)
PROT UR QL STRIP: NEGATIVE
PROTHROMBIN TIME: 14.7 SEC (ref 12.2–14.7)
RBC #/AREA URNS HPF: (no result) /HPF
RBC MORPH BLD: NORMAL
SODIUM SERPL-SCNC: 136 MMOL/L (ref 135–145)
SP GR UR STRIP: 1.02 (ref 1.02–1.02)
UROBILINOGEN UR-MCNC: NORMAL MG/DL
VARIANT LYMPHS NFR BLD MANUAL: 6 %
WBC # BLD AUTO: 9.5 10^3/UL (ref 4.3–11)
WBC #/AREA URNS HPF: (no result) /HPF

## 2019-02-27 PROCEDURE — 83605 ASSAY OF LACTIC ACID: CPT

## 2019-02-27 PROCEDURE — 82962 GLUCOSE BLOOD TEST: CPT

## 2019-02-27 PROCEDURE — 87804 INFLUENZA ASSAY W/OPTIC: CPT

## 2019-02-27 PROCEDURE — 81000 URINALYSIS NONAUTO W/SCOPE: CPT

## 2019-02-27 PROCEDURE — 85027 COMPLETE CBC AUTOMATED: CPT

## 2019-02-27 PROCEDURE — 80053 COMPREHEN METABOLIC PANEL: CPT

## 2019-02-27 PROCEDURE — 96375 TX/PRO/DX INJ NEW DRUG ADDON: CPT

## 2019-02-27 PROCEDURE — 87040 BLOOD CULTURE FOR BACTERIA: CPT

## 2019-02-27 PROCEDURE — 82805 BLOOD GASES W/O2 SATURATION: CPT

## 2019-02-27 PROCEDURE — 94760 N-INVAS EAR/PLS OXIMETRY 1: CPT

## 2019-02-27 PROCEDURE — 85007 BL SMEAR W/DIFF WBC COUNT: CPT

## 2019-02-27 PROCEDURE — 94640 AIRWAY INHALATION TREATMENT: CPT

## 2019-02-27 PROCEDURE — 93005 ELECTROCARDIOGRAM TRACING: CPT

## 2019-02-27 PROCEDURE — 85730 THROMBOPLASTIN TIME PARTIAL: CPT

## 2019-02-27 PROCEDURE — 93041 RHYTHM ECG TRACING: CPT

## 2019-02-27 PROCEDURE — 36415 COLL VENOUS BLD VENIPUNCTURE: CPT

## 2019-02-27 PROCEDURE — 96365 THER/PROPH/DIAG IV INF INIT: CPT

## 2019-02-27 PROCEDURE — 87205 SMEAR GRAM STAIN: CPT

## 2019-02-27 PROCEDURE — 36600 WITHDRAWAL OF ARTERIAL BLOOD: CPT

## 2019-02-27 PROCEDURE — 83880 ASSAY OF NATRIURETIC PEPTIDE: CPT

## 2019-02-27 PROCEDURE — 85025 COMPLETE CBC W/AUTO DIFF WBC: CPT

## 2019-02-27 PROCEDURE — 87088 URINE BACTERIA CULTURE: CPT

## 2019-02-27 PROCEDURE — 85610 PROTHROMBIN TIME: CPT

## 2019-02-27 PROCEDURE — 83735 ASSAY OF MAGNESIUM: CPT

## 2019-02-27 PROCEDURE — 71045 X-RAY EXAM CHEST 1 VIEW: CPT

## 2019-02-27 PROCEDURE — 80048 BASIC METABOLIC PNL TOTAL CA: CPT

## 2019-02-27 PROCEDURE — 87070 CULTURE OTHR SPECIMN AEROBIC: CPT

## 2019-02-27 PROCEDURE — 84484 ASSAY OF TROPONIN QUANT: CPT

## 2019-02-27 RX ADMIN — MAGNESIUM SULFATE IN DEXTROSE SCH MLS/HR: 10 INJECTION, SOLUTION INTRAVENOUS at 21:09

## 2019-02-27 RX ADMIN — MAGNESIUM SULFATE IN DEXTROSE SCH MLS/HR: 10 INJECTION, SOLUTION INTRAVENOUS at 20:05

## 2019-02-27 RX ADMIN — SODIUM CHLORIDE SCH MLS/HR: 4.5 INJECTION, SOLUTION INTRAVENOUS at 22:46

## 2019-02-27 RX ADMIN — ACETAMINOPHEN PRN MG: 500 TABLET ORAL at 22:41

## 2019-02-27 NOTE — DIAGNOSTIC IMAGING REPORT
INDICATION: Weakness. Cough. Fever.



COMPARISON: 01/03/2019.



FINDINGS: Single frontal view of the chest demonstrates mild

cardiomegaly and moderate prominence of the pulmonary

vasculature. The lungs are well aerated and clear. No large

pleural effusion or pneumothorax is seen. The visualized osseous

structures show no acute abnormalities.



IMPRESSION: 

1. Cardiomegaly and pulmonary vascular congestion.



Dictated by: 



  Dictated on workstation # [id number]
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
## 2019-02-27 NOTE — XMS REPORT
Continuity of Care Document

 Created on: 2019



EDUAR OSORIO

External Reference #: [id number]

: 1945

Sex: Male



Demographics







 Address  [address] [place]  [address]

 

 Home Phone  [phone] x

 

 Preferred Language  Unknown

 

 Marital Status  Unknown

 

 Christian Affiliation  Unknown

 

 Race  Unknown

 

 Ethnic Group  Unknown





Author







 Author  [facility]

 

 Organization  [facility]

 

 Address  Unknown

 

 Phone  Unavailable



              



Allergies

      





 Active            Description            Code            Type            
Severity            Reaction            Onset            Reported/Identified   
         Relationship to Patient            Clinical Status        

 

 Yes            No Known Drug Allergies            [id number]            Drug 
Allergy            Unknown            N/A                         2018   
                               



                  



Medications

      



There is no data.                  



Problems

      





 Date Dx Coded            Attending            Type            Code            
Diagnosis            Diagnosed By        

 

 2014            EDUAR JOHNSON DO            Ot            038.9   
         SEPTICEMIA NOS                     

 

 2014            EDUAR JOHNSON DO            Ot            250.00  
          DIAB NIEVES WO COMPL, TYPE II OR UNSPEC TY                     

 

 2014            EDUAR JOHNSON DO            Ot            272.4   
         HYPERLIPIDEMIA NEC/NOS                     

 

 2014            EDUAR JOHNSON DO            Ot            276.52  
          HYPOVOLEMIA                     

 

 2014            EDUAR JOHNSON DO            Ot            305.1   
         TOBACCO USE DISORDER                     

 

 2014            EDUAR JOHNSON DO            Ot            414.01  
          CORONARY ATHEROSCLEROSIS OF NATIVE CORON                     

 

 2014            EDUAR JOHNSON DO            Ot            486     
       PNEUMONIA, ORGANISM NOS                     

 

 2014            EDUAR JOHNSON DO            Ot            510.9   
         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                     

 

 2014            EDUAR JOHNSON DO            Ot            511.9   
         PLEURAL EFFUSION NOS                     

 

 2014            EDUAR JOHNSON DO            Ot            518.81  
          ACUTE RESPIRATORY FAILURE                     

 

 2014            EDUAR JOHNSON DO            Ot            584.9   
         ACUTE RENAL FAILURE, UNSPECIFIED                     

 

 2014            EDUAR JOHNSON DO            Ot            785.50  
          SHOCK NOS                     

 

 2014            EDUAR JOHNSON DO, Ot            785.52  
          SEPTIC SHOCK                     

 

 2014            EDUAR JOHNSON DO            Ot            995.92  
          SEVERE SEPSIS                     

 

 2014            EDUAR JOHNSON DO, Ot            V45.82  
          PERCUTANEOUS TRANSLUM CORON ANGIOPLASTY                      

 

 2014            HARRY MATIAS, SHIRLENE DAVID            Ot            266.2        
    B-COMPLEX DEFIC NEC                     

 

 2014            SHIRLENE MCDONALD MD            Ot            268.9        
    VITAMIN D DEFICIENCY NOS                     

 

 2014            SHIRLENE MCDONALD MD            Ot            272.4        
    HYPERLIPIDEMIA NEC/NOS                     

 

 2014            SHIRLENE MCDONALD MD E            Ot            274.9        
    GOUT NOS                     

 

 2014            SHIRLENE MCDONALD MD            Ot            305.1        
    TOBACCO USE DISORDER                     

 

 2014            SHIRLENE MCDONALD MD            Ot            401.9        
    HYPERTENSION NOS                     

 

 2014            HARRY MATIAS, SHIRLENE E            Ot            564.00       
     UNSPEC CONSTIPATION                     

 

 2014            SHIRLENE MCDONALD MD E            Ot            722.11       
     THORACIC DISC DISPLACMNT                     

 

 2014            SHIRLENE MCDONALD MD            Ot            733.00       
     OSTEOPOROSIS NOS                     

 

 2014            SHIRLENE MCDONALD MD E            Ot            780.79       
     OTH MALAISE FATIGUE                     

 

 2014            SHIRLENE MCDONALD MD E            Ot            790.29       
     OTHER ABNORMAL GLUCOSE                     

 

 2014            SHIRLENE MCDONALD MD            Ot            V57.89       
     REHABILITATION PROC NEC                     

 

 2014            LETTY DO ARLENE F            Ot            726.0    
                              

 

 2014            LETTY DO, ARLENE F            Ot            718.91   
                               

 

 2014            LETTY DO ARLENE F            Ot            726.0    
                              

 

 2014            LETTY DAVIS ARLENE F            Ot            726.0    
        ADHESIVE CAPSULIT SHLDER                     

 

 2014            LETTY DAVIS ARLENE F            Ot            V57.1    
        PHYSICAL THERAPY NEC                     

 

 2014            LETTY DAVIS ARLENE F            Ot            718.91   
                               

 

 2014            LETTY DAVIS ARLENE F            Ot            726.0    
                              

 

 2014            EDUAR JOHNSON DO            Ot            250.00  
          DIAB NIEVES WO COMPL, TYPE II OR UNSPEC TY                     

 

 2014            EDUAR JOHNSON DO            Ot            266.2   
         B-COMPLEX DEFIC NEC                     

 

 2014            EDUAR JOHNSON DO            Ot            268.9   
         VITAMIN D DEFICIENCY NOS                     

 

 2014            EDUAR JOHNSON DO            Ot            272.4   
         HYPERLIPIDEMIA NEC/NOS                     

 

 2014            EDUAR JOHNSON DO            Ot            403.90  
          HYPTNSV CHR KID DIS, UNSPEC, W CHR KD ST                     

 

 2014            EDUAR JOHNSON DO            Ot            414.01  
          CORONARY ATHEROSCLEROSIS OF NATIVE CORON                     

 

 2014            EDUAR JOHNSON DO            Ot            435.9   
         TRANS CEREB ISCHEMIA NOS                     

 

 2014            EDUAR JOHNSON DO            Ot            496     
       CHR AIRWAY OBSTRUCT NEC                     

 

 2014            EDUAR JOHNSON DO            Ot            585.2   
         CHRONIC KIDNEY DISEASE, STAGE II (MILD)                     

 

 2014            EDUAR JOHNSON DO            Ot            722.11  
          THORACIC DISC DISPLACMNT                     

 

 2014            EDUAR JOHNSON DO            Ot            799.02  
          HYPOXEMIA                     

 

 2014            EDUAR JOHNSON DO            Ot            V45.82  
          PERCUTANEOUS TRANSLUM CORON ANGIOPLASTY                      

 

 2014            LETTY DAVIS ARLENE MCKEON            Ot            718.91   
                               

 

 2014            LETTY DAVIS ARLENE MCKEON            Ot            726.0    
                              

 

 2015                         Ot            733.00                       
           

 

 2015                         Ot            733.00                       
           

 

 2015                         Ot            733.00                       
           

 

 2015            EDUAR JOHNSON DO            Ot            733.00  
                                

 

 2015            DANA DILLON DO            Ot            492.8       
                           

 

 2015            DANA DILLON DO            Ot            511.9       
                           

 

 2015            DANA DILLON DO            Ot            586         
                         

 

 2015            LETTY DAVIS ARLENE MCKEON            Ot            726.0    
                              

 

 2015            LETTY DAVIS ARLENE MCKEON            Ot            718.91   
                               

 

 2015            LETTY DAVIS ARLENE MCKEON            Ot            726.0    
                              

 

 2015            LETTY  ARLENE MCKEON            Ot            840.4    
                              

 

 2015            LETTY  ARLENE MCKEON            Ot            E000.8   
                               

 

 2015            LETTY  ARLENE MCKEON            Ot            E928.9   
                               

 

 2015            LETTY DAVIS ARLENE MCKEON            Ot            V72.84   
                               

 

 2015            LETTY ARLENE DAVIS            Ot            840.4    
        SPRAIN ROTATOR CUFF                     

 

 2015            LETTY DAVIS ARLENE MCKEON            Ot            E000.8   
         OTHER EXTERNAL CAUSE STATUS                     

 

 2015            LETTY DAVIS ARLENE MCKEON            Ot            E928.9   
         ACCIDENT NOS                     

 

 2015            LETTY DAVIS ARLENE MCKEON            Ot            V58.61   
         ANTICOAGULANTS,LT,CURRENT USE                     

 

 2015            LETTY  ARLENE MCKEON            Ot            V64.1    
        NO PROC/CONTRAINDICATION                     

 

 2015            LETTY DAVIS ARLENE MCKEON            Ot            715.31   
         LOC OSTEOARTH NOS-SHLDER                     

 

 2015            LETTY DAVIS ARLENE MCKEON            Ot            727.61   
         ROTATOR CUFF RUPTURE                     

 

 2015            LETTY DAVIS ARLENE MCKEON            Ot            840.7    
        (SLAP) SUPERIOR GLENOID LABRUM LESIONS                     

 

 2015            NOHELIA AUGUST MD            Ot            414.01      
                            

 

 2015            NOHELIA AUGUST MD            Ot            786.09      
                            

 

 2015                         Ot            733.00                       
           

 

 2015                         Ot            733.00                       
           

 

 2015                         Ot            733.00                       
           

 

 2015            EDUAR JOHNSON DO            Ot            733.00  
                                

 

 2015            DANA DILLON DO            Ot            492.8       
                           

 

 2015            WILMA DANA DAVIS            Ot            511.9       
                           

 

 2015            WILMA DAVIS DANA M            Ot            586         
                         

 

 2015            LETTY DO, ARLENE MCKEON            Ot            726.0    
                              

 

 2015            LETTY DO, ARLENE F            Ot            718.91   
                               

 

 2015            LETTY DO, ARLENE F            Ot            726.0    
                              

 

 2015            LETTY DO, ARLENE F            Ot            840.4    
                              

 

 2015            LETTY DO, ARLENE MCKEON            Ot            E000.8   
                               

 

 2015            LETTY DO ARLENE MCKEON            Ot            E928.9   
                               

 

 2015            LETTY DO ARLENE MCKEON            Ot            V72.84   
                               

 

 2015            LETTY DO ARLENE MCKEON            Ot            727.61   
                               

 

 2015            LETTY DO ARLENE MCKEON            Ot            V72.84   
                               

 

 2015            NOHELIA AUGUST MD            Ot            414.00      
                            

 

 2015            MATA MATIAS, NOHELIA CLARK            Ot            424.0       
                           

 

 2015            NOHELIA AUGUST MD            Ot            433.10      
                            

 

 2015            NOHELIA AUGUST MD            Ot            786.09      
                            

 

 2015            NOHELIA AUGUST MD            Ot            414.01      
                            

 

 2015            NOHELIA AUGUST MD            Ot            786.09      
                            

 

 2015            LETTY DO ARLENE MCKEON            Ot            840.4    
                              

 

 2015            LETTY DO ARLENE MCKEON            Ot            E000.8   
                               

 

 2015            LETTY DO ARLENE MCKEON            Ot            E928.9   
                               

 

 2015            LETTY DO, ARLENE F            Ot            V72.84   
                               

 

 2015            NOHELIA AUGUST MD            Ot            414.01      
                            

 

 2015            NOHELIA AUGUST MD            Ot            786.09      
                            

 

 2015            LETTY DO ARLENE MCKEON            Ot            719.41   
         JOINT PAIN-SHLDER                     

 

 2015            LETTY DO ARLENE F            Ot            V57.1    
        PHYSICAL THERAPY NEC                     

 

 2015            LETTY DO ARLENE F            Ot            V58.49   
         OTHER SPECIFIED AFTERCARE FOLLOWING SURG                     

 

 2015            LETTY DO, ARLENE MCKEON            Ot            840.4    
                              

 

 2015            LETTY DO, ARLENE MCKEON            Ot            E000.8   
                               

 

 2015            LETTY DO, ARLENE MCKEON            Ot            E928.9   
                               

 

 2015            LETTY DO, ARLENE MCKENO            Ot            V72.84   
                               

 

 2015            LETTY DO, ARLENE MCKEON            Ot            840.4    
                              

 

 2015            LETTY DO, ARLENE MCKEON            Ot            E000.8   
                               

 

 2015            LETTY DO, ARLENE MCKEON            Ot            E928.9   
                               

 

 2015            LETTY DO, ARLENE MCKEON            Ot            V72.84   
                               

 

 2015            LETTY DO, ARLENE MCKEON            Ot            727.61   
                               

 

 2015            LETTY DO, ARLENE MCKEON            Ot            V72.84   
                               

 

 2015            LETTY DO, ARLENE MCKEON            Ot            727.61   
                               

 

 2015            LETTY DO, ARLENE MCKEON            Ot            V72.84   
                               

 

 2015                         Ot            733.00                       
           

 

 2015                         Ot            733.00                       
           

 

 2015            LETTY DO, ARLENE MCKEON            Ot            722.4    
                              

 

 2015            LETTY DO, ARLENE MCKEON            Ot            722.4    
                              

 

 2015            BELKIS WEST MD            Ot            721.0        
                          

 

 2015            BELKIS WEST MD            Ot            V57.1        
                          

 

 2015            BELKIS WEST MD            Ot            721.0        
                          

 

 2015            BELKIS WEST MD            Ot            V57.1        
                          

 

 2015            BELKIS WEST MD            Ot            721.0        
    CERVICAL SPONDYLOSIS                     

 

 2015            BELKIS WEST MD            Ot            V57.1        
    PHYSICAL THERAPY NEC                     

 

 2015            EDUAR JOHNSON DO            Ot            A41.9   
                               

 

 2015            JOHNSONEDUAR HILL DO            Ot            E53.8   
                               

 

 2015            JOHNSONEDUAR HILL DO            Ot            E55.9   
                               

 

 2015            JOHNSONEDUAR HILL DO            Ot            E78.5   
                               

 

 2015            EDUAR JOHNSON DO            Ot            F17.200 
                                 

 

 2015            EDUAR JOHNSON DO            Ot            I12.9   
                               

 

 2015            JOHNSONEDUAR HILL DO            Ot            J15.1   
                               

 

 2015            JOHNSONEDUAR HILL DO            Ot            J15.4   
                               

 

 2015            EDUAR JOHNSON DO            Ot            J44.0   
                               

 

 2015            JOHNSON DO, EDUAR CLARK            Ot            J44.1   
                               

 

 2015            JOHNSON DO, EDUAR CLARK            Ot            M51.14  
                                

 

 2015            JOHNSON DO, EDUAR CLARK            Ot            N18.2   
                               

 

 2015            JOHNSON DO, EDUAR CLARK            Ot            R90.82  
                                

 

 2015            JOHNSON DO, EDUAR CLARK            Ot            Z66     
                             

 

 2015            JOHNSON DO, EDUAR CLARK            Ot            A41.9   
                               

 

 2015            JOHNSON DO, EDUAR CLARK            Ot            E53.8   
                               

 

 2015            JOHNSON DO, EDUAR CLARK            Ot            E55.9   
                               

 

 2015            JOHNSON DO, EDUAR CLARK            Ot            E78.5   
                               

 

 2015            JOHNSON DO, EDUAR CLARK            Ot            F17.200 
                                 

 

 2015            JOHNSON DO, EDUAR CLARK            Ot            I12.9   
                               

 

 2015            JOHNSON DO, EDUAR CLARK            Ot            J15.1   
                               

 

 2015            JOHNSON DO, EDUAR CLARK            Ot            J15.4   
                               

 

 2015            JOHNSON DO, EDUAR CLARK            Ot            J44.0   
                               

 

 2015            JOHNSON DO, EDUAR CLARK            Ot            J44.1   
                               

 

 2015            JOHNSON DO, EDUAR CLARK            Ot            M51.14  
                                

 

 2015            JOHNSON DO, EDUAR CLARK            Ot            N18.2   
                               

 

 2015            JOHNSON DO, EDUAR CLARK            Ot            R90.82  
                                

 

 2015            JOHNSON DO, EDUAR CLARK            Ot            Z66     
                             

 

 2016            JOHNSON DO, EDUAR CLARK            Ot            A41.9   
                               

 

 2016            JOHNSON DO, EDUAR CLARK            Ot            E53.8   
                               

 

 2016            JOHNSON DO, EDUAR CLARK            Ot            E55.9   
                               

 

 2016            JOHNSON DO, EDUAR CLARK            Ot            E78.5   
                               

 

 2016            JOHNSON DO, EDUAR CLARK            Ot            F17.200 
                                 

 

 2016            JOHNSON DO, EDUAR CLARK            Ot            I12.9   
                               

 

 2016            JOHNSON DO, EDUAR CLARK            Ot            J15.1   
                               

 

 2016            JOHNSON DO EDUAR CLARK            Ot            J15.4   
                               

 

 2016            JOHNSON DO, EDUAR CLARK            Ot            J44.0   
                               

 

 2016            JOHNSON DO, EDUAR CLARK            Ot            J44.1   
                               

 

 2016            JOHNSON DO, EDUAR CLARK            Ot            M51.14  
                                

 

 2016            JOHNSON DO, EDUAR CLARK            Ot            N18.2   
                               

 

 2016            JOHNSON DO, EDUAR CLARK            Ot            R90.82  
                                

 

 2016            JOHNSON DO, EDUAR CLARK            Ot            Z66     
                             

 

 2016            JOHNSON DO, EDUAR CLARK            Ot            A41.9   
                               

 

 2016            JOHNSON DO, EDUAR CLARK            Ot            E53.8   
                               

 

 2016            JOHNSON DO, EDUAR CLARK            Ot            E55.9   
                               

 

 2016            JOHNSON DO, EDUAR CLARK            Ot            E78.5   
                               

 

 2016            JOHNSON DO, EDUAR CLARK            Ot            F17.200 
                                 

 

 2016            JOHNSON DO, EDUAR CLARK            Ot            I12.9   
                               

 

 2016            JOHNSON DO, EDUAR CLARK            Ot            J15.1   
                               

 

 2016            JOHNSON DO, EDUAR CLARK            Ot            J15.4   
                               

 

 2016            JOHNSON DO, EDUAR CLARK            Ot            J44.0   
                               

 

 2016            JOHNSON DO, EDUAR CLARK            Ot            J44.1   
                               

 

 2016            JOHNSON DO, EDUAR CLARK            Ot            M51.14  
                                

 

 2016            JOHNSON DO, EDUAR CLARK            Ot            N18.2   
                               

 

 2016            JOHNSON DO, EDUAR CLARK            Ot            R90.82  
                                

 

 2016            JOHNSON DO, EDUAR CLARK            Ot            Z66     
                             

 

 2016            JOHNSON DO, EDUAR CLARK            Ot            A41.9   
                               

 

 2016            JOHNSON DO, EDUAR CLARK            Ot            E53.8   
                               

 

 2016            JOHNSON DO, EDUAR CLARK            Ot            E55.9   
                               

 

 2016            JOHNSON DO, EDUAR CLARK            Ot            E78.5   
                               

 

 2016            JOHNSON DO, EDUAR CLARK            Ot            F17.200 
                                 

 

 2016            JOHNSON DO, EDUAR CLARK            Ot            I12.9   
                               

 

 2016            JOHNSON DO, EDUAR CLARK            Ot            J15.1   
                               

 

 2016            JOHNSON DO, EDUAR CLARK            Ot            J15.4   
                               

 

 2016            JOHNSON DO, EDUAR CLARK            Ot            J44.0   
                               

 

 2016            JOHNSON DO, EDUAR CLARK            Ot            J44.1   
                               

 

 2016            JOHNSON DO, EDUAR CLARK            Ot            M51.14  
                                

 

 2016            JOHNSON DO, EDUAR CLARK            Ot            N18.2   
                               

 

 2016            JOHNSON DO, EDUAR CLARK            Ot            R90.82  
                                

 

 2016            JOHNSON DO, EDUAR CLARK            Ot            Z66     
                             

 

 2016            JOHNSON DO, EDUAR CLARK            Ot            A41.9   
                               

 

 2016            JOHNSON DO, EDUAR CLARK            Ot            E53.8   
                               

 

 2016            JOHNSON DO, EDUAR CLARK            Ot            E55.9   
                               

 

 2016            JOHNSON DO, EDUAR CLARK            Ot            E78.5   
                               

 

 2016            JOHNSON DO, EDUAR CLARK            Ot            F17.200 
                                 

 

 2016            JOHNSON DO, EDUAR CLARK            Ot            I12.9   
                               

 

 2016            JOHNSON DO, EDUAR CLARK            Ot            J15.1   
                               

 

 2016            JOHNSON DO, EDUAR CLARK            Ot            J15.4   
                               

 

 2016            JOHNSON DO, EDUAR CLARK            Ot            J44.0   
                               

 

 2016            JOHNSON DO, EDUAR CLARK            Ot            J44.1   
                               

 

 2016            JOHNSON DO, EDUAR CLARK            Ot            M51.14  
                                

 

 2016            JOHNSON DO, EDUAR CLARK            Ot            N18.2   
                               

 

 2016            JOHNSON DO, EDUAR CLARK            Ot            R90.82  
                                

 

 2016            JOHNSON DO, EDUAR CLARK            Ot            Z66     
                             

 

 2016            JOHNSON DO, EDUAR CLARK            Ot            A41.9   
                               

 

 2016            JOHNSON DO, EDUAR CLARK            Ot            E53.8   
                               

 

 2016            JOHNSON DO, EDUAR CLARK            Ot            E55.9   
                               

 

 2016            JOHNSON DO, EDUAR CLARK            Ot            E78.5   
                               

 

 2016            JOHNSON DO, EDUAR CLARK            Ot            F17.200 
                                 

 

 2016            JOHNSON DO, EDUAR CLARK            Ot            I12.9   
                               

 

 2016            JOHNSON DO, EDUAR CLARK            Ot            J15.1   
                               

 

 2016            JOHNSON DO, EDUAR CLARK            Ot            J15.4   
                               

 

 2016            JOHNSON DO, EDUAR CLARK            Ot            J44.0   
                               

 

 2016            JOHNSON DO, EDUAR CLARK            Ot            J44.1   
                               

 

 2016            JOHNSON DO, EDUAR CLARK            Ot            M51.14  
                                

 

 2016            JOHNSON DO, EDUAR CLARK            Ot            N18.2   
                               

 

 2016            JOHNSON DO, EDUAR CLARK            Ot            R90.82  
                                

 

 2016            JOHNSON DO, EDUAR CLARK            Ot            Z66     
                             

 

 2016            JOHNSON DO, EDUAR CLARK            Ot            A41.9   
                               

 

 2016            JOHNSON DO, EDUAR CLARK            Ot            E53.8   
                               

 

 2016            JOHNSON DO, EDUAR CLARK            Ot            E55.9   
                               

 

 2016            JOHNSON DO, EDUAR CLARK            Ot            E78.5   
                               

 

 2016            JOHNSON DO, EDUAR CLARK            Ot            F17.200 
                                 

 

 2016            JOHNSON DO, EDUAR CLARK            Ot            I12.9   
                               

 

 2016            JOHNSON DO, EDUAR CLARK            Ot            J15.1   
                               

 

 2016            JOHNSON DO, EDUAR CLARK            Ot            J15.4   
                               

 

 2016            JOHNSON DO, EDUAR CLARK            Ot            J44.0   
                               

 

 2016            JOHNSON DO, EDUAR CLARK            Ot            J44.1   
                               

 

 2016            JOHNSON DO, EDUAR CLARK            Ot            M51.14  
                                

 

 2016            JOHNSON DO, EDUAR CLARK            Ot            N18.2   
                               

 

 2016            JOHNSON DO, EDUAR CLARK            Ot            R90.82  
                                

 

 2016            JOHNSON DO, EDUAR CLARK            Ot            Z66     
                             

 

 2016            JOHNSON DO, EDUAR CLARK            Ot            A41.9   
                               

 

 2016            JOHNSON DO, EDUAR CLARK            Ot            E53.8   
                               

 

 2016            JOHNSON DO, EDUAR CLARK            Ot            E55.9   
                               

 

 2016            JOHNSON DO, EDUAR CLARK            Ot            E78.5   
                               

 

 2016            JOHNSON DO, EDUAR CLARK            Ot            F17.200 
                                 

 

 2016            JOHNSON DO, EDUAR CLARK            Ot            I12.9   
                               

 

 2016            JOHNSON DO, EDUAR CLARK            Ot            J15.1   
                               

 

 2016            JOHNSON DO, EDUAR CLARK            Ot            J15.4   
                               

 

 2016            JOHNSON DO, EDUAR CLARK            Ot            J44.0   
                               

 

 2016            JOHNSON DO, EDUAR CLARK            Ot            J44.1   
                               

 

 2016            JOHNSON DO, EDUAR CLARK            Ot            M51.14  
                                

 

 2016            JOHNSON DO, EDUAR CLARK            Ot            N18.2   
                               

 

 2016            JOHNSON DO, EDUAR CLARK            Ot            R90.82  
                                

 

 2016            JOHNSON DO, EDUAR CLARK            Ot            Z66     
                             

 

 2016            JOHNSON DO, EDUAR CLARK            Ot            A41.9   
         SEPSIS, UNSPECIFIED ORGANISM                     

 

 2016            EDUAR JOHNSON DO, Ot            E11.22  
          TYPE 2 DIABETES MELLITUS W DIABETIC                      

 

 2016            EDUAR JOHNSON DO, Ot            E53.8   
         DEFICIENCY OF OTHER SPECIFIED B GROUP VI                     

 

 2016            EDUAR JOHNSON DO, Ot            E55.9   
         VITAMIN D DEFICIENCY, UNSPECIFIED                     

 

 2016            EDUAR JOHNSON DO, Ot            E78.5   
         HYPERLIPIDEMIA, UNSPECIFIED                     

 

 2016            EDUAR JOHNSON DO, Ot            F17.210 
           NICOTINE DEPENDENCE, CIGARETTES, UNCOMPL                     

 

 2016            EDUAR JOHNSON DO, Ot            I12.9   
         HYPERTENSIVE CHRONIC KIDNEY DISEASE W ST                     

 

 2016            EDUAR JOHNSON DO, Ot            I25.10  
          ATHSCL HEART DISEASE OF NATIVE CORONARY                      

 

 2016            EDUAR JOHNSON DO, Ot            J15.1   
         PNEUMONIA DUE TO PSEUDOMONAS                     

 

 2016            EDUAR JOHNSON DO, Ot            J15.4   
         PNEUMONIA DUE TO OTHER STREPTOCOCCI                     

 

 2016            EDUAR JOHNSON DO, Ot            J44.0   
         CHRONIC OBSTRUCTIVE PULMON DISEASE W ACU                     

 

 2016            EDUAR JOHNSON DO, Ot            J44.1   
         CHRONIC OBSTRUCTIVE PULMONARY DISEASE W                      

 

 2016            EDUAR JOHNSON DO, Ot            M51.14  
          INTVRT DISC DISORDERS W RADICULOPATHY, T                     

 

 2016            EDUAR JOHNSON DO, Ot            N18.2   
         CHRONIC KIDNEY DISEASE, STAGE 2 (MILD)                     

 

 2016            EDUAR JOHNSON DO, Ot            R90.82  
          WHITE MATTER DISEASE, UNSPECIFIED                     

 

 2016            EDUAR JOHNSON DO, Ot            Z66     
       DO NOT RESUSCITATE                     

 

 2016                         Ot            733.00                       
           

 

 2016                         Ot            733.00                       
           

 

 2016                         Ot            733.00                       
           

 

 2016            EDUAR JOHNSON DO, Ot            733.00  
                                

 

 2016            DANA DILLON DO            Ot            492.8       
                           

 

 2016            DANA DILLON DO            Ot            511.9       
                           

 

 2016            DANA DILLON DO            Ot            586         
                         

 

 2016            ARLENE SAENZ DO            Ot            726.0    
                              

 

 2016            ARLENE SAENZ DO            Ot            718.91   
                               

 

 2016            ARLENE SAENZ DO            Ot            726.0    
                              

 

 2016            ARLENE SAENZ DO            Ot            840.4    
                              

 

 2016            LETTY DO, ARLENE MCKEON            Ot            E000.8   
                               

 

 2016            LETTY DO, ARLENE MCKEON            Ot            E928.9   
                               

 

 2016            LETTY DO, ARLENE MCKEON            Ot            V72.84   
                               

 

 2016            LETTY DO, ARLENE MCKEON            Ot            727.61   
                               

 

 2016            LETTY DO, ARLENE MCKEON            Ot            V72.84   
                               

 

 2016            MATA MATIAS, NOHELIA CLARK            Ot            414.00      
                            

 

 2016            MATA MATIAS, NOHELIA J            Ot            424.0       
                           

 

 2016            MATA MATIAS, NOHELIA J            Ot            433.10      
                            

 

 2016            MATA MATIAS, NOHELIA J            Ot            786.09      
                            

 

 2016            MATA MATIAS, NOHELIA CLARK            Ot            414.01      
                            

 

 2016            MATA MATIAS, NOHELIA CLARK            Ot            786.09      
                            

 

 2016            LETTY DO, ARLENE MCKEON            Ot            840.4    
                              

 

 2016            LETTY DO, ARLENE MCKEON            Ot            E000.8   
                               

 

 2016            LETTY DO, ARLENE MCKEON            Ot            E928.9   
                               

 

 2016            LETTY DO, ARLENE MCKEON            Ot            V72.84   
                               

 

 2016                         Ot            733.00                       
           

 

 2016            LETTY DO, ARLENE MCKEON            Ot            722.4    
                              

 

 2016                         Ot            J44.9                        
          

 

 2016            EDUAR JOHNSON DO            Ot            J44.9   
                               

 

 2016                         Ot            J44.9                        
          

 

 2016            JOHNSONEDUAR HILL DO            Ot            K59.00  
                                

 

 2016                         Ot            J44.9                        
          

 

 2016            EDUAR JOHNSON DO            Ot            K59.00  
                                

 

 2016            EDUAR JOHNSON DO            Ot            J44.9   
                               

 

 2016            EDUAR JOHNSON DO            Ot            J44.9   
                               

 

 2016            JOHNSONEDUAR HILL DO            Ot            J44.9   
         CHRONIC OBSTRUCTIVE PULMONARY DISEASE, U                     

 

 2016            EDUAR JOHNSON DO            Ot            J44.9   
         CHRONIC OBSTRUCTIVE PULMONARY DISEASE, U                     

 

 2016            MICH COLLIER DO            Ot            R19.5       
     OTHER FECAL ABNORMALITIES                     

 

 2016            MICH COLLIER DO            Ot            Z01.818     
       ENCOUNTER FOR OTHER PREPROCEDURAL EXAMIN                     

 

 2016            MICH COLLIER DO            Ot            R19.5       
     OTHER FECAL ABNORMALITIES                     

 

 2016            MICH COLLIER DO            Ot            Z01.818     
       ENCOUNTER FOR OTHER PREPROCEDURAL EXAMIN                     

 

 2017                         Ot            733.00            
OSTEOPOROSIS NOS                     

 

 2017                         Ot            733.00            
OSTEOPOROSIS NOS                     

 

 2017            EDUAR JOHNSON DO            Ot            733.00  
          OSTEOPOROSIS NOS                     

 

 2017            DANA DILLON DO            Ot            492.8       
     EMPHYSEMA NEC                     

 

 2017            DANA DILLON DO            Ot            511.9       
     PLEURAL EFFUSION NOS                     

 

 2017            DANA DILLON DO            Ot            586         
   RENAL FAILURE NOS                     

 

 2017            LETTY DO ARLENE MCKEON            Ot            726.0    
        ADHESIVE CAPSULIT SHLDER                     

 

 2017            LETTY DO ARLENE MCKEON            Ot            718.91   
         JT DERANGMENT NOS-SHLDER                     

 

 2017            LETTY DO ARLENE MCKEON            Ot            726.0    
        ADHESIVE CAPSULIT SHLDER                     

 

 2017            LETTY DAVIS ARLENE MCKEON            Ot            840.4    
        SPRAIN ROTATOR CUFF                     

 

 2017            LETTY DAVIS ARLENE MCKEON            Ot            E000.8   
         OTHER EXTERNAL CAUSE STATUS                     

 

 2017            LETTY DAVIS ARLENE MCKEON            Ot            E928.9   
         ACCIDENT NOS                     

 

 2017            LETTY DO, ARLENE MCKEON            Ot            V72.84   
         EXAM PRE-OPERATIVE NOS                     

 

 2017            LETTY DVAIS ARLENE MCKEON            Ot            727.61   
         ROTATOR CUFF RUPTURE                     

 

 2017            LETTY DAVIS ARLENE MCKEON            Ot            V72.84   
         EXAM PRE-OPERATIVE NOS                     

 

 2017            MATA MATIAS, NOHELIA CLARK            Ot            414.00      
      CORON ATHEROSCLER NOS TYPE VESSEL, NATIV                     

 

 2017            NOHELIA AUGUST MD            Ot            424.0       
     MITRAL VALVE DISORDER                     

 

 2017            NOHELIA AUGUST MD            Ot            433.10      
      CAROTID ARTERY OCCLUSION W O CEREBRAL IN                     

 

 2017            NOHELIA AUGUST MD            Ot            786.09      
      RESPIRATORY ABNORM NEC                     

 

 2017            NOHELIA AUGUST MD            Ot            414.01      
      CORONARY ATHEROSCLEROSIS OF NATIVE CORON                     

 

 2017            NOHELIA AUGUST MD            Ot            786.09      
      RESPIRATORY ABNORM NEC                     

 

 2017            LETTY DAVIS ARLENE MCKEON            Ot            840.4    
        SPRAIN ROTATOR CUFF                     

 

 2017            LETTY DAVIS ARLENE MCKEON            Ot            E000.8   
         OTHER EXTERNAL CAUSE STATUS                     

 

 2017            LETTY DAVIS ARLENE MCKEON            Ot            E928.9   
         ACCIDENT NOS                     

 

 2017            ARLENE SAENZ DO            Ot            V72.84   
         EXAM PRE-OPERATIVE NOS                     

 

 2017                         Ot            733.00            
OSTEOPOROSIS NOS                     

 

 2017            ARLENE SAENZ DO            Ot            722.4    
        CERVICAL DISC DEGEN                     

 

 2017                         Ot            J44.9            CHRONIC 
OBSTRUCTIVE PULMONARY DISEASE, U                     

 

 2017            JOHNSONLIZ DAVIS EDUAR EDUARDO            Ot            K59.00  
          CONSTIPATION, UNSPECIFIED                     

 

 2017            JOHNSONEDUAR HILL DO            Ot            J44.9   
         CHRONIC OBSTRUCTIVE PULMONARY DISEASE, U                     

 

 2017            COLLIERMICH GALEANO DO            Ot            K59.00      
      CONSTIPATION, UNSPECIFIED                     

 

 2017            COLLIER MICH DAVIS            Ot            R19.5       
     OTHER FECAL ABNORMALITIES                     

 

 2017            COLLIERMICH GALEANO DO            Ot            Z86.010     
       PERSONAL HISTORY OF COLONIC POLYPS                     

 

 2017            COLLIERMICH GALEANO DO            Ot            K59.00      
      CONSTIPATION, UNSPECIFIED                     

 

 2017            COLLIER MICH DAVIS            Ot            R19.5       
     OTHER FECAL ABNORMALITIES                     

 

 2017            MICH COLLIER DO            Ot            Z86.010     
       PERSONAL HISTORY OF COLONIC POLYPS                     

 

 2017            TAE CHRISTIANSEN            Ot            
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                     

 

 2017            TAE CHRISTIANSEN            Ot            
G47.33            OBSTRUCTIVE SLEEP APNEA (ADULT) (PEDIATR                     

 

 2017            TAE CHRISTIANSEN            Ot            
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                      

 

 2017            TAE CHRISTIANSEN            Ot            
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                     

 

 2017            TAE CHRISTIANSEN            Ot            
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                     

 

 2017            TAE CHRISTIANSEN            Ot            
G47.33            OBSTRUCTIVE SLEEP APNEA (ADULT) (PEDIATR                     

 

 2017            TAE CHRISTIANSEN            Ot            
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                      

 

 2017            TAE CHRISTIANSEN            Ot            
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                     

 

 2017            MATA MATIAS, NOHELIA CLARK            Ot            E78.5       
     HYPERLIPIDEMIA, UNSPECIFIED                     

 

 2017            NOHELIA AUGUST MD            Ot            G47.33      
      OBSTRUCTIVE SLEEP APNEA (ADULT) (PEDIATR                     

 

 2017            NOHELIA AUGUST MD            Ot            I10         
   ESSENTIAL (PRIMARY) HYPERTENSION                     

 

 2017            NOHELIA AUGUST MD, Ot            I25.10      
      ATHSCL HEART DISEASE OF NATIVE CORONARY                      

 

 2017            NOHELIA AUGUST MD, Ot            I25.84      
      CORONARY ATHEROSCLEROSIS DUE TO CALCIFIE                     

 

 2017            NOHELIA AUGUST MD            Ot            I34.0       
     NONRHEUMATIC MITRAL (VALVE) INSUFFICIENC                     

 

 2017            NOHELIA AUGUST MD            Ot            I65.23      
      OCCLUSION AND STENOSIS OF BILATERAL MCKEON                     

 

 2017            NOHELIA AUGUST MD            Ot            R94.39      
      ABNORMAL RESULT OF OTHER CARDIOVASCULAR                      

 

 2017            NOHELIA AUGUST MD, Ot            Z72.0       
     TOBACCO USE                     

 

 2017            NOHELIA AUGUST MD, Ot            Z79.899     
       OTHER LONG TERM (CURRENT) DRUG THERAPY                     

 

 2017            NOHELIA AUGUST MD, Ot            Z86.73      
      PRSNL HX OF TIA (TIA), AND CEREB INFRC W                     

 

 2017            NOHELIA AUGUST MD            Ot            Z95.5       
     PRESENCE OF CORONARY ANGIOPLASTY IMPLANT                     

 

 2017            TAE CHRISTIANSEN            Ot            
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                     

 

 2017            TAE CHRISTIANSEN            Ot            
G47.33            OBSTRUCTIVE SLEEP APNEA (ADULT) (PEDIATR                     

 

 2017            TAE CHRISTIANSEN            Ot            
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                      

 

 2017            TAE CHRISTIANSEN            Ot            
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                     

 

 2017            TAE CHRISTIANSEN            Ot            
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                     

 

 2017            TAE CHRISTIANSEN            Ot            
G47.33            OBSTRUCTIVE SLEEP APNEA (ADULT) (PEDIATR                     

 

 2017            TAE CHRISTIANSEN            Ot            
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                      

 

 2017            TAE CHRISTIANSEN            Ot            
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                     

 

 2017            NOHELIA AUGUST MD            Ot            E78.5       
     HYPERLIPIDEMIA, UNSPECIFIED                     

 

 2017            NOHELIA AUGUST MD, Ot            G47.33      
      OBSTRUCTIVE SLEEP APNEA (ADULT) (PEDIATR                     

 

 2017            NOHELIA AUGUST MD            Ot            I10         
   ESSENTIAL (PRIMARY) HYPERTENSION                     

 

 2017            NOHELIA AUGUST MD, Ot            I25.10      
      ATHSCL HEART DISEASE OF NATIVE CORONARY                      

 

 2017            NOHELIA AUGUST MD, Ot            I25.84      
      CORONARY ATHEROSCLEROSIS DUE TO CALCIFIE                     

 

 2017            NOHELIA AUGUST MD            Ot            I34.0       
     NONRHEUMATIC MITRAL (VALVE) INSUFFICIENC                     

 

 2017            NOHELIA AUGUST MD, Ot            I65.23      
      OCCLUSION AND STENOSIS OF BILATERAL MCKEON                     

 

 2017            NOHELIA AUGUST MD            Ot            R94.39      
      ABNORMAL RESULT OF OTHER CARDIOVASCULAR                      

 

 2017            NOHELIA AUGUST MD, Ot            Z72.0       
     TOBACCO USE                     

 

 2017            NOHELIA AUGUST MD, Ot            Z79.899     
       OTHER LONG TERM (CURRENT) DRUG THERAPY                     

 

 2017            NOHELIA AUGUST MD, Ot            Z86.73      
      PRSNL HX OF TIA (TIA), AND CEREB INFRC W                     

 

 2017            NOHELIA AUGUST MD            Ot            Z95.5       
     PRESENCE OF CORONARY ANGIOPLASTY IMPLANT                     

 

 2017            DANA DILLON DO            Ot            R06.02      
      SHORTNESS OF BREATH                     

 

 2017            DANA DILLON DO            Ot            Z87.891     
       PERSONAL HISTORY OF NICOTINE DEPENDENCE                     

 

 2017            DANA DILLON DO            Ot            R06.02      
      SHORTNESS OF BREATH                     

 

 2017            DANA DILLON DO            Ot            Z87.891     
       PERSONAL HISTORY OF NICOTINE DEPENDENCE                     

 

 2017            DANA DILLON DO            Ot            R06.02      
      SHORTNESS OF BREATH                     

 

 2017            DANA DILLON DO            Ot            Z87.891     
       PERSONAL HISTORY OF NICOTINE DEPENDENCE                     

 

 2017            DANA DILLON DO            Ot            R06.02      
      SHORTNESS OF BREATH                     

 

 2017            DANA DILLON DO            Ot            Z87.891     
       PERSONAL HISTORY OF NICOTINE DEPENDENCE                     

 

 2017            DANA DILLON DO            Ot            R06.02      
      SHORTNESS OF BREATH                     

 

 2017            DANA DILLON DO            Ot            Z87.891     
       PERSONAL HISTORY OF NICOTINE DEPENDENCE                     

 

 2017            DANA DILLON DO            Ot            R06.02      
      SHORTNESS OF BREATH                     

 

 2017            DANA DILLON DO            Ot            Z87.891     
       PERSONAL HISTORY OF NICOTINE DEPENDENCE                     

 

 2017            DANA DILLON DO            Ot            R06.02      
      SHORTNESS OF BREATH                     

 

 2017            DANA DILLON DO            Ot            Z87.891     
       PERSONAL HISTORY OF NICOTINE DEPENDENCE                     

 

 2017            DANA DILLON DO            Ot            R06.02      
      SHORTNESS OF BREATH                     

 

 2017            DANA DILLON DO            Ot            Z87.891     
       PERSONAL HISTORY OF NICOTINE DEPENDENCE                     

 

 2018                         Ot            733.00            
OSTEOPOROSIS NOS                     

 

 2018            JOHNSONEDUAR HILL DO            Ot            733.00  
          OSTEOPOROSIS NOS                     

 

 2018            DANA DILLON DO            Ot            492.8       
     EMPHYSEMA NEC                     

 

 2018            DANA DILLON DO            Ot            511.9       
     PLEURAL EFFUSION NOS                     

 

 2018            WILMADANA BELTRÁN DO            Ot            586         
   RENAL FAILURE NOS                     

 

 2018            LETTY DO ARLENE MCKEON            Ot            726.0    
        ADHESIVE CAPSULIT SHLDER                     

 

 2018            LETTY DO ARLENE MCKEON            Ot            718.91   
         JT DERANGMENT NOS-SHLDER                     

 

 2018            LETYT DO ARLENE MCKEON            Ot            726.0    
        ADHESIVE CAPSULIT SHLDER                     

 

 2018            LETTY DO ARLENE MCKEON            Ot            840.4    
        SPRAIN ROTATOR CUFF                     

 

 2018            LETTY DO ARLENE MCKEON            Ot            E000.8   
         OTHER EXTERNAL CAUSE STATUS                     

 

 2018            LETTY DO ARLENE MCKEON            Ot            E928.9   
         ACCIDENT NOS                     

 

 2018            LETTY DAVIS ARLENE MCKEON            Ot            V72.84   
         EXAM PRE-OPERATIVE NOS                     

 

 2018            LETTY DO ARLENE MCKEON            Ot            727.61   
         ROTATOR CUFF RUPTURE                     

 

 2018            LETTY DAVIS ARLENE MCKEON            Ot            V72.84   
         EXAM PRE-OPERATIVE NOS                     

 

 2018            MATA MATIAS, NOHELIA CLARK            Ot            414.00      
      CORON ATHEROSCLER NOS TYPE VESSEL, NATIV                     

 

 2018            NOHELIA AUGUST MD            Ot            424.0       
     MITRAL VALVE DISORDER                     

 

 2018            NOHELIA AUGUST MD            Ot            433.10      
      CAROTID ARTERY OCCLUSION W O CEREBRAL IN                     

 

 2018            NOHELIA AUGUST MD            Ot            786.09      
      RESPIRATORY ABNORM NEC                     

 

 2018            NOHELIA AUGUST MD            Ot            414.01      
      CORONARY ATHEROSCLEROSIS OF NATIVE CORON                     

 

 2018            NOHELIA AUGUST MD            Ot            786.09      
      RESPIRATORY ABNORM NEC                     

 

 2018            ARLENE SAENZ DO            Ot            840.4    
        SPRAIN ROTATOR CUFF                     

 

 2018            ARLENE SAENZ DO            Ot            E000.8   
         OTHER EXTERNAL CAUSE STATUS                     

 

 2018            ARLENE SAENZ DO            Ot            E928.9   
         ACCIDENT NOS                     

 

 2018            ARLENE SAENZ DO            Ot            V72.84   
         EXAM PRE-OPERATIVE NOS                     

 

 2018                         Ot            733.00            
OSTEOPOROSIS NOS                     

 

 2018            ARLENE SAENZ DO            Ot            722.4    
        CERVICAL DISC DEGEN                     

 

 2018                         Ot            J44.9            CHRONIC 
OBSTRUCTIVE PULMONARY DISEASE, U                     

 

 2018            EDUAR JOHNSON DO            Ot            K59.00  
          CONSTIPATION, UNSPECIFIED                     

 

 2018            EDUAR JOHNSON DO, Ot            J44.9   
         CHRONIC OBSTRUCTIVE PULMONARY DISEASE, U                     

 

 2018            TAE CHRISTIANSEN Ot            
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                     

 

 2018            TAE CHRISTIANSEN Ot            
G47.33            OBSTRUCTIVE SLEEP APNEA (ADULT) (PEDIATR                     

 

 2018            TAE CHRISTIANSEN Ot            
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                      

 

 2018            TAE CHRISTIANSEN            Ot            
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                     

 

 2018            DANA DILLON DO            Ot            R06.02      
      SHORTNESS OF BREATH                     

 

 2018            DANA DILLON DO            Ot            Z87.891     
       PERSONAL HISTORY OF NICOTINE DEPENDENCE                     

 

 2018            NOHELIA UAGUST MD, Ot            G47.33      
      OBSTRUCTIVE SLEEP APNEA (ADULT) (PEDIATR                     

 

 2018            NOHELIA AUGUST MD, Ot            I25.10      
      ATHSCL HEART DISEASE OF NATIVE CORONARY                      

 

 2018            NOHELIA AUGUST MD, Ot            I34.0       
     NONRHEUMATIC MITRAL (VALVE) INSUFFICIENC                     

 

 2018            NOHELIA AUGUST MD, Ot            I71.2       
     THORACIC AORTIC ANEURYSM, WITHOUT RUPTUR                     

 

 2018            NOHELIA AUGUST MD            Ot            J43.9       
     EMPHYSEMA, UNSPECIFIED                     

 

 2018            NOHELIA AUGUST MD            Ot            N20.0       
     CALCULUS OF KIDNEY                     

 

 2018            NOHELIA AUGUST MD            Ot            R41.0       
     DISORIENTATION, UNSPECIFIED                     

 

 2018            NOHELIA AUGUST MD            Ot            W19.XXXA    
        UNSPECIFIED FALL, INITIAL ENCOUNTER                     

 

 2018            NOHELIA AUGUST MD, Ot            Z72.0       
     TOBACCO USE                     

 

 2018            EDUAR JOHNSON DO            Ot            J44.9   
         CHRONIC OBSTRUCTIVE PULMONARY DISEASE, U                     

 

 04/10/2018            NOHELIA AUGUST MD, Ot            D64.9       
     ANEMIA, UNSPECIFIED                     

 

 04/10/2018            NOHELIA AUGUST MD, Ot            E78.5       
     HYPERLIPIDEMIA, UNSPECIFIED                     

 

 04/10/2018            NOHELIA AUGUST MD            Ot            F17.210     
       NICOTINE DEPENDENCE, CIGARETTES, UNCOMPL                     

 

 04/10/2018            NOHELIA AUGUST MD, Ot            G47.9       
     SLEEP DISORDER, UNSPECIFIED                     

 

 04/10/2018            NOHELIA AUGUST MD            Ot            I10         
   ESSENTIAL (PRIMARY) HYPERTENSION                     

 

 04/10/2018            NOHELIA AUGUST MD, Ot            I25.10      
      ATHSCL HEART DISEASE OF NATIVE CORONARY                      

 

 04/10/2018            NOHELIA AUGUTS MD, Ot            I65.29      
      OCCLUSION AND STENOSIS OF UNSPECIFIED CA                     

 

 04/10/2018            NOHELIA AUGUST MD, Ot            J44.9       
     CHRONIC OBSTRUCTIVE PULMONARY DISEASE, U                     

 

 04/10/2018            NOHELIA AUGUST MD            Ot            K63.89      
      OTHER SPECIFIED DISEASES OF INTESTINE                     

 

 04/10/2018            NOHELIA AUGUST MD            Ot            N28.9       
     DISORDER OF KIDNEY AND URETER, UNSPECIFI                     

 

 04/10/2018            NOHELIA AUGUST MD            Ot            R06.02      
      SHORTNESS OF BREATH                     

 

 04/10/2018            NOHELIA AUGUST MD            Ot            R19.7       
     DIARRHEA, UNSPECIFIED                     

 

 04/10/2018            NOHELIA AUGUST MD            Ot            R21         
   RASH AND OTHER NONSPECIFIC SKIN ERUPTION                     

 

 04/10/2018            NOHELIA AUGUST MD            Ot            R53.83      
      OTHER FATIGUE                     

 

 04/10/2018            NOHELIA AUGUST MD, Ot            Z79.899     
       OTHER LONG TERM (CURRENT) DRUG THERAPY                     

 

 04/10/2018            NOHELIA AUGUST MD            Ot            Z86.73      
      PRSNL HX OF TIA (TIA), AND CEREB INFRC W                     

 

 2018                         Ot            733.00            
OSTEOPOROSIS NOS                     

 

 2018            EDUAR JOHNSON DO            Ot            733.00  
          OSTEOPOROSIS NOS                     

 

 2018            DANA DILLON DO            Ot            492.8       
     EMPHYSEMA NEC                     

 

 2018            DANA DILLON DO            Ot            511.9       
     PLEURAL EFFUSION NOS                     

 

 2018            WILMA DO, DANA M            Ot            586         
   RENAL FAILURE NOS                     

 

 2018            ARLENE SAENZ DO            Ot            726.0    
        ADHESIVE CAPSULIT SHLDER                     

 

 2018            ARLENE SAENZ DO            Ot            718.91   
         JT DERANGMENT NOS-SHLDER                     

 

 2018            LETTY DAVIS, ARLENE MCKEON            Ot            726.0    
        ADHESIVE CAPSULIT SHLDER                     

 

 2018            ARLENE SAENZ DO            Ot            840.4    
        SPRAIN ROTATOR CUFF                     

 

 2018            ARLENE SAENZ DO            Ot            E000.8   
         OTHER EXTERNAL CAUSE STATUS                     

 

 2018            ARLENE SAENZ DO            Ot            E928.9   
         ACCIDENT NOS                     

 

 2018            ARLENE SAENZ DO            Ot            V72.84   
         EXAM PRE-OPERATIVE NOS                     

 

 2018            ARLENE SAENZ DO            Ot            727.61   
         ROTATOR CUFF RUPTURE                     

 

 2018            ARLENE SAENZ DO            Ot            V72.84   
         EXAM PRE-OPERATIVE NOS                     

 

 2018            MATA MATIAS, NOHELIA CLARK            Ot            414.00      
      CORON ATHEROSCLER NOS TYPE VESSEL, NATIV                     

 

 2018            NOHELIA AUGUST MD            Ot            424.0       
     MITRAL VALVE DISORDER                     

 

 2018            MATA MATIAS, NOHELIA CLARK            Ot            433.10      
      CAROTID ARTERY OCCLUSION W O CEREBRAL IN                     

 

 2018            MATA MATIAS, NOHELIA CLARK            Ot            786.09      
      RESPIRATORY ABNORM NEC                     

 

 2018            MATA MATIAS, NOHELIA CLARK            Ot            414.01      
      CORONARY ATHEROSCLEROSIS OF NATIVE CORON                     

 

 2018            NOHELIA AUGUST MD            Ot            786.09      
      RESPIRATORY ABNORM NEC                     

 

 2018            LETTY DAVIS ARLENE MCKEON            Ot            840.4    
        SPRAIN ROTATOR CUFF                     

 

 2018            LETTY DAVIS ARELNE MCKEON            Ot            E000.8   
         OTHER EXTERNAL CAUSE STATUS                     

 

 2018            ARLENE SAENZ DO            Ot            E928.9   
         ACCIDENT NOS                     

 

 2018            ARLENE SAENZ DO            Ot            V72.84   
         EXAM PRE-OPERATIVE NOS                     

 

 2018                         Ot            733.00            
OSTEOPOROSIS NOS                     

 

 2018            LETTY DAVIS ARLENE MCKEON            Ot            722.4    
        CERVICAL DISC DEGEN                     

 

 2018                         Ot            J44.9            CHRONIC 
OBSTRUCTIVE PULMONARY DISEASE, U                     

 

 2018            EDUAR JOHNSON DO            Ot            K59.00  
          CONSTIPATION, UNSPECIFIED                     

 

 2018            EDUAR JOHNSON DO            Ot            J44.9   
         CHRONIC OBSTRUCTIVE PULMONARY DISEASE, U                     

 

 2018            NIMESH NAILS, TAE COELLO            Ot            
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                     

 

 2018            TAE CHRISTIANSEN Ot            
G47.33            OBSTRUCTIVE SLEEP APNEA (ADULT) (PEDIATR                     

 

 2018            TAE CHRISTIANSEN            Ot            
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                      

 

 2018            TAE CHRISTIANSEN            Ot            
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                     

 

 2018            DANA DILLON DO            Ot            R06.02      
      SHORTNESS OF BREATH                     

 

 2018            DANA DILLON DO            Ot            Z87.891     
       PERSONAL HISTORY OF NICOTINE DEPENDENCE                     

 

 2018            NOHELIA AUGUST MD, Ot            G47.33      
      OBSTRUCTIVE SLEEP APNEA (ADULT) (PEDIATR                     

 

 2018            NOHELIA AUGUST MD, Ot            I25.10      
      ATHSCL HEART DISEASE OF NATIVE CORONARY                      

 

 2018            NOHELIA AUGUST MD            Ot            I34.0       
     NONRHEUMATIC MITRAL (VALVE) INSUFFICIENC                     

 

 2018            NOHELIA AUGUST MD            Ot            I71.2       
     THORACIC AORTIC ANEURYSM, WITHOUT RUPTUR                     

 

 2018            NOHELIA AUGUST MD, Ot            J43.9       
     EMPHYSEMA, UNSPECIFIED                     

 

 2018            NOHELIA AUGUST MD            Ot            N20.0       
     CALCULUS OF KIDNEY                     

 

 2018            NOHELIA AUGUST MD            Ot            R41.0       
     DISORIENTATION, UNSPECIFIED                     

 

 2018            NOHELIA AUGUST MD            Ot            W19.XXXA    
        UNSPECIFIED FALL, INITIAL ENCOUNTER                     

 

 2018            NOHELIA AUGUST MD            Ot            Z72.0       
     TOBACCO USE                     

 

 2018            MICH COLLIER DO            Ot            R93.5       
     ABN FINDINGS ON DX IMAGING OF ABD REGION                     

 

 2018            MICH COLLIER DO            Ot            Z01.818     
       ENCOUNTER FOR OTHER PREPROCEDURAL EXAMIN                     

 

 2018            NOHELIA AUGUST MD, Ot            G47.33      
      OBSTRUCTIVE SLEEP APNEA (ADULT) (PEDIATR                     

 

 2018            NOHELIA AUGUST MD            Ot            I25.10      
      ATHSCL HEART DISEASE OF NATIVE CORONARY                      

 

 2018            NOHELIA AUGUST MD            Ot            I34.0       
     NONRHEUMATIC MITRAL (VALVE) INSUFFICIENC                     

 

 2018            NOHELIA AUGUST MD            Ot            I71.2       
     THORACIC AORTIC ANEURYSM, WITHOUT RUPTUR                     

 

 2018            MATA MATIAS, NOHELIA CLARK            Ot            J43.9       
     EMPHYSEMA, UNSPECIFIED                     

 

 2018            NOHELIA AUGUST MD            Ot            N20.0       
     CALCULUS OF KIDNEY                     

 

 2018            NOHELIA AUGUST MD            Ot            R41.0       
     DISORIENTATION, UNSPECIFIED                     

 

 2018            NOHELIA AUGUST MD            Ot            W19.XXXA    
        UNSPECIFIED FALL, INITIAL ENCOUNTER                     

 

 2018            NOHELIA AUGUST MD            Ot            Z72.0       
     TOBACCO USE                     

 

 2018                         Ot            733.00            
OSTEOPOROSIS NOS                     

 

 2018            JOHNSONEDUAR HILL DO            Ot            733.00  
          OSTEOPOROSIS NOS                     

 

 2018            DANA DILLON DO            Ot            492.8       
     EMPHYSEMA NEC                     

 

 2018            DANA DILLON DO            Ot            511.9       
     PLEURAL EFFUSION NOS                     

 

 2018            DANA DILLON DO            Ot            586         
   RENAL FAILURE NOS                     

 

 2018            LETTY , ARLENE F            Ot            726.0    
        ADHESIVE CAPSULIT SHLDER                     

 

 2018            LETTY , ARLENE F            Ot            718.91   
         JT DERANGMENT NOS-SHLDER                     

 

 2018            LETTY , ARLENE LINDA            Ot            726.0    
        ADHESIVE CAPSULIT SHLDER                     

 

 2018            LETTY , ARLENE F            Ot            840.4    
        SPRAIN ROTATOR CUFF                     

 

 2018            LETTY DAVIS, ARLENE F            Ot            E000.8   
         OTHER EXTERNAL CAUSE STATUS                     

 

 2018            LETTY DO ARLENE LINDA            Ot            E928.9   
         ACCIDENT NOS                     

 

 2018            LETTY , ARLENE MCKEON            Ot            V72.84   
         EXAM PRE-OPERATIVE NOS                     

 

 2018            LETTY DO ARLENE F            Ot            727.61   
         ROTATOR CUFF RUPTURE                     

 

 2018            LETTY DO ARLENE LINDA            Ot            V72.84   
         EXAM PRE-OPERATIVE NOS                     

 

 2018            NOHELIA AUGUST MD            Ot            414.00      
      CORON ATHEROSCLER NOS TYPE VESSEL, NATIV                     

 

 2018            NOHELIA AUGUST MD            Ot            424.0       
     MITRAL VALVE DISORDER                     

 

 2018            NOHELIA AUGUST MD            Ot            433.10      
      CAROTID ARTERY OCCLUSION W O CEREBRAL IN                     

 

 2018            NOHELIA AUGUST MD            Ot            786.09      
      RESPIRATORY ABNORM NEC                     

 

 2018            NOHELIA AUGUST MD            Ot            414.01      
      CORONARY ATHEROSCLEROSIS OF NATIVE CORON                     

 

 2018            NOHELIA AUGUST MD            Ot            786.09      
      RESPIRATORY ABNORM NEC                     

 

 2018            ARLENE SAENZ DO            Ot            840.4    
        SPRAIN ROTATOR CUFF                     

 

 2018            ARLENE SAENZ DO            Ot            E000.8   
         OTHER EXTERNAL CAUSE STATUS                     

 

 2018            ARLENE SAENZ DO            Ot            E928.9   
         ACCIDENT NOS                     

 

 2018            ARLENE SAENZ DO            Ot            V72.84   
         EXAM PRE-OPERATIVE NOS                     

 

 2018                         Ot            733.00            
OSTEOPOROSIS NOS                     

 

 2018            ARLENE SAENZ DO            Ot            722.4    
        CERVICAL DISC DEGEN                     

 

 2018                         Ot            J44.9            CHRONIC 
OBSTRUCTIVE PULMONARY DISEASE, U                     

 

 2018            EDUAR JOHNSON DO            Ot            K59.00  
          CONSTIPATION, UNSPECIFIED                     

 

 2018            EDUAR JOHNSON DO, Ot            J44.9   
         CHRONIC OBSTRUCTIVE PULMONARY DISEASE, U                     

 

 2018            TAE CHRISTIANSEN            Ot            
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                     

 

 2018            TAE CHRISTIANSEN Ot            
G47.33            OBSTRUCTIVE SLEEP APNEA (ADULT) (PEDIATR                     

 

 2018            TAE CHRISTIANSEN Ot            
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                      

 

 2018            TAE CHRISTIANSEN            Ot            
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                     

 

 2018            DANA DILLON DO            Ot            R06.02      
      SHORTNESS OF BREATH                     

 

 2018            DANA DILLON DO            Ot            Z87.891     
       PERSONAL HISTORY OF NICOTINE DEPENDENCE                     

 

 2018            NOHELIA AUGUST MD, Ot            G47.33      
      OBSTRUCTIVE SLEEP APNEA (ADULT) (PEDIATR                     

 

 2018            NOHELIA AUGUST MD, Ot            I25.10      
      ATHSCL HEART DISEASE OF NATIVE CORONARY                      

 

 2018            NOHELIA AUGUST MD            Ot            I34.0       
     NONRHEUMATIC MITRAL (VALVE) INSUFFICIENC                     

 

 2018            NOHELIA AUGUST MD            Ot            I71.2       
     THORACIC AORTIC ANEURYSM, WITHOUT RUPTUR                     

 

 2018            NOHELIA AUGUST MD            Ot            J43.9       
     EMPHYSEMA, UNSPECIFIED                     

 

 2018            NOHELIA AUGUST MD            Ot            N20.0       
     CALCULUS OF KIDNEY                     

 

 2018            NOHELIA AUGUST MD            Ot            R41.0       
     DISORIENTATION, UNSPECIFIED                     

 

 2018            MATA MATIAS, NOHELIA CLARK            Ot            W19.XXXA    
        UNSPECIFIED FALL, INITIAL ENCOUNTER                     

 

 2018            MATA MATIAS, NOHELIA CLARK            Ot            Z72.0       
     TOBACCO USE                     

 

 2018                         Ot            733.00            
OSTEOPOROSIS NOS                     

 

 2018            JOHNSON DO EDUAR CLARK            Ot            733.00  
          OSTEOPOROSIS NOS                     

 

 2018            DANA DILLON DO            Ot            492.8       
     EMPHYSEMA NEC                     

 

 2018            DANA DILLON DO M            Ot            511.9       
     PLEURAL EFFUSION NOS                     

 

 2018            DANA DILLON DO            Ot            586         
   RENAL FAILURE NOS                     

 

 2018            LETTY DO, ARLENE F            Ot            726.0    
        ADHESIVE CAPSULIT SHLDER                     

 

 2018            LETTY DO, ARLENE F            Ot            718.91   
         JT DERANGMENT NOS-SHLDER                     

 

 2018            LETTY DO, ARLENE F            Ot            726.0    
        ADHESIVE CAPSULIT SHLDER                     

 

 2018            LETTY DO, ARLENE F            Ot            840.4    
        SPRAIN ROTATOR CUFF                     

 

 2018            LETTY , ARLENE F            Ot            E000.8   
         OTHER EXTERNAL CAUSE STATUS                     

 

 2018            LETTY DO, ARLENE F            Ot            E928.9   
         ACCIDENT NOS                     

 

 2018            LETTY DO, ARLENE LINDA            Ot            V72.84   
         EXAM PRE-OPERATIVE NOS                     

 

 2018            LETTY , ARLENE F            Ot            727.61   
         ROTATOR CUFF RUPTURE                     

 

 2018            LETTY , ARLENE F            Ot            V72.84   
         EXAM PRE-OPERATIVE NOS                     

 

 2018            MATA MATIAS, NOHELIA CLARK            Ot            414.00      
      CORON ATHEROSCLER NOS TYPE VESSEL, NATIV                     

 

 2018            NOHELIA AUGUST MD            Ot            424.0       
     MITRAL VALVE DISORDER                     

 

 2018            NOHELIA AUGUST MD            Ot            433.10      
      CAROTID ARTERY OCCLUSION W O CEREBRAL IN                     

 

 2018            NOHELIA AUGUST MD            Ot            786.09      
      RESPIRATORY ABNORM NEC                     

 

 2018            NOHELIA AUGUST MD            Ot            414.01      
      CORONARY ATHEROSCLEROSIS OF NATIVE CORON                     

 

 2018            NOHELIA AUGUST MD            Ot            786.09      
      RESPIRATORY ABNORM NEC                     

 

 2018            LETTY DO ARLENE LINDA            Ot            840.4    
        SPRAIN ROTATOR CUFF                     

 

 2018            LETTY , ARLENE F            Ot            E000.8   
         OTHER EXTERNAL CAUSE STATUS                     

 

 2018            LETTY DO, ARLENE F            Ot            E928.9   
         ACCIDENT NOS                     

 

 2018            LETTY ARLENE            Ot            V72.84   
         EXAM PRE-OPERATIVE NOS                     

 

 2018                         Ot            733.00            
OSTEOPOROSIS NOS                     

 

 2018            ARLENE SAENZ DO            Ot            722.4    
        CERVICAL DISC DEGEN                     

 

 2018                         Ot            J44.9            CHRONIC 
OBSTRUCTIVE PULMONARY DISEASE, U                     

 

 2018            EDUAR JOHNSON DO            Ot            K59.00  
          CONSTIPATION, UNSPECIFIED                     

 

 2018            EDUAR JOHNSON DO            Ot            J44.9   
         CHRONIC OBSTRUCTIVE PULMONARY DISEASE, U                     

 

 2018            TAE CHRISTIANSEN Ot            
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                     

 

 2018            TAE CHRISTIANSEN Ot            
G47.33            OBSTRUCTIVE SLEEP APNEA (ADULT) (PEDIATR                     

 

 2018            TAE CHRISTIANSEN Ot            
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                      

 

 2018            TAE CHRISTIANSEN            Ot            
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                     

 

 2018            DANA DILLON DO            Ot            R06.02      
      SHORTNESS OF BREATH                     

 

 2018            DANA DILLON DO            Ot            Z87.891     
       PERSONAL HISTORY OF NICOTINE DEPENDENCE                     

 

 2018            NOHELIA AUGUST MD, Ot            G47.33      
      OBSTRUCTIVE SLEEP APNEA (ADULT) (PEDIATR                     

 

 2018            NOHELIA AUGUST MD, Ot            I25.10      
      ATHSCL HEART DISEASE OF NATIVE CORONARY                      

 

 2018            NOHELIA AUGUST MD            Ot            I34.0       
     NONRHEUMATIC MITRAL (VALVE) INSUFFICIENC                     

 

 2018            NOHELIA AUGUST MD, Ot            I71.2       
     THORACIC AORTIC ANEURYSM, WITHOUT RUPTUR                     

 

 2018            NOHELIA AUGUST MD, Ot            J43.9       
     EMPHYSEMA, UNSPECIFIED                     

 

 2018            NOHELIA AUGUST MD            Ot            N20.0       
     CALCULUS OF KIDNEY                     

 

 2018            NOHELIA AUGUST MD, Ot            R41.0       
     DISORIENTATION, UNSPECIFIED                     

 

 2018            NOHELIA AUGUST MD            Ot            W19.XXXA    
        UNSPECIFIED FALL, INITIAL ENCOUNTER                     

 

 2018            NOHELIA AUGUST MD, Ot            Z72.0       
     TOBACCO USE                     

 

 2018            MICH COLLIER DO            Ot            R93.5       
     ABN FINDINGS ON DX IMAGING OF ABD REGION                     

 

 2018            MICH COLLIER DO            Ot            Z01.818     
       ENCOUNTER FOR OTHER PREPROCEDURAL EXAMIN                     

 

 2018                         Ot            733.00            
OSTEOPOROSIS NOS                     

 

 2018            ELIZABETH DAVIS EDUAR CLARK            Ot            733.00  
          OSTEOPOROSIS NOS                     

 

 2018            DANA DILLON DO            Ot            492.8       
     EMPHYSEMA NEC                     

 

 2018            DANA DILLON DO            Ot            511.9       
     PLEURAL EFFUSION NOS                     

 

 2018            DANA DILLON DO            Ot            586         
   RENAL FAILURE NOS                     

 

 2018            LETTY , ARLENE MCKEON            Ot            726.0    
        ADHESIVE CAPSULIT SHLDER                     

 

 2018            LETTY , ARLENE MCKEON            Ot            718.91   
         JT DERANGMENT NOS-SHLDER                     

 

 2018            LETTY DO ARLENE MCKEON            Ot            726.0    
        ADHESIVE CAPSULIT SHLDER                     

 

 2018            LETTY , ARLENE MCKEON            Ot            840.4    
        SPRAIN ROTATOR CUFF                     

 

 2018            LETTY DO ARLENE MCKEON            Ot            E000.8   
         OTHER EXTERNAL CAUSE STATUS                     

 

 2018            LETTY DO ARLENE MCKEON            Ot            E928.9   
         ACCIDENT NOS                     

 

 2018            LETTY , ARLENE MCKEON            Ot            V72.84   
         EXAM PRE-OPERATIVE NOS                     

 

 2018            LETTY DO ARLENE MCKEON            Ot            727.61   
         ROTATOR CUFF RUPTURE                     

 

 2018            LETTY DO ARLENE MCKEON            Ot            V72.84   
         EXAM PRE-OPERATIVE NOS                     

 

 2018            NOHELIA AUGUST MD            Ot            414.00      
      CORON ATHEROSCLER NOS TYPE VESSEL, NATIV                     

 

 2018            NOHELIA AUGUST MD            Ot            424.0       
     MITRAL VALVE DISORDER                     

 

 2018            NOHELIA AUGUST MD            Ot            433.10      
      CAROTID ARTERY OCCLUSION W O CEREBRAL IN                     

 

 2018            NOHELIA AUGUST MD            Ot            786.09      
      RESPIRATORY ABNORM NEC                     

 

 2018            NOHELIA AUGUST MD            Ot            414.01      
      CORONARY ATHEROSCLEROSIS OF NATIVE CORON                     

 

 2018            NOHELIA AUGUST MD            Ot            786.09      
      RESPIRATORY ABNORM NEC                     

 

 2018            ARLENE SAENZ DO            Ot            840.4    
        SPRAIN ROTATOR CUFF                     

 

 2018            LETTY DO ARLENE MCKEON            Ot            E000.8   
         OTHER EXTERNAL CAUSE STATUS                     

 

 2018            LETTY DO ARLENE MCKEON            Ot            E928.9   
         ACCIDENT NOS                     

 

 2018            LETTY DO ARLENE MCKEON            Ot            V72.84   
         EXAM PRE-OPERATIVE NOS                     

 

 2018                         Ot            733.00            
OSTEOPOROSIS NOS                     

 

 2018            ARLENE SAENZ DO            Ot            722.4    
        CERVICAL DISC DEGEN                     

 

 2018                         Ot            J44.9            CHRONIC 
OBSTRUCTIVE PULMONARY DISEASE, U                     

 

 2018            EDUAR JOHNSON DO            Ot            K59.00  
          CONSTIPATION, UNSPECIFIED                     

 

 2018            EDUAR JOHNSON DO            Ot            J44.9   
         CHRONIC OBSTRUCTIVE PULMONARY DISEASE, U                     

 

 2018            TAE CHRISTIANSEN            Ot            
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                     

 

 2018            TAE CHRISTIANSEN Ot            
G47.33            OBSTRUCTIVE SLEEP APNEA (ADULT) (PEDIATR                     

 

 2018            TAE CHRISTIANSEN Ot            
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                      

 

 2018            TAE CHRISTIANSEN            Ot            
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                     

 

 2018            DANA DILLON DO            Ot            R06.02      
      SHORTNESS OF BREATH                     

 

 2018            DANA DILLON DO            Ot            Z87.891     
       PERSONAL HISTORY OF NICOTINE DEPENDENCE                     

 

 2018            NOHELIA AUGUST MD, Ot            G47.33      
      OBSTRUCTIVE SLEEP APNEA (ADULT) (PEDIATR                     

 

 2018            NOHELIA AUGUST MD, Ot            I25.10      
      ATHSCL HEART DISEASE OF NATIVE CORONARY                      

 

 2018            NOHELIA AUGUST MD, Ot            I34.0       
     NONRHEUMATIC MITRAL (VALVE) INSUFFICIENC                     

 

 2018            NOHELIA AUGUST MD, Ot            I71.2       
     THORACIC AORTIC ANEURYSM, WITHOUT RUPTUR                     

 

 2018            NOHELIA AUGUST MD, Ot            J43.9       
     EMPHYSEMA, UNSPECIFIED                     

 

 2018            NOHELIA AUGUST MD, Ot            N20.0       
     CALCULUS OF KIDNEY                     

 

 2018            NOHELIA AUGUST MD, Ot            R41.0       
     DISORIENTATION, UNSPECIFIED                     

 

 2018            NOHELIA AUGUST MD            Ot            W19.XXXA    
        UNSPECIFIED FALL, INITIAL ENCOUNTER                     

 

 2018            NOHELIA AUGUST MD, Ot            Z72.0       
     TOBACCO USE                     

 

 2018            MICH COLLIER DO            Ot            E03.9       
     HYPOTHYROIDISM, UNSPECIFIED                     

 

 2018            MICH COLLIER DO            Ot            E11.22      
      TYPE 2 DIABETES MELLITUS W DIABETIC                      

 

 2018            MICH COLLIER DO            Ot            F17.210     
       NICOTINE DEPENDENCE, CIGARETTES, UNCOMPL                     

 

 2018            MICH COLLIER DO            Ot            F32.9       
     MAJOR DEPRESSIVE DISORDER, SINGLE EPISOD                     

 

 2018            MICH COLLIER DO            Ot            G47.33      
      OBSTRUCTIVE SLEEP APNEA (ADULT) (PEDIATR                     

 

 2018            MICH COLLIER DO            Ot            I12.0       
     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                     

 

 2018            MICH COLLIER DO            Ot            I25.10      
      ATHSCL HEART DISEASE OF NATIVE CORONARY                      

 

 2018            MICH COLLIER DO            Ot            J43.9       
     EMPHYSEMA, UNSPECIFIED                     

 

 2018            MICH COLLIER DO            Ot            K21.9       
     GASTRO-ESOPHAGEAL REFLUX DISEASE WITHOUT                     

 

 2018            MICH COLLIER DO            Ot            K44.9       
     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                     

 

 2018            MICH COLLIER DO            Ot            N18.6       
     END STAGE RENAL DISEASE                     

 

 2018            MICH COLLIER DO            Ot            Z79.899     
       OTHER LONG TERM (CURRENT) DRUG THERAPY                     

 

 2018            MICH COLLIER DO            Ot            Z86.73      
      PRSNL HX OF TIA (TIA), AND CEREB INFRC W                     

 

 2018            MICH COLLIER DO            Ot            Z95.5       
     PRESENCE OF CORONARY ANGIOPLASTY IMPLANT                     

 

 2018            MICH COLLIER DO            Ot            Z99.2       
     DEPENDENCE ON RENAL DIALYSIS                     

 

 2018            MICH COLLIER DO            Ot            E03.9       
     HYPOTHYROIDISM, UNSPECIFIED                     

 

 2018            MICH COLLIER DO            Ot            E11.22      
      TYPE 2 DIABETES MELLITUS W DIABETIC                      

 

 2018            MICH COLLIER DO            Ot            F17.210     
       NICOTINE DEPENDENCE, CIGARETTES, UNCOMPL                     

 

 2018            MICH COLLIER DO            Ot            F32.9       
     MAJOR DEPRESSIVE DISORDER, SINGLE EPISOD                     

 

 2018            MICH COLLIER DO            Ot            G47.33      
      OBSTRUCTIVE SLEEP APNEA (ADULT) (PEDIATR                     

 

 2018            MICH COLLIER DO            Ot            I12.0       
     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                     

 

 2018            MICH COLLIER DO            Ot            I25.10      
      ATHSCL HEART DISEASE OF NATIVE CORONARY                      

 

 2018            MICH COLLIER DO            Ot            J43.9       
     EMPHYSEMA, UNSPECIFIED                     

 

 2018            MICH COLLIER DO            Ot            K21.9       
     GASTRO-ESOPHAGEAL REFLUX DISEASE WITHOUT                     

 

 2018            MICH COLLIER DO            Ot            K44.9       
     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                     

 

 2018            MICH COLLIER DO            Ot            N18.6       
     END STAGE RENAL DISEASE                     

 

 2018            COLLIER MICH DAVIS            Ot            Z79.899     
       OTHER LONG TERM (CURRENT) DRUG THERAPY                     

 

 2018            MICH COLLIER DO            Ot            Z86.73      
      PRSNL HX OF TIA (TIA), AND CEREB INFRC W                     

 

 2018            MICH COLLIER DO            Ot            Z95.5       
     PRESENCE OF CORONARY ANGIOPLASTY IMPLANT                     

 

 2018            MICH COLLIER DO            Ot            Z99.2       
     DEPENDENCE ON RENAL DIALYSIS                     

 

 2018            NOHELIA AUGUST MD            Ot            G47.33      
      OBSTRUCTIVE SLEEP APNEA (ADULT) (PEDIATR                     

 

 2018            NOHELIA AUGUST MD            Ot            I25.10      
      ATHSCL HEART DISEASE OF NATIVE CORONARY                      

 

 2018            NOHELIA AUGUST MD            Ot            I34.0       
     NONRHEUMATIC MITRAL (VALVE) INSUFFICIENC                     

 

 2018            NOHELIA AUGUST MD            Ot            I71.2       
     THORACIC AORTIC ANEURYSM, WITHOUT RUPTUR                     

 

 2018            NOHELIA AUGUST MD            Ot            J43.9       
     EMPHYSEMA, UNSPECIFIED                     

 

 2018            NOEHLIA AUGUST MD            Ot            N20.0       
     CALCULUS OF KIDNEY                     

 

 2018            NOHELIA AUGUST MD            Ot            R41.0       
     DISORIENTATION, UNSPECIFIED                     

 

 2018            NOHELIA AUGUST MD            Ot            W19.XXXA    
        UNSPECIFIED FALL, INITIAL ENCOUNTER                     

 

 2018            NOHELIA AUGUST MD            Ot            Z72.0       
     TOBACCO USE                     

 

 2018            NOHELIA AUGUST MD            Ot            G47.33      
      OBSTRUCTIVE SLEEP APNEA (ADULT) (PEDIATR                     

 

 2018            NOHELIA AUGSUT MD            Ot            I25.10      
      ATHSCL HEART DISEASE OF NATIVE CORONARY                      

 

 2018            NOHELIA AUGUST MD            Ot            I34.0       
     NONRHEUMATIC MITRAL (VALVE) INSUFFICIENC                     

 

 2018            NOHELIA AUGUST MD            Ot            I71.2       
     THORACIC AORTIC ANEURYSM, WITHOUT RUPTUR                     

 

 2018            NOHELIA AUGUST MD            Ot            J43.9       
     EMPHYSEMA, UNSPECIFIED                     

 

 2018            NOHELIA AUGUST MD            Ot            N20.0       
     CALCULUS OF KIDNEY                     

 

 2018            NOHELIA AUGUST MD            Ot            R41.0       
     DISORIENTATION, UNSPECIFIED                     

 

 2018            NOHELIA AUGUST MD            Ot            W19.XXXA    
        UNSPECIFIED FALL, INITIAL ENCOUNTER                     

 

 2018            NOHELIA AUGUST MD            Ot            Z72.0       
     TOBACCO USE                     

 

 2018                         Ot            733.00            
OSTEOPOROSIS NOS                     

 

 2018            JOHNSONEDUAR HILL DO            Ot            733.00  
          OSTEOPOROSIS NOS                     

 

 2018            DANA DILLON DO            Ot            492.8       
     EMPHYSEMA NEC                     

 

 2018            DANA DILLON DO            Ot            511.9       
     PLEURAL EFFUSION NOS                     

 

 2018            DANA DILLON DO            Ot            586         
   RENAL FAILURE NOS                     

 

 2018            LETTY DO, ARLENE MCKEON            Ot            726.0    
        ADHESIVE CAPSULIT SHLDER                     

 

 2018            LETTY DO, ARLENE MCKEON            Ot            718.91   
         JT DERANGMENT NOS-SHLDER                     

 

 2018            LETTY DO, ARLENE MCKEON            Ot            726.0    
        ADHESIVE CAPSULIT SHLDER                     

 

 2018            LETTY DO, ARLENE MCKEON            Ot            840.4    
        SPRAIN ROTATOR CUFF                     

 

 2018            LETTY DO, ARLENE MCKEON            Ot            E000.8   
         OTHER EXTERNAL CAUSE STATUS                     

 

 2018            LETTY  ARLENE MCKEON            Ot            E928.9   
         ACCIDENT NOS                     

 

 2018            LETTY DO, ARLENE MCKEON            Ot            V72.84   
         EXAM PRE-OPERATIVE NOS                     

 

 2018            LETTY  ARLENE MCKEON            Ot            727.61   
         ROTATOR CUFF RUPTURE                     

 

 2018            LETTY DO ARLENE             Ot            V72.84   
         EXAM PRE-OPERATIVE NOS                     

 

 2018            NOHELIA AUGUST MD            Ot            414.00      
      CORON ATHEROSCLER NOS TYPE VESSEL, NATIV                     

 

 2018            NOHELIA AUGUST MD            Ot            424.0       
     MITRAL VALVE DISORDER                     

 

 2018            NOHELIA AUGUST MD            Ot            433.10      
      CAROTID ARTERY OCCLUSION W O CEREBRAL IN                     

 

 2018            NOHELIA AUGUST MD            Ot            786.09      
      RESPIRATORY ABNORM NEC                     

 

 2018            NOHELIA AUGUST MD            Ot            414.01      
      CORONARY ATHEROSCLEROSIS OF NATIVE CORON                     

 

 2018            NOHELIA AUGUST MD            Ot            786.09      
      RESPIRATORY ABNORM NEC                     

 

 2018            ARLENE SAENZ DO            Ot            840.4    
        SPRAIN ROTATOR CUFF                     

 

 2018            ARLENE SAENZ DO            Ot            E000.8   
         OTHER EXTERNAL CAUSE STATUS                     

 

 2018            ARLENE SAENZ DO            Ot            E928.9   
         ACCIDENT NOS                     

 

 2018            ARLENE SAENZ DO            Ot            V72.84   
         EXAM PRE-OPERATIVE NOS                     

 

 2018                         Ot            733.00            
OSTEOPOROSIS NOS                     

 

 2018            ARLENE SAENZ DO            Ot            722.4    
        CERVICAL DISC DEGEN                     

 

 2018                         Ot            J44.9            CHRONIC 
OBSTRUCTIVE PULMONARY DISEASE, U                     

 

 2018            EDUAR JOHNSON DO            Ot            K59.00  
          CONSTIPATION, UNSPECIFIED                     

 

 2018            EDUAR JOHNSON DO            Ot            J44.9   
         CHRONIC OBSTRUCTIVE PULMONARY DISEASE, U                     

 

 2018            TAE CHRISTIANSEN            Ot            
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                     

 

 2018            TAE CHRISTIANSEN Ot            
G47.33            OBSTRUCTIVE SLEEP APNEA (ADULT) (PEDIATR                     

 

 2018            TAE CHRISTIANSEN Ot            
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                      

 

 2018            TAE CHRISTIANSEN            Ot            
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                     

 

 2018            DANA DILLON DO            Ot            R06.02      
      SHORTNESS OF BREATH                     

 

 2018            DANA DILLON DO            Ot            Z87.891     
       PERSONAL HISTORY OF NICOTINE DEPENDENCE                     

 

 2018            NOHELIA AUGUST MD, Ot            G47.33      
      OBSTRUCTIVE SLEEP APNEA (ADULT) (PEDIATR                     

 

 2018            NOHELIA AUGUST MD, Ot            I25.10      
      ATHSCL HEART DISEASE OF NATIVE CORONARY                      

 

 2018            NOHELIA AUGUST MD, Ot            I34.0       
     NONRHEUMATIC MITRAL (VALVE) INSUFFICIENC                     

 

 2018            NOHELIA AUGUST MD, Ot            I71.2       
     THORACIC AORTIC ANEURYSM, WITHOUT RUPTUR                     

 

 2018            NOHELIA AUGUST MD            Ot            J43.9       
     EMPHYSEMA, UNSPECIFIED                     

 

 2018            NOHELIA AUGUST MD, Ot            N20.0       
     CALCULUS OF KIDNEY                     

 

 2018            NOHELIA AUGUST MD, Ot            R41.0       
     DISORIENTATION, UNSPECIFIED                     

 

 2018            NOHELIA AUGUST MD            Ot            W19.XXXA    
        UNSPECIFIED FALL, INITIAL ENCOUNTER                     

 

 2018            MATA MATIAS, NOHELIA CLARK            Ot            Z72.0       
     TOBACCO USE                     

 

 2018            MICH COLLIER DO            Ot            K42.9       
     UMBILICAL HERNIA WITHOUT OBSTRUCTION OR                      

 

 2018            MICH COLLIER DO            Ot            Z01.812     
       ENCOUNTER FOR PREPROCEDURAL LABORATORY E                     

 

 2018            MICH COLLIER DO            Ot            Z11.2       
     ENCOUNTER FOR SCREENING FOR OTHER BACTER                     

 

 2018            MICH COLLIER DO            Ot            K42.9       
     UMBILICAL HERNIA WITHOUT OBSTRUCTION OR                      

 

 2018            MICH COLLIER DO            Ot            Z01.812     
       ENCOUNTER FOR PREPROCEDURAL LABORATORY E                     

 

 2018            MICH COLLIER DO            Ot            Z11.2       
     ENCOUNTER FOR SCREENING FOR OTHER BACTER                     

 

 2018            MICH COLLIER DO            Ot            F17.210     
       NICOTINE DEPENDENCE, CIGARETTES, UNCOMPL                     

 

 2018            MICH COLLIER DO            Ot            G47.33      
      OBSTRUCTIVE SLEEP APNEA (ADULT) (PEDIATR                     

 

 2018            MICH COLLIER DO            Ot            I08.1       
     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                     

 

 2018            MICH COLLIER DO            Ot            I10         
   ESSENTIAL (PRIMARY) HYPERTENSION                     

 

 2018            MICH COLLIER DO            Ot            I25.10      
      ATHSCL HEART DISEASE OF NATIVE CORONARY                      

 

 2018            MICH COLLIER DO            Ot            J44.9       
     CHRONIC OBSTRUCTIVE PULMONARY DISEASE, U                     

 

 2018            MICH COLLIER DO            Ot            J45.909     
       UNSPECIFIED ASTHMA, UNCOMPLICATED                     

 

 2018            MICH COLLIER DO            Ot            K42.0       
     UMBILICAL HERNIA WITH OBSTRUCTION, WITHO                     

 

 2018            MICH COLLIER DO            Ot            Z79.02      
      LONG TERM (CURRENT) USE OF ANTITHROMBOTI                     

 

 2018            MICH COLLIER DO            Ot            Z79.899     
       OTHER LONG TERM (CURRENT) DRUG THERAPY                     

 

 2018            MICH COLLIER DO            Ot            F17.210     
       NICOTINE DEPENDENCE, CIGARETTES, UNCOMPL                     

 

 2018            MICH COLLIER DO            Ot            G47.33      
      OBSTRUCTIVE SLEEP APNEA (ADULT) (PEDIATR                     

 

 2018            MICH COLLIER DO            Ot            I08.1       
     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                     

 

 2018            MICH COLLIER DO            Ot            I10         
   ESSENTIAL (PRIMARY) HYPERTENSION                     

 

 2018            MICH COLLIER DO            Ot            I25.10      
      ATHSCL HEART DISEASE OF NATIVE CORONARY                      

 

 2018            MICH COLLIER DO            Ot            J44.9       
     CHRONIC OBSTRUCTIVE PULMONARY DISEASE, U                     

 

 2018            MICH COLLIER DO            Ot            J45.909     
       UNSPECIFIED ASTHMA, UNCOMPLICATED                     

 

 2018            MICH COLLIER DO            Ot            K42.0       
     UMBILICAL HERNIA WITH OBSTRUCTION, WITHO                     

 

 2018            MICH COLLIER DO            Ot            Z79.02      
      LONG TERM (CURRENT) USE OF ANTITHROMBOTI                     

 

 2018            MICH COLLIER DO            Ot            Z79.899     
       OTHER LONG TERM (CURRENT) DRUG THERAPY                     

 

 2018            MICH COLLIER DO            Ot            F17.210     
       NICOTINE DEPENDENCE, CIGARETTES, UNCOMPL                     

 

 2018            MICH COLLIER DO            Ot            G47.33      
      OBSTRUCTIVE SLEEP APNEA (ADULT) (PEDIATR                     

 

 2018            MICH COLLIER DO            Ot            I08.1       
     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                     

 

 2018            MICH COLLIER DO            Ot            I10         
   ESSENTIAL (PRIMARY) HYPERTENSION                     

 

 2018            MICH COLLIER DO            Ot            I25.10      
      ATHSCL HEART DISEASE OF NATIVE CORONARY                      

 

 2018            MICH COLLIER DO            Ot            J44.9       
     CHRONIC OBSTRUCTIVE PULMONARY DISEASE, U                     

 

 2018            MICH COLLIER DO            Ot            J45.909     
       UNSPECIFIED ASTHMA, UNCOMPLICATED                     

 

 2018            MICH COLLIER DO            Ot            K42.0       
     UMBILICAL HERNIA WITH OBSTRUCTION, WITHO                     

 

 2018            MICH COLLIER DO            Ot            Z79.02      
      LONG TERM (CURRENT) USE OF ANTITHROMBOTI                     

 

 2018            MICH COLLIER DO            Ot            Z79.899     
       OTHER LONG TERM (CURRENT) DRUG THERAPY                     

 

 2018            ZACH DIEGO            Ot            J44.9 
           CHRONIC OBSTRUCTIVE PULMONARY DISEASE, U                     

 

 2018            ZACH DIEGO APRN            Ot            R59.0 
           LOCALIZED ENLARGED LYMPH NODES                     

 

 07/10/2018            ZACH DIEGO            Ot            J44.9 
           CHRONIC OBSTRUCTIVE PULMONARY DISEASE, U                     

 

 07/10/2018            ZACH DIEGO APRN            Ot            R59.0 
           LOCALIZED ENLARGED LYMPH NODES                     

 

 2018            ZACH DIEGO APRN            Ot            J44.9 
           CHRONIC OBSTRUCTIVE PULMONARY DISEASE, U                     

 

 2018            JOHNATHONZACH KRAFT EDWARDO            Ot            R59.0 
           LOCALIZED ENLARGED LYMPH NODES                     

 

 2018                         Ot            733.00            
OSTEOPOROSIS NOS                     

 

 2018            JOHNSON EDUAR DAVIS            Ot            733.00  
          OSTEOPOROSIS NOS                     

 

 2018            WILMA  DANA SMITH            Ot            492.8       
     EMPHYSEMA NEC                     

 

 2018            WILMA  DANA SMITH            Ot            511.9       
     PLEURAL EFFUSION NOS                     

 

 2018            WILMA DAVIS DANA SMITH            Ot            586         
   RENAL FAILURE NOS                     

 

 2018            LETTY DO, ARLENE LINDA            Ot            726.0    
        ADHESIVE CAPSULIT SHLDER                     

 

 2018            LETTY DO, ARLENE MCKEON            Ot            718.91   
         JT DERANGMENT NOS-SHLDER                     

 

 2018            LETTY , ARLENE MCKEON            Ot            726.0    
        ADHESIVE CAPSULIT SHLDER                     

 

 2018            LETTY DO, ARLENE MCKEON            Ot            840.4    
        SPRAIN ROTATOR CUFF                     

 

 2018            LETTY DO, ARLENE LINDA            Ot            E000.8   
         OTHER EXTERNAL CAUSE STATUS                     

 

 2018            LETTY DO, ARLENE MCKEON            Ot            E928.9   
         ACCIDENT NOS                     

 

 2018            [facility], ARLENE MCKEON            Ot            V72.84   
         EXAM PRE-OPERATIVE NOS                     

 

 2018            LETTY , ARLENE MCKEON            Ot            727.61   
         ROTATOR CUFF RUPTURE                     

 

 2018            [facility], ARLENE             Ot            V72.84   
         EXAM PRE-OPERATIVE NOS                     

 

 2018            MATA MATIAS, NOHELIA CLARK            Ot            414.00      
      CORON ATHEROSCLER NOS TYPE VESSEL, NATIV                     

 

 2018            NOHELIA AUGUST MD            Ot            424.0       
     MITRAL VALVE DISORDER                     

 

 2018            NOHELIA AUGUST MD            Ot            433.10      
      CAROTID ARTERY OCCLUSION W O CEREBRAL IN                     

 

 2018            NOHELIA AUGUST MD            Ot            786.09      
      RESPIRATORY ABNORM NEC                     

 

 2018            NOHELIA AUGUST MD            Ot            414.01      
      CORONARY ATHEROSCLEROSIS OF NATIVE CORON                     

 

 2018            NOHELIA AUGUST MD            Ot            786.09      
      RESPIRATORY ABNORM NEC                     

 

 2018            LETTY  ARLENE MCKEON            Ot            840.4    
        SPRAIN ROTATOR CUFF                     

 

 2018            LETTY  ARLENE MCKEON            Ot            E000.8   
         OTHER EXTERNAL CAUSE STATUS                     

 

 2018            LETTY , ARLENE LINDA            Ot            E928.9   
         ACCIDENT NOS                     

 

 2018            LETTY , ARLENE LINDA            Ot            V72.84   
         EXAM PRE-OPERATIVE NOS                     

 

 2018                         Ot            733.00            
OSTEOPOROSIS NOS                     

 

 2018            ARLENE SAENZ DO            Ot            722.4    
        CERVICAL DISC DEGEN                     

 

 2018                         Ot            J44.9            CHRONIC 
OBSTRUCTIVE PULMONARY DISEASE, U                     

 

 2018            EDUAR JOHNSON DO            Ot            K59.00  
          CONSTIPATION, UNSPECIFIED                     

 

 2018            EDUAR JOHNSON DO            Ot            J44.9   
         CHRONIC OBSTRUCTIVE PULMONARY DISEASE, U                     

 

 2018            TAE CHRISTIANSEN            Ot            
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                     

 

 2018            TAE CHRISTIANSEN            Ot            
G47.33            OBSTRUCTIVE SLEEP APNEA (ADULT) (PEDIATR                     

 

 2018            TAE CHRISTIANSEN            Ot            
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                      

 

 2018            TAE CHRISTIANSEN            Ot            
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                     

 

 2018            DANA DILLON DO            Ot            R06.02      
      SHORTNESS OF BREATH                     

 

 2018            DANA DILLON DO            Ot            Z87.891     
       PERSONAL HISTORY OF NICOTINE DEPENDENCE                     

 

 2018            NOHELIA AUGUST MD            Ot            G47.33      
      OBSTRUCTIVE SLEEP APNEA (ADULT) (PEDIATR                     

 

 2018            NOHELIA AUGUST MD            Ot            I25.10      
      ATHSCL HEART DISEASE OF NATIVE CORONARY                      

 

 2018            NOHELIA AUGUST MD            Ot            I34.0       
     NONRHEUMATIC MITRAL (VALVE) INSUFFICIENC                     

 

 2018            NOHELIA AUGUST MD            Ot            I71.2       
     THORACIC AORTIC ANEURYSM, WITHOUT RUPTUR                     

 

 2018            NOHELIA AUGUST MD            Ot            J43.9       
     EMPHYSEMA, UNSPECIFIED                     

 

 2018            NOHELIA AUGUST MD            Ot            N20.0       
     CALCULUS OF KIDNEY                     

 

 2018            NOHELIA AUGUST MD            Ot            R41.0       
     DISORIENTATION, UNSPECIFIED                     

 

 2018            NOHELIA AUGUST MD            Ot            W19.XXXA    
        UNSPECIFIED FALL, INITIAL ENCOUNTER                     

 

 2018            NOHELIA AUGUST MD            Ot            Z72.0       
     TOBACCO USE                     

 

 2018            ZACH DIEGO            Ot            J44.9 
           CHRONIC OBSTRUCTIVE PULMONARY DISEASE, U                     

 

 2018            ZACH DIEGO            Ot            R09.02
            HYPOXEMIA                     

 

 2018            ZACH DIEGO APRN            Ot            J44.9 
           CHRONIC OBSTRUCTIVE PULMONARY DISEASE, U                     

 

 2018            ZACH DIEGO APRN            Ot            R59.0 
           LOCALIZED ENLARGED LYMPH NODES                     

 

 2018            EDUAR JOHNSON DO            Ot            M16.0   
         BILATERAL PRIMARY OSTEOARTHRITIS OF HIP                     

 

 2018            ELIZABETH DAVIS, EDUAR CLARK            Ot            M47.817 
           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                     

 

 2018            EDUAR JOHNSON DO            Ot            M16.0   
         BILATERAL PRIMARY OSTEOARTHRITIS OF HIP                     

 

 2018            JOHNSON DO, EDUAR CLARK            Ot            M47.817 
           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                     

 

 2018            ZACH DIEGO APRN            Ot            J44.9 
           CHRONIC OBSTRUCTIVE PULMONARY DISEASE, U                     

 

 2018            ZACH DIEGO APRN            Ot            R09.02
            HYPOXEMIA                     

 

 2018            ZACH DIEGO APRN            Ot            J44.9 
           CHRONIC OBSTRUCTIVE PULMONARY DISEASE, U                     

 

 2018            ZACH DIEGO APRN            Ot            R09.02
            HYPOXEMIA                     

 

 2018            ZACH DIEGO APRN            Ot            J44.9 
           CHRONIC OBSTRUCTIVE PULMONARY DISEASE, U                     

 

 2018            ZACH DIEGO APRN            Ot            R09.02
            HYPOXEMIA                     

 

 2018            ZACH DIEGO APRN            Ot            J44.9 
           CHRONIC OBSTRUCTIVE PULMONARY DISEASE, U                     

 

 2018            ZACH DIEGO APRN            Ot            R09.02
            HYPOXEMIA                     

 

 2018            ZACH DIEGO APRN            Ot            J44.9 
           CHRONIC OBSTRUCTIVE PULMONARY DISEASE, U                     

 

 2018            ZACH DIEGO APRN            Ot            R09.02
            HYPOXEMIA                     

 

 2018            ZACH DIEGO APRN            Ot            J44.9 
           CHRONIC OBSTRUCTIVE PULMONARY DISEASE, U                     

 

 2018            ZACH DIEGO APRN            Ot            R09.02
            HYPOXEMIA                     

 

 2018            ZACH DIEGO APRN            Ot            J44.9 
           CHRONIC OBSTRUCTIVE PULMONARY DISEASE, U                     

 

 2018            ZACH DIEGO APRN            Ot            R09.02
            HYPOXEMIA                     

 

 2018            ZACH DIEGO APRN            Ot            J44.9 
           CHRONIC OBSTRUCTIVE PULMONARY DISEASE, U                     

 

 2018            ZACH DIEGO APRN            Ot            R09.02
            HYPOXEMIA                     

 

 2018            JOHNATHON, ZACH E APRN            Ot            J44.9 
           CHRONIC OBSTRUCTIVE PULMONARY DISEASE, U                     

 

 2018            NEELAM DIEGOINE E APRN            Ot            R09.02
            HYPOXEMIA                     

 

 2018            NEELAM DIEGOINE E APRN            Ot            J44.9 
           CHRONIC OBSTRUCTIVE PULMONARY DISEASE, U                     

 

 2018            NEELAM DIEGOINE E APRN            Ot            R09.02
            HYPOXEMIA                     

 

 2018            NEELAM DIEGOINE E APRN            Ot            J44.9 
           CHRONIC OBSTRUCTIVE PULMONARY DISEASE, U                     

 

 2018            JOHNATHONNEELAM KRAFTINE E APRN            Ot            R09.02
            HYPOXEMIA                     

 

 2018            JOHNATHON, ZACH E APRN            Ot            J44.9 
           CHRONIC OBSTRUCTIVE PULMONARY DISEASE, U                     

 

 2018            NEELAM DIEGOINE E APRN            Ot            R09.02
            HYPOXEMIA                     

 

 2018            NEELAM DIEGOINE E APRN            Ot            J44.9 
           CHRONIC OBSTRUCTIVE PULMONARY DISEASE, U                     

 

 2018            NEELAM DIEGOINE E APRN            Ot            R09.02
            HYPOXEMIA                     

 

 2018            ZACH DIEGO E APRN            Ot            J44.9 
           CHRONIC OBSTRUCTIVE PULMONARY DISEASE, U                     

 

 2018            NEELAM DIEGOINE E APRN            Ot            R09.02
            HYPOXEMIA                     

 

 2018            NEELAM DIEGOINE E APRN            Ot            J44.9 
           CHRONIC OBSTRUCTIVE PULMONARY DISEASE, U                     

 

 2018            NEELAM DIEGOINE E APRN            Ot            R09.02
            HYPOXEMIA                     

 

 2018            NEELAM DIEGOINE E APRN            Ot            J44.9 
           CHRONIC OBSTRUCTIVE PULMONARY DISEASE, U                     

 

 2018            NEELAM DIEGOINE E APRN            Ot            R09.02
            HYPOXEMIA                     

 

 10/21/2018            ZACH DIEGO E APRN            Ot            J44.9 
           CHRONIC OBSTRUCTIVE PULMONARY DISEASE, U                     

 

 10/21/2018            NEELAM DIEGOINE E APRN            Ot            R09.02
            HYPOXEMIA                     

 

 2018            JOHNATHON ZACH E APRN            Ot            J44.9 
           CHRONIC OBSTRUCTIVE PULMONARY DISEASE, U                     

 

 2018            NEELAM DIEGOINE FRANKIE APRN            Ot            R09.02
            HYPOXEMIA                     

 

 2018            ZACH DIEGO APRN            Ot            I25.10
            ATHSCL HEART DISEASE OF NATIVE CORONARY                      

 

 2018            NEELAM DIEGOINE E APRN            Ot            I71.9 
           AORTIC ANEURYSM OF UNSPECIFIED SITE, WIT                     

 

 2018            ZACH DIEGO E APRN            Ot            J43.9 
           EMPHYSEMA, UNSPECIFIED                     

 

 2018            ZACH DIEGO APRN            Ot            R59.9 
           ENLARGED LYMPH NODES, UNSPECIFIED                     

 

 2018            ZACH DIEGO APRN            Ot            
Z87.891            PERSONAL HISTORY OF NICOTINE DEPENDENCE                     

 

 2018            ZACH DIEGO APRN            Ot            J44.9 
           CHRONIC OBSTRUCTIVE PULMONARY DISEASE, U                     

 

 2018            ZACH DIEGO APRN            Ot            R09.02
            HYPOXEMIA                     

 

 2018            ZACH DIEGO APRN            Ot            J44.9 
           CHRONIC OBSTRUCTIVE PULMONARY DISEASE, U                     

 

 2018            ZACH DIEGO APRN            Ot            R09.02
            HYPOXEMIA                     

 

 2018            ZACH DIEGO APRN            Ot            J44.9 
           CHRONIC OBSTRUCTIVE PULMONARY DISEASE, U                     

 

 2018            ZACH DIEGO APRN            Ot            R09.02
            HYPOXEMIA                     

 

 2019            ZACH DIEGO APRN            Ot            I25.10
            ATHSCL HEART DISEASE OF NATIVE CORONARY                      

 

 2019            ZACH DIEGO APRBRITNEY            Ot            I71.9 
           AORTIC ANEURYSM OF UNSPECIFIED SITE, WIT                     

 

 2019            ZACH DIEGO APRN            Ot            J43.9 
           EMPHYSEMA, UNSPECIFIED                     

 

 2019            ZACH DIEGO APRN            Ot            R59.9 
           ENLARGED LYMPH NODES, UNSPECIFIED                     

 

 2019            ZACH DIEGO APRN            Ot            
Z87.891            PERSONAL HISTORY OF NICOTINE DEPENDENCE                     

 

 2019            DONALD BEATTY MD            Ot            A41.9       
     SEPSIS, UNSPECIFIED ORGANISM                     

 

 2019            DONALD BEATTY MD            Ot            E89.0       
     POSTPROCEDURAL HYPOTHYROIDISM                     

 

 2019            DONALD BEATTY MD            Ot            F17.210     
       NICOTINE DEPENDENCE, CIGARETTES, UNCOMPL                     

 

 2019            DONALD BEATTY MD            Ot            F32.9       
     MAJOR DEPRESSIVE DISORDER, SINGLE EPISOD                     

 

 2019            DONALD BEATTY MD            Ot            G47.30      
      SLEEP APNEA, UNSPECIFIED                     

 

 2019            DONALD BEATTY MD            Ot            H40.9       
     UNSPECIFIED GLAUCOMA                     

 

 2019            DONALD BEATTY MD            Ot            H93.19      
      TINNITUS, UNSPECIFIED EAR                     

 

 2019            DONALD BEATTY MD            Ot            I10         
   ESSENTIAL (PRIMARY) HYPERTENSION                     

 

 2019            DONALD BEATTY MD            Ot            I25.10      
      ATHSCL HEART DISEASE OF NATIVE CORONARY                      

 

 2019            DONALD BEATTY MD            Ot            J18.9       
     PNEUMONIA, UNSPECIFIED ORGANISM                     

 

 2019            DONALD BEATTY MD, Ot            J30.2       
     OTHER SEASONAL ALLERGIC RHINITIS                     

 

 2019            DONALD BEATTY MD, Ot            J43.9       
     EMPHYSEMA, UNSPECIFIED                     

 

 2019            DONALD BEATTY MD            Ot            K21.9       
     GASTRO-ESOPHAGEAL REFLUX DISEASE WITHOUT                     

 

 2019            DONALD BEATTY MD, Ot            K46.9       
     UNSPECIFIED ABDOMINAL HERNIA WITHOUT OBS                     

 

 2019            DONALD BEATTY MD, Ot            K59.09      
      OTHER CONSTIPATION                     

 

 2019            DONALD BEATTY MD            Ot            M19.91      
      PRIMARY OSTEOARTHRITIS, UNSPECIFIED SITE                     

 

 2019            DONALD BEATTY MD, Ot            M54.9       
     DORSALGIA, UNSPECIFIED                     

 

 2019            DONALD BEATTY MD            Ot            M81.0       
     AGE-RELATED OSTEOPOROSIS W/O CURRENT PAT                     

 

 2019            DONALD BEATTY MD, Ot            N17.9       
     ACUTE KIDNEY FAILURE, UNSPECIFIED                     

 

 2019            DONALD BEATTY MD            Ot            R09.02      
      HYPOXEMIA                     

 

 2019            DONALD BEATTY MD            Ot            Z66         
   DO NOT RESUSCITATE                     

 

 2019            DONALD BEATTY MD            Ot            Z86.73      
      PRSNL HX OF TIA (TIA), AND CEREB INFRC W                     

 

 2019            DONALD BEATTY MD            Ot            Z91.5       
     PERSONAL HISTORY OF SELF-HARM                     

 

 2019            DONALD BEATTY MD            Ot            Z95.5       
     PRESENCE OF CORONARY ANGIOPLASTY IMPLANT                     

 

 2019            DONALD BEATTY MD            Ot            A41.9       
     SEPSIS, UNSPECIFIED ORGANISM                     

 

 2019            DONALD BEATTY MD            Ot            E89.0       
     POSTPROCEDURAL HYPOTHYROIDISM                     

 

 2019            DONALD BEATTY MD            Ot            F17.210     
       NICOTINE DEPENDENCE, CIGARETTES, UNCOMPL                     

 

 2019            DONALD BEATTY MD            Ot            F32.9       
     MAJOR DEPRESSIVE DISORDER, SINGLE EPISOD                     

 

 2019            DONALD BEATTY MD            Ot            G47.30      
      SLEEP APNEA, UNSPECIFIED                     

 

 2019            DONALD BEATTY MD            Ot            H40.9       
     UNSPECIFIED GLAUCOMA                     

 

 2019            DONALD BEATTY MD            Ot            H93.19      
      TINNITUS, UNSPECIFIED EAR                     

 

 2019            DONALD BEATTY MD            Ot            I10         
   ESSENTIAL (PRIMARY) HYPERTENSION                     

 

 2019            DONALD BEATTY MD            Ot            I25.10      
      ATHSCL HEART DISEASE OF NATIVE CORONARY                      

 

 2019            DONALD BEATTY MD            Ot            J18.9       
     PNEUMONIA, UNSPECIFIED ORGANISM                     

 

 2019            DONALD BEATTY MD            Ot            J30.2       
     OTHER SEASONAL ALLERGIC RHINITIS                     

 

 2019            DONALD BEATTY MD            Ot            J43.9       
     EMPHYSEMA, UNSPECIFIED                     

 

 2019            DONALD BEATTY MD            Ot            K21.9       
     GASTRO-ESOPHAGEAL REFLUX DISEASE WITHOUT                     

 

 2019            DONALD BEATTY MD            Ot            K46.9       
     UNSPECIFIED ABDOMINAL HERNIA WITHOUT OBS                     

 

 2019            DONALD BEATTY MD            Ot            K59.09      
      OTHER CONSTIPATION                     

 

 2019            DONALD BEATTY MD            Ot            M19.91      
      PRIMARY OSTEOARTHRITIS, UNSPECIFIED SITE                     

 

 2019            DONALD BEATTY MD            Ot            M54.9       
     DORSALGIA, UNSPECIFIED                     

 

 2019            DONALD BEATTY MD            Ot            M81.0       
     AGE-RELATED OSTEOPOROSIS W/O CURRENT PAT                     

 

 2019            DONALD BEATTY MD            Ot            N17.9       
     ACUTE KIDNEY FAILURE, UNSPECIFIED                     

 

 2019            DONALD BEATTY MD            Ot            R09.02      
      HYPOXEMIA                     

 

 2019            DONALD BEATTY MD            Ot            Z66         
   DO NOT RESUSCITATE                     

 

 2019            DONALD BEATTY MD            Ot            Z86.73      
      PRSNL HX OF TIA (TIA), AND CEREB INFRC W                     

 

 2019            DONALD BEATTY MD            Ot            Z91.5       
     PERSONAL HISTORY OF SELF-HARM                     

 

 2019            DONALD BEATTY MD            Ot            Z95.5       
     PRESENCE OF CORONARY ANGIOPLASTY IMPLANT                     

 

 2019            ZACH DIEGO            Ot            I25.10
            ATHSCL HEART DISEASE OF NATIVE CORONARY                      

 

 2019            ZACH DIEGO            Ot            I71.9 
           AORTIC ANEURYSM OF UNSPECIFIED SITE, WIT                     

 

 2019            ZACH DIEGO            Ot            J43.9 
           EMPHYSEMA, UNSPECIFIED                     

 

 2019            ZACH DIGEO            Ot            R59.9 
           ENLARGED LYMPH NODES, UNSPECIFIED                     

 

 2019            ZACH DIEGO            Ot            
Z87.891            PERSONAL HISTORY OF NICOTINE DEPENDENCE                     

 

 2019            EDUAR JOHNSON DO            Ot            K21.9   
         GASTRO-ESOPHAGEAL REFLUX DISEASE WITHOUT                     

 

 2019            EDUAR JOHNSON DO, Ot            K22.8   
         OTHER SPECIFIED DISEASES OF ESOPHAGUS                     

 

 2019            EDUAR JOHNSON DO, Ot            K44.9   
         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                     



                                                                               
                                                                               
                                                                               
                                                                               
                                                                               
                                                                               
                                                                               
                                                                               
                                                                               
                                                                               
                                                                               
                                                                               
                                                                               
                                                                               
                                                                               
                                                                               
                                                                               
                                                                               
                                                                               
                                                                               
                                                                               
                                                                               
                                                                               
                                                                               
          



Procedures

      





 Code            Description            Performed By            Performed On   
     

 

             38.93                                  VENOUS CATHETERIZATION NEC 
                                  2014        

 

             96.04                                  INSERT ENDOTRACHEAL TUBE   
                                2014        

 

             96.71                                  CONTINUOUS INVASIVE 
MECHANICAL VENTILATI                                   2014        



                      



Results

      





 Test            Result            Range        









 Complete urinalysis with reflex to culture - 17 09:12         









 Urine color determination            YELLOW             NRG        

 

 Urine clarity determination            CLEAR             NRG        

 

 Urine pH measurement by test strip            5             5-9        

 

 Specific gravity of urine by test strip            1.025             1.016-
1.022        

 

 Urine protein assay by test strip, semi-quantitative            NEGATIVE      
       NEGATIVE        

 

 Urine glucose detection by automated test strip            NEGATIVE           
  NEGATIVE        

 

 Erythrocytes detection in urine sediment by light microscopy            
NEGATIVE             NEGATIVE        

 

 Urine ketones detection by automated test strip            NEGATIVE           
  NEGATIVE        

 

 Urine nitrite detection by test strip            NEGATIVE             NEGATIVE
        

 

 Urine total bilirubin detection by test strip            NEGATIVE             
NEGATIVE        

 

 Urine urobilinogen measurement by automated test strip (mass/volume)          
  NORMAL             NORMAL        

 

 Urine leukocyte esterase detection by dipstick            NEGATIVE             
NEGATIVE        

 

 Automated urine sediment erythrocyte count by microscopy (number/high power 
field)            NONE             NRG        

 

 Automated urine sediment leukocyte count by microscopy (number/high power field
)            NONE             NRG        

 

 Bacteria detection in urine sediment by light microscopy            NEGATIVE  
           NRG        

 

 Squamous epithelial cells detection in urine sediment by light microscopy     
       2-5             NRG        

 

 Crystals detection in urine sediment by light microscopy            NONE      
       NRG        

 

 Casts detection in urine sediment by light microscopy            NONE         
    NRG        

 

 Mucus detection in urine sediment by light microscopy            NEGATIVE     
        NRG        

 

 Complete urinalysis with reflex to culture            NO             NRG      
  









 Automated blood complete blood count (hemogram) panel - 17 09:25         









 Blood leukocytes automated count (number/volume)            6.8 10*3/uL       
     4.3-11.0        

 

 Blood erythrocytes automated count (number/volume)            4.06 10*6/uL    
        4.35-5.85        

 

 Venous blood hemoglobin measurement (mass/volume)            13.7 g/dL        
    13.3-17.7        

 

 Blood hematocrit (volume fraction)            40 %            40-54        

 

 Automated erythrocyte mean corpuscular volume            99 [foz_us]          
  80-99        

 

 Automated erythrocyte mean corpuscular hemoglobin (mass per erythrocyte)      
      34 pg            25-34        

 

 Automated erythrocyte mean corpuscular hemoglobin concentration measurement (
mass/volume)            34 g/dL            32-36        

 

 Automated erythrocyte distribution width ratio            14.9 %            
10.0-14.5        

 

 Automated blood platelet count (count/volume)            230 10*3/uL          
  130-400        

 

 Automated blood platelet mean volume measurement            8.9 [foz_us]      
      7.4-10.4        









 PT panel in platelet poor plasma by coagulation assay - 17 09:25         









 Prothrombin time (PT) in platelet poor plasma by coagulation assay            
14.1 s            12.2-14.7        

 

 INR in platelet poor plasma or blood by coagulation assay            1.1      
       0.8-1.4        









 Activated partial thromboplastin time (aPTT) in platelet poor plasma 
bycoagulation assay - 17 09:25         









 Activated partial thromboplastin time (aPTT) in platelet poor plasma 
bycoagulation assay            29 s            24-35        









 Comprehensive metabolic panel - 17 09:25         









 Serum or plasma sodium measurement (moles/volume)            141 mmol/L       
     135-145        

 

 Serum or plasma potassium measurement (moles/volume)            4.1 mmol/L    
        3.6-5.0        

 

 Serum or plasma chloride measurement (moles/volume)            109 mmol/L     
               

 

 Carbon dioxide            21 mmol/L            21-32        

 

 Serum or plasma anion gap determination (moles/volume)            11 mmol/L   
         5-14        

 

 Serum or plasma urea nitrogen measurement (mass/volume)            18 mg/dL   
         7-18        

 

 Serum or plasma creatinine measurement (mass/volume)            1.32 mg/dL    
        0.60-1.30        

 

 Serum or plasma urea nitrogen/creatinine mass ratio            14             
NRG        

 

 Serum or plasma creatinine measurement with calculation of estimated 
glomerular filtration rate            54             NRG        

 

 Serum or plasma glucose measurement (mass/volume)            100 mg/dL        
            

 

 Serum or plasma calcium measurement (mass/volume)            9.6 mg/dL        
    8.5-10.1        

 

 Serum or plasma total bilirubin measurement (mass/volume)            0.4 mg/dL
            0.1-1.0        

 

 Serum or plasma alkaline phosphatase measurement (enzymatic activity/volume)  
          75 U/L                    

 

 Serum or plasma aspartate aminotransferase measurement (enzymatic activity/
volume)            17 U/L            5-34        

 

 Serum or plasma alanine aminotransferase measurement (enzymatic activity/volume
)            22 U/L            0-55        

 

 Serum or plasma protein measurement (mass/volume)            6.8 g/dL         
   6.4-8.2        

 

 Serum or plasma albumin measurement (mass/volume)            4.1 g/dL         
   3.2-4.5        









 Lipid 1996 panel - 17 09:25         









 Serum or plasma triglyceride measurement (mass/volume)            88 mg/dL    
        <150        

 

 Serum or plasma cholesterol measurement (mass/volume)            150 mg/dL    
        < 200        

 

 Serum or plasma cholesterol in HDL measurement (mass/volume)            56 mg/
dL            40-60        

 

 Cholesterol in LDL [mass/volume] in serum or plasma by direct assay            
78 mg/dL            1-129        

 

 Serum or plasma cholesterol in VLDL measurement (mass/volume)            18 mg/
dL            5-40        









 Methicillin resistant Staphylococcus aureus (MRSA) screening culture -  09:25         









 MRSA SCREEN RESULT            MRSA ISOLATED             [facility]        









 Automated blood complete blood count (hemogram) panel - 18 15:25         









 Blood leukocytes automated count (number/volume)            7.6 10*3/uL       
     4.3-11.0        

 

 Blood erythrocytes automated count (number/volume)            3.10 10*6/uL    
        4.35-5.85        

 

 Venous blood hemoglobin measurement (mass/volume)            11.0 g/dL        
    13.3-17.7        

 

 Blood hematocrit (volume fraction)            32 %            40-54        

 

 Automated erythrocyte mean corpuscular volume            104 [foz_us]         
   80-99        

 

 Automated erythrocyte mean corpuscular hemoglobin (mass per erythrocyte)      
      36 pg            25-34        

 

 Automated erythrocyte mean corpuscular hemoglobin concentration measurement (
mass/volume)            34 g/dL            32-36        

 

 Automated erythrocyte distribution width ratio            13.9 %            
10.0-14.5        

 

 Automated blood platelet count (count/volume)            223 10*3/uL          
  130-400        

 

 Automated blood platelet mean volume measurement            9.5 [foz_us]      
      7.4-10.4        









 PT panel in platelet poor plasma by coagulation assay - 18 15:25         









 Prothrombin time (PT) in platelet poor plasma by coagulation assay            
15.2 s            12.2-14.7        

 

 INR in platelet poor plasma or blood by coagulation assay            1.2      
       0.8-1.4        









 Activated partial thromboplastin time (aPTT) in platelet poor plasma 
bycoagulation assay - 18 15:25         









 Activated partial thromboplastin time (aPTT) in platelet poor plasma 
bycoagulation assay            29 s            24-35        









 Comprehensive metabolic panel - 18 15:25         









 Serum or plasma sodium measurement (moles/volume)            140 mmol/L       
     135-145        

 

 Serum or plasma potassium measurement (moles/volume)            4.1 mmol/L    
        3.6-5.0        

 

 Serum or plasma chloride measurement (moles/volume)            111 mmol/L     
               

 

 Carbon dioxide            18 mmol/L            21-32        

 

 Serum or plasma anion gap determination (moles/volume)            11 mmol/L   
         5-14        

 

 Serum or plasma urea nitrogen measurement (mass/volume)            56 mg/dL   
         7-18        

 

 Serum or plasma creatinine measurement (mass/volume)            1.48 mg/dL    
        0.60-1.30        

 

 Serum or plasma urea nitrogen/creatinine mass ratio            38             
NRG        

 

 Serum or plasma creatinine measurement with calculation of estimated 
glomerular filtration rate            47             NRG        

 

 Serum or plasma glucose measurement (mass/volume)            94 mg/dL         
           

 

 Serum or plasma calcium measurement (mass/volume)            9.3 mg/dL        
    8.5-10.1        

 

 Serum or plasma total bilirubin measurement (mass/volume)            0.5 mg/dL
            0.1-1.0        

 

 Serum or plasma alkaline phosphatase measurement (enzymatic activity/volume)  
          56 U/L                    

 

 Serum or plasma aspartate aminotransferase measurement (enzymatic activity/
volume)            13 U/L            5-34        

 

 Serum or plasma alanine aminotransferase measurement (enzymatic activity/volume
)            16 U/L            0-55        

 

 Serum or plasma protein measurement (mass/volume)            5.9 g/dL         
   6.4-8.2        

 

 Serum or plasma albumin measurement (mass/volume)            3.8 g/dL         
   3.2-4.5        









 Serum or plasma lithium measurement (moles/volume) - 18 15:25         









 BNP level            23.9 pg/mL            <100.0        









 Serum or plasma troponin i.cardiac measurement (mass/volume) - 18 15:25 
        









 Serum or plasma troponin i.cardiac measurement (mass/volume)            < ng/
mL            <0.30        









 THYROID STIMULATING HORMONE - 18 15:25         









 THYROID STIMULATING HORMONE            1.75 u[iU]/mL            0.35-4.94     
   









 Automated blood complete blood count (hemogram) panel - 04/10/18 05:46         









 Blood leukocytes automated count (number/volume)            6.2 10*3/uL       
     4.3-11.0        

 

 Blood erythrocytes automated count (number/volume)            2.66 10*6/uL    
        4.35-5.85        

 

 Venous blood hemoglobin measurement (mass/volume)            9.3 g/dL         
   13.3-17.7        

 

 Blood hematocrit (volume fraction)            28 %            40-54        

 

 Automated erythrocyte mean corpuscular volume            105 [foz_us]         
   80-99        

 

 Automated erythrocyte mean corpuscular hemoglobin (mass per erythrocyte)      
      35 pg            25-34        

 

 Automated erythrocyte mean corpuscular hemoglobin concentration measurement (
mass/volume)            34 g/dL            32-36        

 

 Automated erythrocyte distribution width ratio            13.9 %            
10.0-14.5        

 

 Automated blood platelet count (count/volume)            200 10*3/uL          
  130-400        

 

 Automated blood platelet mean volume measurement            9.7 [foz_us]      
      7.4-10.4        









 Comprehensive metabolic panel - 04/10/18 05:46         









 Serum or plasma sodium measurement (moles/volume)            141 mmol/L       
     135-145        

 

 Serum or plasma potassium measurement (moles/volume)            4.0 mmol/L    
        3.6-5.0        

 

 Serum or plasma chloride measurement (moles/volume)            113 mmol/L     
               

 

 Carbon dioxide            24 mmol/L            21-32        

 

 Serum or plasma anion gap determination (moles/volume)            4 mmol/L    
        5-14        

 

 Serum or plasma urea nitrogen measurement (mass/volume)            36 mg/dL   
         7-18        

 

 Serum or plasma creatinine measurement (mass/volume)            1.20 mg/dL    
        0.60-1.30        

 

 Serum or plasma urea nitrogen/creatinine mass ratio            30             
NRG        

 

 Serum or plasma creatinine measurement with calculation of estimated 
glomerular filtration rate            60             NRG        

 

 Serum or plasma glucose measurement (mass/volume)            94 mg/dL         
           

 

 Serum or plasma calcium measurement (mass/volume)            8.6 mg/dL        
    8.5-10.1        

 

 Serum or plasma total bilirubin measurement (mass/volume)            0.5 mg/dL
            0.1-1.0        

 

 Serum or plasma alkaline phosphatase measurement (enzymatic activity/volume)  
          46 U/L                    

 

 Serum or plasma aspartate aminotransferase measurement (enzymatic activity/
volume)            13 U/L            5-34        

 

 Serum or plasma alanine aminotransferase measurement (enzymatic activity/volume
)            14 U/L            0-55        

 

 Serum or plasma protein measurement (mass/volume)            5.3 g/dL         
   6.4-8.2        

 

 Serum or plasma albumin measurement (mass/volume)            3.4 g/dL         
   3.2-4.5        









 Whole blood hemoglobin and hematocrit panel - 04/10/18 11:45         









 Venous blood hemoglobin measurement (mass/volume)            8.9 g/dL         
   13.3-17.7        

 

 Blood hematocrit (volume fraction)            27 %            40-54        









 Complete blood count (CBC) with automated white blood cell (WBC) differential 
- 18 10:15         









 Blood leukocytes automated count (number/volume)            6.9 10*3/uL       
     4.3-11.0        

 

 Blood erythrocytes automated count (number/volume)            3.44 10*6/uL    
        4.35-5.85        

 

 Venous blood hemoglobin measurement (mass/volume)            11.2 g/dL        
    13.3-17.7        

 

 Blood hematocrit (volume fraction)            34 %            40-54        

 

 Automated erythrocyte mean corpuscular volume            99 [foz_us]          
  80-99        

 

 Automated erythrocyte mean corpuscular hemoglobin (mass per erythrocyte)      
      33 pg            25-34        

 

 Automated erythrocyte mean corpuscular hemoglobin concentration measurement (
mass/volume)            33 g/dL            32-36        

 

 Automated erythrocyte distribution width ratio            14.7 %            
10.0-14.5        

 

 Automated blood platelet count (count/volume)            268 10*3/uL          
  130-400        

 

 Automated blood platelet mean volume measurement            9.1 [foz_us]      
      7.4-10.4        

 

 Automated blood neutrophils/100 leukocytes            70 %            42-75   
     

 

 Automated blood lymphocytes/100 leukocytes            17 %            12-44   
     

 

 Blood monocytes/100 leukocytes            8 %            0-12        

 

 Automated blood eosinophils/100 leukocytes            4 %            0-10     
   

 

 Automated blood basophils/100 leukocytes            0 %            0-10        

 

 Blood neutrophils automated count (number/volume)            4.8 10*3         
   1.8-7.8        

 

 Blood lymphocytes automated count (number/volume)            1.2 10*3         
   1.0-4.0        

 

 Blood monocytes automated count (number/volume)            0.6 10*3            
0.0-1.0        

 

 Automated eosinophil count            0.3 10*3/uL            0.0-0.3        

 

 Automated blood basophil count (count/volume)            0.0 10*3/uL          
  0.0-0.1        









 Whole blood basic metabolic panel - 18 10:15         









 Serum or plasma sodium measurement (moles/volume)            140 mmol/L       
     135-145        

 

 Serum or plasma potassium measurement (moles/volume)            4.4 mmol/L    
        3.6-5.0        

 

 Serum or plasma chloride measurement (moles/volume)            108 mmol/L     
               

 

 Carbon dioxide            22 mmol/L            21-32        

 

 Serum or plasma anion gap determination (moles/volume)            10 mmol/L   
         5-14        

 

 Serum or plasma urea nitrogen measurement (mass/volume)            14 mg/dL   
         7-18        

 

 Serum or plasma creatinine measurement (mass/volume)            1.29 mg/dL    
        0.60-1.30        

 

 Serum or plasma urea nitrogen/creatinine mass ratio            11             
NRG        

 

 Serum or plasma creatinine measurement with calculation of estimated 
glomerular filtration rate            55             NRG        

 

 Serum or plasma glucose measurement (mass/volume)            97 mg/dL         
           

 

 Serum or plasma calcium measurement (mass/volume)            10.5 mg/dL       
     8.5-10.1        









 Methicillin resistant Staphylococcus aureus (MRSA) screening culture -  10:15         









 MRSA SCREEN RESULT            MRSA ISOLATED             NRG        









 Influenza virus A and B antigen detection - 19 11:57         









 FLU RESULT            NEGATIVE FOR INFLUENZA A AND B ANTIGENS BY IA           
  NRG        









 Arterial blood gas measurement - 19 12:14         









 Blood pCO2            36 mm[Hg]            35-45        

 

 Blood pO2            74 mm[Hg]            79-93        

 

 Arterial blood bicarbonate measurement (moles/volume)            23 mmol/L    
        23-27        

 

 Arterial blood base excess by calculation            -0.9 mmol/L            -
2.5-2.5        

 

 Arterial blood oxygen saturation measurement            91 %            
        

 

 * Inhaled oxygen flow rate            ROOM AIR             NRG        

 

 Arterial blood pH measurement with patient temperature correction            
7.42             7.37-7.43        

 

 Arterial blood carbon dioxide, total measurement (moles/volume)            
23.5 mmol/L            21.0-31.0        

 

 Body site            L RAD             NRG        

 

 Assessment of wrist artery patency prior to arterial puncture            YES-
POS             NRG        

 

 Setting of ventilation mode            NO             NRG        

 

 Measurement of body temperature            102.3             NRG        









 Complete blood count (CBC) with automated white blood cell (WBC) differential 
- 19 13:15         









 Blood leukocytes automated count (number/volume)            16.2 10*3/uL      
      4.3-11.0        

 

 Blood erythrocytes automated count (number/volume)            3.89 10*6/uL    
        4.35-5.85        

 

 Venous blood hemoglobin measurement (mass/volume)            12.6 g/dL        
    13.3-17.7        

 

 Blood hematocrit (volume fraction)            38 %            40-54        

 

 Automated erythrocyte mean corpuscular volume            98 [foz_us]          
  80-99        

 

 Automated erythrocyte mean corpuscular hemoglobin (mass per erythrocyte)      
      32 pg            25-34        

 

 Automated erythrocyte mean corpuscular hemoglobin concentration measurement (
mass/volume)            33 g/dL            32-36        

 

 Automated erythrocyte distribution width ratio            16.2 %            
10.0-14.5        

 

 Automated blood platelet count (count/volume)            207 10*3/uL          
  130-400        

 

 Automated blood platelet mean volume measurement            9.6 [foz_us]      
      7.4-10.4        

 

 Automated blood neutrophils/100 leukocytes            88 %            42-75   
     

 

 Automated blood lymphocytes/100 leukocytes            7 %            12-44    
    

 

 Blood monocytes/100 leukocytes            5 %            0-12        

 

 Automated blood eosinophils/100 leukocytes            0 %            0-10     
   

 

 Automated blood basophils/100 leukocytes            0 %            0-10        

 

 Blood neutrophils automated count (number/volume)            14.2 10*3        
    1.8-7.8        

 

 Blood lymphocytes automated count (number/volume)            1.1 10*3         
   1.0-4.0        

 

 Blood monocytes automated count (number/volume)            0.9 10*3            
0.0-1.0        

 

 Automated eosinophil count            0.0 10*3/uL            0.0-0.3        

 

 Automated blood basophil count (count/volume)            0.0 10*3/uL          
  0.0-0.1        









 Blood lactic acid measurement (moles/volume) - 19 13:15         









 Blood lactic acid measurement (moles/volume)            0.80 mmol/L            
0.50-2.00        









 Comprehensive metabolic panel - 19 13:15         









 Serum or plasma sodium measurement (moles/volume)            137 mmol/L       
     135-145        

 

 Serum or plasma potassium measurement (moles/volume)            4.0 mmol/L    
        3.6-5.0        

 

 Serum or plasma chloride measurement (moles/volume)            105 mmol/L     
               

 

 Carbon dioxide            24 mmol/L            21-32        

 

 Serum or plasma anion gap determination (moles/volume)            8 mmol/L    
        5-14        

 

 Serum or plasma urea nitrogen measurement (mass/volume)            19 mg/dL   
         7-18        

 

 Serum or plasma creatinine measurement (mass/volume)            1.33 mg/dL    
        0.60-1.30        

 

 Serum or plasma urea nitrogen/creatinine mass ratio            14             
NRG        

 

 Serum or plasma creatinine measurement with calculation of estimated 
glomerular filtration rate            53             NRG        

 

 Serum or plasma glucose measurement (mass/volume)            111 mg/dL        
            

 

 Serum or plasma calcium measurement (mass/volume)            9.3 mg/dL        
    8.5-10.1        

 

 Serum or plasma total bilirubin measurement (mass/volume)            1.1 mg/dL
            0.1-1.0        

 

 Serum or plasma alkaline phosphatase measurement (enzymatic activity/volume)  
          86 U/L                    

 

 Serum or plasma aspartate aminotransferase measurement (enzymatic activity/
volume)            15 U/L            5-34        

 

 Serum or plasma alanine aminotransferase measurement (enzymatic activity/volume
)            17 U/L            0-55        

 

 Serum or plasma protein measurement (mass/volume)            6.8 g/dL         
   6.4-8.2        

 

 Serum or plasma albumin measurement (mass/volume)            3.8 g/dL         
   3.2-4.5        

 

 CALCIUM CORRECTED            9.5 mg/dL            8.5-10.1        









 Blood manual differential performed detection - 19 13:15         









 Blood monocytes/100 leukocytes            6 %            NRG        

 

 Manual blood segmented neutrophils/100 leukocytes            77 %            
NRG        

 

 Blood band neutrophils/100 leukocytes            12 %            NRG        

 

 Manual blood lymphocytes/100 leukocytes            4 %            NRG        

 

 Manual eosinophils/100 leukocytes in nose            0 %            NRG        

 

 Manual blood basophils/100 leukocytes            1 %            NRG        

 

 Blood anisocytosis detection by light microscopy            SLIGHT             
NRG        









 Bacterial blood culture - 19 13:15         









 Bacterial blood culture            NG             NRG        









 Bacterial blood culture - 19 13:25         









 Bacterial blood culture            NG             [facility]        









 Sputum Gram stain - 19 15:45         









 Sputum Gram stain            2019, 605.             NRG        









 Bacterial sputum culture - 19 15:45         









 FREE TEXT EXTERNAL            ID REPORTED 19 16:05             NRG        

 

 QUANTITY OF GROWTH            .             NRG        

 

 FREE TEXT ENTRY 2            SENSITIVITY REPORTED 19 09:05             NRG
        

 

 Bacterial sputum culture            USUAL RESP             NRG        









 RML Sensitivity Panel - 19 15:45         









 Gentamicin susceptibility test by minimum inhibitory concentration            <
=            NRG        

 

 Trimethoprim/sulfamethoxazole susceptibility test by minimum 
inhibitoryconcentration            S             NRG        

 

 Levofloxacin susceptibility test by minimum inhibitory concentration          
  >             NRG        

 

 Ampicillin susceptibility test by minimum inhibitory concentration            <
=            NRG        

 

 Cefazolin susceptibility test by minimum inhibitory concentration            2
             NRG        

 

 Ceftriaxone susceptibility test by minimum inhibitory concentration            
<=            NRG        

 

 Piperacillin/tazobactam susceptibility test by minimum inhibitory 
concentration            =            NRG        

 

 Ciprofloxacin susceptibility test by minimum inhibitory concentration         
   >             NRG        

 

 Meropenem susceptibility test by minimum inhibitory concentration            <
=            NRG        

 

 Amoxicillin and clavulanate potassium susc LIAM            <=            NRG   
     

 

 Imipenem susceptibility test by minimum inhibitory concentration            <=
            NRG        









 Complete blood count (CBC) with automated white blood cell (WBC) differential 
- 19 05:20         









 Blood leukocytes automated count (number/volume)            12.6 10*3/uL      
      4.3-11.0        

 

 Blood erythrocytes automated count (number/volume)            3.79 10*6/uL    
        4.35-5.85        

 

 Venous blood hemoglobin measurement (mass/volume)            12.4 g/dL        
    13.3-17.7        

 

 Blood hematocrit (volume fraction)            37 %            40-54        

 

 Automated erythrocyte mean corpuscular volume            99 [foz_us]          
  80-99        

 

 Automated erythrocyte mean corpuscular hemoglobin (mass per erythrocyte)      
      33 pg            25-34        

 

 Automated erythrocyte mean corpuscular hemoglobin concentration measurement (
mass/volume)            33 g/dL            32-36        

 

 Automated erythrocyte distribution width ratio            16.6 %            
10.0-14.5        

 

 Automated blood platelet count (count/volume)            217 10*3/uL          
  130-400        

 

 Automated blood platelet mean volume measurement            9.8 [foz_us]      
      7.4-10.4        

 

 Automated blood neutrophils/100 leukocytes            83 %            42-75   
     

 

 Automated blood lymphocytes/100 leukocytes            10 %            12-44   
     

 

 Blood monocytes/100 leukocytes            7 %            0-12        

 

 Automated blood eosinophils/100 leukocytes            1 %            0-10     
   

 

 Automated blood basophils/100 leukocytes            0 %            0-10        

 

 Blood neutrophils automated count (number/volume)            10.4 10*3        
    1.8-7.8        

 

 Blood lymphocytes automated count (number/volume)            1.3 10*3         
   1.0-4.0        

 

 Blood monocytes automated count (number/volume)            0.8 10*3            
0.0-1.0        

 

 Automated eosinophil count            0.1 10*3/uL            0.0-0.3        

 

 Automated blood basophil count (count/volume)            0.0 10*3/uL          
  0.0-0.1        









 Whole blood basic metabolic panel - 19 05:20         









 Serum or plasma sodium measurement (moles/volume)            137 mmol/L       
     135-145        

 

 Serum or plasma potassium measurement (moles/volume)            4.0 mmol/L    
        3.6-5.0        

 

 Serum or plasma chloride measurement (moles/volume)            105 mmol/L     
               

 

 Carbon dioxide            19 mmol/L            21-32        

 

 Serum or plasma anion gap determination (moles/volume)            13 mmol/L   
         5-14        

 

 Serum or plasma urea nitrogen measurement (mass/volume)            19 mg/dL   
         7-18        

 

 Serum or plasma creatinine measurement (mass/volume)            1.36 mg/dL    
        0.60-1.30        

 

 Serum or plasma urea nitrogen/creatinine mass ratio            14             
NRG        

 

 Serum or plasma creatinine measurement with calculation of estimated 
glomerular filtration rate            52             NRG        

 

 Serum or plasma glucose measurement (mass/volume)            98 mg/dL         
           

 

 Serum or plasma calcium measurement (mass/volume)            9.3 mg/dL        
    8.5-10.1        









 PT panel in platelet poor plasma by coagulation assay - 19 05:30         









 Prothrombin time (PT) in platelet poor plasma by coagulation assay            
16.4 s            12.2-14.7        

 

 INR in platelet poor plasma or blood by coagulation assay            1.3      
       0.8-1.4        









 Complete blood count (CBC) with automated white blood cell (WBC) differential 
- 19 06:03         









 Blood leukocytes automated count (number/volume)            8.1 10*3/uL       
     4.3-11.0        

 

 Blood erythrocytes automated count (number/volume)            3.67 10*6/uL    
        4.35-5.85        

 

 Venous blood hemoglobin measurement (mass/volume)            12.1 g/dL        
    13.3-17.7        

 

 Blood hematocrit (volume fraction)            37 %            40-54        

 

 Automated erythrocyte mean corpuscular volume            100 [foz_us]         
   80-99        

 

 Automated erythrocyte mean corpuscular hemoglobin (mass per erythrocyte)      
      33 pg            25-34        

 

 Automated erythrocyte mean corpuscular hemoglobin concentration measurement (
mass/volume)            33 g/dL            32-36        

 

 Automated erythrocyte distribution width ratio            16.2 %            
10.0-14.5        

 

 Automated blood platelet count (count/volume)            209 10*3/uL          
  130-400        

 

 Automated blood platelet mean volume measurement            9.7 [foz_us]      
      7.4-10.4        

 

 Automated blood neutrophils/100 leukocytes            77 %            42-75   
     

 

 Automated blood lymphocytes/100 leukocytes            13 %            12-44   
     

 

 Blood monocytes/100 leukocytes            8 %            0-12        

 

 Automated blood eosinophils/100 leukocytes            2 %            0-10     
   

 

 Automated blood basophils/100 leukocytes            0 %            0-10        

 

 Blood neutrophils automated count (number/volume)            6.2 10*3         
   1.8-7.8        

 

 Blood lymphocytes automated count (number/volume)            1.0 10*3         
   1.0-4.0        

 

 Blood monocytes automated count (number/volume)            0.7 10*3            
0.0-1.0        

 

 Automated eosinophil count            0.2 10*3/uL            0.0-0.3        

 

 Automated blood basophil count (count/volume)            0.0 10*3/uL          
  0.0-0.1        









 Whole blood basic metabolic panel - 19 06:03         









 Serum or plasma sodium measurement (moles/volume)            139 mmol/L       
     135-145        

 

 Serum or plasma potassium measurement (moles/volume)            4.0 mmol/L    
        3.6-5.0        

 

 Serum or plasma chloride measurement (moles/volume)            108 mmol/L     
               

 

 Carbon dioxide            21 mmol/L            21-32        

 

 Serum or plasma anion gap determination (moles/volume)            10 mmol/L   
         5-14        

 

 Serum or plasma urea nitrogen measurement (mass/volume)            18 mg/dL   
         7-18        

 

 Serum or plasma creatinine measurement (mass/volume)            1.33 mg/dL    
        0.60-1.30        

 

 Serum or plasma urea nitrogen/creatinine mass ratio            14             
NRG        

 

 Serum or plasma creatinine measurement with calculation of estimated 
glomerular filtration rate            53             NRG        

 

 Serum or plasma glucose measurement (mass/volume)            147 mg/dL        
            

 

 Serum or plasma calcium measurement (mass/volume)            8.9 mg/dL        
    8.5-10.1        









 Complete blood count (CBC) with automated white blood cell (WBC) differential 
- 19 18:19         









 Blood leukocytes automated count (number/volume)            9.5 10*3/uL       
     4.3-11.0        

 

 Blood erythrocytes automated count (number/volume)            4.01 10*6/uL    
        4.35-5.85        

 

 Venous blood hemoglobin measurement (mass/volume)            13.2 g/dL        
    13.3-17.7        

 

 Blood hematocrit (volume fraction)            39 %            40-54        

 

 Automated erythrocyte mean corpuscular volume            98 [foz_us]          
  80-99        

 

 Automated erythrocyte mean corpuscular hemoglobin (mass per erythrocyte)      
      33 pg            25-34        

 

 Automated erythrocyte mean corpuscular hemoglobin concentration measurement (
mass/volume)            34 g/dL            32-36        

 

 Automated erythrocyte distribution width ratio            16.6 %            
10.0-14.5        

 

 Automated blood platelet count (count/volume)            264 10*3/uL          
  130-400        

 

 Automated blood platelet mean volume measurement            9.3 [foz_us]      
      7.4-10.4        

 

 Automated blood neutrophils/100 leukocytes            87 %            42-75   
     

 

 Automated blood lymphocytes/100 leukocytes            5 %            12-44    
    

 

 Blood monocytes/100 leukocytes            7 %            0-12        

 

 Automated blood eosinophils/100 leukocytes            1 %            0-10     
   

 

 Automated blood basophils/100 leukocytes            0 %            0-10        

 

 Blood neutrophils automated count (number/volume)            8.3 10*3         
   1.8-7.8        

 

 Blood lymphocytes automated count (number/volume)            0.4 10*3         
   1.0-4.0        

 

 Blood monocytes automated count (number/volume)            0.7 10*3            
0.0-1.0        

 

 Automated eosinophil count            0.1 10*3/uL            0.0-0.3        

 

 Automated blood basophil count (count/volume)            0.0 10*3/uL          
  0.0-0.1        









 PT panel in platelet poor plasma by coagulation assay - 19 18:19         









 Prothrombin time (PT) in platelet poor plasma by coagulation assay            
14.7 s            12.2-14.7        

 

 INR in platelet poor plasma or blood by coagulation assay            1.2      
       0.8-1.4        









 Activated partial thromboplastin time (aPTT) in platelet poor plasma 
bycoagulation assay - 19 18:19         









 Activated partial thromboplastin time (aPTT) in platelet poor plasma 
bycoagulation assay            32 s            24-35        









 Influenza virus A and B antigen detection - 19 18:19         









 FLU RESULT            NEGATIVE FOR INFLUENZA A AND B ANTIGENS BY IA           
  [facility]        









 Blood manual differential performed detection - 19 18:19         









 Blood monocytes/100 leukocytes            3 %            NRG        

 

 Manual blood segmented neutrophils/100 leukocytes            84 %            
NRG        

 

 Blood band neutrophils/100 leukocytes            7 %            NRG        

 

 Manual blood lymphocytes/100 leukocytes            6 %            NRG        

 

 Blood erythrocyte morphology finding identification            NORMAL         
    NRG        









 Blood lactic acid measurement (moles/volume) - 19 18:19         









 Blood lactic acid measurement (moles/volume)            0.92 mmol/L            
0.50-2.00        









 Serum or plasma lithium measurement (moles/volume) - 19 18:19         









 BNP level            67.8 pg/mL            <100.0        









 Comprehensive metabolic panel - 19 18:19         









 Serum or plasma sodium measurement (moles/volume)            136 mmol/L       
     135-145        

 

 Serum or plasma potassium measurement (moles/volume)            4.2 mmol/L    
        3.6-5.0        

 

 Serum or plasma chloride measurement (moles/volume)            105 mmol/L     
               

 

 Carbon dioxide            21 mmol/L            21-32        

 

 Serum or plasma anion gap determination (moles/volume)            10 mmol/L   
         5-14        

 

 Serum or plasma urea nitrogen measurement (mass/volume)            18 mg/dL   
         7-18        

 

 Serum or plasma creatinine measurement (mass/volume)            1.42 mg/dL    
        0.60-1.30        

 

 Serum or plasma urea nitrogen/creatinine mass ratio            13             
NRG        

 

 Serum or plasma creatinine measurement with calculation of estimated 
glomerular filtration rate            49             NRG        

 

 Serum or plasma glucose measurement (mass/volume)            111 mg/dL        
            

 

 Serum or plasma calcium measurement (mass/volume)            9.9 mg/dL        
    8.5-10.1        

 

 Serum or plasma total bilirubin measurement (mass/volume)            0.4 mg/dL
            0.1-1.0        

 

 Serum or plasma alkaline phosphatase measurement (enzymatic activity/volume)  
          105 U/L                    

 

 Serum or plasma aspartate aminotransferase measurement (enzymatic activity/
volume)            21 U/L            5-34        

 

 Serum or plasma alanine aminotransferase measurement (enzymatic activity/volume
)            19 U/L            0-55        

 

 Serum or plasma protein measurement (mass/volume)            7.4 g/dL         
   6.4-8.2        

 

 Serum or plasma albumin measurement (mass/volume)            4.1 g/dL         
   3.2-4.5        

 

 CALCIUM CORRECTED            9.8 mg/dL            8.5-10.1        









 Magnesium - 19 18:19         









 Magnesium            1.5 mg/dL            1.8-2.4        









 Serum or plasma troponin i.cardiac measurement (mass/volume) - 19 18:19 
        









 Serum or plasma troponin i.cardiac measurement (mass/volume)            < ng/
mL            <0.028        









 Complete urinalysis with reflex to culture - 19 18:24         









 Urine color determination            YELLOW             NRG        

 

 Urine clarity determination            SLIGHTLY CLOUDY             NRG        

 

 Urine pH measurement by test strip            5             5-9        

 

 Specific gravity of urine by test strip            1.020             1.016-
1.022        

 

 Urine protein assay by test strip, semi-quantitative            NEGATIVE      
       NEGATIVE        

 

 Urine glucose detection by automated test strip            NEGATIVE           
  NEGATIVE        

 

 Erythrocytes detection in urine sediment by light microscopy            1+    
         NEGATIVE        

 

 Urine ketones detection by automated test strip            NEGATIVE           
  NEGATIVE        

 

 Urine nitrite detection by test strip            NEGATIVE             NEGATIVE
        

 

 Urine total bilirubin detection by test strip            NEGATIVE             
NEGATIVE        

 

 Urine urobilinogen measurement by automated test strip (mass/volume)          
  NORMAL             NORMAL        

 

 Urine leukocyte esterase detection by dipstick            1+             
NEGATIVE        

 

 Automated urine sediment erythrocyte count by microscopy (number/high power 
field)             [HPF]            NRG        

 

 Automated urine sediment leukocyte count by microscopy (number/high power field
)             [HPF]            NRG        

 

 Bacteria detection in urine sediment by light microscopy            TRACE     
        NRG        

 

 Crystals detection in urine sediment by light microscopy            NONE      
       NRG        

 

 Casts detection in urine sediment by light microscopy            NONE         
    NRG        

 

 Mucus detection in urine sediment by light microscopy            NEGATIVE     
        NRG        

 

 Complete urinalysis with reflex to culture            CULTURE PENDING         
    NRG        



                                                                               
                             



Encounters

      





 ACCT No.            Visit Date/Time            Discharge            Status    
        Pt. Type            Provider            Facility            Loc./Unit  
          Complaint        

 

 [id number]            2019 07:23:00            2019 23:59:59    
        CLS            Preadmit            NOHELIA AUGUST MD            [facility]            CARD            CAD,DYSPNEA        

 

 [id number]            2019 09:25:00            2019 23:59:59    
        CLS            Outpatient            EDUAR JOHNSON DO            
[facility]            RAD            K21.9 GASTRO-
ESOPHAGEAL REFLUX DISEASE        

 

 [id number]            2019 10:25:00            2019 14:09:00    
        DIS            Inpatient            JACI MATIAS, DONALD SMITH            [facility]            4TH            INFLUENZA SYNDROME RESP 
DISTRESS        

 

 [id number]            2018 08:15:00            2018 23:59:59    
        CLS            Preadmit            ZACH DIEGO APRN            
[facility]            PULM            J44.9 COPD        

 

 [id number]            2018 08:00:00            2018 00:01:00    
        DIS            Outpatient            ZACH DIEGO APRN          
  [facility]            PULM            J44.9 COPD        

 

 [id number]            12/10/2018 09:13:00            12/10/2018 23:59:59    
        CLS            Outpatient            ZACH DIEGO APRN          
  [facility]            RAD            ENLARGED LYMPH 
NODES        

 

 [id number]            2018 08:04:00            2018 00:01:00    
        DIS            Outpatient            ZACH DIEGO APRN          
  [facility]            PULM            J44.9 COPD        

 

 [id number]            2018 11:07:00            2018 23:59:59    
        CLS            Outpatient            EDUAR JOHNSON DO            
[facility]            RAD            HIP PAIN        

 

 [id number]            2018 10:09:00            2018 23:59:59    
        CLS            Outpatient            ZACH DIEGO APRN          
  [facility]            RAD            R59.9 LYMPH NODE 
ENLARGEMENT        

 

 [id number]            2018 06:00:00            2018 12:20:00    
        DIS            Outpatient            MICH COLLIER DO            [facility]            SDC            UMBILICAL HERNIA        

 

 [id number]            2018 09:33:00            2018 10:20:00    
        DIS            Outpatient            MICH COLLIER DO            [facility]            PREOP            UMBILICAL HERNIA      
  

 

 [id number]            2018 12:49:00            2018 15:30:00    
        DIS            Outpatient            MICH COLLIER DO            [facility]            ENDO            REFLUX, 
GASTROINTESTINAL BLEEDING        

 

 [id number]            2018 05:40:00            2018 12:13:00    
        DIS            Outpatient            MICH COLLIER DO            [facility]            PREOP            REFLUX,
GASTROINTESTINAL BLEEDING        

 

 [id number]            2018 14:58:00            04/10/2018 17:50:00    
        DIS            Inpatient            NOHELIA AUGUST MD            [facility]            4TH            SOB        

 

 [id number]            2018 10:39:00            2018 23:59:59    
        CLS            Outpatient            NOHELIA AUGUST MD            [facility]            RAD            CAD,CONFUSION        

 

 [id number]            2017 15:06:00            2017 23:59:59    
        CLS            Outpatient            DANA DILLON DO            [facility]            RT            DYSPNEA R06.02        

 

 [id number]            2017 16:45:00            2017 16:45:00    
        CAN            Preadmit            DANA DILLON DO            [facility]            RT            DYSPNEA,HX OF TOBACCO USE,
MARYANA ON CPAP        

 

 [id number]            2017 08:48:00            2017 15:30:00    
        DIS            Outpatient            NOHELIA AUGUST MD            [facility]            CATH            ANM STRESS TEST,CAD    
    

 

 [id number]            2017 08:13:00            2017 23:59:59    
        CLS            Outpatient            TAE CHRISTIANSEN    
        [facility]            CARD            CAD,MARYANA ON 
CPAP,TIA        

 

 [id number]            2017 08:51:00            2017 15:30:00    
        DIS            Outpatient            MICH COLLIER DO            Via 
[facility]            OCCULT STOOLS        

 

 [id number]            2016 05:47:00            2016 13:50:00    
        DIS            Outpatient            MICH COLLIER DO            Via 
[facility]            PREOP            OCCULT STOOLS        

 

 [id number]            2016 10:15:00            2016 23:59:59    
        CLS            Preadmit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

## 2019-02-28 VITALS — DIASTOLIC BLOOD PRESSURE: 73 MMHG | SYSTOLIC BLOOD PRESSURE: 157 MMHG

## 2019-02-28 VITALS — DIASTOLIC BLOOD PRESSURE: 60 MMHG | SYSTOLIC BLOOD PRESSURE: 124 MMHG

## 2019-02-28 VITALS — DIASTOLIC BLOOD PRESSURE: 61 MMHG | SYSTOLIC BLOOD PRESSURE: 120 MMHG

## 2019-02-28 VITALS — DIASTOLIC BLOOD PRESSURE: 71 MMHG | SYSTOLIC BLOOD PRESSURE: 131 MMHG

## 2019-02-28 VITALS — SYSTOLIC BLOOD PRESSURE: 121 MMHG | DIASTOLIC BLOOD PRESSURE: 78 MMHG

## 2019-02-28 VITALS — SYSTOLIC BLOOD PRESSURE: 137 MMHG | DIASTOLIC BLOOD PRESSURE: 84 MMHG

## 2019-02-28 VITALS — SYSTOLIC BLOOD PRESSURE: 120 MMHG | DIASTOLIC BLOOD PRESSURE: 72 MMHG

## 2019-02-28 VITALS — SYSTOLIC BLOOD PRESSURE: 126 MMHG | DIASTOLIC BLOOD PRESSURE: 67 MMHG

## 2019-02-28 VITALS — DIASTOLIC BLOOD PRESSURE: 59 MMHG | SYSTOLIC BLOOD PRESSURE: 104 MMHG

## 2019-02-28 LAB
ALBUMIN SERPL-MCNC: 3.8 GM/DL (ref 3.2–4.5)
ALP SERPL-CCNC: 87 U/L (ref 40–136)
ALT SERPL-CCNC: 18 U/L (ref 0–55)
BASOPHILS # BLD AUTO: 0 10^3/UL (ref 0–0.1)
BASOPHILS NFR BLD AUTO: 0 % (ref 0–10)
BILIRUB SERPL-MCNC: 0.3 MG/DL (ref 0.1–1)
BUN/CREAT SERPL: 16
CALCIUM SERPL-MCNC: 9.3 MG/DL (ref 8.5–10.1)
CHLORIDE SERPL-SCNC: 107 MMOL/L (ref 98–107)
CO2 SERPL-SCNC: 21 MMOL/L (ref 21–32)
CREAT SERPL-MCNC: 1.35 MG/DL (ref 0.6–1.3)
EOSINOPHIL # BLD AUTO: 0 10^3/UL (ref 0–0.3)
EOSINOPHIL NFR BLD AUTO: 0 % (ref 0–10)
ERYTHROCYTE [DISTWIDTH] IN BLOOD BY AUTOMATED COUNT: 16.6 % (ref 10–14.5)
GFR SERPLBLD BASED ON 1.73 SQ M-ARVRAT: 52 ML/MIN
GLUCOSE SERPL-MCNC: 157 MG/DL (ref 70–105)
HCT VFR BLD CALC: 39 % (ref 40–54)
HGB BLD-MCNC: 13.1 G/DL (ref 13.3–17.7)
LYMPHOCYTES # BLD AUTO: 0.4 X 10^3 (ref 1–4)
LYMPHOCYTES NFR BLD AUTO: 6 % (ref 12–44)
MAGNESIUM SERPL-MCNC: 2.3 MG/DL (ref 1.8–2.4)
MANUAL DIFFERENTIAL PERFORMED BLD QL: NO
MCH RBC QN AUTO: 33 PG (ref 25–34)
MCHC RBC AUTO-ENTMCNC: 34 G/DL (ref 32–36)
MCV RBC AUTO: 98 FL (ref 80–99)
MONOCYTES # BLD AUTO: 0.1 X 10^3 (ref 0–1)
MONOCYTES NFR BLD AUTO: 1 % (ref 0–12)
NEUTROPHILS # BLD AUTO: 5.4 X 10^3 (ref 1.8–7.8)
NEUTROPHILS NFR BLD AUTO: 93 % (ref 42–75)
PLATELET # BLD: 237 10^3/UL (ref 130–400)
PMV BLD AUTO: 9.4 FL (ref 7.4–10.4)
POTASSIUM SERPL-SCNC: 4.3 MMOL/L (ref 3.6–5)
PROT SERPL-MCNC: 6.8 GM/DL (ref 6.4–8.2)
SODIUM SERPL-SCNC: 139 MMOL/L (ref 135–145)
WBC # BLD AUTO: 5.8 10^3/UL (ref 4.3–11)

## 2019-02-28 RX ADMIN — ACETAMINOPHEN PRN MG: 500 TABLET ORAL at 10:28

## 2019-02-28 RX ADMIN — SUCRALFATE SCH GM: 1 TABLET ORAL at 16:27

## 2019-02-28 RX ADMIN — NICOTINE SCH MG: 21 PATCH, EXTENDED RELEASE TRANSDERMAL at 08:57

## 2019-02-28 RX ADMIN — FLUTICASONE PROPIONATE AND SALMETEROL XINAFOATE SCH PUFF: 115; 21 AEROSOL, METERED RESPIRATORY (INHALATION) at 22:05

## 2019-02-28 RX ADMIN — METHYLPREDNISOLONE SODIUM SUCCINATE SCH MG: 125 INJECTION, POWDER, FOR SOLUTION INTRAMUSCULAR; INTRAVENOUS at 12:55

## 2019-02-28 RX ADMIN — IPRATROPIUM BROMIDE AND ALBUTEROL SULFATE SCH ML: .5; 3 SOLUTION RESPIRATORY (INHALATION) at 22:04

## 2019-02-28 RX ADMIN — IPRATROPIUM BROMIDE AND ALBUTEROL SULFATE SCH ML: .5; 3 SOLUTION RESPIRATORY (INHALATION) at 15:07

## 2019-02-28 RX ADMIN — INSULIN ASPART SCH UNIT: 100 INJECTION, SOLUTION INTRAVENOUS; SUBCUTANEOUS at 12:12

## 2019-02-28 RX ADMIN — GABAPENTIN SCH MG: 300 CAPSULE ORAL at 20:08

## 2019-02-28 RX ADMIN — METHYLPREDNISOLONE SODIUM SUCCINATE SCH MG: 40 INJECTION, POWDER, FOR SOLUTION INTRAMUSCULAR; INTRAVENOUS at 23:50

## 2019-02-28 RX ADMIN — IPRATROPIUM BROMIDE AND ALBUTEROL SULFATE SCH ML: .5; 3 SOLUTION RESPIRATORY (INHALATION) at 22:05

## 2019-02-28 RX ADMIN — IPRATROPIUM BROMIDE AND ALBUTEROL SULFATE SCH ML: .5; 3 SOLUTION RESPIRATORY (INHALATION) at 01:26

## 2019-02-28 RX ADMIN — SODIUM CHLORIDE SCH MLS/HR: 900 INJECTION INTRAVENOUS at 18:17

## 2019-02-28 RX ADMIN — IPRATROPIUM BROMIDE AND ALBUTEROL SULFATE SCH ML: .5; 3 SOLUTION RESPIRATORY (INHALATION) at 10:44

## 2019-02-28 RX ADMIN — INSULIN ASPART SCH UNIT: 100 INJECTION, SOLUTION INTRAVENOUS; SUBCUTANEOUS at 20:08

## 2019-02-28 RX ADMIN — SIMVASTATIN SCH MG: 10 TABLET, FILM COATED ORAL at 20:09

## 2019-02-28 RX ADMIN — INSULIN ASPART SCH UNIT: 100 INJECTION, SOLUTION INTRAVENOUS; SUBCUTANEOUS at 16:22

## 2019-02-28 RX ADMIN — METHYLPREDNISOLONE SODIUM SUCCINATE SCH MG: 125 INJECTION, POWDER, FOR SOLUTION INTRAMUSCULAR; INTRAVENOUS at 01:20

## 2019-02-28 RX ADMIN — METHYLPREDNISOLONE SODIUM SUCCINATE SCH MG: 125 INJECTION, POWDER, FOR SOLUTION INTRAMUSCULAR; INTRAVENOUS at 08:57

## 2019-02-28 RX ADMIN — SUCRALFATE SCH GM: 1 TABLET ORAL at 20:08

## 2019-02-28 RX ADMIN — AZITHROMYCIN SCH MG: 250 TABLET, FILM COATED ORAL at 20:08

## 2019-02-28 RX ADMIN — SODIUM CHLORIDE SCH MLS/HR: 4.5 INJECTION, SOLUTION INTRAVENOUS at 12:40

## 2019-02-28 RX ADMIN — LATANOPROST SCH DROP: 50 SOLUTION/ DROPS OPHTHALMIC at 20:07

## 2019-02-28 RX ADMIN — IPRATROPIUM BROMIDE AND ALBUTEROL SULFATE SCH ML: .5; 3 SOLUTION RESPIRATORY (INHALATION) at 07:31

## 2019-02-28 RX ADMIN — METHYLPREDNISOLONE SODIUM SUCCINATE SCH MG: 40 INJECTION, POWDER, FOR SOLUTION INTRAMUSCULAR; INTRAVENOUS at 18:17

## 2019-02-28 RX ADMIN — INSULIN ASPART SCH UNIT: 100 INJECTION, SOLUTION INTRAVENOUS; SUBCUTANEOUS at 05:18

## 2019-02-28 NOTE — HISTORY & PHYSICAL-HOSPITALIST
History of Present Illness


HPI/Chief Complaint


Pt is a 73yoCM known to me from recent admission who presented to the ER due to 

SOB. He states his symptoms started abruptly at 9am yesterday with a headache 

and SOB at 10am. He continued to decline throughout the day and felt very weak. 

His wife states that she was unable to get him to stand so she called her son 

in law and they still had difficulty. He normally doesn't wear oxygen but put 

it on yesterday due to his shortness of breath. Eventually he had such 

difficulty breathing that they decided to bring him to the ER. On arrival he 

was found to be febrile and hypoxia with diffuse wheezes. He denies any 

wheezing to me. He does complain of coughing with sputum production. He 

continues to smoke as well. Today he states he feel much better but is still 

coughing.


Source:  patient, family


Date Seen


19


Time Seen by a Provider:  11:30


Attending Physician


Aly Nguyễn MD


PCP


Walker Velasquez DO


Referring Physician





Date of Admission


2019 at 19:40





Home Medications & Allergies


Home Medications


Reviewed patient Home Medication Reconciliation performed by pharmacy 

medication reconciliations technician and/or nursing.


Patients Allergies have been reviewed.





Allergies





Allergies


Coded Allergies


  No Known Drug Allergies (Verified18)








Past Medical-Social-Family Hx


Past Med/Social Hx:  Reviewed Nursing Past Med/Soc Hx


Patient Social History


Marrital Status:  


Employed/Student:  employed


Alcohol Use:  Denies Use


Recreational Drug Use:  No


Smoking Status:  Current Everyday Smoker (1-2 PPD)


Type Used:  Cigarettes


Physical Abuse Screen:  No


Sexual Abuse:  No


Recent Foreign Travel:  No


Contact w/other who traveled:  No


Recent Hopitalizations:  Yes


Recent Infectious Disease Expo:  No





Immunizations Up To Date


Tetanus Booster (TDap):  More than 5yrs


Date of Pneumonia Vaccine:  Aug 1, 2015


Date of Influenza Vaccine:  2018





Seasonal Allergies


Seasonal Allergies:  Yes





Past Medical History


Surgeries:  Appendectomy, Cardiac, Coronary Stent, Gallbladder, Neurological, 

Orthopedic, Vascular Surgery


Currently Using CPAP:  Yes


Currently Using BIPAP:  No


Cardiac:  Coronary Artery Disease, High Cholesterol, Hypertension


Neurological:  Stroke, TIA, Traumatic Brain Injury


Reproductive:  No


Sexually Transmitted Disease:  No


HIV/AIDS:  No


Genitourinary:  Renal Failure


Gastrointestinal:  Abdominal Hernia, Gastroesophageal Reflux, Gastrointestinal 

Bleed, Chronic Constipation


Musculoskeletal:  Degenerate Disk Disease, Osteoporosis, Arthritis, Back Injury

, Chronic Back Pain


Endocrine:  Hypothyroidsim, Diabetes, Non-Insulin dep


HEENT:  Tinnitis, Eye Injury, Glaucoma


Loss of Vision:  Right


Hearing Impairment:  Hard of Hearing


Psychosocial:  Depression


History of Blood Disorders:  No


Adverse Reaction to Blood Barrera:  No





Family History


Reviewed Nursing Family Hx





Alcoholism


  19 FATHER, 


Cancer


Family history: Asthma


  19 MOTHER


Family history: Osteoporosis


  19 MOTHER


Hearing loss


  19 MOTHER


No Pertinent Family Hx





Review of Systems


Constitutional:  fever, weakness


EENTM:  no symptoms reported


Respiratory:  cough, short of breath


Cardiovascular:  no symptoms reported


Gastrointestinal:  no symptoms reported


Genitourinary:  no symptoms reported


Musculoskeletal:  no symptoms reported


Skin:  no symptoms reported


Psychiatric/Neurological:  Headache, Weakness





Physical Exam


Physical Exam


Vital Signs





Vital Signs - First Documented








 19





 18:29 21:50


 


Temp 102.4 


 


Pulse 85 


 


Resp 24 


 


B/P (MAP) 135/74 (94) 


 


Pulse Ox 96 


 


O2 Delivery Nasal Cannula 


 


O2 Flow Rate 3.00 


 


FiO2  32





Capillary Refill : Less Than 3 Seconds


Height, Weight, BMI


Height: 5'11.00"


Weight: 216lbs. 3.0oz. 98.821651ia; 30.4 BMI


Method:Stated


General Appearance:  No Apparent Distress, WD/WN


Eyes:  Right Eye Normal Inspection, Right Eye PERRL


HEENT:  PERRL/EOMI, Normal ENT Inspection, Moist Mucous Membranes; No Scleral 

Icterus (L), No Scleral Icterus (R)


Neck:  Full Range of Motion, Normal Inspection, Non Tender


Respiratory:  No Accessory Muscle Use, No Respiratory Distress; No Crackles, No 

Rhonci, No Wheezing; Other (poor air movement throughout)


Cardiovascular:  Regular Rate, Rhythm, No Murmur, Normal Peripheral Pulses


Gastrointestinal:  Normal Bowel Sounds, No Pulsatile Mass, Non Tender, Soft


Extremity:  Normal Capillary Refill, Non Tender, No Calf Tenderness, No Pedal 

Edema


Neurologic/Psychiatric:  Alert, Oriented x3, No Motor/Sensory Deficits, Normal 

Mood/Affect


Skin:  Normal Color, Warm/Dry





Results


Results/Procedures


Labs


Laboratory Tests


19 18:19








19 04:05








Patient resulted labs reviewed.


Imaging:  Reviewed Imaging Report


Imaging


Date of Exam:   19





CHEST 1 VIEW, AP/PA ONLY


 





INDICATION: Pneumonia and COPD.





Comparison made with prior examination from  19.





FINDINGS: There is cardiomegaly. There is mild central venous


congestion. There is no pleural effusion or pneumothorax. The


mediastinum is unremarkable. 





IMPRESSION: Cardiomegaly and mild central venous congestion with


some minimal patchy bibasal infiltrates.





Assessment/Plan


Admission Diagnosis


COPD Exacerbation


Admission Status:  Inpatient Order (span 2 midnights)


Reason for Inpatient Admission:  


Needs IV steroids, abx





Diagnosis/Problems


Diagnosis/Problems





(1) CAP (community acquired pneumonia)


Assessment & Plan:  


Bilateral lower lobe infiltrates on exam


Continue CAP coverage with Rocephin and Azithro


   Low threshold for escalation given admission 8 weeks ago and COPD


Await cultures


Qualifiers:  


   Lung location:  lower lobe of lung  


(2) COPD exacerbation


Status:  Acute


Assessment & Plan:  


Continue on Steroids, SVNs


Pulm consulted, appreciate recs


Titrate sats to keep >90





(3) Hypothyroidism


Status:  Chronic


Assessment & Plan:  


Continue home synthroid


Qualifiers:  


   Hypothyroidism type:  unspecified  Qualified Codes:  E03.9 - Hypothyroidism, 

unspecified


(4) Essential (primary) hypertension


Status:  Chronic


Assessment & Plan:  


Resume home meds





(5) Tobacco abuse


Assessment & Plan:  


Recommended cessation








Clinical Quality Measures


DVT/VTE Risk/Contraindication:


Risk Factor Score Per Nursin


RFS Level Per Nursing on Admit:  4+=Very High











DONALD BEATTY MD 2019 11:50
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
## 2019-03-01 VITALS — DIASTOLIC BLOOD PRESSURE: 72 MMHG | SYSTOLIC BLOOD PRESSURE: 137 MMHG

## 2019-03-01 VITALS — DIASTOLIC BLOOD PRESSURE: 81 MMHG | SYSTOLIC BLOOD PRESSURE: 121 MMHG

## 2019-03-01 VITALS — SYSTOLIC BLOOD PRESSURE: 126 MMHG | DIASTOLIC BLOOD PRESSURE: 73 MMHG

## 2019-03-01 VITALS — DIASTOLIC BLOOD PRESSURE: 82 MMHG | SYSTOLIC BLOOD PRESSURE: 136 MMHG

## 2019-03-01 VITALS — SYSTOLIC BLOOD PRESSURE: 141 MMHG | DIASTOLIC BLOOD PRESSURE: 81 MMHG

## 2019-03-01 LAB
ARTERIAL PATENCY WRIST A: (no result)
BASE EXCESS STD BLDA CALC-SCNC: 1.1 MMOL/L (ref -2.5–2.5)
BASOPHILS # BLD AUTO: 0 10^3/UL (ref 0–0.1)
BASOPHILS NFR BLD AUTO: 0 % (ref 0–10)
BDY SITE: (no result)
BODY TEMPERATURE: 98.4
BUN/CREAT SERPL: 20
CALCIUM SERPL-MCNC: 10.2 MG/DL (ref 8.5–10.1)
CHLORIDE SERPL-SCNC: 106 MMOL/L (ref 98–107)
CO2 BLDA CALC-SCNC: 25.9 MMOL/L (ref 21–31)
CO2 SERPL-SCNC: 22 MMOL/L (ref 21–32)
CREAT SERPL-MCNC: 1.22 MG/DL (ref 0.6–1.3)
EOSINOPHIL # BLD AUTO: 0 10^3/UL (ref 0–0.3)
EOSINOPHIL NFR BLD AUTO: 0 % (ref 0–10)
ERYTHROCYTE [DISTWIDTH] IN BLOOD BY AUTOMATED COUNT: 16.4 % (ref 10–14.5)
GFR SERPLBLD BASED ON 1.73 SQ M-ARVRAT: 58 ML/MIN
GLUCOSE SERPL-MCNC: 133 MG/DL (ref 70–105)
HCT VFR BLD CALC: 40 % (ref 40–54)
HGB BLD-MCNC: 13.3 G/DL (ref 13.3–17.7)
INHALED O2 FLOW RATE: (no result) L/MIN
LYMPHOCYTES # BLD AUTO: 0.6 X 10^3 (ref 1–4)
LYMPHOCYTES NFR BLD AUTO: 5 % (ref 12–44)
MANUAL DIFFERENTIAL PERFORMED BLD QL: NO
MCH RBC QN AUTO: 32 PG (ref 25–34)
MCHC RBC AUTO-ENTMCNC: 33 G/DL (ref 32–36)
MCV RBC AUTO: 98 FL (ref 80–99)
MONOCYTES # BLD AUTO: 0.7 X 10^3 (ref 0–1)
MONOCYTES NFR BLD AUTO: 6 % (ref 0–12)
NEUTROPHILS # BLD AUTO: 10.6 X 10^3 (ref 1.8–7.8)
NEUTROPHILS NFR BLD AUTO: 90 % (ref 42–75)
PCO2 BLDA: 36 MMHG (ref 35–45)
PH BLDA: 7.45 [PH] (ref 7.37–7.43)
PLATELET # BLD: 241 10^3/UL (ref 130–400)
PMV BLD AUTO: 9.3 FL (ref 7.4–10.4)
PO2 BLDA: 94 MMHG (ref 79–93)
POTASSIUM SERPL-SCNC: 4.2 MMOL/L (ref 3.6–5)
SAO2 % BLDA FROM PO2: 96 % (ref 94–100)
SODIUM SERPL-SCNC: 141 MMOL/L (ref 135–145)
VENTILATION MODE VENT: NO
WBC # BLD AUTO: 11.9 10^3/UL (ref 4.3–11)

## 2019-03-01 RX ADMIN — SUCRALFATE SCH GM: 1 TABLET ORAL at 05:59

## 2019-03-01 RX ADMIN — NICOTINE SCH MG: 21 PATCH, EXTENDED RELEASE TRANSDERMAL at 08:52

## 2019-03-01 RX ADMIN — METHYLPREDNISOLONE SODIUM SUCCINATE SCH MG: 40 INJECTION, POWDER, FOR SOLUTION INTRAMUSCULAR; INTRAVENOUS at 18:44

## 2019-03-01 RX ADMIN — LATANOPROST SCH DROP: 50 SOLUTION/ DROPS OPHTHALMIC at 22:00

## 2019-03-01 RX ADMIN — IPRATROPIUM BROMIDE AND ALBUTEROL SULFATE SCH ML: .5; 3 SOLUTION RESPIRATORY (INHALATION) at 07:28

## 2019-03-01 RX ADMIN — INSULIN ASPART SCH UNIT: 100 INJECTION, SOLUTION INTRAVENOUS; SUBCUTANEOUS at 05:59

## 2019-03-01 RX ADMIN — IPRATROPIUM BROMIDE AND ALBUTEROL SULFATE SCH ML: .5; 3 SOLUTION RESPIRATORY (INHALATION) at 15:33

## 2019-03-01 RX ADMIN — ASPIRIN SCH MG: 325 TABLET, COATED ORAL at 08:52

## 2019-03-01 RX ADMIN — Medication SCH EACH: at 08:51

## 2019-03-01 RX ADMIN — IPRATROPIUM BROMIDE AND ALBUTEROL SULFATE SCH ML: .5; 3 SOLUTION RESPIRATORY (INHALATION) at 02:37

## 2019-03-01 RX ADMIN — GABAPENTIN SCH MG: 300 CAPSULE ORAL at 08:51

## 2019-03-01 RX ADMIN — LEVOTHYROXINE SODIUM SCH MCG: 0.11 TABLET ORAL at 05:59

## 2019-03-01 RX ADMIN — INSULIN ASPART SCH UNIT: 100 INJECTION, SOLUTION INTRAVENOUS; SUBCUTANEOUS at 16:40

## 2019-03-01 RX ADMIN — SODIUM CHLORIDE SCH MLS/HR: 900 INJECTION INTRAVENOUS at 18:44

## 2019-03-01 RX ADMIN — AMLODIPINE BESYLATE SCH MG: 5 TABLET ORAL at 08:52

## 2019-03-01 RX ADMIN — SIMVASTATIN SCH MG: 10 TABLET, FILM COATED ORAL at 22:00

## 2019-03-01 RX ADMIN — IPRATROPIUM BROMIDE AND ALBUTEROL SULFATE SCH ML: .5; 3 SOLUTION RESPIRATORY (INHALATION) at 11:23

## 2019-03-01 RX ADMIN — IPRATROPIUM BROMIDE AND ALBUTEROL SULFATE SCH ML: .5; 3 SOLUTION RESPIRATORY (INHALATION) at 19:14

## 2019-03-01 RX ADMIN — FLUTICASONE PROPIONATE AND SALMETEROL XINAFOATE SCH PUFF: 115; 21 AEROSOL, METERED RESPIRATORY (INHALATION) at 07:30

## 2019-03-01 RX ADMIN — INSULIN ASPART SCH UNIT: 100 INJECTION, SOLUTION INTRAVENOUS; SUBCUTANEOUS at 11:32

## 2019-03-01 RX ADMIN — INSULIN ASPART SCH UNIT: 100 INJECTION, SOLUTION INTRAVENOUS; SUBCUTANEOUS at 21:55

## 2019-03-01 RX ADMIN — METHYLPREDNISOLONE SODIUM SUCCINATE SCH MG: 40 INJECTION, POWDER, FOR SOLUTION INTRAMUSCULAR; INTRAVENOUS at 05:58

## 2019-03-01 RX ADMIN — SUCRALFATE SCH GM: 1 TABLET ORAL at 22:00

## 2019-03-01 RX ADMIN — SUCRALFATE SCH GM: 1 TABLET ORAL at 11:52

## 2019-03-01 RX ADMIN — SUCRALFATE SCH GM: 1 TABLET ORAL at 16:40

## 2019-03-01 RX ADMIN — METHYLPREDNISOLONE SODIUM SUCCINATE SCH MG: 40 INJECTION, POWDER, FOR SOLUTION INTRAMUSCULAR; INTRAVENOUS at 11:52

## 2019-03-01 RX ADMIN — IPRATROPIUM BROMIDE AND ALBUTEROL SULFATE SCH ML: .5; 3 SOLUTION RESPIRATORY (INHALATION) at 23:49

## 2019-03-01 RX ADMIN — FLUTICASONE PROPIONATE AND SALMETEROL XINAFOATE SCH PUFF: 115; 21 AEROSOL, METERED RESPIRATORY (INHALATION) at 19:14

## 2019-03-01 RX ADMIN — GABAPENTIN SCH MG: 300 CAPSULE ORAL at 22:00

## 2019-03-01 RX ADMIN — AZITHROMYCIN SCH MG: 250 TABLET, FILM COATED ORAL at 22:00

## 2019-03-01 RX ADMIN — SODIUM CHLORIDE SCH MLS/HR: 4.5 INJECTION, SOLUTION INTRAVENOUS at 02:20

## 2019-03-01 NOTE — PROGRESS NOTE-HOSPITALIST
Subjective


HPI/CC On Admission


Date Seen by Provider:  Mar 1, 2019


Time Seen by Provider:  11:12


Pt is a 73yoCM known to me from recent admission who presented to the ER due to 

SOB. He states his symptoms started abruptly at 9am yesterday with a headache 

and SOB at 10am. He continued to decline throughout the day and felt very weak. 

His wife states that she was unable to get him to stand so she called her son 

in law and they still had difficulty. He normally doesn't wear oxygen but put 

it on yesterday due to his shortness of breath. Eventually he had such 

difficulty breathing that they decided to bring him to the ER. On arrival he 

was found to be febrile and hypoxia with diffuse wheezes. He denies any 

wheezing to me. He does complain of coughing with sputum production. He 

continues to smoke as well. Today he states he feel much better but is still 

coughing.


Subjective/Events-last exam


Pt reports feeling better this AM. Up in bathroom doing nasal flush. States 

coughing more up.





Focused Exam


Lactate Level


19 18:19: Lactic Acid Level 0.92








Objective


Exam


Vital Signs





Vital Signs








  Date Time  Temp Pulse Resp B/P (MAP) Pulse Ox O2 Delivery O2 Flow Rate FiO2


 


3/1/19 13:00  86      


 


3/1/19 12:00 98.0  16 126/73 (90) 96 Nasal Cannula 3.00 


 


19 21:50        32





Capillary Refill : Less Than 3 Seconds


General Appearance:  No Apparent Distress, WD/WN


HEENT:  No Scleral Icterus (L), No Scleral Icterus (R)


Respiratory:  No Accessory Muscle Use, No Respiratory Distress; No Crackles; 

Wheezing


Cardiovascular:  Regular Rate, Rhythm, No Murmur, Normal Peripheral Pulses


Gastrointestinal:  Normal Bowel Sounds, No Pulsatile Mass, Non Tender, Soft


Neurologic/Psychiatric:  Alert, Oriented x3


Skin:  Normal Color, Warm/Dry





Results/Procedures


Lab


Laboratory Tests


3/1/19 06:15








Patient resulted labs reviewed.


Imaging:  Reviewed Imaging Report





Assessment/Plan


Assessment and Plan


Assess & Plan/Chief Complaint


COPD Exacerbation with PNA





Diagnosis/Problems


Diagnosis/Problems





(1) CAP (community acquired pneumonia)


Assessment & Plan:  


Bilateral lower lobe infiltrates


Continue CAP coverage with Rocephin and Azithro


   Low threshold for escalation given admission 8 weeks ago and COPD


Await cultures


Qualifiers:  


   Lung location:  lower lobe of lung  


(2) COPD exacerbation


Status:  Acute


Assessment & Plan:  


Continue on Steroids, SVNs


Pulm consulted, appreciate recs


Titrate sats to keep >90





(3) Hypothyroidism


Status:  Chronic


Assessment & Plan:  


Continue home synthroid


Qualifiers:  


   Hypothyroidism type:  unspecified  Qualified Codes:  E03.9 - Hypothyroidism, 

unspecified


(4) Essential (primary) hypertension


Status:  Chronic


Assessment & Plan:  


Cont home meds





(5) Tobacco abuse


Assessment & Plan:  


Recommended cessation








Clinical Quality Measures


DVT/VTE Risk/Contraindication:


Risk Factor Score Per Nursin


RFS Level Per Nursing on Admit:  4+=Very High











DONALD BEATTY MD Mar 1, 2019 11:16

## 2019-03-01 NOTE — PULMONARY PROGRESS NOTE
Subjective


Time Seen by a Provider:  06:56


Subjective/Events-last exam


Pt complains of persistent cough and SOB. Wife at bedside with a lot of 

questions. I answered all questions to the best of my ability.





Sepsis Event


Evaluation


Height, Weight, BMI


Height: 5'11.00"


Weight: 216lbs. 3.0oz. 97.694544rq; 30.4 BMI


Method:Stated





Focused Exam


Lactate Level


2/27/19 18:19: Lactic Acid Level 0.92





Exam


Exam





Vital Signs








  Date Time  Temp Pulse Resp B/P (MAP) Pulse Ox O2 Delivery O2 Flow Rate FiO2


 


3/1/19 03:58 98.8 80 18 141/81 (101) 96 NIV CPAP  


 


3/1/19 02:37     98 NIV CPAP 3.00 


 


3/1/19 01:00  82      


 


2/28/19 23:50 99.0 92 24 120/72 (88) 95 Nasal Cannula 3.00 


 


2/28/19 22:19      Room Air  


 


2/28/19 22:05     91 Room Air  


 


2/28/19 20:00 99.2 94 26 137/84 (101) 95 Nasal Cannula 3.00 


 


2/28/19 20:00      Nasal Cannula 3.00 


 


2/28/19 19:00  88      


 


2/28/19 16:04 98.8 79 30 121/78 (92) 97 Nasal Cannula 3.00 


 


2/28/19 15:07     94 Nasal Cannula 3.00 


 


2/28/19 13:00  88      


 


2/28/19 12:00 97.8 88 20 124/60 (81) 93 Nasal Cannula 3.00 


 


2/28/19 11:00 99.3       


 


2/28/19 10:44     93 Nasal Cannula 3.00 


 


2/28/19 10:00 99.3 78 18 157/73 (101) 94 Nasal Cannula 3.00 


 


2/28/19 08:00 98.3 79 20 120/61 (80) 95 Nasal Cannula 3.00 


 


2/28/19 08:00     95 Nasal Cannula 3.00 


 


2/28/19 07:31     92 Nasal Cannula 3.00 














I & O 


 


 3/1/19





 07:00


 


Intake Total 2920 ml


 


Output Total 2725 ml


 


Balance 195 ml








Height & Weight


Height: 5'11.00"


Weight: 216lbs. 3.0oz. 97.484980sp; 30.4 BMI


Method:Stated


General Appearance:  WD/WN, Anxious, Chronically ill


HEENT:  PERRL/EOMI, Normal ENT Inspection, Moist Mucous Membranes; No Scleral 

Icterus (L), No Scleral Icterus (R)


Neck:  Full Range of Motion, Normal Inspection, Non Tender


Respiratory:  No Accessory Muscle Use, No Respiratory Distress; No Crackles; 

Decreased Breath Sounds; No Rhonci, No Wheezing; Other (poor air movement 

throughout)


Cardiovascular:  Regular Rate, Rhythm, No Murmur, Normal Peripheral Pulses


Capillary Refill:  Less Than 3 Seconds


Extremity:  Normal Capillary Refill, Non Tender, No Calf Tenderness, No Pedal 

Edema


Neurologic/Psychiatric:  Alert, Oriented x3, No Motor/Sensory Deficits, Normal 

Mood/Affect


Skin:  Normal Color, Warm/Dry





Results


Lab


Laboratory Tests


2/27/19 18:19








2/28/19 04:05








3/1/19 06:15











Assessment/Plan


Assessment/Plan


Acute on chronic respiratory failure


   - stat ABG


   -BiPAP PRN 


   -Oxygen 


Pneumonia 


   - IVF


   -Currently on Rocephin- 


      -Pt grew Ecoli last hospitalization which was sensitive to Rocephin 


   -Pan cultures 


   -Will need out pt f/u imaging 


UTI


   -Pan cultures 


COPDAE with hypoxia 


   -SVNs


   -Oxygen 


   -Solumedrol 


Acute renal failure


   -IVF 


   -Monitor 


Hypothyroid











DANA DILLON DO Mar 1, 2019 07:06

## 2019-03-02 VITALS — DIASTOLIC BLOOD PRESSURE: 67 MMHG | SYSTOLIC BLOOD PRESSURE: 123 MMHG

## 2019-03-02 VITALS — DIASTOLIC BLOOD PRESSURE: 84 MMHG | SYSTOLIC BLOOD PRESSURE: 144 MMHG

## 2019-03-02 VITALS — DIASTOLIC BLOOD PRESSURE: 72 MMHG | SYSTOLIC BLOOD PRESSURE: 137 MMHG

## 2019-03-02 RX ADMIN — METHYLPREDNISOLONE SODIUM SUCCINATE SCH MG: 40 INJECTION, POWDER, FOR SOLUTION INTRAMUSCULAR; INTRAVENOUS at 06:45

## 2019-03-02 RX ADMIN — NICOTINE SCH MG: 21 PATCH, EXTENDED RELEASE TRANSDERMAL at 09:27

## 2019-03-02 RX ADMIN — SUCRALFATE SCH GM: 1 TABLET ORAL at 06:45

## 2019-03-02 RX ADMIN — AMLODIPINE BESYLATE SCH MG: 5 TABLET ORAL at 09:26

## 2019-03-02 RX ADMIN — Medication SCH EACH: at 09:26

## 2019-03-02 RX ADMIN — METHYLPREDNISOLONE SODIUM SUCCINATE SCH MG: 40 INJECTION, POWDER, FOR SOLUTION INTRAMUSCULAR; INTRAVENOUS at 00:07

## 2019-03-02 RX ADMIN — ASPIRIN SCH MG: 325 TABLET, COATED ORAL at 09:26

## 2019-03-02 RX ADMIN — IPRATROPIUM BROMIDE AND ALBUTEROL SULFATE SCH ML: .5; 3 SOLUTION RESPIRATORY (INHALATION) at 01:29

## 2019-03-02 RX ADMIN — GABAPENTIN SCH MG: 300 CAPSULE ORAL at 09:26

## 2019-03-02 RX ADMIN — INSULIN ASPART SCH UNIT: 100 INJECTION, SOLUTION INTRAVENOUS; SUBCUTANEOUS at 06:02

## 2019-03-02 RX ADMIN — IPRATROPIUM BROMIDE AND ALBUTEROL SULFATE SCH ML: .5; 3 SOLUTION RESPIRATORY (INHALATION) at 07:41

## 2019-03-02 RX ADMIN — LEVOTHYROXINE SODIUM SCH MCG: 0.11 TABLET ORAL at 06:45

## 2019-03-02 RX ADMIN — FLUTICASONE PROPIONATE AND SALMETEROL XINAFOATE SCH PUFF: 115; 21 AEROSOL, METERED RESPIRATORY (INHALATION) at 07:41

## 2019-03-02 NOTE — NUR
Synthroid order 112 mcg daily in the comment on this drug there is a communication to give 
with synthroid 25 mcg to equal pt 137 mcg-this rn contacted Dr. Hutson by phone to clarify 
this medication order. 

order received to give an additional 25 mcg of Synthroid to equal home dose of 137mcg.

## 2019-03-02 NOTE — DISCHARGE SUMMARY-HOSPITALIST
Diagnosis/Chief Complaint


Date of Admission


2019 at 19:40


Date of Discharge





Discharge Date:  Mar 2, 2019


Admission Diagnosis


COPD Exacerbation


Discharge Diagnosis





(1) CAP (community acquired pneumonia)


Assessment & Plan:  


Bilateral lower lobe infiltrates


Continue CAP coverage with Rocephin and Azithro


   Low threshold for escalation given admission 8 weeks ago and COPD


Await cultures





(2) COPD exacerbation


Status:  Acute


Assessment & Plan:  


Continue on Steroids, SVNs


Pulm consulted, appreciate recs


Titrate sats to keep >90





(3) Hypothyroidism


Status:  Chronic


Assessment & Plan:  


Continue home synthroid





(4) Essential (primary) hypertension


Status:  Chronic


Assessment & Plan:  


Cont home meds





(5) Tobacco abuse


Assessment & Plan:  


Recommended cessation








Discharge Summary


Discharge Physical Exam


Allergies:  


Coded Allergies:  


     No Known Drug Allergies (Verified , 18)


Vitals & I&Os





Vital Signs








  Date Time  Temp Pulse Resp B/P (MAP) Pulse Ox O2 Delivery O2 Flow Rate FiO2


 


3/2/19 07:42     92 Nasal Cannula 3.00 


 


3/2/19 07:31 98.6 65 20 123/67 (85)    


 


19 21:50        32








General Appearance:  WD/WN, Anxious, Chronically ill


HEENT:  PERRL/EOMI, Normal ENT Inspection, Moist Mucous Membranes; No Scleral 

Icterus (L), No Scleral Icterus (R)


Respiratory:  No Accessory Muscle Use, No Respiratory Distress; No Crackles; 

Decreased Breath Sounds; No Rhonci, No Wheezing; Other (poor air movement 

throughout)


Cardiovascular:  Regular Rate, Rhythm, No Murmur, Normal Peripheral Pulses


Gastrointestinal:  Normal Bowel Sounds, No Pulsatile Mass, Non Tender, Soft


Extremity:  Normal Capillary Refill, Non Tender, No Calf Tenderness, No Pedal 

Edema


Skin:  Normal Color, Warm/Dry


Neurologic/Psychiatric:  Alert, Oriented x3, No Motor/Sensory Deficits, Normal 

Mood/Affect





Hospital Course


Labs (last 24 hrs)


Laboratory Tests


3/1/19 10:47: 


Blood Gas Puncture Site RR, Blood Gas Patient Temperature 98.4, Arterial Blood 

pH 7.45H, Arterial Blood Partial Pressure CO2 36, Arterial Blood Partial 

Pressure O2 94H, Arterial Blood HCO3 25, Arterial Blood Total CO2 25.9, 

Arterial Blood Oxygen Saturation 96, Arterial Blood Base Excess 1.1, Delon Test 

YES-POS, Blood Gas Ventilator Setting NO, Blood Gas Inspired Oxygen 3L


3/1/19 11:23: Glucometer 121H


3/1/19 16:35: Glucometer 131H


3/1/19 20:57: Glucometer 136H


3/2/19 05:42: Glucometer 128H





Microbiology


19 Blood Culture - Preliminary, Resulted


          No growth


19 Gram Stain - Final, Complete


          


19 Sputum Culture - Final, Complete


          Usual upper respiratory eliana


19 Urine Culture - Final, Complete


          NO GROWTH


Patient resulted labs reviewed.


Pending Labs


Laboratory Tests


3/2/19 05:42: Glucometer 128





Imaging:  Reviewed Imaging Report





Discharge


Home Medications:





Active Scripts


Active


Keflex (Cephalexin) 500 Mg Capsule 500 Mg PO BID


Azithromycin 250 Mg Tablet 250 Mg PO HS


Probiotic (Lactobacillus Acidophilus) 1 Each Capsule 1 Cap PO DAILY


Tums (Calcium Carbonate) 300 Mg Tab.chew 300 Mg PO Q2HR PRN 1 Days


Reported


Pantoprazole Sodium 40 Mg Tablet.dr 40 Mg PO DAILY


Aspirin EC (Aspirin) 325 Mg Tablet.dr 325 Mg PO DAILY


Zantac (Ranitidine HCl) 150 Mg Tablet 150 Mg PO BID


Excedrin Extra Strength Caplet (Aspirin/Acetaminophen/Caffeine) 1 Each Tablet 1 

Tab PO BID PRN


Cyanocobalamin Injection (Cyanocobalamin) 1,000 Mcg/Ml Inj 1,000 Mcg IM MONTHLY


     TAKES THE 1ST WEEK OF MONTH-DAUGHTER GIVES AND DAY VARIES


Vitamin C (Ascorbate Calcium) 500 Mg Tablet 500 Mg PO DAILY


Vitamin D3 (Cholecalciferol (Vitamin D3)) 1,000 Unit Tablet 1,000 Unit PO DAILY


Fish Oil 1,200 mg Softgel (Omega-3 Fatty Acids/Fish Oil) 1 Each Capsule 1,200 

Mg PO DAILY


Lovastatin 40 Mg Tablet 20 Mg PO HS


     TAKE 1/2 OF 40MG TAB


Carafate (Sucralfate) 1 Gm Tablet 1 Gm PO ACHS


Gabapentin 300 Mg Capsule 300 Mg PO BID


Latanoprost 2.5 Ml Drops 1 Drop OU HS


Levothyroxine Sodium 137 Mcg Tablet 137 Mcg PO 1800


Iprat-Albut 0.5-3(2.5) mg/3 ml (Ipratropium/Albuterol Sulfate) 3 Ml Ampul.neb 3 

Ml NEB TID


Colace (Docusate Sodium) 100 Mg Capsule 300 Mg PO HS


Symbicort 160-4.5 Mcg Inhaler (Budesonide/Formoterol Fumarate) 10.2 Gm 

Hfa.aer.ad 2 Puff IH BID


Amlodipine Besylate 10 Mg Tablet 5 Mg PO DAILY


     TAKES 1/2 (10 MG) TABLET





Instructions to patient/family


Please see electronic discharge instructions given to patient.





Clinical Quality Measures


DVT/VTE Risk/Contraindication:


Risk Factor Score Per Nursin


RFS Level Per Nursing on Admit:  4+=Very High





Problem Qualifiers





(1) CAP (community acquired pneumonia):  


Lung location:  lower lobe of lung  


(2) Hypothyroidism:  


Hypothyroidism type:  unspecified  Qualified Codes:  E03.9 - Hypothyroidism, 

unspecified








DONALD BEATTY MD Mar 2, 2019 08:46

## 2019-03-02 NOTE — PULMONARY PROGRESS NOTE
Subjective


Time Seen by a Provider:  06:59


Subjective/Events-last exam


NO complications noted.





Sepsis Event


Evaluation


Height, Weight, BMI


Height: 5'11.00"


Weight: 215lbs. 0.0oz. 97.411970yu; 30.4 BMI


Method:Stated





Focused Exam


Lactate Level


2/27/19 18:19: Lactic Acid Level 0.92





Exam


Exam





Vital Signs








  Date Time  Temp Pulse Resp B/P (MAP) Pulse Ox O2 Delivery O2 Flow Rate FiO2


 


3/2/19 04:15 97.8 64 20 137/72 (93) 96 NIV CPAP 3.00 


 


3/2/19 01:29     96 NIV CPAP 3.00 


 


3/2/19 01:00  70      


 


3/2/19 00:08 98.1 67 21 144/84 (104) 97 NIV CPAP 3.00 


 


3/1/19 20:58 96.8 82 21 121/81 (94) 92 Nasal Cannula 3.00 


 


3/1/19 20:00      Nasal Cannula 3.00 


 


3/1/19 19:14     93 Nasal Cannula 3.00 


 


3/1/19 19:00  75      


 


3/1/19 16:36  86 19 136/82 (100) 94 Nasal Cannula 3.00 


 


3/1/19 15:33     93 Nasal Cannula 3.00 


 


3/1/19 13:00  86      


 


3/1/19 12:00 98.0 83 16 126/73 (90) 96 Nasal Cannula 3.00 


 


3/1/19 11:23     94 Nasal Cannula 3.00 


 


3/1/19 08:00     95 Nasal Cannula 3.00 


 


3/1/19 08:00 97.9 83 16 137/72 (93) 95 Nasal Cannula 3.00 


 


3/1/19 07:34     96  3.00 


 


3/1/19 07:28     86 Room Air  


 


3/1/19 07:00  74      














I & O 


 


 3/2/19





 07:00


 


Intake Total 1472 ml


 


Output Total 825 ml


 


Balance 647 ml








Height & Weight


Height: 5'11.00"


Weight: 215lbs. 0.0oz. 97.868174xz; 30.4 BMI


Method:Stated


General Appearance:  WD/WN, Anxious, Chronically ill


HEENT:  PERRL/EOMI, Normal ENT Inspection, Moist Mucous Membranes; No Scleral 

Icterus (L), No Scleral Icterus (R)


Neck:  Full Range of Motion, Normal Inspection, Non Tender


Respiratory:  No Accessory Muscle Use, No Respiratory Distress; No Crackles; 

Decreased Breath Sounds; No Rhonci, No Wheezing; Other (poor air movement 

throughout)


Cardiovascular:  Regular Rate, Rhythm, No Murmur, Normal Peripheral Pulses


Capillary Refill:  Less Than 3 Seconds


Extremity:  Normal Capillary Refill, Non Tender, No Calf Tenderness, No Pedal 

Edema


Neurologic/Psychiatric:  Alert, Oriented x3, No Motor/Sensory Deficits, Normal 

Mood/Affect


Skin:  Normal Color, Warm/Dry





Results


Lab


Laboratory Tests


3/1/19 06:15











Assessment/Plan


Assessment/Plan


Acute on chronic respiratory failure


   -BiPAP PRN 


   -Oxygen 


Pneumonia 


   - IVF


   -Currently on Rocephin- 


      -Pt grew Ecoli last hospitalization which was sensitive to Rocephin 


   -Pan cultures 


   -Will need out pt f/u imaging 


UTI


   -Pan cultures 


COPDAE with hypoxia 


   -SVNs


   -Oxygen 


   -Solumedrol 


Acute renal failure


   -IVF 


   -Monitor 


Hypothyroid











DANA DILLON DO Mar 2, 2019 06:59

## 2019-03-02 NOTE — DISCHARGE INST-SIMPLE/STANDARD
Discharge Inst-Standard


Discharge Medications


New, Converted or Re-Newed RX:  Transmitted to Pharmacy





Patient Instructions/Follow Up


Plan of Care/Instructions/FU:  


Please continue to take your medications as written. Please follow up with


your PCP next week and with Dr Stapleton in the next 2 weeks.


Activity as Tolerated:  Yes


Discharge Diet:  No Restrictions


Return to The Hospital For:  


Fever, shortness of breath, confusion, chest pain, if you feel you are


getting worse.











DONALD BEATTY MD Mar 2, 2019 08:45

## 2019-03-18 ENCOUNTER — HOSPITAL ENCOUNTER (INPATIENT)
Dept: HOSPITAL 75 - ER | Age: 74
LOS: 4 days | Discharge: HOME | DRG: 871 | End: 2019-03-22
Attending: INTERNAL MEDICINE | Admitting: INTERNAL MEDICINE
Payer: MEDICARE

## 2019-03-18 VITALS — WEIGHT: 225.37 LBS | HEIGHT: 71 IN | BODY MASS INDEX: 31.55 KG/M2

## 2019-03-18 VITALS — SYSTOLIC BLOOD PRESSURE: 118 MMHG | DIASTOLIC BLOOD PRESSURE: 66 MMHG

## 2019-03-18 DIAGNOSIS — J15.5: ICD-10-CM

## 2019-03-18 DIAGNOSIS — J43.9: ICD-10-CM

## 2019-03-18 DIAGNOSIS — I12.9: ICD-10-CM

## 2019-03-18 DIAGNOSIS — A41.9: Primary | ICD-10-CM

## 2019-03-18 DIAGNOSIS — Z66: ICD-10-CM

## 2019-03-18 DIAGNOSIS — M19.91: ICD-10-CM
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
## 2019-03-18 DIAGNOSIS — E89.0: ICD-10-CM

## 2019-03-18 DIAGNOSIS — R09.02: ICD-10-CM

## 2019-03-18 DIAGNOSIS — N18.9: ICD-10-CM

## 2019-03-18 DIAGNOSIS — H40.9: ICD-10-CM

## 2019-03-18 DIAGNOSIS — F17.210: ICD-10-CM

## 2019-03-18 DIAGNOSIS — F32.9: ICD-10-CM

## 2019-03-18 DIAGNOSIS — E11.22: ICD-10-CM

## 2019-03-18 DIAGNOSIS — I25.10: ICD-10-CM

## 2019-03-18 DIAGNOSIS — Z86.73: ICD-10-CM

## 2019-03-18 DIAGNOSIS — S04.011S: ICD-10-CM

## 2019-03-18 DIAGNOSIS — K59.09: ICD-10-CM

## 2019-03-18 DIAGNOSIS — G47.33: ICD-10-CM

## 2019-03-18 DIAGNOSIS — K21.9: ICD-10-CM

## 2019-03-18 DIAGNOSIS — H93.19: ICD-10-CM

## 2019-03-18 DIAGNOSIS — J84.9: ICD-10-CM

## 2019-03-18 DIAGNOSIS — M81.0: ICD-10-CM

## 2019-03-18 DIAGNOSIS — E78.00: ICD-10-CM

## 2019-03-18 DIAGNOSIS — Z95.5: ICD-10-CM

## 2019-03-18 DIAGNOSIS — R41.0: ICD-10-CM

## 2019-03-18 LAB
ALBUMIN SERPL-MCNC: 3.9 GM/DL (ref 3.2–4.5)
ALP SERPL-CCNC: 111 U/L (ref 40–136)
ALT SERPL-CCNC: 13 U/L (ref 0–55)
ANISOCYTOSIS BLD QL SMEAR: SLIGHT
APTT BLD: 34 SEC (ref 24–35)
APTT PPP: YELLOW S
BACTERIA #/AREA URNS HPF: (no result) /HPF
BASOPHILS # BLD AUTO: 0 10^3/UL (ref 0–0.1)
BASOPHILS NFR BLD AUTO: 0 % (ref 0–10)
BASOPHILS NFR BLD MANUAL: 0 %
BILIRUB SERPL-MCNC: 0.4 MG/DL (ref 0.1–1)
BILIRUB UR QL STRIP: NEGATIVE
BUN/CREAT SERPL: 15
CALCIUM SERPL-MCNC: 9.3 MG/DL (ref 8.5–10.1)
CHLORIDE SERPL-SCNC: 106 MMOL/L (ref 98–107)
CO2 SERPL-SCNC: 23 MMOL/L (ref 21–32)
CREAT SERPL-MCNC: 1.44 MG/DL (ref 0.6–1.3)
EOSINOPHIL # BLD AUTO: 0.1 10^3/UL (ref 0–0.3)
EOSINOPHIL NFR BLD AUTO: 0 % (ref 0–10)
EOSINOPHIL NFR BLD MANUAL: 0 %
ERYTHROCYTE [DISTWIDTH] IN BLOOD BY AUTOMATED COUNT: 15.6 % (ref 10–14.5)
FIBRINOGEN PPP-MCNC: CLEAR MG/DL
GFR SERPLBLD BASED ON 1.73 SQ M-ARVRAT: 48 ML/MIN
GLUCOSE SERPL-MCNC: 117 MG/DL (ref 70–105)
GLUCOSE UR STRIP-MCNC: NEGATIVE MG/DL
HCT VFR BLD CALC: 37 % (ref 40–54)
HGB BLD-MCNC: 12.7 G/DL (ref 13.3–17.7)
INR PPP: 1.1 (ref 0.8–1.4)
KETONES UR QL STRIP: NEGATIVE
LEUKOCYTE ESTERASE UR QL STRIP: NEGATIVE
LYMPHOCYTES # BLD AUTO: 0.8 X 10^3 (ref 1–4)
LYMPHOCYTES NFR BLD AUTO: 5 % (ref 12–44)
MANUAL DIFFERENTIAL PERFORMED BLD QL: YES
MCH RBC QN AUTO: 34 PG (ref 25–34)
MCHC RBC AUTO-ENTMCNC: 35 G/DL (ref 32–36)
MCV RBC AUTO: 97 FL (ref 80–99)
MONOCYTES # BLD AUTO: 0.5 X 10^3 (ref 0–1)
MONOCYTES NFR BLD AUTO: 3 % (ref 0–12)
MONOCYTES NFR BLD: 4 %
NEUTROPHILS # BLD AUTO: 13.3 X 10^3 (ref 1.8–7.8)
NEUTROPHILS NFR BLD AUTO: 91 % (ref 42–75)
NEUTS BAND NFR BLD MANUAL: 85 %
NEUTS BAND NFR BLD: 2 %
NITRITE UR QL STRIP: NEGATIVE
PH UR STRIP: 6 [PH] (ref 5–9)
PLATELET # BLD: 258 10^3/UL (ref 130–400)
PMV BLD AUTO: 9.2 FL (ref 7.4–10.4)
POTASSIUM SERPL-SCNC: 4.2 MMOL/L (ref 3.6–5)
PROT SERPL-MCNC: 7.3 GM/DL (ref 6.4–8.2)
PROT UR QL STRIP: NEGATIVE
PROTHROMBIN TIME: 14.3 SEC (ref 12.2–14.7)
RBC #/AREA URNS HPF: (no result) /HPF
SODIUM SERPL-SCNC: 138 MMOL/L (ref 135–145)
SP GR UR STRIP: 1.01 (ref 1.02–1.02)
SQUAMOUS #/AREA URNS HPF: (no result) /HPF
TOXIC GRANULES BLD QL SMEAR: (no result)
UROBILINOGEN UR-MCNC: NORMAL MG/DL
VARIANT LYMPHS NFR BLD MANUAL: 3 %
VARIANT LYMPHS NFR BLD MANUAL: 6 %
WBC # BLD AUTO: 14.7 10^3/UL (ref 4.3–11)
WBC #/AREA URNS HPF: (no result) /HPF

## 2019-03-18 PROCEDURE — 85007 BL SMEAR W/DIFF WBC COUNT: CPT

## 2019-03-18 PROCEDURE — 80170 ASSAY OF GENTAMICIN: CPT

## 2019-03-18 PROCEDURE — 87186 SC STD MICRODIL/AGAR DIL: CPT

## 2019-03-18 PROCEDURE — 87070 CULTURE OTHR SPECIMN AEROBIC: CPT

## 2019-03-18 PROCEDURE — 85027 COMPLETE CBC AUTOMATED: CPT

## 2019-03-18 PROCEDURE — 87205 SMEAR GRAM STAIN: CPT

## 2019-03-18 PROCEDURE — 83605 ASSAY OF LACTIC ACID: CPT

## 2019-03-18 PROCEDURE — 83880 ASSAY OF NATRIURETIC PEPTIDE: CPT

## 2019-03-18 PROCEDURE — 85610 PROTHROMBIN TIME: CPT

## 2019-03-18 PROCEDURE — 36415 COLL VENOUS BLD VENIPUNCTURE: CPT

## 2019-03-18 PROCEDURE — 87804 INFLUENZA ASSAY W/OPTIC: CPT

## 2019-03-18 PROCEDURE — 93005 ELECTROCARDIOGRAM TRACING: CPT

## 2019-03-18 PROCEDURE — 81000 URINALYSIS NONAUTO W/SCOPE: CPT

## 2019-03-18 PROCEDURE — 94664 DEMO&/EVAL PT USE INHALER: CPT

## 2019-03-18 PROCEDURE — 96375 TX/PRO/DX INJ NEW DRUG ADDON: CPT

## 2019-03-18 PROCEDURE — 82805 BLOOD GASES W/O2 SATURATION: CPT

## 2019-03-18 PROCEDURE — 85730 THROMBOPLASTIN TIME PARTIAL: CPT

## 2019-03-18 PROCEDURE — 87077 CULTURE AEROBIC IDENTIFY: CPT

## 2019-03-18 PROCEDURE — 87101 SKIN FUNGI CULTURE: CPT

## 2019-03-18 PROCEDURE — 94640 AIRWAY INHALATION TREATMENT: CPT

## 2019-03-18 PROCEDURE — 84484 ASSAY OF TROPONIN QUANT: CPT

## 2019-03-18 PROCEDURE — 85025 COMPLETE CBC W/AUTO DIFF WBC: CPT

## 2019-03-18 PROCEDURE — 87206 SMEAR FLUORESCENT/ACID STAI: CPT

## 2019-03-18 PROCEDURE — 71045 X-RAY EXAM CHEST 1 VIEW: CPT

## 2019-03-18 PROCEDURE — 87081 CULTURE SCREEN ONLY: CPT

## 2019-03-18 PROCEDURE — 87088 URINE BACTERIA CULTURE: CPT

## 2019-03-18 PROCEDURE — 87040 BLOOD CULTURE FOR BACTERIA: CPT

## 2019-03-18 PROCEDURE — 96374 THER/PROPH/DIAG INJ IV PUSH: CPT

## 2019-03-18 PROCEDURE — 94760 N-INVAS EAR/PLS OXIMETRY 1: CPT

## 2019-03-18 PROCEDURE — 87015 SPECIMEN INFECT AGNT CONCNTJ: CPT

## 2019-03-18 PROCEDURE — 80053 COMPREHEN METABOLIC PANEL: CPT

## 2019-03-18 PROCEDURE — 87116 MYCOBACTERIA CULTURE: CPT
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
## 2019-03-18 NOTE — XMS REPORT
Continuity of Care Document

 Created on: 2019



EDUAR OSORIO

External Reference #: [id number]

: 1945

Sex: Male



Demographics







 Address  [address] [place]  [address]

 

 Home Phone  [phone] x

 

 Preferred Language  Unknown

 

 Marital Status  Unknown

 

 [employer] Affiliation  Unknown

 

 Race  Unknown

 

 Ethnic Group  Unknown





Author







 Author  [facility]

 

 Organization  [facility]

 

 Address  Unknown

 

 Phone  Unavailable



              



Allergies

      





 Active            Description            Code            Type            
Severity            Reaction            Onset            Reported/Identified   
         Relationship to Patient            Clinical Status        

 

 Yes            No Known Drug Allergies            [id number]            Drug 
Allergy            Unknown            N/A                         2018   
                               



                  



Medications

      



There is no data.                  



Problems

      





 Date Dx Coded            Attending            Type            Code            
Diagnosis            Diagnosed By        

 

 2014            EDUAR JOHNSON DO            Ot            038.9   
         SEPTICEMIA NOS                     

 

 2014            EDUAR JOHNSON DO            Ot            250.00  
          DIAB NIEVES WO COMPL, TYPE II OR UNSPEC TY                     

 

 2014            EDUAR JOHNSON DO            Ot            272.4   
         HYPERLIPIDEMIA NEC/NOS                     

 

 2014            EDUAR JOHNSON DO            Ot            276.52  
          HYPOVOLEMIA                     

 

 2014            EDUAR JOHNSON DO            Ot            305.1   
         TOBACCO USE DISORDER                     

 

 2014            EDUAR JOHNSON DO            Ot            414.01  
          CORONARY ATHEROSCLEROSIS OF NATIVE CORON                     

 

 2014            EDUAR JOHNSON DO            Ot            486     
       PNEUMONIA, ORGANISM NOS                     

 

 2014            EDUAR JOHNSON DO            Ot            510.9   
         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                     

 

 2014            EDUAR JOHNSON DO            Ot            511.9   
         PLEURAL EFFUSION NOS                     

 

 2014            EDUAR JOHNSON DO            Ot            518.81  
          ACUTE RESPIRATORY FAILURE                     

 

 2014            EDUAR JOHNSON DO            Ot            584.9   
         ACUTE RENAL FAILURE, UNSPECIFIED                     

 

 2014            EDUAR JOHNSON DO            Ot            785.50  
          SHOCK NOS                     

 

 2014            EDUAR JOHNSON DO, Ot            785.52  
          SEPTIC SHOCK                     

 

 2014            EDUAR JOHNSON DO            Ot            995.92  
          SEVERE SEPSIS                     

 

 2014            EDUAR JOHNSON DO, Ot            V45.82  
          PERCUTANEOUS TRANSLUM CORON ANGIOPLASTY                      

 

 2014            HARRY MATIAS, SHIRLENE DAVID            Ot            266.2        
    B-COMPLEX DEFIC NEC                     

 

 2014            SHIRLENE MCDONALD MD            Ot            268.9        
    VITAMIN D DEFICIENCY NOS                     

 

 2014            SHIRLENE MCDONALD MD            Ot            272.4        
    HYPERLIPIDEMIA NEC/NOS                     

 

 2014            SHIRLENE MCDONALD MD E            Ot            274.9        
    GOUT NOS                     

 

 2014            SHIRLENE MCDONALD MD            Ot            305.1        
    TOBACCO USE DISORDER                     

 

 2014            SHIRLENE MCDONALD MD            Ot            401.9        
    HYPERTENSION NOS                     

 

 2014            HARRY MATIAS, SHIRLENE E            Ot            564.00       
     UNSPEC CONSTIPATION                     

 

 2014            SHIRLENE MCDONALD MD E            Ot            722.11       
     THORACIC DISC DISPLACMNT                     

 

 2014            SHIRLENE MCDONALD MD            Ot            733.00       
     OSTEOPOROSIS NOS                     

 

 2014            SHIRLENE MCDONALD MD E            Ot            780.79       
     OTH MALAISE FATIGUE                     

 

 2014            SHIRLENE MCDONALD MD E            Ot            790.29       
     OTHER ABNORMAL GLUCOSE                     

 

 2014            SHIRLENE MCDONALD MD            Ot            V57.89       
     REHABILITATION PROC NEC                     

 

 2014            LETTY DO ARLENE F            Ot            726.0    
                              

 

 2014            LETTY DO, ARLENE F            Ot            718.91   
                               

 

 2014            LETTY DO ARLENE F            Ot            726.0    
                              

 

 2014            LETTY DAVIS ARLENE F            Ot            726.0    
        ADHESIVE CAPSULIT SHLDER                     

 

 2014            LETTY DAVIS ARLENE F            Ot            V57.1    
        PHYSICAL THERAPY NEC                     

 

 2014            LETTY DAVIS ARLENE F            Ot            718.91   
                               

 

 2014            LETTY DAVIS ARLENE F            Ot            726.0    
                              

 

 2014            EDUAR JOHNSON DO            Ot            250.00  
          DIAB NIEVES WO COMPL, TYPE II OR UNSPEC TY                     

 

 2014            EDUAR JOHNSON DO            Ot            266.2   
         B-COMPLEX DEFIC NEC                     

 

 2014            EDUAR JOHNSON DO            Ot            268.9   
         VITAMIN D DEFICIENCY NOS                     

 

 2014            EDUAR JOHNSON DO            Ot            272.4   
         HYPERLIPIDEMIA NEC/NOS                     

 

 2014            EDUAR JOHNSON DO            Ot            403.90  
          HYPTNSV CHR KID DIS, UNSPEC, W CHR KD ST                     

 

 2014            EDUAR JOHNSON DO            Ot            414.01  
          CORONARY ATHEROSCLEROSIS OF NATIVE CORON                     

 

 2014            EDUAR JOHNSON DO            Ot            435.9   
         TRANS CEREB ISCHEMIA NOS                     

 

 2014            EDUAR JOHNSON DO            Ot            496     
       CHR AIRWAY OBSTRUCT NEC                     

 

 2014            EDUAR JOHNSON DO            Ot            585.2   
         CHRONIC KIDNEY DISEASE, STAGE II (MILD)                     

 

 2014            EDUAR JOHNSON DO            Ot            722.11  
          THORACIC DISC DISPLACMNT                     

 

 2014            EDUAR JOHNSON DO            Ot            799.02  
          HYPOXEMIA                     

 

 2014            EDUAR JOHNSON DO            Ot            V45.82  
          PERCUTANEOUS TRANSLUM CORON ANGIOPLASTY                      

 

 2014            LETTY DAVIS ARLENE MCKEON            Ot            718.91   
                               

 

 2014            LETTY DAVIS ARLENE MCKEON            Ot            726.0    
                              

 

 2015                         Ot            733.00                       
           

 

 2015                         Ot            733.00                       
           

 

 2015                         Ot            733.00                       
           

 

 2015            EDUAR JOHNSON DO            Ot            733.00  
                                

 

 2015            DANA DILLON DO            Ot            492.8       
                           

 

 2015            DANA DILLON DO            Ot            511.9       
                           

 

 2015            DANA DILLON DO            Ot            586         
                         

 

 2015            LETTY DAVIS ARLENE MCKEON            Ot            726.0    
                              

 

 2015            LETTY DAVIS ARLENE MCKEON            Ot            718.91   
                               

 

 2015            LETTY DAVIS ARLNEE MCKEON            Ot            726.0    
                              

 

 2015            LETTY  ARLENE MCKEON            Ot            840.4    
                              

 

 2015            LETTY  ARLENE MCKEON            Ot            E000.8   
                               

 

 2015            LETTY  ARLENE MCKEON            Ot            E928.9   
                               

 

 2015            LETTY DAVIS ARLENE MCKEON            Ot            V72.84   
                               

 

 2015            LETTY ARLENE DAVIS            Ot            840.4    
        SPRAIN ROTATOR CUFF                     

 

 2015            LETTY DAVIS ARLENE MCKEON            Ot            E000.8   
         OTHER EXTERNAL CAUSE STATUS                     

 

 2015            LETTY DAVIS ARLENE MCKEON            Ot            E928.9   
         ACCIDENT NOS                     

 

 2015            LETTY DAVIS ARLENE MCKEON            Ot            V58.61   
         ANTICOAGULANTS,LT,CURRENT USE                     

 

 2015            LETTY  ARLENE MCKEON            Ot            V64.1    
        NO PROC/CONTRAINDICATION                     

 

 2015            LETTY DAVIS ARLENE MCKEON            Ot            715.31   
         LOC OSTEOARTH NOS-SHLDER                     

 

 2015            LETTY DAVIS ARLENE MCKEON            Ot            727.61   
         ROTATOR CUFF RUPTURE                     

 

 2015            LETTY DAVIS ARLENE MCKEON            Ot            840.7    
        (SLAP) SUPERIOR GLENOID LABRUM LESIONS                     

 

 2015            NOHELIA AUGUST MD            Ot            414.01      
                            

 

 2015            NHOELIA AUGUST MD            Ot            786.09      
                            

 

 2015                         Ot            733.00                       
           

 

 2015                         Ot            733.00                       
           

 

 2015                         Ot            733.00                       
           

 

 2015            EDUAR JOHNSON DO            Ot            733.00  
                                

 

 2015            DANA DILLON DO            Ot            492.8       
                           

 

 2015            WILMA DANA DAVIS            Ot            511.9       
                           

 

 2015            WILMA DAVIS DANA M            Ot            586         
                         

 

 2015            LETTY DO, ARLENE MCKEON            Ot            726.0    
                              

 

 2015            LETTY DO, ARLENE F            Ot            718.91   
                               

 

 2015            LETTY DO, ARLENE F            Ot            726.0    
                              

 

 2015            LETTY DO, ARLENE F            Ot            840.4    
                              

 

 2015            LETTY DO, ARLENE MCKEON            Ot            E000.8   
                               

 

 2015            LETTY DO ARLENE MCKEON            Ot            E928.9   
                               

 

 2015            LETTY DO ARLENE MCKEON            Ot            V72.84   
                               

 

 2015            LETTY DO ARLENE MCKEON            Ot            727.61   
                               

 

 2015            LETTY DO ARLENE MCKEON            Ot            V72.84   
                               

 

 2015            NOHELIA AUGUST MD            Ot            414.00      
                            

 

 2015            MATA MATIAS, NOHELIA CLARK            Ot            424.0       
                           

 

 2015            NOHELIA AUGUST MD            Ot            433.10      
                            

 

 2015            NOHELIA AUGUST MD            Ot            786.09      
                            

 

 2015            NOHELIA AUGUST MD            Ot            414.01      
                            

 

 2015            NOHELIA AUGUST MD            Ot            786.09      
                            

 

 2015            LETTY DO ARLENE MCKEON            Ot            840.4    
                              

 

 2015            LETTY DO ARLENE MCKEON            Ot            E000.8   
                               

 

 2015            LETTY DO ARLENE MCKEON            Ot            E928.9   
                               

 

 2015            LETTY DO, ARLENE F            Ot            V72.84   
                               

 

 2015            NOHELIA AUGUST MD            Ot            414.01      
                            

 

 2015            NOHELIA AUGUST MD            Ot            786.09      
                            

 

 2015            LETTY DO ARLENE MCKEON            Ot            719.41   
         JOINT PAIN-SHLDER                     

 

 2015            LETTY DO ARLENE F            Ot            V57.1    
        PHYSICAL THERAPY NEC                     

 

 2015            LETTY DO ARLENE F            Ot            V58.49   
         OTHER SPECIFIED AFTERCARE FOLLOWING SURG                     

 

 2015            LETTY DO, ARLENE MCKEON            Ot            840.4    
                              

 

 2015            LETTY DO, ARLENE MCKEON            Ot            E000.8   
                               

 

 2015            LETTY DO, ARLENE MCKEON            Ot            E928.9   
                               

 

 2015            LETTY DO, ARLENE MCKEON            Ot            V72.84   
                               

 

 2015            LETTY DO, ARLENE MCKEON            Ot            840.4    
                              

 

 2015            LETTY DO, ARLENE MCKEON            Ot            E000.8   
                               

 

 2015            LETTY DO, ARLENE MCKEON            Ot            E928.9   
                               

 

 2015            LETTY DO, ARLENE MCKEON            Ot            V72.84   
                               

 

 2015            LETTY DO, ARLENE MCKEON            Ot            727.61   
                               

 

 2015            LETTY DO, ARLENE MCKEON            Ot            V72.84   
                               

 

 2015            LETTY DO, ARLENE MCKEON            Ot            727.61   
                               

 

 2015            LETTY DO, ARLENE MCKEON            Ot            V72.84   
                               

 

 2015                         Ot            733.00                       
           

 

 2015                         Ot            733.00                       
           

 

 2015            LETTY DO, ARLENE MCKEON            Ot            722.4    
                              

 

 2015            LETTY DO, ARLENE MCKEON            Ot            722.4    
                              

 

 2015            BELKIS WEST MD            Ot            721.0        
                          

 

 2015            BELKIS WEST MD            Ot            V57.1        
                          

 

 2015            BELKIS WEST MD            Ot            721.0        
                          

 

 2015            BELKIS WEST MD            Ot            V57.1        
                          

 

 2015            BELKIS WEST MD            Ot            721.0        
    CERVICAL SPONDYLOSIS                     

 

 2015            BELKIS WEST MD            Ot            V57.1        
    PHYSICAL THERAPY NEC                     

 

 2015            EDUAR JOHNSON DO            Ot            A41.9   
                               

 

 2015            JOHNSONEDUAR HILL DO            Ot            E53.8   
                               

 

 2015            JOHNSONEDUAR HILL DO            Ot            E55.9   
                               

 

 2015            JOHNSONEDUAR HILL DO            Ot            E78.5   
                               

 

 2015            EDUAR JOHNSON DO            Ot            F17.200 
                                 

 

 2015            EDUAR JOHNSON DO            Ot            I12.9   
                               

 

 2015            JOHNSONEDUAR HILL DO            Ot            J15.1   
                               

 

 2015            JOHNSONEDUAR HILL DO            Ot            J15.4   
                               

 

 2015            EDUAR JOHNSON DO            Ot            J44.0   
                               

 

 2015            JOHNSON DO, EDUAR CLARK            Ot            J44.1   
                               

 

 2015            JOHNSON DO, EDUAR CLARK            Ot            M51.14  
                                

 

 2015            JOHNSON DO, EDUAR CLARK            Ot            N18.2   
                               

 

 2015            JOHNSON DO, EDUAR CLARK            Ot            R90.82  
                                

 

 2015            JOHNSON DO, EDUAR CLARK            Ot            Z66     
                             

 

 2015            JOHNSON DO, EDUAR CLARK            Ot            A41.9   
                               

 

 2015            JOHNSON DO, EDUAR CLARK            Ot            E53.8   
                               

 

 2015            JOHNSON DO, EDUAR CLARK            Ot            E55.9   
                               

 

 2015            JOHNSON DO, EDUAR CLARK            Ot            E78.5   
                               

 

 2015            JOHNSON DO, EDUAR CLARK            Ot            F17.200 
                                 

 

 2015            JOHNSON DO, EDUAR CLARK            Ot            I12.9   
                               

 

 2015            JOHNSON DO, EDUAR CLARK            Ot            J15.1   
                               

 

 2015            JOHNSON DO, EDUAR CLARK            Ot            J15.4   
                               

 

 2015            JOHNSON DO, EDUAR CLARK            Ot            J44.0   
                               

 

 2015            JOHNSON DO, EDUAR CLARK            Ot            J44.1   
                               

 

 2015            JOHNSON DO, EDUAR CLARK            Ot            M51.14  
                                

 

 2015            JOHNSON DO, EDUAR CLARK            Ot            N18.2   
                               

 

 2015            JOHNSON DO, EDUAR CLARK            Ot            R90.82  
                                

 

 2015            JOHNSON DO, EDUAR CLARK            Ot            Z66     
                             

 

 2016            JOHNSON DO, EDUAR CLARK            Ot            A41.9   
                               

 

 2016            JOHNSON DO, EDUAR CLARK            Ot            E53.8   
                               

 

 2016            JOHNSON DO, EDUAR CLARK            Ot            E55.9   
                               

 

 2016            JOHNSON DO, EDUAR CLARK            Ot            E78.5   
                               

 

 2016            JOHNSON DO, EDUAR CLARK            Ot            F17.200 
                                 

 

 2016            JOHNSON DO, EDUAR CLARK            Ot            I12.9   
                               

 

 2016            JOHNSON DO, EDUAR CLARK            Ot            J15.1   
                               

 

 2016            JOHNSON DO EDUAR CLARK            Ot            J15.4   
                               

 

 2016            JOHNSON DO, EDUAR CLARK            Ot            J44.0   
                               

 

 2016            JOHNSON DO, EDUAR CLARK            Ot            J44.1   
                               

 

 2016            JOHNSON DO, EDUAR CLARK            Ot            M51.14  
                                

 

 2016            JOHNSON DO, EDUAR CLARK            Ot            N18.2   
                               

 

 2016            JOHNSON DO, EDUAR CLARK            Ot            R90.82  
                                

 

 2016            JOHNSON DO, EDUAR CLARK            Ot            Z66     
                             

 

 2016            JOHNSON DO, EDUAR CLARK            Ot            A41.9   
                               

 

 2016            JOHNSON DO, EDUAR CLARK            Ot            E53.8   
                               

 

 2016            JOHNSON DO, EDUAR CLARK            Ot            E55.9   
                               

 

 2016            JOHNSON DO, EDUAR CLARK            Ot            E78.5   
                               

 

 2016            JOHNSON DO, EDUAR CLARK            Ot            F17.200 
                                 

 

 2016            JOHNSON DO, EDUAR CLARK            Ot            I12.9   
                               

 

 2016            JOHNSON DO, EDUAR CLARK            Ot            J15.1   
                               

 

 2016            JOHNSON DO, EDUAR CLARK            Ot            J15.4   
                               

 

 2016            JOHNSON DO, EDUAR CLARK            Ot            J44.0   
                               

 

 2016            JOHNSON DO, EDUAR CLARK            Ot            J44.1   
                               

 

 2016            JOHNSON DO, EDUAR CLARK            Ot            M51.14  
                                

 

 2016            JOHNSON DO, EDUAR CLARK            Ot            N18.2   
                               

 

 2016            JOHNSON DO, EDUAR CLARK            Ot            R90.82  
                                

 

 2016            JOHNSON DO, EDUAR CLARK            Ot            Z66     
                             

 

 2016            JOHNSON DO, EDUAR CLARK            Ot            A41.9   
                               

 

 2016            JOHNSON DO, EDUAR CLARK            Ot            E53.8   
                               

 

 2016            JOHNSON DO, EDUAR CLARK            Ot            E55.9   
                               

 

 2016            JOHNSON DO, EDUAR CLARK            Ot            E78.5   
                               

 

 2016            JOHNSON DO, EDUAR CLARK            Ot            F17.200 
                                 

 

 2016            JOHNSON DO, EDUAR CLARK            Ot            I12.9   
                               

 

 2016            JOHNSON DO, EDUAR CLARK            Ot            J15.1   
                               

 

 2016            JOHNSON DO, EDUAR CLARK            Ot            J15.4   
                               

 

 2016            JOHNSON DO, EDUAR CLARK            Ot            J44.0   
                               

 

 2016            JOHNSON DO, EDUAR CLARK            Ot            J44.1   
                               

 

 2016            JOHNSON DO, EDUAR CLARK            Ot            M51.14  
                                

 

 2016            JOHNSON DO, EDUAR CLARK            Ot            N18.2   
                               

 

 2016            JOHNSON DO, EDUAR CLARK            Ot            R90.82  
                                

 

 2016            JOHNSON DO, EDUAR CLARK            Ot            Z66     
                             

 

 2016            JOHNSON DO, EDUAR CLARK            Ot            A41.9   
                               

 

 2016            JOHNSON DO, EDUAR CLARK            Ot            E53.8   
                               

 

 2016            JOHNSON DO, EDUAR CLARK            Ot            E55.9   
                               

 

 2016            JOHNSON DO, EDUAR CLARK            Ot            E78.5   
                               

 

 2016            JOHNSON DO, EDUAR CLARK            Ot            F17.200 
                                 

 

 2016            JOHNSON DO, EDUAR CLARK            Ot            I12.9   
                               

 

 2016            JOHNSON DO, EDUAR CLARK            Ot            J15.1   
                               

 

 2016            JOHNSON DO, EDUAR CLARK            Ot            J15.4   
                               

 

 2016            JOHNSON DO, EDUAR CLARK            Ot            J44.0   
                               

 

 2016            JOHNSON DO, EDUAR CLARK            Ot            J44.1   
                               

 

 2016            JOHNSON DO, EDUAR CLARK            Ot            M51.14  
                                

 

 2016            JOHNSON DO, EDUAR CLARK            Ot            N18.2   
                               

 

 2016            JOHNSON DO, EDUAR CLARK            Ot            R90.82  
                                

 

 2016            JOHNSON DO, EDUAR CLARK            Ot            Z66     
                             

 

 2016            JOHNSON DO, EDUAR CLARK            Ot            A41.9   
                               

 

 2016            JOHNSON DO, EDUAR CLARK            Ot            E53.8   
                               

 

 2016            JOHNSON DO, EDUAR CLARK            Ot            E55.9   
                               

 

 2016            JOHNSON DO, EDUAR CLARK            Ot            E78.5   
                               

 

 2016            JOHNSON DO, EDUAR CLARK            Ot            F17.200 
                                 

 

 2016            JOHNSON DO, EDUAR CLARK            Ot            I12.9   
                               

 

 2016            JOHNSON DO, EDUAR CLARK            Ot            J15.1   
                               

 

 2016            JOHNSON DO, EDUAR CLARK            Ot            J15.4   
                               

 

 2016            JOHNSON DO, EDUAR CLARK            Ot            J44.0   
                               

 

 2016            JOHNSON DO, EDUAR CLARK            Ot            J44.1   
                               

 

 2016            JOHNSON DO, EDUAR CLARK            Ot            M51.14  
                                

 

 2016            JOHNSON DO, EDUAR CLARK            Ot            N18.2   
                               

 

 2016            JOHNSON DO, EDUAR CLARK            Ot            R90.82  
                                

 

 2016            JOHNSON DO, EDUAR CLARK            Ot            Z66     
                             

 

 2016            JOHNSON DO, EDUAR CLARK            Ot            A41.9   
                               

 

 2016            JOHNSON DO, EDUAR CLARK            Ot            E53.8   
                               

 

 2016            JOHNSON DO, EDUAR CLARK            Ot            E55.9   
                               

 

 2016            JOHNSON DO, EDUAR CLARK            Ot            E78.5   
                               

 

 2016            JOHNSON DO, EDUAR CLARK            Ot            F17.200 
                                 

 

 2016            JOHNSON DO, EDUAR CLARK            Ot            I12.9   
                               

 

 2016            JOHNSON DO, EDUAR CLARK            Ot            J15.1   
                               

 

 2016            JOHNSON DO, EDUAR CLARK            Ot            J15.4   
                               

 

 2016            JOHNSON DO, EDUAR CLARK            Ot            J44.0   
                               

 

 2016            JOHNSON DO, EDUAR CLARK            Ot            J44.1   
                               

 

 2016            JOHNSON DO, EDUAR CLARK            Ot            M51.14  
                                

 

 2016            JOHNSON DO, EDUAR CLARK            Ot            N18.2   
                               

 

 2016            JOHNSON DO, EDUAR CLARK            Ot            R90.82  
                                

 

 2016            JOHNSON DO, EDUAR CLARK            Ot            Z66     
                             

 

 2016            JOHNSON DO, EDUAR CLARK            Ot            A41.9   
                               

 

 2016            JOHNSON DO, EDUAR CLARK            Ot            E53.8   
                               

 

 2016            JOHNSON DO, EDUAR CLARK            Ot            E55.9   
                               

 

 2016            JOHNSON DO, EDUAR CLARK            Ot            E78.5   
                               

 

 2016            JOHNSON DO, EDUAR CLARK            Ot            F17.200 
                                 

 

 2016            JOHNSON DO, EDUAR CLARK            Ot            I12.9   
                               

 

 2016            JOHNSON DO, EDUAR CLARK            Ot            J15.1   
                               

 

 2016            JOHNSON DO, EDUAR CLARK            Ot            J15.4   
                               

 

 2016            JOHNSON DO, EDUAR CLARK            Ot            J44.0   
                               

 

 2016            JOHNSON DO, EDUAR CLARK            Ot            J44.1   
                               

 

 2016            JOHNSON DO, EDUAR CLARK            Ot            M51.14  
                                

 

 2016            JOHNSON DO, EDUAR CLARK            Ot            N18.2   
                               

 

 2016            JOHNSON DO, EDUAR CLARK            Ot            R90.82  
                                

 

 2016            JOHNSON DO, EDUAR CLARK            Ot            Z66     
                             

 

 2016            JOHNSON DO, EDUAR CLARK            Ot            A41.9   
         SEPSIS, UNSPECIFIED ORGANISM                     

 

 2016            EDUAR JOHNSON DO, Ot            E11.22  
          TYPE 2 DIABETES MELLITUS W DIABETIC                      

 

 2016            EDUAR JOHNSON DO, Ot            E53.8   
         DEFICIENCY OF OTHER SPECIFIED B GROUP VI                     

 

 2016            EDUAR JOHNSON DO, Ot            E55.9   
         VITAMIN D DEFICIENCY, UNSPECIFIED                     

 

 2016            EDUAR JOHNSON DO, Ot            E78.5   
         HYPERLIPIDEMIA, UNSPECIFIED                     

 

 2016            EDUAR JOHNSON DO, Ot            F17.210 
           NICOTINE DEPENDENCE, CIGARETTES, UNCOMPL                     

 

 2016            EDUAR JOHNSON DO, Ot            I12.9   
         HYPERTENSIVE CHRONIC KIDNEY DISEASE W ST                     

 

 2016            EDUAR JOHNSON DO, Ot            I25.10  
          ATHSCL HEART DISEASE OF NATIVE CORONARY                      

 

 2016            EDUAR JOHNSON DO, Ot            J15.1   
         PNEUMONIA DUE TO PSEUDOMONAS                     

 

 2016            EDUAR JOHNSON DO, Ot            J15.4   
         PNEUMONIA DUE TO OTHER STREPTOCOCCI                     

 

 2016            EDUAR JOHNSON DO, Ot            J44.0   
         CHRONIC OBSTRUCTIVE PULMON DISEASE W ACU                     

 

 2016            EDUAR JOHNSON DO, Ot            J44.1   
         CHRONIC OBSTRUCTIVE PULMONARY DISEASE W                      

 

 2016            EDUAR JOHNSON DO, Ot            M51.14  
          INTVRT DISC DISORDERS W RADICULOPATHY, T                     

 

 2016            EDUAR JOHNSON DO, Ot            N18.2   
         CHRONIC KIDNEY DISEASE, STAGE 2 (MILD)                     

 

 2016            EDUAR JOHNSON DO, Ot            R90.82  
          WHITE MATTER DISEASE, UNSPECIFIED                     

 

 2016            EDUAR JOHNSON DO, Ot            Z66     
       DO NOT RESUSCITATE                     

 

 2016                         Ot            733.00                       
           

 

 2016                         Ot            733.00                       
           

 

 2016                         Ot            733.00                       
           

 

 2016            EDUAR JOHNSON DO, Ot            733.00  
                                

 

 2016            DANA DILLON DO            Ot            492.8       
                           

 

 2016            DANA DILLON DO            Ot            511.9       
                           

 

 2016            DANA DILLON DO            Ot            586         
                         

 

 2016            ARLENE SAENZ DO            Ot            726.0    
                              

 

 2016            ARLENE SAENZ DO            Ot            718.91   
                               

 

 2016            ARLNEE SAENZ DO            Ot            726.0    
                              

 

 2016            ARLENE SAENZ DO            Ot            840.4    
                              

 

 2016            LETTY DO, ARLENE MCKEON            Ot            E000.8   
                               

 

 2016            LETTY DO, ARLENE MCKEON            Ot            E928.9   
                               

 

 2016            LETTY DO, ARLENE MCKEON            Ot            V72.84   
                               

 

 2016            LETTY DO, ARLENE MCKEON            Ot            727.61   
                               

 

 2016            LETTY DO, ARLENE MCKEON            Ot            V72.84   
                               

 

 2016            MATA MATIAS, NOHELIA CLARK            Ot            414.00      
                            

 

 2016            MATA MATIAS, NOHELIA J            Ot            424.0       
                           

 

 2016            MATA MATIAS, NOHELIA J            Ot            433.10      
                            

 

 2016            MATA MATIAS, NOHELIA J            Ot            786.09      
                            

 

 2016            MATA MATIAS, NOHELIA CLARK            Ot            414.01      
                            

 

 2016            MATA MATIAS, NOHELIA CLARK            Ot            786.09      
                            

 

 2016            LETTY DO, ARLENE MCKEON            Ot            840.4    
                              

 

 2016            LETTY DO, ARLENE MCKEON            Ot            E000.8   
                               

 

 2016            LETTY DO, ARLENE MCKEON            Ot            E928.9   
                               

 

 2016            LETTY DO, ARLENE MCKEON            Ot            V72.84   
                               

 

 2016                         Ot            733.00                       
           

 

 2016            LETTY DO, ARLENE MCKEON            Ot            722.4    
                              

 

 2016                         Ot            J44.9                        
          

 

 2016            EDUAR JOHNSON DO            Ot            J44.9   
                               

 

 2016                         Ot            J44.9                        
          

 

 2016            JOHNSONEDUAR HILL DO            Ot            K59.00  
                                

 

 2016                         Ot            J44.9                        
          

 

 2016            EDUAR JOHNSON DO            Ot            K59.00  
                                

 

 2016            EDUAR JOHNSON DO            Ot            J44.9   
                               

 

 2016            EDUAR JOHNSON DO            Ot            J44.9   
                               

 

 2016            JOHNSONEDUAR HILL DO            Ot            J44.9   
         CHRONIC OBSTRUCTIVE PULMONARY DISEASE, U                     

 

 2016            EDUAR JOHNSON DO            Ot            J44.9   
         CHRONIC OBSTRUCTIVE PULMONARY DISEASE, U                     

 

 2016            MICH COLLIER DO            Ot            R19.5       
     OTHER FECAL ABNORMALITIES                     

 

 2016            MICH COLLIER DO            Ot            Z01.818     
       ENCOUNTER FOR OTHER PREPROCEDURAL EXAMIN                     

 

 2016            MICH COLLIER DO            Ot            R19.5       
     OTHER FECAL ABNORMALITIES                     

 

 2016            MICH COLLIER DO            Ot            Z01.818     
       ENCOUNTER FOR OTHER PREPROCEDURAL EXAMIN                     

 

 2017                         Ot            733.00            
OSTEOPOROSIS NOS                     

 

 2017                         Ot            733.00            
OSTEOPOROSIS NOS                     

 

 2017            EDUAR JOHNSON DO            Ot            733.00  
          OSTEOPOROSIS NOS                     

 

 2017            DANA DILLON DO            Ot            492.8       
     EMPHYSEMA NEC                     

 

 2017            DANA DILLON DO            Ot            511.9       
     PLEURAL EFFUSION NOS                     

 

 2017            DANA DILLON DO            Ot            586         
   RENAL FAILURE NOS                     

 

 2017            LETTY DO ARLENE MCKEON            Ot            726.0    
        ADHESIVE CAPSULIT SHLDER                     

 

 2017            LETTY DO ARLENE MCKEON            Ot            718.91   
         JT DERANGMENT NOS-SHLDER                     

 

 2017            LETTY DO ARLENE MCKEON            Ot            726.0    
        ADHESIVE CAPSULIT SHLDER                     

 

 2017            LETTY DAVIS ARLENE MCKEON            Ot            840.4    
        SPRAIN ROTATOR CUFF                     

 

 2017            LETTY DAVIS ARLENE MCKEON            Ot            E000.8   
         OTHER EXTERNAL CAUSE STATUS                     

 

 2017            LETTY DAVIS ARLENE MCKEON            Ot            E928.9   
         ACCIDENT NOS                     

 

 2017            LETTY DO, ARLENE MCKEON            Ot            V72.84   
         EXAM PRE-OPERATIVE NOS                     

 

 2017            LETTY DAVIS ARLENE MCKEON            Ot            727.61   
         ROTATOR CUFF RUPTURE                     

 

 2017            LETTY DAVIS ARLENE MCKEON            Ot            V72.84   
         EXAM PRE-OPERATIVE NOS                     

 

 2017            MATA MATIAS, NOHELIA CLARK            Ot            414.00      
      CORON ATHEROSCLER NOS TYPE VESSEL, NATIV                     

 

 2017            NOHELIA AUGUST MD            Ot            424.0       
     MITRAL VALVE DISORDER                     

 

 2017            NOHELIA AUGUST MD            Ot            433.10      
      CAROTID ARTERY OCCLUSION W O CEREBRAL IN                     

 

 2017            NOHELIA AUGUST MD            Ot            786.09      
      RESPIRATORY ABNORM NEC                     

 

 2017            NOHELIA AUGUST MD            Ot            414.01      
      CORONARY ATHEROSCLEROSIS OF NATIVE CORON                     

 

 2017            NOHELIA AUGUST MD            Ot            786.09      
      RESPIRATORY ABNORM NEC                     

 

 2017            LETTY DAVIS ARLENE MCKEON            Ot            840.4    
        SPRAIN ROTATOR CUFF                     

 

 2017            LETTY DAVIS ARLENE MCKEON            Ot            E000.8   
         OTHER EXTERNAL CAUSE STATUS                     

 

 2017            LETTY DAVIS ARLENE MCKEON            Ot            E928.9   
         ACCIDENT NOS                     

 

 2017            ARLENE SAENZ DO            Ot            V72.84   
         EXAM PRE-OPERATIVE NOS                     

 

 2017                         Ot            733.00            
OSTEOPOROSIS NOS                     

 

 2017            ARLENE SAENZ DO            Ot            722.4    
        CERVICAL DISC DEGEN                     

 

 2017                         Ot            J44.9            CHRONIC 
OBSTRUCTIVE PULMONARY DISEASE, U                     

 

 2017            JOHNSONLIZ DAVIS EDUAR EDUARDO            Ot            K59.00  
          CONSTIPATION, UNSPECIFIED                     

 

 2017            JOHNSONEDUAR HILL DO            Ot            J44.9   
         CHRONIC OBSTRUCTIVE PULMONARY DISEASE, U                     

 

 2017            COLLIERMICH GALEANO DO            Ot            K59.00      
      CONSTIPATION, UNSPECIFIED                     

 

 2017            COLLIER MICH DAVIS            Ot            R19.5       
     OTHER FECAL ABNORMALITIES                     

 

 2017            COLLIERMICH GALEANO DO            Ot            Z86.010     
       PERSONAL HISTORY OF COLONIC POLYPS                     

 

 2017            COLLIERMICH GALEANO DO            Ot            K59.00      
      CONSTIPATION, UNSPECIFIED                     

 

 2017            COLLIER MICH DAVIS            Ot            R19.5       
     OTHER FECAL ABNORMALITIES                     

 

 2017            MICH COLLIER DO            Ot            Z86.010     
       PERSONAL HISTORY OF COLONIC POLYPS                     

 

 2017            TAE CHRISTIANSEN            Ot            
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                     

 

 2017            TAE CHRISTIANSEN            Ot            
G47.33            OBSTRUCTIVE SLEEP APNEA (ADULT) (PEDIATR                     

 

 2017            TAE CHRISTIANSEN            Ot            
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                      

 

 2017            TAE CHRISTIANSEN            Ot            
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                     

 

 2017            TAE CHRISTIANSEN            Ot            
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                     

 

 2017            TAE CHRISTIANSEN            Ot            
G47.33            OBSTRUCTIVE SLEEP APNEA (ADULT) (PEDIATR                     

 

 2017            TAE CHRISTIANSEN            Ot            
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                      

 

 2017            TAE CHRISTIANSEN            Ot            
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                     

 

 2017            MATA MATIAS, NOHELIA CLARK            Ot            E78.5       
     HYPERLIPIDEMIA, UNSPECIFIED                     

 

 2017            NOHELIA AUGUST MD            Ot            G47.33      
      OBSTRUCTIVE SLEEP APNEA (ADULT) (PEDIATR                     

 

 2017            NOHELIA AUGUST MD            Ot            I10         
   ESSENTIAL (PRIMARY) HYPERTENSION                     

 

 2017            NOHELIA AUGUST MD, Ot            I25.10      
      ATHSCL HEART DISEASE OF NATIVE CORONARY                      

 

 2017            NOHELIA AUGUST MD, Ot            I25.84      
      CORONARY ATHEROSCLEROSIS DUE TO CALCIFIE                     

 

 2017            NOHELIA AUGUST MD            Ot            I34.0       
     NONRHEUMATIC MITRAL (VALVE) INSUFFICIENC                     

 

 2017            NOHELIA AUGUST MD            Ot            I65.23      
      OCCLUSION AND STENOSIS OF BILATERAL MCKEON                     

 

 2017            NOHELIA AUGUST MD            Ot            R94.39      
      ABNORMAL RESULT OF OTHER CARDIOVASCULAR                      

 

 2017            NOHELIA AUGUST MD, Ot            Z72.0       
     TOBACCO USE                     

 

 2017            NOHELIA AUGUST MD, Ot            Z79.899     
       OTHER LONG TERM (CURRENT) DRUG THERAPY                     

 

 2017            NOHELIA AUGUST MD, Ot            Z86.73      
      PRSNL HX OF TIA (TIA), AND CEREB INFRC W                     

 

 2017            NOHELIA AUGUST MD            Ot            Z95.5       
     PRESENCE OF CORONARY ANGIOPLASTY IMPLANT                     

 

 2017            TAE CHRISTIANSEN            Ot            
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                     

 

 2017            TAE CHRISTIANSEN            Ot            
G47.33            OBSTRUCTIVE SLEEP APNEA (ADULT) (PEDIATR                     

 

 2017            TAE CHRISTIANSEN            Ot            
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                      

 

 2017            TAE CHRISTIANSEN            Ot            
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                     

 

 2017            TAE CHRISTIANSEN            Ot            
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                     

 

 2017            TAE CHRISTIANSEN            Ot            
G47.33            OBSTRUCTIVE SLEEP APNEA (ADULT) (PEDIATR                     

 

 2017            TAE CHRISTIANSEN            Ot            
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                      

 

 2017            TAE CHRISTIANSEN            Ot            
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                     

 

 2017            NOHELIA AUGUST MD            Ot            E78.5       
     HYPERLIPIDEMIA, UNSPECIFIED                     

 

 2017            NOHELIA AUGUST MD, Ot            G47.33      
      OBSTRUCTIVE SLEEP APNEA (ADULT) (PEDIATR                     

 

 2017            NOHELIA AUGUST MD            Ot            I10         
   ESSENTIAL (PRIMARY) HYPERTENSION                     

 

 2017            NOHELIA AUGUST MD, Ot            I25.10      
      ATHSCL HEART DISEASE OF NATIVE CORONARY                      

 

 2017            NOHELIA AUGUST MD, Ot            I25.84      
      CORONARY ATHEROSCLEROSIS DUE TO CALCIFIE                     

 

 2017            NOHELIA AUGUST MD            Ot            I34.0       
     NONRHEUMATIC MITRAL (VALVE) INSUFFICIENC                     

 

 2017            NOHELIA AUGUST MD, Ot            I65.23      
      OCCLUSION AND STENOSIS OF BILATERAL MCKEON                     

 

 2017            NOHELIA AUGUST MD            Ot            R94.39      
      ABNORMAL RESULT OF OTHER CARDIOVASCULAR                      

 

 2017            NOHELIA AUGUST MD, Ot            Z72.0       
     TOBACCO USE                     

 

 2017            NOHELIA AUGUST MD, Ot            Z79.899     
       OTHER LONG TERM (CURRENT) DRUG THERAPY                     

 

 2017            NOHELIA AUGUST MD, Ot            Z86.73      
      PRSNL HX OF TIA (TIA), AND CEREB INFRC W                     

 

 2017            NOHELIA AUGUST MD            Ot            Z95.5       
     PRESENCE OF CORONARY ANGIOPLASTY IMPLANT                     

 

 2017            DANA DILLON DO            Ot            R06.02      
      SHORTNESS OF BREATH                     

 

 2017            DANA DILLON DO            Ot            Z87.891     
       PERSONAL HISTORY OF NICOTINE DEPENDENCE                     

 

 2017            DANA DILLON DO            Ot            R06.02      
      SHORTNESS OF BREATH                     

 

 2017            DANA DILLON DO            Ot            Z87.891     
       PERSONAL HISTORY OF NICOTINE DEPENDENCE                     

 

 2017            DANA DILLON DO            Ot            R06.02      
      SHORTNESS OF BREATH                     

 

 2017            DANA DILLON DO            Ot            Z87.891     
       PERSONAL HISTORY OF NICOTINE DEPENDENCE                     

 

 2017            DANA DILLON DO            Ot            R06.02      
      SHORTNESS OF BREATH                     

 

 2017            DANA DILLON DO            Ot            Z87.891     
       PERSONAL HISTORY OF NICOTINE DEPENDENCE                     

 

 2017            DANA DILLON DO            Ot            R06.02      
      SHORTNESS OF BREATH                     

 

 2017            DANA DILLON DO            Ot            Z87.891     
       PERSONAL HISTORY OF NICOTINE DEPENDENCE                     

 

 2017            DANA DILLON DO            Ot            R06.02      
      SHORTNESS OF BREATH                     

 

 2017            DANA DILLON DO            Ot            Z87.891     
       PERSONAL HISTORY OF NICOTINE DEPENDENCE                     

 

 2017            DANA DILLON DO            Ot            R06.02      
      SHORTNESS OF BREATH                     

 

 2017            DANA DILLON DO            Ot            Z87.891     
       PERSONAL HISTORY OF NICOTINE DEPENDENCE                     

 

 2017            DANA DILLON DO            Ot            R06.02      
      SHORTNESS OF BREATH                     

 

 2017            DANA DILLON DO            Ot            Z87.891     
       PERSONAL HISTORY OF NICOTINE DEPENDENCE                     

 

 2018                         Ot            733.00            
OSTEOPOROSIS NOS                     

 

 2018            JOHNSONEDUAR HILL DO            Ot            733.00  
          OSTEOPOROSIS NOS                     

 

 2018            DANA DILLON DO            Ot            492.8       
     EMPHYSEMA NEC                     

 

 2018            DANA DILLON DO            Ot            511.9       
     PLEURAL EFFUSION NOS                     

 

 2018            WILMADANA BELTRÁN DO            Ot            586         
   RENAL FAILURE NOS                     

 

 2018            LETTY DO ARLENE MCKEON            Ot            726.0    
        ADHESIVE CAPSULIT SHLDER                     

 

 2018            LETTY DO ARLENE MCKEON            Ot            718.91   
         JT DERANGMENT NOS-SHLDER                     

 

 2018            LETTY DO ARLENE MCKEON            Ot            726.0    
        ADHESIVE CAPSULIT SHLDER                     

 

 2018            LETTY DO ARLENE MCKEON            Ot            840.4    
        SPRAIN ROTATOR CUFF                     

 

 2018            LETTY DO ARLENE MCKEON            Ot            E000.8   
         OTHER EXTERNAL CAUSE STATUS                     

 

 2018            LETTY DO ARLENE MCKEON            Ot            E928.9   
         ACCIDENT NOS                     

 

 2018            LETTY DAVIS ARLENE MCKEON            Ot            V72.84   
         EXAM PRE-OPERATIVE NOS                     

 

 2018            LETTY DO ARLENE MCKEON            Ot            727.61   
         ROTATOR CUFF RUPTURE                     

 

 2018            LETTY DAVIS ARLENE MCKEON            Ot            V72.84   
         EXAM PRE-OPERATIVE NOS                     

 

 2018            MATA MATIAS, NOHELIA CLARK            Ot            414.00      
      CORON ATHEROSCLER NOS TYPE VESSEL, NATIV                     

 

 2018            NOHELIA AUGUST MD            Ot            424.0       
     MITRAL VALVE DISORDER                     

 

 2018            NOHELIA AUGUST MD            Ot            433.10      
      CAROTID ARTERY OCCLUSION W O CEREBRAL IN                     

 

 2018            NOHELIA AUGUST MD            Ot            786.09      
      RESPIRATORY ABNORM NEC                     

 

 2018            NOHELIA AUGUST MD            Ot            414.01      
      CORONARY ATHEROSCLEROSIS OF NATIVE CORON                     

 

 2018            NOHELIA AUGUST MD            Ot            786.09      
      RESPIRATORY ABNORM NEC                     

 

 2018            ARLENE SAENZ DO            Ot            840.4    
        SPRAIN ROTATOR CUFF                     

 

 2018            ARLENE SAENZ DO            Ot            E000.8   
         OTHER EXTERNAL CAUSE STATUS                     

 

 2018            ARLENE SAENZ DO            Ot            E928.9   
         ACCIDENT NOS                     

 

 2018            ARLENE SAENZ DO            Ot            V72.84   
         EXAM PRE-OPERATIVE NOS                     

 

 2018                         Ot            733.00            
OSTEOPOROSIS NOS                     

 

 2018            ARLENE SAENZ DO            Ot            722.4    
        CERVICAL DISC DEGEN                     

 

 2018                         Ot            J44.9            CHRONIC 
OBSTRUCTIVE PULMONARY DISEASE, U                     

 

 2018            EDUAR JOHNSON DO            Ot            K59.00  
          CONSTIPATION, UNSPECIFIED                     

 

 2018            EDUAR JOHNSON DO, Ot            J44.9   
         CHRONIC OBSTRUCTIVE PULMONARY DISEASE, U                     

 

 2018            TAE CHRISTIANSEN Ot            
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                     

 

 2018            TAE CHRISTIANSEN Ot            
G47.33            OBSTRUCTIVE SLEEP APNEA (ADULT) (PEDIATR                     

 

 2018            TAE CHRISTIANSEN Ot            
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                      

 

 2018            TAE CHRISTIANSEN            Ot            
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                     

 

 2018            DANA DILLON DO            Ot            R06.02      
      SHORTNESS OF BREATH                     

 

 2018            DANA DILLON DO            Ot            Z87.891     
       PERSONAL HISTORY OF NICOTINE DEPENDENCE                     

 

 2018            NOHELIA AUGUST MD, Ot            G47.33      
      OBSTRUCTIVE SLEEP APNEA (ADULT) (PEDIATR                     

 

 2018            NOHELIA AUGUST MD, Ot            I25.10      
      ATHSCL HEART DISEASE OF NATIVE CORONARY                      

 

 2018            NOHELIA AUGUST MD, Ot            I34.0       
     NONRHEUMATIC MITRAL (VALVE) INSUFFICIENC                     

 

 2018            NOHELIA AUGUST MD, Ot            I71.2       
     THORACIC AORTIC ANEURYSM, WITHOUT RUPTUR                     

 

 2018            NOHELIA AUGUST MD            Ot            J43.9       
     EMPHYSEMA, UNSPECIFIED                     

 

 2018            NOHELIA AUGUST MD            Ot            N20.0       
     CALCULUS OF KIDNEY                     

 

 2018            NOHELIA AUGUST MD            Ot            R41.0       
     DISORIENTATION, UNSPECIFIED                     

 

 2018            NOHELIA AUGUST MD            Ot            W19.XXXA    
        UNSPECIFIED FALL, INITIAL ENCOUNTER                     

 

 2018            NOHELIA AUGUST MD, Ot            Z72.0       
     TOBACCO USE                     

 

 2018            EDUAR JOHNSON DO            Ot            J44.9   
         CHRONIC OBSTRUCTIVE PULMONARY DISEASE, U                     

 

 04/10/2018            NOHELIA AUGUST MD, Ot            D64.9       
     ANEMIA, UNSPECIFIED                     

 

 04/10/2018            NOHELIA AUGUST MD, Ot            E78.5       
     HYPERLIPIDEMIA, UNSPECIFIED                     

 

 04/10/2018            NOHELIA AUGUST MD            Ot            F17.210     
       NICOTINE DEPENDENCE, CIGARETTES, UNCOMPL                     

 

 04/10/2018            NOHELIA AUGUST MD, Ot            G47.9       
     SLEEP DISORDER, UNSPECIFIED                     

 

 04/10/2018            NOHELIA AUGUST MD            Ot            I10         
   ESSENTIAL (PRIMARY) HYPERTENSION                     

 

 04/10/2018            NOHELIA AUGUST MD, Ot            I25.10      
      ATHSCL HEART DISEASE OF NATIVE CORONARY                      

 

 04/10/2018            NOHELIA AUGUST MD, Ot            I65.29      
      OCCLUSION AND STENOSIS OF UNSPECIFIED CA                     

 

 04/10/2018            NOHELIA AUGUST MD, Ot            J44.9       
     CHRONIC OBSTRUCTIVE PULMONARY DISEASE, U                     

 

 04/10/2018            NOHELIA AUGUST MD            Ot            K63.89      
      OTHER SPECIFIED DISEASES OF INTESTINE                     

 

 04/10/2018            NOHELIA AUGUST MD            Ot            N28.9       
     DISORDER OF KIDNEY AND URETER, UNSPECIFI                     

 

 04/10/2018            NOHELIA AUGUST MD            Ot            R06.02      
      SHORTNESS OF BREATH                     

 

 04/10/2018            NOHELIA AUGUST MD            Ot            R19.7       
     DIARRHEA, UNSPECIFIED                     

 

 04/10/2018            NOHELIA AUGUST MD            Ot            R21         
   RASH AND OTHER NONSPECIFIC SKIN ERUPTION                     

 

 04/10/2018            NOHELIA AUGUST MD            Ot            R53.83      
      OTHER FATIGUE                     

 

 04/10/2018            NOHELIA AUGUST MD, Ot            Z79.899     
       OTHER LONG TERM (CURRENT) DRUG THERAPY                     

 

 04/10/2018            NOHELIA AUGUST MD            Ot            Z86.73      
      PRSNL HX OF TIA (TIA), AND CEREB INFRC W                     

 

 2018                         Ot            733.00            
OSTEOPOROSIS NOS                     

 

 2018            EDUAR JOHNSON DO            Ot            733.00  
          OSTEOPOROSIS NOS                     

 

 2018            DANA DILLON DO            Ot            492.8       
     EMPHYSEMA NEC                     

 

 2018            DANA DILLON DO            Ot            511.9       
     PLEURAL EFFUSION NOS                     

 

 2018            WILMA DO, DANA M            Ot            586         
   RENAL FAILURE NOS                     

 

 2018            ARLENE SAENZ DO            Ot            726.0    
        ADHESIVE CAPSULIT SHLDER                     

 

 2018            ARLENE SAENZ DO            Ot            718.91   
         JT DERANGMENT NOS-SHLDER                     

 

 2018            LETTY DAVIS, ARLENE MCKEON            Ot            726.0    
        ADHESIVE CAPSULIT SHLDER                     

 

 2018            ARLENE SAENZ DO            Ot            840.4    
        SPRAIN ROTATOR CUFF                     

 

 2018            ARLENE SAENZ DO            Ot            E000.8   
         OTHER EXTERNAL CAUSE STATUS                     

 

 2018            ARLENE SAENZ DO            Ot            E928.9   
         ACCIDENT NOS                     

 

 2018            ARLENE SAENZ DO            Ot            V72.84   
         EXAM PRE-OPERATIVE NOS                     

 

 2018            ARLENE SAENZ DO            Ot            727.61   
         ROTATOR CUFF RUPTURE                     

 

 2018            ARLENE SAENZ DO            Ot            V72.84   
         EXAM PRE-OPERATIVE NOS                     

 

 2018            MATA MATIAS, NOHELIA CLARK            Ot            414.00      
      CORON ATHEROSCLER NOS TYPE VESSEL, NATIV                     

 

 2018            NOHELIA AUGUST MD            Ot            424.0       
     MITRAL VALVE DISORDER                     

 

 2018            MATA MATIAS, NOHELIA CLARK            Ot            433.10      
      CAROTID ARTERY OCCLUSION W O CEREBRAL IN                     

 

 2018            MATA MATIAS, NOHELIA CLARK            Ot            786.09      
      RESPIRATORY ABNORM NEC                     

 

 2018            MATA MATIAS, NOHELIA CLARK            Ot            414.01      
      CORONARY ATHEROSCLEROSIS OF NATIVE CORON                     

 

 2018            NOHELIA AUGUST MD            Ot            786.09      
      RESPIRATORY ABNORM NEC                     

 

 2018            LETTY DAVIS ARLENE MCKEON            Ot            840.4    
        SPRAIN ROTATOR CUFF                     

 

 2018            LETTY DAVIS ARLENE MCKEON            Ot            E000.8   
         OTHER EXTERNAL CAUSE STATUS                     

 

 2018            ARLENE SAENZ DO            Ot            E928.9   
         ACCIDENT NOS                     

 

 2018            ARLENE SAENZ DO            Ot            V72.84   
         EXAM PRE-OPERATIVE NOS                     

 

 2018                         Ot            733.00            
OSTEOPOROSIS NOS                     

 

 2018            LETTY DAVIS ARLENE MCKEON            Ot            722.4    
        CERVICAL DISC DEGEN                     

 

 2018                         Ot            J44.9            CHRONIC 
OBSTRUCTIVE PULMONARY DISEASE, U                     

 

 2018            EDUAR JOHNSON DO            Ot            K59.00  
          CONSTIPATION, UNSPECIFIED                     

 

 2018            EDUAR JOHNSON DO            Ot            J44.9   
         CHRONIC OBSTRUCTIVE PULMONARY DISEASE, U                     

 

 2018            NIMESH NAILS, TAE COELLO            Ot            
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                     

 

 2018            TAE CHRISTIANSEN Ot            
G47.33            OBSTRUCTIVE SLEEP APNEA (ADULT) (PEDIATR                     

 

 2018            TAE CHRISTIANSEN            Ot            
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                      

 

 2018            TAE CHRISTIANSEN            Ot            
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                     

 

 2018            DANA DILLON DO            Ot            R06.02      
      SHORTNESS OF BREATH                     

 

 2018            DANA DILLON DO            Ot            Z87.891     
       PERSONAL HISTORY OF NICOTINE DEPENDENCE                     

 

 2018            NOHELIA AUGUST MD, Ot            G47.33      
      OBSTRUCTIVE SLEEP APNEA (ADULT) (PEDIATR                     

 

 2018            NOHELIA AUGUST MD, Ot            I25.10      
      ATHSCL HEART DISEASE OF NATIVE CORONARY                      

 

 2018            NOHELIA AUGUST MD            Ot            I34.0       
     NONRHEUMATIC MITRAL (VALVE) INSUFFICIENC                     

 

 2018            NOHELIA AUGUST MD            Ot            I71.2       
     THORACIC AORTIC ANEURYSM, WITHOUT RUPTUR                     

 

 2018            NOHELIA AUGUST MD, Ot            J43.9       
     EMPHYSEMA, UNSPECIFIED                     

 

 2018            NOHELIA AUGUST MD            Ot            N20.0       
     CALCULUS OF KIDNEY                     

 

 2018            NOHELIA AUGUST MD            Ot            R41.0       
     DISORIENTATION, UNSPECIFIED                     

 

 2018            NOHELIA AUGUST MD            Ot            W19.XXXA    
        UNSPECIFIED FALL, INITIAL ENCOUNTER                     

 

 2018            NOHELIA AUGUST MD            Ot            Z72.0       
     TOBACCO USE                     

 

 2018            MICH COLLIER DO            Ot            R93.5       
     ABN FINDINGS ON DX IMAGING OF ABD REGION                     

 

 2018            MICH COLLIER DO            Ot            Z01.818     
       ENCOUNTER FOR OTHER PREPROCEDURAL EXAMIN                     

 

 2018            NOHELIA AUGUST MD, Ot            G47.33      
      OBSTRUCTIVE SLEEP APNEA (ADULT) (PEDIATR                     

 

 2018            NOHELIA AUGUST MD            Ot            I25.10      
      ATHSCL HEART DISEASE OF NATIVE CORONARY                      

 

 2018            NOHELIA AUGUST MD            Ot            I34.0       
     NONRHEUMATIC MITRAL (VALVE) INSUFFICIENC                     

 

 2018            NOHELIA AUGUST MD            Ot            I71.2       
     THORACIC AORTIC ANEURYSM, WITHOUT RUPTUR                     

 

 2018            MATA MATIAS, NOHELIA CLRAK            Ot            J43.9       
     EMPHYSEMA, UNSPECIFIED                     

 

 2018            NOHELIA AUGUST MD            Ot            N20.0       
     CALCULUS OF KIDNEY                     

 

 2018            NOHELIA AUGUST MD            Ot            R41.0       
     DISORIENTATION, UNSPECIFIED                     

 

 2018            NOHELIA AUGUST MD            Ot            W19.XXXA    
        UNSPECIFIED FALL, INITIAL ENCOUNTER                     

 

 2018            NOHELIA AUGUST MD            Ot            Z72.0       
     TOBACCO USE                     

 

 2018                         Ot            733.00            
OSTEOPOROSIS NOS                     

 

 2018            JOHNSONEDUAR HILL DO            Ot            733.00  
          OSTEOPOROSIS NOS                     

 

 2018            DANA DILLON DO            Ot            492.8       
     EMPHYSEMA NEC                     

 

 2018            DANA DILLON DO            Ot            511.9       
     PLEURAL EFFUSION NOS                     

 

 2018            DANA DILLON DO            Ot            586         
   RENAL FAILURE NOS                     

 

 2018            LETTY , ARLENE F            Ot            726.0    
        ADHESIVE CAPSULIT SHLDER                     

 

 2018            LETTY , ARLENE F            Ot            718.91   
         JT DERANGMENT NOS-SHLDER                     

 

 2018            LETTY , ARLENE LINDA            Ot            726.0    
        ADHESIVE CAPSULIT SHLDER                     

 

 2018            LETTY , ARLENE F            Ot            840.4    
        SPRAIN ROTATOR CUFF                     

 

 2018            LETTY DAVIS, ARLENE F            Ot            E000.8   
         OTHER EXTERNAL CAUSE STATUS                     

 

 2018            LETTY DO ARLENE LINDA            Ot            E928.9   
         ACCIDENT NOS                     

 

 2018            LETTY , ARLENE CMKEON            Ot            V72.84   
         EXAM PRE-OPERATIVE NOS                     

 

 2018            LETTY DO ARLENE F            Ot            727.61   
         ROTATOR CUFF RUPTURE                     

 

 2018            LETTY DO ARLENE LINDA            Ot            V72.84   
         EXAM PRE-OPERATIVE NOS                     

 

 2018            NOHELIA AUGUST MD            Ot            414.00      
      CORON ATHEROSCLER NOS TYPE VESSEL, NATIV                     

 

 2018            NOHELIA AUGUST MD            Ot            424.0       
     MITRAL VALVE DISORDER                     

 

 2018            NOHELIA AUGUST MD            Ot            433.10      
      CAROTID ARTERY OCCLUSION W O CEREBRAL IN                     

 

 2018            NOHELIA AUGUST MD            Ot            786.09      
      RESPIRATORY ABNORM NEC                     

 

 2018            NOHELIA AUGUST MD            Ot            414.01      
      CORONARY ATHEROSCLEROSIS OF NATIVE CORON                     

 

 2018            NOHELIA AUGUST MD            Ot            786.09      
      RESPIRATORY ABNORM NEC                     

 

 2018            ARLENE SAENZ DO            Ot            840.4    
        SPRAIN ROTATOR CUFF                     

 

 2018            ARLENE SAENZ DO            Ot            E000.8   
         OTHER EXTERNAL CAUSE STATUS                     

 

 2018            ARLENE SAENZ DO            Ot            E928.9   
         ACCIDENT NOS                     

 

 2018            ARLENE SAENZ DO            Ot            V72.84   
         EXAM PRE-OPERATIVE NOS                     

 

 2018                         Ot            733.00            
OSTEOPOROSIS NOS                     

 

 2018            ARLENE SAENZ DO            Ot            722.4    
        CERVICAL DISC DEGEN                     

 

 2018                         Ot            J44.9            CHRONIC 
OBSTRUCTIVE PULMONARY DISEASE, U                     

 

 2018            EDUAR JOHNSON DO            Ot            K59.00  
          CONSTIPATION, UNSPECIFIED                     

 

 2018            EDUAR JOHNSON DO, Ot            J44.9   
         CHRONIC OBSTRUCTIVE PULMONARY DISEASE, U                     

 

 2018            TAE CHRISTIANSEN            Ot            
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                     

 

 2018            TAE CHRISTIANSEN Ot            
G47.33            OBSTRUCTIVE SLEEP APNEA (ADULT) (PEDIATR                     

 

 2018            TAE CHRISTIANSEN Ot            
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                      

 

 2018            TAE CHRISTIANSEN            Ot            
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                     

 

 2018            DANA DILLON DO            Ot            R06.02      
      SHORTNESS OF BREATH                     

 

 2018            DANA DILLON DO            Ot            Z87.891     
       PERSONAL HISTORY OF NICOTINE DEPENDENCE                     

 

 2018            NOHELIA AUGUST MD, Ot            G47.33      
      OBSTRUCTIVE SLEEP APNEA (ADULT) (PEDIATR                     

 

 2018            NOHELIA AUGUST MD, Ot            I25.10      
      ATHSCL HEART DISEASE OF NATIVE CORONARY                      

 

 2018            NOHELIA AUGUST MD            Ot            I34.0       
     NONRHEUMATIC MITRAL (VALVE) INSUFFICIENC                     

 

 2018            NOHELIA AUGUST MD            Ot            I71.2       
     THORACIC AORTIC ANEURYSM, WITHOUT RUPTUR                     

 

 2018            NOHELIA AUGUST MD            Ot            J43.9       
     EMPHYSEMA, UNSPECIFIED                     

 

 2018            NOHELIA AUGUST MD            Ot            N20.0       
     CALCULUS OF KIDNEY                     

 

 2018            NOHELIA AUGUST MD            Ot            R41.0       
     DISORIENTATION, UNSPECIFIED                     

 

 2018            MATA MATIAS, NOHELIA CLARK            Ot            W19.XXXA    
        UNSPECIFIED FALL, INITIAL ENCOUNTER                     

 

 2018            MATA MATIAS, NOHELIA CLARK            Ot            Z72.0       
     TOBACCO USE                     

 

 2018                         Ot            733.00            
OSTEOPOROSIS NOS                     

 

 2018            JOHNSON DO EDUAR CLARK            Ot            733.00  
          OSTEOPOROSIS NOS                     

 

 2018            DANA DILLON DO            Ot            492.8       
     EMPHYSEMA NEC                     

 

 2018            DANA DILLON DO M            Ot            511.9       
     PLEURAL EFFUSION NOS                     

 

 2018            DANA DILLON DO            Ot            586         
   RENAL FAILURE NOS                     

 

 2018            LETTY DO, ARLENE F            Ot            726.0    
        ADHESIVE CAPSULIT SHLDER                     

 

 2018            LETTY DO, ARLENE F            Ot            718.91   
         JT DERANGMENT NOS-SHLDER                     

 

 2018            LETTY DO, ARLENE F            Ot            726.0    
        ADHESIVE CAPSULIT SHLDER                     

 

 2018            LETTY DO, ARLENE F            Ot            840.4    
        SPRAIN ROTATOR CUFF                     

 

 2018            LETTY , ARLENE F            Ot            E000.8   
         OTHER EXTERNAL CAUSE STATUS                     

 

 2018            LETTY DO, ARLENE F            Ot            E928.9   
         ACCIDENT NOS                     

 

 2018            LETTY DO, ARLENE LINDA            Ot            V72.84   
         EXAM PRE-OPERATIVE NOS                     

 

 2018            LETTY , ARLENE F            Ot            727.61   
         ROTATOR CUFF RUPTURE                     

 

 2018            LETTY , ARLENE F            Ot            V72.84   
         EXAM PRE-OPERATIVE NOS                     

 

 2018            MATA MATIAS, NOHELIA CLARK            Ot            414.00      
      CORON ATHEROSCLER NOS TYPE VESSEL, NATIV                     

 

 2018            NOHELIA AUGUST MD            Ot            424.0       
     MITRAL VALVE DISORDER                     

 

 2018            NOHELIA AUGUST MD            Ot            433.10      
      CAROTID ARTERY OCCLUSION W O CEREBRAL IN                     

 

 2018            NOHELIA AUGUST MD            Ot            786.09      
      RESPIRATORY ABNORM NEC                     

 

 2018            NOHELIA AUGUST MD            Ot            414.01      
      CORONARY ATHEROSCLEROSIS OF NATIVE CORON                     

 

 2018            NOHELIA AUGUST MD            Ot            786.09      
      RESPIRATORY ABNORM NEC                     

 

 2018            LETTY DO ARLENE LINDA            Ot            840.4    
        SPRAIN ROTATOR CUFF                     

 

 2018            LETTY , ARLENE F            Ot            E000.8   
         OTHER EXTERNAL CAUSE STATUS                     

 

 2018            LETTY DO, ARLENE F            Ot            E928.9   
         ACCIDENT NOS                     

 

 2018            LETTY ARLENE            Ot            V72.84   
         EXAM PRE-OPERATIVE NOS                     

 

 2018                         Ot            733.00            
OSTEOPOROSIS NOS                     

 

 2018            ARLENE SAENZ DO            Ot            722.4    
        CERVICAL DISC DEGEN                     

 

 2018                         Ot            J44.9            CHRONIC 
OBSTRUCTIVE PULMONARY DISEASE, U                     

 

 2018            EDUAR JOHNSON DO            Ot            K59.00  
          CONSTIPATION, UNSPECIFIED                     

 

 2018            EDUAR JOHNSON DO            Ot            J44.9   
         CHRONIC OBSTRUCTIVE PULMONARY DISEASE, U                     

 

 2018            TAE CHRISTIANSEN Ot            
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                     

 

 2018            TAE CHRISTIANSEN Ot            
G47.33            OBSTRUCTIVE SLEEP APNEA (ADULT) (PEDIATR                     

 

 2018            TAE CHRISTIANSEN Ot            
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                      

 

 2018            TAE CHRISTIANSEN            Ot            
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                     

 

 2018            DANA DILLON DO            Ot            R06.02      
      SHORTNESS OF BREATH                     

 

 2018            DANA DILLON DO            Ot            Z87.891     
       PERSONAL HISTORY OF NICOTINE DEPENDENCE                     

 

 2018            NOHELIA AUGUST MD, Ot            G47.33      
      OBSTRUCTIVE SLEEP APNEA (ADULT) (PEDIATR                     

 

 2018            NOHELIA AUGUST MD, Ot            I25.10      
      ATHSCL HEART DISEASE OF NATIVE CORONARY                      

 

 2018            NOHELIA AUGUST MD            Ot            I34.0       
     NONRHEUMATIC MITRAL (VALVE) INSUFFICIENC                     

 

 2018            NOHELIA AUGUST MD, Ot            I71.2       
     THORACIC AORTIC ANEURYSM, WITHOUT RUPTUR                     

 

 2018            NOHELIA AUGUST MD, Ot            J43.9       
     EMPHYSEMA, UNSPECIFIED                     

 

 2018            NOHELIA AUGUST MD            Ot            N20.0       
     CALCULUS OF KIDNEY                     

 

 2018            NOHELIA AUGUST MD, Ot            R41.0       
     DISORIENTATION, UNSPECIFIED                     

 

 2018            NOHELIA AUGUST MD            Ot            W19.XXXA    
        UNSPECIFIED FALL, INITIAL ENCOUNTER                     

 

 2018            NOHELIA AUGUST MD, Ot            Z72.0       
     TOBACCO USE                     

 

 2018            MICH COLLIER DO            Ot            R93.5       
     ABN FINDINGS ON DX IMAGING OF ABD REGION                     

 

 2018            MICH COLLIER DO            Ot            Z01.818     
       ENCOUNTER FOR OTHER PREPROCEDURAL EXAMIN                     

 

 2018                         Ot            733.00            
OSTEOPOROSIS NOS                     

 

 2018            ELIZABETH DAVIS EDUAR CLARK            Ot            733.00  
          OSTEOPOROSIS NOS                     

 

 2018            DANA DILLON DO            Ot            492.8       
     EMPHYSEMA NEC                     

 

 2018            DANA DILLON DO            Ot            511.9       
     PLEURAL EFFUSION NOS                     

 

 2018            DANA DILLON DO            Ot            586         
   RENAL FAILURE NOS                     

 

 2018            LETTY , ARLENE MCKEON            Ot            726.0    
        ADHESIVE CAPSULIT SHLDER                     

 

 2018            LETTY , ARLENE MCKEON            Ot            718.91   
         JT DERANGMENT NOS-SHLDER                     

 

 2018            LETTY DO ARLENE MCKEON            Ot            726.0    
        ADHESIVE CAPSULIT SHLDER                     

 

 2018            LETTY , ARLENE MCKEON            Ot            840.4    
        SPRAIN ROTATOR CUFF                     

 

 2018            LETTY DO ARLENE MCKEON            Ot            E000.8   
         OTHER EXTERNAL CAUSE STATUS                     

 

 2018            LETTY DO ARLENE MCKEON            Ot            E928.9   
         ACCIDENT NOS                     

 

 2018            LETTY , ARLENE MCKEON            Ot            V72.84   
         EXAM PRE-OPERATIVE NOS                     

 

 2018            LETTY DO ARLENE MCKEON            Ot            727.61   
         ROTATOR CUFF RUPTURE                     

 

 2018            LETTY DO ARLENE MCKEON            Ot            V72.84   
         EXAM PRE-OPERATIVE NOS                     

 

 2018            NOHELIA AUGUST MD            Ot            414.00      
      CORON ATHEROSCLER NOS TYPE VESSEL, NATIV                     

 

 2018            NOHELIA AUGUST MD            Ot            424.0       
     MITRAL VALVE DISORDER                     

 

 2018            NOHELIA AUGUST MD            Ot            433.10      
      CAROTID ARTERY OCCLUSION W O CEREBRAL IN                     

 

 2018            NOHELIA AUGUST MD            Ot            786.09      
      RESPIRATORY ABNORM NEC                     

 

 2018            NOHELIA AUGUST MD            Ot            414.01      
      CORONARY ATHEROSCLEROSIS OF NATIVE CORON                     

 

 2018            NOHELIA AUGUST MD            Ot            786.09      
      RESPIRATORY ABNORM NEC                     

 

 2018            ARLENE SAENZ DO            Ot            840.4    
        SPRAIN ROTATOR CUFF                     

 

 2018            LETTY DO ARLENE MCKEON            Ot            E000.8   
         OTHER EXTERNAL CAUSE STATUS                     

 

 2018            LETTY DO ARLENE MCKEON            Ot            E928.9   
         ACCIDENT NOS                     

 

 2018            LETTY DO ARLENE MCKEON            Ot            V72.84   
         EXAM PRE-OPERATIVE NOS                     

 

 2018                         Ot            733.00            
OSTEOPOROSIS NOS                     

 

 2018            ARLENE SAENZ DO            Ot            722.4    
        CERVICAL DISC DEGEN                     

 

 2018                         Ot            J44.9            CHRONIC 
OBSTRUCTIVE PULMONARY DISEASE, U                     

 

 2018            EDUAR JOHNSON DO            Ot            K59.00  
          CONSTIPATION, UNSPECIFIED                     

 

 2018            EDUAR JOHNSON DO            Ot            J44.9   
         CHRONIC OBSTRUCTIVE PULMONARY DISEASE, U                     

 

 2018            TAE CHRISTIANSEN            Ot            
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                     

 

 2018            TAE CHRISTIANSEN Ot            
G47.33            OBSTRUCTIVE SLEEP APNEA (ADULT) (PEDIATR                     

 

 2018            TAE CHRISTIANSEN Ot            
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                      

 

 2018            TAE CHRISTIANSEN            Ot            
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                     

 

 2018            DANA DILLON DO            Ot            R06.02      
      SHORTNESS OF BREATH                     

 

 2018            DANA DILLON DO            Ot            Z87.891     
       PERSONAL HISTORY OF NICOTINE DEPENDENCE                     

 

 2018            NOHELIA AUGUST MD, Ot            G47.33      
      OBSTRUCTIVE SLEEP APNEA (ADULT) (PEDIATR                     

 

 2018            NOHELIA AUGUST MD, Ot            I25.10      
      ATHSCL HEART DISEASE OF NATIVE CORONARY                      

 

 2018            NOHELIA AUGUST MD, Ot            I34.0       
     NONRHEUMATIC MITRAL (VALVE) INSUFFICIENC                     

 

 2018            NOHELIA AUGUST MD, Ot            I71.2       
     THORACIC AORTIC ANEURYSM, WITHOUT RUPTUR                     

 

 2018            NOHELIA AUGUST MD, Ot            J43.9       
     EMPHYSEMA, UNSPECIFIED                     

 

 2018            NOHELIA AUGUST MD, Ot            N20.0       
     CALCULUS OF KIDNEY                     

 

 2018            NOHELIA AUGUST MD, Ot            R41.0       
     DISORIENTATION, UNSPECIFIED                     

 

 2018            NOHELIA AUGUST MD            Ot            W19.XXXA    
        UNSPECIFIED FALL, INITIAL ENCOUNTER                     

 

 2018            NOHELIA AUGUST MD, Ot            Z72.0       
     TOBACCO USE                     

 

 2018            MICH COLLIER DO            Ot            E03.9       
     HYPOTHYROIDISM, UNSPECIFIED                     

 

 2018            MICH COLLIER DO            Ot            E11.22      
      TYPE 2 DIABETES MELLITUS W DIABETIC                      

 

 2018            MICH COLLIER DO            Ot            F17.210     
       NICOTINE DEPENDENCE, CIGARETTES, UNCOMPL                     

 

 2018            MICH COLLIER DO            Ot            F32.9       
     MAJOR DEPRESSIVE DISORDER, SINGLE EPISOD                     

 

 2018            MICH COLLIER DO            Ot            G47.33      
      OBSTRUCTIVE SLEEP APNEA (ADULT) (PEDIATR                     

 

 2018            MICH COLLIER DO            Ot            I12.0       
     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                     

 

 2018            MICH COLLIER DO            Ot            I25.10      
      ATHSCL HEART DISEASE OF NATIVE CORONARY                      

 

 2018            MICH COLLIER DO            Ot            J43.9       
     EMPHYSEMA, UNSPECIFIED                     

 

 2018            MICH COLLIER DO            Ot            K21.9       
     GASTRO-ESOPHAGEAL REFLUX DISEASE WITHOUT                     

 

 2018            MICH COLLIER DO            Ot            K44.9       
     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                     

 

 2018            MICH COLLIER DO            Ot            N18.6       
     END STAGE RENAL DISEASE                     

 

 2018            MICH COLLIER DO            Ot            Z79.899     
       OTHER LONG TERM (CURRENT) DRUG THERAPY                     

 

 2018            MICH COLLIER DO            Ot            Z86.73      
      PRSNL HX OF TIA (TIA), AND CEREB INFRC W                     

 

 2018            MICH COLLIER DO            Ot            Z95.5       
     PRESENCE OF CORONARY ANGIOPLASTY IMPLANT                     

 

 2018            MICH COLLIER DO            Ot            Z99.2       
     DEPENDENCE ON RENAL DIALYSIS                     

 

 2018            MICH COLLIER DO            Ot            E03.9       
     HYPOTHYROIDISM, UNSPECIFIED                     

 

 2018            MICH COLLIER DO            Ot            E11.22      
      TYPE 2 DIABETES MELLITUS W DIABETIC                      

 

 2018            MICH COLLIER DO            Ot            F17.210     
       NICOTINE DEPENDENCE, CIGARETTES, UNCOMPL                     

 

 2018            MICH COLLIER DO            Ot            F32.9       
     MAJOR DEPRESSIVE DISORDER, SINGLE EPISOD                     

 

 2018            MICH COLLIER DO            Ot            G47.33      
      OBSTRUCTIVE SLEEP APNEA (ADULT) (PEDIATR                     

 

 2018            MICH COLLIER DO            Ot            I12.0       
     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                     

 

 2018            MICH COLLIER DO            Ot            I25.10      
      ATHSCL HEART DISEASE OF NATIVE CORONARY                      

 

 2018            MICH COLLIER DO            Ot            J43.9       
     EMPHYSEMA, UNSPECIFIED                     

 

 2018            MICH COLLIER DO            Ot            K21.9       
     GASTRO-ESOPHAGEAL REFLUX DISEASE WITHOUT                     

 

 2018            MICH COLLIER DO            Ot            K44.9       
     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                     

 

 2018            MICH COLLIER DO            Ot            N18.6       
     END STAGE RENAL DISEASE                     

 

 2018            COLLIER MICH DAVIS            Ot            Z79.899     
       OTHER LONG TERM (CURRENT) DRUG THERAPY                     

 

 2018            MICH COLLIER DO            Ot            Z86.73      
      PRSNL HX OF TIA (TIA), AND CEREB INFRC W                     

 

 2018            MICH COLLIER DO            Ot            Z95.5       
     PRESENCE OF CORONARY ANGIOPLASTY IMPLANT                     

 

 2018            MICH COLLIER DO            Ot            Z99.2       
     DEPENDENCE ON RENAL DIALYSIS                     

 

 2018            NOHELIA AUGUST MD            Ot            G47.33      
      OBSTRUCTIVE SLEEP APNEA (ADULT) (PEDIATR                     

 

 2018            NOHELIA AUGUST MD            Ot            I25.10      
      ATHSCL HEART DISEASE OF NATIVE CORONARY                      

 

 2018            NOHELIA AUGUST MD            Ot            I34.0       
     NONRHEUMATIC MITRAL (VALVE) INSUFFICIENC                     

 

 2018            NOHELIA AUGUST MD            Ot            I71.2       
     THORACIC AORTIC ANEURYSM, WITHOUT RUPTUR                     

 

 2018            NOHELIA AUGUST MD            Ot            J43.9       
     EMPHYSEMA, UNSPECIFIED                     

 

 2018            NOHELIA AUGUST MD            Ot            N20.0       
     CALCULUS OF KIDNEY                     

 

 2018            NOHELIA AUGUST MD            Ot            R41.0       
     DISORIENTATION, UNSPECIFIED                     

 

 2018            NOHELIA AUGUST MD            Ot            W19.XXXA    
        UNSPECIFIED FALL, INITIAL ENCOUNTER                     

 

 2018            NOHELIA AUGUST MD            Ot            Z72.0       
     TOBACCO USE                     

 

 2018            NOHELIA AUGUST MD            Ot            G47.33      
      OBSTRUCTIVE SLEEP APNEA (ADULT) (PEDIATR                     

 

 2018            NOHELIA AUGUST MD            Ot            I25.10      
      ATHSCL HEART DISEASE OF NATIVE CORONARY                      

 

 2018            NOHELIA AUGUST MD            Ot            I34.0       
     NONRHEUMATIC MITRAL (VALVE) INSUFFICIENC                     

 

 2018            NOHELIA AUGUST MD            Ot            I71.2       
     THORACIC AORTIC ANEURYSM, WITHOUT RUPTUR                     

 

 2018            NOHELIA AUGUST MD            Ot            J43.9       
     EMPHYSEMA, UNSPECIFIED                     

 

 2018            NOHELIA AUGUST MD            Ot            N20.0       
     CALCULUS OF KIDNEY                     

 

 2018            NOHELIA AUGUST MD            Ot            R41.0       
     DISORIENTATION, UNSPECIFIED                     

 

 2018            NOHELIA AUGUST MD            Ot            W19.XXXA    
        UNSPECIFIED FALL, INITIAL ENCOUNTER                     

 

 2018            NOHELIA AUGUST MD            Ot            Z72.0       
     TOBACCO USE                     

 

 2018                         Ot            733.00            
OSTEOPOROSIS NOS                     

 

 2018            JOHNSONEDUAR HILL DO            Ot            733.00  
          OSTEOPOROSIS NOS                     

 

 2018            DANA DILLON DO            Ot            492.8       
     EMPHYSEMA NEC                     

 

 2018            DANA DILLON DO            Ot            511.9       
     PLEURAL EFFUSION NOS                     

 

 2018            DANA DILLON DO            Ot            586         
   RENAL FAILURE NOS                     

 

 2018            LETTY DO, ARLENE MCKEON            Ot            726.0    
        ADHESIVE CAPSULIT SHLDER                     

 

 2018            LETTY DO, ARLENE MCKEON            Ot            718.91   
         JT DERANGMENT NOS-SHLDER                     

 

 2018            LETTY DO, ARLENE MCKEON            Ot            726.0    
        ADHESIVE CAPSULIT SHLDER                     

 

 2018            LETTY DO, ARLENE MCKEON            Ot            840.4    
        SPRAIN ROTATOR CUFF                     

 

 2018            LETTY DO, ARLENE MCKEON            Ot            E000.8   
         OTHER EXTERNAL CAUSE STATUS                     

 

 2018            LETTY  ARLENE MCKEON            Ot            E928.9   
         ACCIDENT NOS                     

 

 2018            LETTY DO, ARLENE MCKEON            Ot            V72.84   
         EXAM PRE-OPERATIVE NOS                     

 

 2018            LETTY  ARLENE MCKEON            Ot            727.61   
         ROTATOR CUFF RUPTURE                     

 

 2018            LETTY DO ARLENE             Ot            V72.84   
         EXAM PRE-OPERATIVE NOS                     

 

 2018            NOHELIA AUGUST MD            Ot            414.00      
      CORON ATHEROSCLER NOS TYPE VESSEL, NATIV                     

 

 2018            NOHELIA AUGUST MD            Ot            424.0       
     MITRAL VALVE DISORDER                     

 

 2018            NOHELIA AUGUST MD            Ot            433.10      
      CAROTID ARTERY OCCLUSION W O CEREBRAL IN                     

 

 2018            NOHELIA AUGUST MD            Ot            786.09      
      RESPIRATORY ABNORM NEC                     

 

 2018            NOHELIA AUGUST MD            Ot            414.01      
      CORONARY ATHEROSCLEROSIS OF NATIVE CORON                     

 

 2018            NOHELIA AUGUST MD            Ot            786.09      
      RESPIRATORY ABNORM NEC                     

 

 2018            ARLENE SAENZ DO            Ot            840.4    
        SPRAIN ROTATOR CUFF                     

 

 2018            ARLENE SAENZ DO            Ot            E000.8   
         OTHER EXTERNAL CAUSE STATUS                     

 

 2018            ARLENE SAENZ DO            Ot            E928.9   
         ACCIDENT NOS                     

 

 2018            ARLENE SAENZ DO            Ot            V72.84   
         EXAM PRE-OPERATIVE NOS                     

 

 2018                         Ot            733.00            
OSTEOPOROSIS NOS                     

 

 2018            ARLENE SAENZ DO            Ot            722.4    
        CERVICAL DISC DEGEN                     

 

 2018                         Ot            J44.9            CHRONIC 
OBSTRUCTIVE PULMONARY DISEASE, U                     

 

 2018            EDUAR JOHNSON DO            Ot            K59.00  
          CONSTIPATION, UNSPECIFIED                     

 

 2018            EDUAR JOHNSON DO            Ot            J44.9   
         CHRONIC OBSTRUCTIVE PULMONARY DISEASE, U                     

 

 2018            TAE CHRISTIANSEN            Ot            
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                     

 

 2018            TAE CHRISTIANSEN Ot            
G47.33            OBSTRUCTIVE SLEEP APNEA (ADULT) (PEDIATR                     

 

 2018            TAE CHRISTIANSEN Ot            
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                      

 

 2018            TAE CHRISTIANSEN            Ot            
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                     

 

 2018            DANA DILLON DO            Ot            R06.02      
      SHORTNESS OF BREATH                     

 

 2018            DANA DILLON DO            Ot            Z87.891     
       PERSONAL HISTORY OF NICOTINE DEPENDENCE                     

 

 2018            NOHELIA AUGUST MD, Ot            G47.33      
      OBSTRUCTIVE SLEEP APNEA (ADULT) (PEDIATR                     

 

 2018            NOHELIA AUGUST MD, Ot            I25.10      
      ATHSCL HEART DISEASE OF NATIVE CORONARY                      

 

 2018            NOHELIA AUGUST MD, Ot            I34.0       
     NONRHEUMATIC MITRAL (VALVE) INSUFFICIENC                     

 

 2018            NOHELIA AUGUST MD, Ot            I71.2       
     THORACIC AORTIC ANEURYSM, WITHOUT RUPTUR                     

 

 2018            NOHELIA AUGUST MD            Ot            J43.9       
     EMPHYSEMA, UNSPECIFIED                     

 

 2018            NOHELIA AUGUST MD, Ot            N20.0       
     CALCULUS OF KIDNEY                     

 

 2018            NOHELIA AUGUST MD, Ot            R41.0       
     DISORIENTATION, UNSPECIFIED                     

 

 2018            NOHELIA AUGUST MD            Ot            W19.XXXA    
        UNSPECIFIED FALL, INITIAL ENCOUNTER                     

 

 2018            MATA MATIAS, NOHELIA CLARK            Ot            Z72.0       
     TOBACCO USE                     

 

 2018            MICH COLLIER DO            Ot            K42.9       
     UMBILICAL HERNIA WITHOUT OBSTRUCTION OR                      

 

 2018            MICH COLLIER DO            Ot            Z01.812     
       ENCOUNTER FOR PREPROCEDURAL LABORATORY E                     

 

 2018            MICH COLLIER DO            Ot            Z11.2       
     ENCOUNTER FOR SCREENING FOR OTHER BACTER                     

 

 2018            MICH COLLIER DO            Ot            K42.9       
     UMBILICAL HERNIA WITHOUT OBSTRUCTION OR                      

 

 2018            MICH COLLIER DO            Ot            Z01.812     
       ENCOUNTER FOR PREPROCEDURAL LABORATORY E                     

 

 2018            MICH COLLIER DO            Ot            Z11.2       
     ENCOUNTER FOR SCREENING FOR OTHER BACTER                     

 

 2018            MICH COLLIER DO            Ot            F17.210     
       NICOTINE DEPENDENCE, CIGARETTES, UNCOMPL                     

 

 2018            MICH COLLIER DO            Ot            G47.33      
      OBSTRUCTIVE SLEEP APNEA (ADULT) (PEDIATR                     

 

 2018            MICH COLLIER DO            Ot            I08.1       
     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                     

 

 2018            MICH COLLIER DO            Ot            I10         
   ESSENTIAL (PRIMARY) HYPERTENSION                     

 

 2018            MICH COLLIER DO            Ot            I25.10      
      ATHSCL HEART DISEASE OF NATIVE CORONARY                      

 

 2018            MICH COLLIER DO            Ot            J44.9       
     CHRONIC OBSTRUCTIVE PULMONARY DISEASE, U                     

 

 2018            MICH COLLIER DO            Ot            J45.909     
       UNSPECIFIED ASTHMA, UNCOMPLICATED                     

 

 2018            MICH COLLIER DO            Ot            K42.0       
     UMBILICAL HERNIA WITH OBSTRUCTION, WITHO                     

 

 2018            MICH COLLIER DO            Ot            Z79.02      
      LONG TERM (CURRENT) USE OF ANTITHROMBOTI                     

 

 2018            MICH COLLIER DO            Ot            Z79.899     
       OTHER LONG TERM (CURRENT) DRUG THERAPY                     

 

 2018            MICH COLLIER DO            Ot            F17.210     
       NICOTINE DEPENDENCE, CIGARETTES, UNCOMPL                     

 

 2018            MICH COLLIER DO            Ot            G47.33      
      OBSTRUCTIVE SLEEP APNEA (ADULT) (PEDIATR                     

 

 2018            MICH COLLIER DO            Ot            I08.1       
     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                     

 

 2018            MICH COLLIER DO            Ot            I10         
   ESSENTIAL (PRIMARY) HYPERTENSION                     

 

 2018            MICH COLLIER DO            Ot            I25.10      
      ATHSCL HEART DISEASE OF NATIVE CORONARY                      

 

 2018            MICH COLLIER DO            Ot            J44.9       
     CHRONIC OBSTRUCTIVE PULMONARY DISEASE, U                     

 

 2018            MICH COLLIER DO            Ot            J45.909     
       UNSPECIFIED ASTHMA, UNCOMPLICATED                     

 

 2018            MICH COLLIER DO            Ot            K42.0       
     UMBILICAL HERNIA WITH OBSTRUCTION, WITHO                     

 

 2018            MICH COLLIER DO            Ot            Z79.02      
      LONG TERM (CURRENT) USE OF ANTITHROMBOTI                     

 

 2018            MICH COLLIER DO            Ot            Z79.899     
       OTHER LONG TERM (CURRENT) DRUG THERAPY                     

 

 2018            MICH COLLIER DO            Ot            F17.210     
       NICOTINE DEPENDENCE, CIGARETTES, UNCOMPL                     

 

 2018            MICH COLLIER DO            Ot            G47.33      
      OBSTRUCTIVE SLEEP APNEA (ADULT) (PEDIATR                     

 

 2018            MICH COLLIER DO            Ot            I08.1       
     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                     

 

 2018            MICH COLLIER DO            Ot            I10         
   ESSENTIAL (PRIMARY) HYPERTENSION                     

 

 2018            MICH COLLIER DO            Ot            I25.10      
      ATHSCL HEART DISEASE OF NATIVE CORONARY                      

 

 2018            MICH COLLIER DO            Ot            J44.9       
     CHRONIC OBSTRUCTIVE PULMONARY DISEASE, U                     

 

 2018            MICH COLLIER DO            Ot            J45.909     
       UNSPECIFIED ASTHMA, UNCOMPLICATED                     

 

 2018            MICH COLLIER DO            Ot            K42.0       
     UMBILICAL HERNIA WITH OBSTRUCTION, WITHO                     

 

 2018            MICH COLLIER DO            Ot            Z79.02      
      LONG TERM (CURRENT) USE OF ANTITHROMBOTI                     

 

 2018            MICH COLLIER DO            Ot            Z79.899     
       OTHER LONG TERM (CURRENT) DRUG THERAPY                     

 

 2018            ZACH DIEGO            Ot            J44.9 
           CHRONIC OBSTRUCTIVE PULMONARY DISEASE, U                     

 

 2018            ZACH DIEGO APRN            Ot            R59.0 
           LOCALIZED ENLARGED LYMPH NODES                     

 

 07/10/2018            ZACH DIEGO            Ot            J44.9 
           CHRONIC OBSTRUCTIVE PULMONARY DISEASE, U                     

 

 07/10/2018            ZACH DIEGO APRN            Ot            R59.0 
           LOCALIZED ENLARGED LYMPH NODES                     

 

 2018            ZACH DIEGO APRN            Ot            J44.9 
           CHRONIC OBSTRUCTIVE PULMONARY DISEASE, U                     

 

 2018            JOHNATHONZACH KRAFT EDWARDO            Ot            R59.0 
           LOCALIZED ENLARGED LYMPH NODES                     

 

 2018                         Ot            733.00            
OSTEOPOROSIS NOS                     

 

 2018            JOHNSON EDUAR DAVIS            Ot            733.00  
          OSTEOPOROSIS NOS                     

 

 2018            WILMA  DANA SMITH            Ot            492.8       
     EMPHYSEMA NEC                     

 

 2018            WILMA  DANA SMITH            Ot            511.9       
     PLEURAL EFFUSION NOS                     

 

 2018            WILMA DAVIS DANA SMITH            Ot            586         
   RENAL FAILURE NOS                     

 

 2018            LETTY DO, ARLENE LINDA            Ot            726.0    
        ADHESIVE CAPSULIT SHLDER                     

 

 2018            LETTY DO, ARLENE MCKEON            Ot            718.91   
         JT DERANGMENT NOS-SHLDER                     

 

 2018            LETTY , ARLENE MCKEON            Ot            726.0    
        ADHESIVE CAPSULIT SHLDER                     

 

 2018            LETTY DO, ARLENE MCKEON            Ot            840.4    
        SPRAIN ROTATOR CUFF                     

 

 2018            LETTY DO, ARLENE LINDA            Ot            E000.8   
         OTHER EXTERNAL CAUSE STATUS                     

 

 2018            LETTY DO, ARLENE MCKEON            Ot            E928.9   
         ACCIDENT NOS                     

 

 2018            [facility], ARLENE MCKEON            Ot            V72.84   
         EXAM PRE-OPERATIVE NOS                     

 

 2018            LETTY , ARLENE MCKEON            Ot            727.61   
         ROTATOR CUFF RUPTURE                     

 

 2018            [facility], ARLENE             Ot            V72.84   
         EXAM PRE-OPERATIVE NOS                     

 

 2018            MATA MATIAS, NOHELIA CLARK            Ot            414.00      
      CORON ATHEROSCLER NOS TYPE VESSEL, NATIV                     

 

 2018            NOHELIA AUGUST MD            Ot            424.0       
     MITRAL VALVE DISORDER                     

 

 2018            NOHELIA AUGUST MD            Ot            433.10      
      CAROTID ARTERY OCCLUSION W O CEREBRAL IN                     

 

 2018            NOHELIA AUGUST MD            Ot            786.09      
      RESPIRATORY ABNORM NEC                     

 

 2018            NOHELIA AUGUST MD            Ot            414.01      
      CORONARY ATHEROSCLEROSIS OF NATIVE CORON                     

 

 2018            NOHELIA AUGUST MD            Ot            786.09      
      RESPIRATORY ABNORM NEC                     

 

 2018            LETTY  ARLENE MCKEON            Ot            840.4    
        SPRAIN ROTATOR CUFF                     

 

 2018            LETTY  ARLENE MCKEON            Ot            E000.8   
         OTHER EXTERNAL CAUSE STATUS                     

 

 2018            LETTY , ARLENE LINDA            Ot            E928.9   
         ACCIDENT NOS                     

 

 2018            LETTY , ARLENE LINDA            Ot            V72.84   
         EXAM PRE-OPERATIVE NOS                     

 

 2018                         Ot            733.00            
OSTEOPOROSIS NOS                     

 

 2018            ARLENE SAENZ DO            Ot            722.4    
        CERVICAL DISC DEGEN                     

 

 2018                         Ot            J44.9            CHRONIC 
OBSTRUCTIVE PULMONARY DISEASE, U                     

 

 2018            EDUAR JOHNSON DO            Ot            K59.00  
          CONSTIPATION, UNSPECIFIED                     

 

 2018            EDUAR JOHNSON DO            Ot            J44.9   
         CHRONIC OBSTRUCTIVE PULMONARY DISEASE, U                     

 

 2018            TAE CHRISTIANSEN            Ot            
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                     

 

 2018            TAE CHRISTIANSEN            Ot            
G47.33            OBSTRUCTIVE SLEEP APNEA (ADULT) (PEDIATR                     

 

 2018            TAE CHRISTIANSEN            Ot            
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                      

 

 2018            TAE CHRISTIANSEN            Ot            
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                     

 

 2018            DANA DILLON DO            Ot            R06.02      
      SHORTNESS OF BREATH                     

 

 2018            DANA DILLON DO            Ot            Z87.891     
       PERSONAL HISTORY OF NICOTINE DEPENDENCE                     

 

 2018            NOHELIA AUGUST MD            Ot            G47.33      
      OBSTRUCTIVE SLEEP APNEA (ADULT) (PEDIATR                     

 

 2018            NOHELIA AUGUST MD            Ot            I25.10      
      ATHSCL HEART DISEASE OF NATIVE CORONARY                      

 

 2018            NOHELIA AUGUST MD            Ot            I34.0       
     NONRHEUMATIC MITRAL (VALVE) INSUFFICIENC                     

 

 2018            NOHELIA AUGUST MD            Ot            I71.2       
     THORACIC AORTIC ANEURYSM, WITHOUT RUPTUR                     

 

 2018            NOHELIA AUGUST MD            Ot            J43.9       
     EMPHYSEMA, UNSPECIFIED                     

 

 2018            NOHELIA AUGUST MD            Ot            N20.0       
     CALCULUS OF KIDNEY                     

 

 2018            NOHELIA AUGUST MD            Ot            R41.0       
     DISORIENTATION, UNSPECIFIED                     

 

 2018            NOHELIA AUGUST MD            Ot            W19.XXXA    
        UNSPECIFIED FALL, INITIAL ENCOUNTER                     

 

 2018            NOHELIA AUGUST MD            Ot            Z72.0       
     TOBACCO USE                     

 

 2018            ZACH DIEGO            Ot            J44.9 
           CHRONIC OBSTRUCTIVE PULMONARY DISEASE, U                     

 

 2018            ZACH DIEGO            Ot            R09.02
            HYPOXEMIA                     

 

 2018            ZACH DIEGO APRN            Ot            J44.9 
           CHRONIC OBSTRUCTIVE PULMONARY DISEASE, U                     

 

 2018            ZACH DIEGO APRN            Ot            R59.0 
           LOCALIZED ENLARGED LYMPH NODES                     

 

 2018            EDUAR JOHNSON DO            Ot            M16.0   
         BILATERAL PRIMARY OSTEOARTHRITIS OF HIP                     

 

 2018            ELIZABETH DAVIS, EDUAR CLARK            Ot            M47.817 
           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                     

 

 2018            EDUAR JOHNSON DO            Ot            M16.0   
         BILATERAL PRIMARY OSTEOARTHRITIS OF HIP                     

 

 2018            JOHNSON DO, EDUAR CLARK            Ot            M47.817 
           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                     

 

 2018            ZACH DIEGO APRN            Ot            J44.9 
           CHRONIC OBSTRUCTIVE PULMONARY DISEASE, U                     

 

 2018            ZACH DIEGO APRN            Ot            R09.02
            HYPOXEMIA                     

 

 2018            ZACH DIEGO APRN            Ot            J44.9 
           CHRONIC OBSTRUCTIVE PULMONARY DISEASE, U                     

 

 2018            ZACH DIEGO APRN            Ot            R09.02
            HYPOXEMIA                     

 

 2018            ZACH DIEGO APRN            Ot            J44.9 
           CHRONIC OBSTRUCTIVE PULMONARY DISEASE, U                     

 

 2018            ZACH DIEGO APRN            Ot            R09.02
            HYPOXEMIA                     

 

 2018            ZACH DIEGO APRN            Ot            J44.9 
           CHRONIC OBSTRUCTIVE PULMONARY DISEASE, U                     

 

 2018            ZACH DIEGO APRN            Ot            R09.02
            HYPOXEMIA                     

 

 2018            ZACH DIEGO APRN            Ot            J44.9 
           CHRONIC OBSTRUCTIVE PULMONARY DISEASE, U                     

 

 2018            ZACH DIEGO APRN            Ot            R09.02
            HYPOXEMIA                     

 

 2018            ZACH DIEGO APRN            Ot            J44.9 
           CHRONIC OBSTRUCTIVE PULMONARY DISEASE, U                     

 

 2018            ZACH DIEGO APRN            Ot            R09.02
            HYPOXEMIA                     

 

 2018            ZACH DIEGO APRN            Ot            J44.9 
           CHRONIC OBSTRUCTIVE PULMONARY DISEASE, U                     

 

 2018            ZACH DIEGO APRN            Ot            R09.02
            HYPOXEMIA                     

 

 2018            ZACH DIEGO APRN            Ot            J44.9 
           CHRONIC OBSTRUCTIVE PULMONARY DISEASE, U                     

 

 2018            ZACH DIEGO APRN            Ot            R09.02
            HYPOXEMIA                     

 

 2018            JOHNATHON, ZACH E APRN            Ot            J44.9 
           CHRONIC OBSTRUCTIVE PULMONARY DISEASE, U                     

 

 2018            NEELAM DIEGOINE E APRN            Ot            R09.02
            HYPOXEMIA                     

 

 2018            NEELAM DIEGOINE E APRN            Ot            J44.9 
           CHRONIC OBSTRUCTIVE PULMONARY DISEASE, U                     

 

 2018            NEELAM DIEGOINE E APRN            Ot            R09.02
            HYPOXEMIA                     

 

 2018            NEELAM DIEGOINE E APRN            Ot            J44.9 
           CHRONIC OBSTRUCTIVE PULMONARY DISEASE, U                     

 

 2018            JOHNATHONNEELAM KRAFTINE E APRN            Ot            R09.02
            HYPOXEMIA                     

 

 2018            JOHNATHON, ZACH E APRN            Ot            J44.9 
           CHRONIC OBSTRUCTIVE PULMONARY DISEASE, U                     

 

 2018            NEELAM DIEGOINE E APRN            Ot            R09.02
            HYPOXEMIA                     

 

 2018            NEELAM DIEGOINE E APRN            Ot            J44.9 
           CHRONIC OBSTRUCTIVE PULMONARY DISEASE, U                     

 

 2018            NEELAM DIEGOINE E APRN            Ot            R09.02
            HYPOXEMIA                     

 

 2018            ZACH DIEGO E APRN            Ot            J44.9 
           CHRONIC OBSTRUCTIVE PULMONARY DISEASE, U                     

 

 2018            NEELAM DIEGOINE E APRN            Ot            R09.02
            HYPOXEMIA                     

 

 2018            NEELAM DIEGOINE E APRN            Ot            J44.9 
           CHRONIC OBSTRUCTIVE PULMONARY DISEASE, U                     

 

 2018            NEELAM DIEGOINE E APRN            Ot            R09.02
            HYPOXEMIA                     

 

 2018            NEELAM DIEGOINE E APRN            Ot            J44.9 
           CHRONIC OBSTRUCTIVE PULMONARY DISEASE, U                     

 

 2018            NEELAM DIEGOINE E APRN            Ot            R09.02
            HYPOXEMIA                     

 

 10/21/2018            ZACH DIEGO E APRN            Ot            J44.9 
           CHRONIC OBSTRUCTIVE PULMONARY DISEASE, U                     

 

 10/21/2018            NEELAM DIEGOINE E APRN            Ot            R09.02
            HYPOXEMIA                     

 

 2018            JOHNATHON ZACH E APRN            Ot            J44.9 
           CHRONIC OBSTRUCTIVE PULMONARY DISEASE, U                     

 

 2018            NEELAM DIEGOINE FRANKIE APRN            Ot            R09.02
            HYPOXEMIA                     

 

 2018            ZACH DIEGO APRN            Ot            I25.10
            ATHSCL HEART DISEASE OF NATIVE CORONARY                      

 

 2018            NEELAM DIEGOINE E APRN            Ot            I71.9 
           AORTIC ANEURYSM OF UNSPECIFIED SITE, WIT                     

 

 2018            ZACH DIEGO E APRN            Ot            J43.9 
           EMPHYSEMA, UNSPECIFIED                     

 

 2018            ZACH DIEGO APRN            Ot            R59.9 
           ENLARGED LYMPH NODES, UNSPECIFIED                     

 

 2018            ZACH DIEGO APRN            Ot            
Z87.891            PERSONAL HISTORY OF NICOTINE DEPENDENCE                     

 

 2018            ZACH DIEGO APRN            Ot            J44.9 
           CHRONIC OBSTRUCTIVE PULMONARY DISEASE, U                     

 

 2018            ZACH DIEGO APRN            Ot            R09.02
            HYPOXEMIA                     

 

 2018            ZACH DIEGO APRN            Ot            J44.9 
           CHRONIC OBSTRUCTIVE PULMONARY DISEASE, U                     

 

 2018            ZACH DIEGO APRN            Ot            R09.02
            HYPOXEMIA                     

 

 2018            ZACH DIEGO APRN            Ot            J44.9 
           CHRONIC OBSTRUCTIVE PULMONARY DISEASE, U                     

 

 2018            ZACH DIEGO APRN            Ot            R09.02
            HYPOXEMIA                     

 

 2019            ZACH DIEGO APRN            Ot            I25.10
            ATHSCL HEART DISEASE OF NATIVE CORONARY                      

 

 2019            ZACH DIEGO APRBRITNEY            Ot            I71.9 
           AORTIC ANEURYSM OF UNSPECIFIED SITE, WIT                     

 

 2019            ZACH DIEGO APRN            Ot            J43.9 
           EMPHYSEMA, UNSPECIFIED                     

 

 2019            ZACH DIEGO APRN            Ot            R59.9 
           ENLARGED LYMPH NODES, UNSPECIFIED                     

 

 2019            ZACH DIEGO APRN            Ot            
Z87.891            PERSONAL HISTORY OF NICOTINE DEPENDENCE                     

 

 2019            DONALD BEATTY MD            Ot            A41.9       
     SEPSIS, UNSPECIFIED ORGANISM                     

 

 2019            DONALD BEATTY MD            Ot            E89.0       
     POSTPROCEDURAL HYPOTHYROIDISM                     

 

 2019            DONALD BEATTY MD            Ot            F17.210     
       NICOTINE DEPENDENCE, CIGARETTES, UNCOMPL                     

 

 2019            DONALD BEATTY MD            Ot            F32.9       
     MAJOR DEPRESSIVE DISORDER, SINGLE EPISOD                     

 

 2019            DONALD BEATTY MD            Ot            G47.30      
      SLEEP APNEA, UNSPECIFIED                     

 

 2019            DONALD BEATTY MD            Ot            H40.9       
     UNSPECIFIED GLAUCOMA                     

 

 2019            DONALD BEATTY MD            Ot            H93.19      
      TINNITUS, UNSPECIFIED EAR                     

 

 2019            DONALD BEATTY MD            Ot            I10         
   ESSENTIAL (PRIMARY) HYPERTENSION                     

 

 2019            DONALD BEATTY MD            Ot            I25.10      
      ATHSCL HEART DISEASE OF NATIVE CORONARY                      

 

 2019            DONALD BEATTY MD            Ot            J18.9       
     PNEUMONIA, UNSPECIFIED ORGANISM                     

 

 2019            DONALD BEATTY MD, Ot            J30.2       
     OTHER SEASONAL ALLERGIC RHINITIS                     

 

 2019            DONALD BEATTY MD, Ot            J43.9       
     EMPHYSEMA, UNSPECIFIED                     

 

 2019            DONALD BEATTY MD            Ot            K21.9       
     GASTRO-ESOPHAGEAL REFLUX DISEASE WITHOUT                     

 

 2019            DONALD BEATTY MD, Ot            K46.9       
     UNSPECIFIED ABDOMINAL HERNIA WITHOUT OBS                     

 

 2019            DONALD BEATTY MD, Ot            K59.09      
      OTHER CONSTIPATION                     

 

 2019            DONALD BEATTY MD            Ot            M19.91      
      PRIMARY OSTEOARTHRITIS, UNSPECIFIED SITE                     

 

 2019            DONALD BEATTY MD, Ot            M54.9       
     DORSALGIA, UNSPECIFIED                     

 

 2019            DONALD BEATTY MD            Ot            M81.0       
     AGE-RELATED OSTEOPOROSIS W/O CURRENT PAT                     

 

 2019            DONALD BEATTY MD, Ot            N17.9       
     ACUTE KIDNEY FAILURE, UNSPECIFIED                     

 

 2019            DONALD BEATTY MD            Ot            R09.02      
      HYPOXEMIA                     

 

 2019            DONALD BEATTY MD            Ot            Z66         
   DO NOT RESUSCITATE                     

 

 2019            DONALD BEATTY MD            Ot            Z86.73      
      PRSNL HX OF TIA (TIA), AND CEREB INFRC W                     

 

 2019            DONALD BEATTY MD            Ot            Z91.5       
     PERSONAL HISTORY OF SELF-HARM                     

 

 2019            DONALD BEATTY MD            Ot            Z95.5       
     PRESENCE OF CORONARY ANGIOPLASTY IMPLANT                     

 

 2019            DONALD BEATTY MD            Ot            A41.9       
     SEPSIS, UNSPECIFIED ORGANISM                     

 

 2019            DONALD BEATTY MD            Ot            E89.0       
     POSTPROCEDURAL HYPOTHYROIDISM                     

 

 2019            DONALD BEATTY MD            Ot            F17.210     
       NICOTINE DEPENDENCE, CIGARETTES, UNCOMPL                     

 

 2019            DONALD BEATTY MD            Ot            F32.9       
     MAJOR DEPRESSIVE DISORDER, SINGLE EPISOD                     

 

 2019            DONALD BEATTY MD            Ot            G47.30      
      SLEEP APNEA, UNSPECIFIED                     

 

 2019            DONALD BEATTY MD            Ot            H40.9       
     UNSPECIFIED GLAUCOMA                     

 

 2019            DONALD BEATTY MD            Ot            H93.19      
      TINNITUS, UNSPECIFIED EAR                     

 

 2019            DONALD BEATTY MD            Ot            I10         
   ESSENTIAL (PRIMARY) HYPERTENSION                     

 

 2019            DONALD BEATTY MD            Ot            I25.10      
      ATHSCL HEART DISEASE OF NATIVE CORONARY                      

 

 2019            DONALD BEATTY MD            Ot            J18.9       
     PNEUMONIA, UNSPECIFIED ORGANISM                     

 

 2019            DONALD BEATTY MD            Ot            J30.2       
     OTHER SEASONAL ALLERGIC RHINITIS                     

 

 2019            DONALD BEATTY MD            Ot            J43.9       
     EMPHYSEMA, UNSPECIFIED                     

 

 2019            DONALD BEATTY MD            Ot            K21.9       
     GASTRO-ESOPHAGEAL REFLUX DISEASE WITHOUT                     

 

 2019            DONALD BEATTY MD            Ot            K46.9       
     UNSPECIFIED ABDOMINAL HERNIA WITHOUT OBS                     

 

 2019            DONALD BEATTY MD            Ot            K59.09      
      OTHER CONSTIPATION                     

 

 2019            DONALD BEATTY MD            Ot            M19.91      
      PRIMARY OSTEOARTHRITIS, UNSPECIFIED SITE                     

 

 2019            DONALD BEATTY MD            Ot            M54.9       
     DORSALGIA, UNSPECIFIED                     

 

 2019            DONALD BEATTY MD            Ot            M81.0       
     AGE-RELATED OSTEOPOROSIS W/O CURRENT PAT                     

 

 2019            DONALD BEATTY MD            Ot            N17.9       
     ACUTE KIDNEY FAILURE, UNSPECIFIED                     

 

 2019            DONALD BEATTY MD            Ot            R09.02      
      HYPOXEMIA                     

 

 2019            DONALD BEATTY MD            Ot            Z66         
   DO NOT RESUSCITATE                     

 

 2019            DONALD BEATTY MD            Ot            Z86.73      
      PRSNL HX OF TIA (TIA), AND CEREB INFRC W                     

 

 2019            DONALD BEATTY MD            Ot            Z91.5       
     PERSONAL HISTORY OF SELF-HARM                     

 

 2019            DONALD BEATTY MD            Ot            Z95.5       
     PRESENCE OF CORONARY ANGIOPLASTY IMPLANT                     

 

 2019            ZACH DIEGO            Ot            I25.10
            ATHSCL HEART DISEASE OF NATIVE CORONARY                      

 

 2019            ZACH DIEGO            Ot            I71.9 
           AORTIC ANEURYSM OF UNSPECIFIED SITE, WIT                     

 

 2019            ZACH DIEGO            Ot            J43.9 
           EMPHYSEMA, UNSPECIFIED                     

 

 2019            ZACH DIEGO            Ot            R59.9 
           ENLARGED LYMPH NODES,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 aminotransferase measurement (enzymatic activity/
volume)            13 U/L            5-34        

 

 Serum or plasma alanine aminotransferase measurement (enzymatic activity/volume
)            16 U/L            0-55        

 

 Serum or plasma protein measurement (mass/volume)            5.9 g/dL         
   6.4-8.2        

 

 Serum or plasma albumin measurement (mass/volume)            3.8 g/dL         
   3.2-4.5        









 Serum or plasma lithium measurement (moles/volume) - 18 15:25         









 BNP level            23.9 pg/mL            <100.0        









 Serum or plasma troponin i.cardiac measurement (mass/volume) - 18 15:25 
        









 Serum or plasma troponin i.cardiac measurement (mass/volume)            < ng/
mL            <0.30        









 THYROID STIMULATING HORMONE - 18 15:25         









 THYROID STIMULATING HORMONE            1.75 u[iU]/mL            0.35-4.94     
   









 Automated blood complete blood count (hemogram) panel - 04/10/18 05:46         









 Blood leukocytes automated count (number/volume)            6.2 10*3/uL       
     4.3-11.0        

 

 Blood erythrocytes automated count (number/volume)            2.66 10*6/uL    
        4.35-5.85        

 

 Venous blood hemoglobin measurement (mass/volume)            9.3 g/dL         
   13.3-17.7        

 

 Blood hematocrit (volume fraction)            28 %            40-54        

 

 Automated erythrocyte mean corpuscular volume            105 [foz_us]         
   80-99        

 

 Automated erythrocyte mean corpuscular hemoglobin (mass per erythrocyte)      
      35 pg            25-34        

 

 Automated erythrocyte mean corpuscular hemoglobin concentration measurement (
mass/volume)            34 g/dL            32-36        

 

 Automated erythrocyte distribution width ratio            13.9 %            
10.0-14.5        

 

 Automated blood platelet count (count/volume)            200 10*3/uL          
  130-400        

 

 Automated blood platelet mean volume measurement            9.7 [foz_us]      
      7.4-10.4        









 Comprehensive metabolic panel - 04/10/18 05:46         









 Serum or plasma sodium measurement (moles/volume)            141 mmol/L       
     135-145        

 

 Serum or plasma potassium measurement (moles/volume)            4.0 mmol/L    
        3.6-5.0        

 

 Serum or plasma chloride measurement (moles/volume)            113 mmol/L     
               

 

 Carbon dioxide            24 mmol/L            21-32        

 

 Serum or plasma anion gap determination (moles/volume)            4 mmol/L    
        5-14        

 

 Serum or plasma urea nitrogen measurement (mass/volume)            36 mg/dL   
         7-18        

 

 Serum or plasma creatinine measurement (mass/volume)            1.20 mg/dL    
        0.60-1.30        

 

 Serum or plasma urea nitrogen/creatinine mass ratio            30             
NRG        

 

 Serum or plasma creatinine measurement with calculation of estimated 
glomerular filtration rate            60             NRG        

 

 Serum or plasma glucose measurement (mass/volume)            94 mg/dL         
           

 

 Serum or plasma calcium measurement (mass/volume)            8.6 mg/dL        
    8.5-10.1        

 

 Serum or plasma total bilirubin measurement (mass/volume)            0.5 mg/dL
            0.1-1.0        

 

 Serum or plasma alkaline phosphatase measurement (enzymatic activity/volume)  
          46 U/L                    

 

 Serum or plasma aspartate aminotransferase measurement (enzymatic activity/
volume)            13 U/L            5-34        

 

 Serum or plasma alanine aminotransferase measurement (enzymatic activity/volume
)            14 U/L            0-55        

 

 Serum or plasma protein measurement (mass/volume)            5.3 g/dL         
   6.4-8.2        

 

 Serum or plasma albumin measurement (mass/volume)            3.4 g/dL         
   3.2-4.5        









 Whole blood hemoglobin and hematocrit panel - 04/10/18 11:45         









 Venous blood hemoglobin measurement (mass/volume)            8.9 g/dL         
   13.3-17.7        

 

 Blood hematocrit (volume fraction)            27 %            40-54        









 Complete blood count (CBC) with automated white blood cell (WBC) differential 
- 18 10:15         









 Blood leukocytes automated count (number/volume)            6.9 10*3/uL       
     4.3-11.0        

 

 Blood erythrocytes automated count (number/volume)            3.44 10*6/uL    
        4.35-5.85        

 

 Venous blood hemoglobin measurement (mass/volume)            11.2 g/dL        
    13.3-17.7        

 

 Blood hematocrit (volume fraction)            34 %            40-54        

 

 Automated erythrocyte mean corpuscular volume            99 [foz_us]          
  80-99        

 

 Automated erythrocyte mean corpuscular hemoglobin (mass per erythrocyte)      
      33 pg            25-34        

 

 Automated erythrocyte mean corpuscular hemoglobin concentration measurement (
mass/volume)            33 g/dL            32-36        

 

 Automated erythrocyte distribution width ratio            14.7 %            
10.0-14.5        

 

 Automated blood platelet count (count/volume)            268 10*3/uL          
  130-400        

 

 Automated blood platelet mean volume measurement            9.1 [foz_us]      
      7.4-10.4        

 

 Automated blood neutrophils/100 leukocytes            70 %            42-75   
     

 

 Automated blood lymphocytes/100 leukocytes            17 %            12-44   
     

 

 Blood monocytes/100 leukocytes            8 %            0-12        

 

 Automated blood eosinophils/100 leukocytes            4 %            0-10     
   

 

 Automated blood basophils/100 leukocytes            0 %            0-10        

 

 Blood neutrophils automated count (number/volume)            4.8 10*3         
   1.8-7.8        

 

 Blood lymphocytes automated count (number/volume)            1.2 10*3         
   1.0-4.0        

 

 Blood monocytes automated count (number/volume)            0.6 10*3            
0.0-1.0        

 

 Automated eosinophil count            0.3 10*3/uL            0.0-0.3        

 

 Automated blood basophil count (count/volume)            0.0 10*3/uL          
  0.0-0.1        









 Whole blood basic metabolic panel - 18 10:15         









 Serum or plasma sodium measurement (moles/volume)            140 mmol/L       
     135-145        

 

 Serum or plasma potassium measurement (moles/volume)            4.4 mmol/L    
        3.6-5.0        

 

 Serum or plasma chloride measurement (moles/volume)            108 mmol/L     
               

 

 Carbon dioxide            22 mmol/L            21-32        

 

 Serum or plasma anion gap determination (moles/volume)            10 mmol/L   
         5-14        

 

 Serum or plasma urea nitrogen measurement (mass/volume)            14 mg/dL   
         7-18        

 

 Serum or plasma creatinine measurement (mass/volume)            1.29 mg/dL    
        0.60-1.30        

 

 Serum or plasma urea nitrogen/creatinine mass ratio            11             
NRG        

 

 Serum or plasma creatinine measurement with calculation of estimated 
glomerular filtration rate            55             NRG        

 

 Serum or plasma glucose measurement (mass/volume)            97 mg/dL         
           

 

 Serum or plasma calcium measurement (mass/volume)            10.5 mg/dL       
     8.5-10.1        









 Methicillin resistant Staphylococcus aureus (MRSA) screening culture -  10:15         









 MRSA SCREEN RESULT            MRSA ISOLATED             NRG        









 Influenza virus A and B antigen detection - 19 11:57         









 FLU RESULT            NEGATIVE FOR INFLUENZA A AND B ANTIGENS BY IA           
  NRG        









 Arterial blood gas measurement - 19 12:14         









 Blood pCO2            36 mm[Hg]            35-45        

 

 Blood pO2            74 mm[Hg]            79-93        

 

 Arterial blood bicarbonate measurement (moles/volume)            23 mmol/L    
        23-27        

 

 Arterial blood base excess by calculation            -0.9 mmol/L            -
2.5-2.5        

 

 Arterial blood oxygen saturation measurement            91 %            
        

 

 * Inhaled oxygen flow rate            ROOM AIR             NRG        

 

 Arterial blood pH measurement with patient temperature correction            
7.42             7.37-7.43        

 

 Arterial blood carbon dioxide, total measurement (moles/volume)            
23.5 mmol/L            21.0-31.0        

 

 Body site            L RAD             NRG        

 

 Assessment of wrist artery patency prior to arterial puncture            YES-
POS             NRG        

 

 Setting of ventilation mode            NO             NRG        

 

 Measurement of body temperature            102.3             NRG        









 Complete blood count (CBC) with automated white blood cell (WBC) differential 
- 19 13:15         









 Blood leukocytes automated count (number/volume)            16.2 10*3/uL      
      4.3-11.0        

 

 Blood erythrocytes automated count (number/volume)            3.89 10*6/uL    
        4.35-5.85        

 

 Venous blood hemoglobin measurement (mass/volume)            12.6 g/dL        
    13.3-17.7        

 

 Blood hematocrit (volume fraction)            38 %            40-54        

 

 Automated erythrocyte mean corpuscular volume            98 [foz_us]          
  80-99        

 

 Automated erythrocyte mean corpuscular hemoglobin (mass per erythrocyte)      
      32 pg            25-34        

 

 Automated erythrocyte mean corpuscular hemoglobin concentration measurement (
mass/volume)            33 g/dL            32-36        

 

 Automated erythrocyte distribution width ratio            16.2 %            
10.0-14.5        

 

 Automated blood platelet count (count/volume)            207 10*3/uL          
  130-400        

 

 Automated blood platelet mean volume measurement            9.6 [foz_us]      
      7.4-10.4        

 

 Automated blood neutrophils/100 leukocytes            88 %            42-75   
     

 

 Automated blood lymphocytes/100 leukocytes            7 %            12-44    
    

 

 Blood monocytes/100 leukocytes            5 %            0-12        

 

 Automated blood eosinophils/100 leukocytes            0 %            0-10     
   

 

 Automated blood basophils/100 leukocytes            0 %            0-10        

 

 Blood neutrophils automated count (number/volume)            14.2 10*3        
    1.8-7.8        

 

 Blood lymphocytes automated count (number/volume)            1.1 10*3         
   1.0-4.0        

 

 Blood monocytes automated count (number/volume)            0.9 10*3            
0.0-1.0        

 

 Automated eosinophil count            0.0 10*3/uL            0.0-0.3        

 

 Automated blood basophil count (count/volume)            0.0 10*3/uL          
  0.0-0.1        









 Blood lactic acid measurement (moles/volume) - 19 13:15         









 Blood lactic acid measurement (moles/volume)            0.80 mmol/L            
0.50-2.00        









 Comprehensive metabolic panel - 19 13:15         









 Serum or plasma sodium measurement (moles/volume)            137 mmol/L       
     135-145        

 

 Serum or plasma potassium measurement (moles/volume)            4.0 mmol/L    
        3.6-5.0        

 

 Serum or plasma chloride measurement (moles/volume)            105 mmol/L     
               

 

 Carbon dioxide            24 mmol/L            21-32        

 

 Serum or plasma anion gap determination (moles/volume)            8 mmol/L    
        5-14        

 

 Serum or plasma urea nitrogen measurement (mass/volume)            19 mg/dL   
         7-18        

 

 Serum or plasma creatinine measurement (mass/volume)            1.33 mg/dL    
        0.60-1.30        

 

 Serum or plasma urea nitrogen/creatinine mass ratio            14             
NRG        

 

 Serum or plasma creatinine measurement with calculation of estimated 
glomerular filtration rate            53             NRG        

 

 Serum or plasma glucose measurement (mass/volume)            111 mg/dL        
            

 

 Serum or plasma calcium measurement (mass/volume)            9.3 mg/dL        
    8.5-10.1        

 

 Serum or plasma total bilirubin measurement (mass/volume)            1.1 mg/dL
            0.1-1.0        

 

 Serum or plasma alkaline phosphatase measurement (enzymatic activity/volume)  
          86 U/L                    

 

 Serum or plasma aspartate aminotransferase measurement (enzymatic activity/
volume)            15 U/L            5-34        

 

 Serum or plasma alanine aminotransferase measurement (enzymatic activity/volume
)            17 U/L            0-55        

 

 Serum or plasma protein measurement (mass/volume)            6.8 g/dL         
   6.4-8.2        

 

 Serum or plasma albumin measurement (mass/volume)            3.8 g/dL         
   3.2-4.5        

 

 CALCIUM CORRECTED            9.5 mg/dL            8.5-10.1        









 Blood manual differential performed detection - 19 13:15         









 Blood monocytes/100 leukocytes            6 %            NRG        

 

 Manual blood segmented neutrophils/100 leukocytes            77 %            
NRG        

 

 Blood band neutrophils/100 leukocytes            12 %            NRG        

 

 Manual blood lymphocytes/100 leukocytes            4 %            NRG        

 

 Manual eosinophils/100 leukocytes in nose            0 %            NRG        

 

 Manual blood basophils/100 leukocytes            1 %            NRG        

 

 Blood anisocytosis detection by light microscopy            SLIGHT             
NRG        









 Bacterial blood culture - 19 13:15         









 Bacterial blood culture            NG             NRG        









 Bacterial blood culture - 19 13:25         









 Bacterial blood culture            NG             NRG        









 Sputum Gram stain - 19 15:45         









 Sputum Gram stain            605.             NRG        









 Bacterial sputum culture - 19 15:45         









 FREE TEXT EXTERNAL            ID REPORTED 19 16:05             NRG        

 

 QUANTITY OF GROWTH            .             NRG        

 

 FREE TEXT ENTRY 2            SENSITIVITY REPORTED 19 09:05             NRG
        

 

 Bacterial sputum culture            USUAL RESP             NRG        









 RML Sensitivity Panel - 19 15:45         









 Gentamicin susceptibility test by minimum inhibitory concentration            <
=            NRG        

 

 Trimethoprim/sulfamethoxazole susceptibility test by minimum 
inhibitoryconcentration            S             NRG        

 

 Levofloxacin susceptibility test by minimum inhibitory concentration          
  >             NRG        

 

 Ampicillin susceptibility test by minimum inhibitory concentration            <
=            NRG        

 

 Cefazolin susceptibility test by minimum inhibitory concentration            2
             NRG        

 

 Ceftriaxone susceptibility test by minimum inhibitory concentration            
<=            NRG        

 

 Piperacillin/tazobactam susceptibility test by minimum inhibitory 
concentration            =            NRG        

 

 Ciprofloxacin susceptibility test by minimum inhibitory concentration         
   >             NRG        

 

 Meropenem susceptibility test by minimum inhibitory concentration            <
=            NRG        

 

 Amoxicillin and clavulanate potassium susc LIAM            <=            NRG   
     

 

 Imipenem susceptibility test by minimum inhibitory concentration            <=
            NRG        









 Complete blood count (CBC) with automated white blood cell (WBC) differential 
- 19 05:20         









 Blood leukocytes automated count (number/volume)            12.6 10*3/uL      
      4.3-11.0        

 

 Blood erythrocytes automated count (number/volume)            3.79 10*6/uL    
        4.35-5.85        

 

 Venous blood hemoglobin measurement (mass/volume)            12.4 g/dL        
    13.3-17.7        

 

 Blood hematocrit (volume fraction)            37 %            40-54        

 

 Automated erythrocyte mean corpuscular volume            99 [foz_us]          
  80-99        

 

 Automated erythrocyte mean corpuscular hemoglobin (mass per erythrocyte)      
      33 pg            25-34        

 

 Automated erythrocyte mean corpuscular hemoglobin concentration measurement (
mass/volume)            33 g/dL            32-36        

 

 Automated erythrocyte distribution width ratio            16.6 %            
10.0-14.5        

 

 Automated blood platelet count (count/volume)            217 10*3/uL          
  130-400        

 

 Automated blood platelet mean volume measurement            9.8 [foz_us]      
      7.4-10.4        

 

 Automated blood neutrophils/100 leukocytes            83 %            42-75   
     

 

 Automated blood lymphocytes/100 leukocytes            10 %            12-44   
     

 

 Blood monocytes/100 leukocytes            7 %            0-12        

 

 Automated blood eosinophils/100 leukocytes            1 %            0-10     
   

 

 Automated blood basophils/100 leukocytes            0 %            0-10        

 

 Blood neutrophils automated count (number/volume)            10.4 10*3        
    1.8-7.8        

 

 Blood lymphocytes automated count (number/volume)            1.3 10*3         
   1.0-4.0        

 

 Blood monocytes automated count (number/volume)            0.8 10*3            
0.0-1.0        

 

 Automated eosinophil count            0.1 10*3/uL            0.0-0.3        

 

 Automated blood basophil count (count/volume)            0.0 10*3/uL          
  0.0-0.1        









 Whole blood basic metabolic panel - 19 05:20         









 Serum or plasma sodium measurement (moles/volume)            137 mmol/L       
     135-145        

 

 Serum or plasma potassium measurement (moles/volume)            4.0 mmol/L    
        3.6-5.0        

 

 Serum or plasma chloride measurement (moles/volume)            105 mmol/L     
               

 

 Carbon dioxide            19 mmol/L            21-32        

 

 Serum or plasma anion gap determination (moles/volume)            13 mmol/L   
         5-14        

 

 Serum or plasma urea nitrogen measurement (mass/volume)            19 mg/dL   
         7-18        

 

 Serum or plasma creatinine measurement (mass/volume)            1.36 mg/dL    
        0.60-1.30        

 

 Serum or plasma urea nitrogen/creatinine mass ratio            14             
NRG        

 

 Serum or plasma creatinine measurement with calculation of estimated 
glomerular filtration rate            52             NRG        

 

 Serum or plasma glucose measurement (mass/volume)            98 mg/dL         
           

 

 Serum or plasma calcium measurement (mass/volume)            9.3 mg/dL        
    8.5-10.1        









 PT panel in platelet poor plasma by coagulation assay - 19 05:30         









 Prothrombin time (PT) in platelet poor plasma by coagulation assay            
16.4 s            12.2-14.7        

 

 INR in platelet poor plasma or blood by coagulation assay            1.3      
       0.8-1.4        









 Complete blood count (CBC) with automated white blood cell (WBC) differential 
- 19 06:03         









 Blood leukocytes automated count (number/volume)            8.1 10*3/uL       
     4.3-11.0        

 

 Blood erythrocytes automated count (number/volume)            3.67 10*6/uL    
        4.35-5.85        

 

 Venous blood hemoglobin measurement (mass/volume)            12.1 g/dL        
    13.3-17.7        

 

 Blood hematocrit (volume fraction)            37 %            40-54        

 

 Automated erythrocyte mean corpuscular volume            100 [foz_us]         
   80-99        

 

 Automated erythrocyte mean corpuscular hemoglobin (mass per erythrocyte)      
      33 pg            25-34        

 

 Automated erythrocyte mean corpuscular hemoglobin concentration measurement (
mass/volume)            33 g/dL            32-36        

 

 Automated erythrocyte distribution width ratio            16.2 %            
10.0-14.5        

 

 Automated blood platelet count (count/volume)            209 10*3/uL          
  130-400        

 

 Automated blood platelet mean volume measurement            9.7 [foz_us]      
      7.4-10.4        

 

 Automated blood neutrophils/100 leukocytes            77 %            42-75   
     

 

 Automated blood lymphocytes/100 leukocytes            13 %            12-44   
     

 

 Blood monocytes/100 leukocytes            8 %            0-12        

 

 Automated blood eosinophils/100 leukocytes            2 %            0-10     
   

 

 Automated blood basophils/100 leukocytes            0 %            0-10        

 

 Blood neutrophils automated count (number/volume)            6.2 10*3         
   1.8-7.8        

 

 Blood lymphocytes automated count (number/volume)            1.0 10*3         
   1.0-4.0        

 

 Blood monocytes automated count (number/volume)            0.7 10*3            
0.0-1.0        

 

 Automated eosinophil count            0.2 10*3/uL            0.0-0.3        

 

 Automated blood basophil count (count/volume)            0.0 10*3/uL          
  0.0-0.1        









 Whole blood basic metabolic panel - 19 06:03         









 Serum or plasma sodium measurement (moles/volume)            139 mmol/L       
     135-145        

 

 Serum or plasma potassium measurement (moles/volume)            4.0 mmol/L    
        3.6-5.0        

 

 Serum or plasma chloride measurement (moles/volume)            108 mmol/L     
               

 

 Carbon dioxide            21 mmol/L            21-32        

 

 Serum or plasma anion gap determination (moles/volume)            10 mmol/L   
         5-14        

 

 Serum or plasma urea nitrogen measurement (mass/volume)            18 mg/dL   
         7-18        

 

 Serum or plasma creatinine measurement (mass/volume)            1.33 mg/dL    
        0.60-1.30        

 

 Serum or plasma urea nitrogen/creatinine mass ratio            14             
NRG        

 

 Serum or plasma creatinine measurement with calculation of estimated 
glomerular filtration rate            53             NRG        

 

 Serum or plasma glucose measurement (mass/volume)            147 mg/dL        
            

 

 Serum or plasma calcium measurement (mass/volume)            8.9 mg/dL        
    8.5-10.1        









 Complete blood count (CBC) with automated white blood cell (WBC) differential 
- 19 18:19         









 Blood leukocytes automated count (number/volume)            9.5 10*3/uL       
     4.3-11.0        

 

 Blood erythrocytes automated count (number/volume)            4.01 10*6/uL    
        4.35-5.85        

 

 Venous blood hemoglobin measurement (mass/volume)            13.2 g/dL        
    13.3-17.7        

 

 Blood hematocrit (volume fraction)            39 %            40-54        

 

 Automated erythrocyte mean corpuscular volume            98 [foz_us]          
  80-99        

 

 Automated erythrocyte mean corpuscular hemoglobin (mass per erythrocyte)      
      33 pg            25-34        

 

 Automated erythrocyte mean corpuscular hemoglobin concentration measurement (
mass/volume)            34 g/dL            32-36        

 

 Automated erythrocyte distribution width ratio            16.6 %            
10.0-14.5        

 

 Automated blood platelet count (count/volume)            264 10*3/uL          
  130-400        

 

 Automated blood platelet mean volume measurement            9.3 [foz_us]      
      7.4-10.4        

 

 Automated blood neutrophils/100 leukocytes            87 %            42-75   
     

 

 Automated blood lymphocytes/100 leukocytes            5 %            12-44    
    

 

 Blood monocytes/100 leukocytes            7 %            0-12        

 

 Automated blood eosinophils/100 leukocytes            1 %            0-10     
   

 

 Automated blood basophils/100 leukocytes            0 %            0-10        

 

 Blood neutrophils automated count (number/volume)            8.3 10*3         
   1.8-7.8        

 

 Blood lymphocytes automated count (number/volume)            0.4 10*3         
   1.0-4.0        

 

 Blood monocytes automated count (number/volume)            0.7 10*3            
0.0-1.0        

 

 Automated eosinophil count            0.1 10*3/uL            0.0-0.3        

 

 Automated blood basophil count (count/volume)            0.0 10*3/uL          
  0.0-0.1        









 PT panel in platelet poor plasma by coagulation assay - 19 18:19         









 Prothrombin time (PT) in platelet poor plasma by coagulation assay            
14.7 s            12.2-14.7        

 

 INR in platelet poor plasma or blood by coagulation assay            1.2      
       0.8-1.4        









 Activated partial thromboplastin time (aPTT) in platelet poor plasma 
bycoagulation assay - 19 18:19         









 Activated partial thromboplastin time (aPTT) in platelet poor plasma 
bycoagulation assay            32 s            24-35        









 Influenza virus A and B antigen detection - 19 18:19         









 FLU RESULT            NEGATIVE FOR INFLUENZA A AND B ANTIGENS BY IA           
  [facility]        









 Blood manual differential performed detection - 19 18:19         









 Blood monocytes/100 leukocytes            3 %            NRG        

 

 Manual blood segmented neutrophils/100 leukocytes            84 %            
NRG        

 

 Blood band neutrophils/100 leukocytes            7 %            NRG        

 

 Manual blood lymphocytes/100 leukocytes            6 %            NRG        

 

 Blood erythrocyte morphology finding identification            NORMAL         
    NRG        









 Blood lactic acid measurement (moles/volume) - 19 18:19         









 Blood lactic acid measurement (moles/volume)            0.92 mmol/L            
0.50-2.00        









 Serum or plasma lithium measurement (moles/volume) - 19 18:19         









 BNP level            67.8 pg/mL            <100.0        









 Comprehensive metabolic panel - 19 18:19         









 Serum or plasma sodium measurement (moles/volume)            136 mmol/L       
     135-145        

 

 Serum or plasma potassium measurement (moles/volume)            4.2 mmol/L    
        3.6-5.0        

 

 Serum or plasma chloride measurement (moles/volume)            105 mmol/L     
               

 

 Carbon dioxide            21 mmol/L            21-32        

 

 Serum or plasma anion gap determination (moles/volume)            10 mmol/L   
         5-14        

 

 Serum or plasma urea nitrogen measurement (mass/volume)            18 mg/dL   
         7-18        

 

 Serum or plasma creatinine measurement (mass/volume)            1.42 mg/dL    
        0.60-1.30        

 

 Serum or plasma urea nitrogen/creatinine mass ratio            13             
NRG        

 

 Serum or plasma creatinine measurement with calculation of estimated 
glomerular filtration rate            49             NRG        

 

 Serum or plasma glucose measurement (mass/volume)            111 mg/dL        
            

 

 Serum or plasma calcium measurement (mass/volume)            9.9 mg/dL        
    8.5-10.1        

 

 Serum or plasma total bilirubin measurement (mass/volume)            0.4 mg/dL
            0.1-1.0        

 

 Serum or plasma alkaline phosphatase measurement (enzymatic activity/volume)  
          105 U/L                    

 

 Serum or plasma aspartate aminotransferase measurement (enzymatic activity/
volume)            21 U/L            5-34        

 

 Serum or plasma alanine aminotransferase measurement (enzymatic activity/volume
)            19 U/L            0-55        

 

 Serum or plasma protein measurement (mass/volume)            7.4 g/dL         
   6.4-8.2        

 

 Serum or plasma albumin measurement (mass/volume)            4.1 g/dL         
   3.2-4.5        

 

 CALCIUM CORRECTED            9.8 mg/dL            8.5-10.1        









 Magnesium - 19 18:19         









 Magnesium            1.5 mg/dL            1.8-2.4        









 Serum or plasma troponin i.cardiac measurement (mass/volume) - 19 18:19 
        









 Serum or plasma troponin i.cardiac measurement (mass/volume)            < ng/
mL            <0.028        









 Bacterial blood culture - 19 18:19         









 Bacterial blood culture            NG             NRG        









 Complete urinalysis with reflex to culture - 19 18:24         









 Urine color determination            YELLOW             NRG        

 

 Urine clarity determination            SLIGHTLY CLOUDY             NRG        

 

 Urine pH measurement by test strip            5             5-9        

 

 Specific gravity of urine by test strip            1.020             1.016-
1.022        

 

 Urine protein assay by test strip, semi-quantitative            NEGATIVE      
       NEGATIVE        

 

 Urine glucose detection by automated test strip            NEGATIVE           
  NEGATIVE        

 

 Erythrocytes detection in urine sediment by light microscopy            1+    
         NEGATIVE        

 

 Urine ketones detection by automated test strip            NEGATIVE           
  NEGATIVE        

 

 Urine nitrite detection by test strip            NEGATIVE             NEGATIVE
        

 

 Urine total bilirubin detection by test strip            NEGATIVE             
NEGATIVE        

 

 Urine urobilinogen measurement by automated test strip (mass/volume)          
  NORMAL             NORMAL        

 

 Urine leukocyte esterase detection by dipstick            1+             
NEGATIVE        

 

 Automated urine sediment erythrocyte count by microscopy (number/high power 
field)             [HPF]            NRG        

 

 Automated urine sediment leukocyte count by microscopy (number/high power field
)             [HPF]            NRG        

 

 Bacteria detection in urine sediment by light microscopy            TRACE     
        NRG        

 

 Crystals detection in urine sediment by light microscopy            NONE      
       NRG        

 

 Casts detection in urine sediment by light microscopy            NONE         
    NRG        

 

 Mucus detection in urine sediment by light microscopy            NEGATIVE     
        NRG        

 

 Complete urinalysis with reflex to culture            CULTURE PENDING         
    NRG        









 Bacterial urine culture - 19 18:24         









 Bacterial urine culture            NG             NRG        









 Bacterial blood culture - 19 18:40         









 Bacterial blood culture            NG             NRG        









 Sputum Gram stain - 19 20:56         









 Sputum Gram stain            2-, 1605             [facility]        









 Bacterial sputum culture - 19 20:56         









 QUANTITY OF GROWTH            .             NRG        

 

 Bacterial sputum culture            USUAL RESP             NRG        









 Capillary blood glucose measurement by glucometer (mass/volume) - 19 21:
44         









 Capillary blood glucose measurement by glucometer (mass/volume)            124 
mg/dL                    









 Complete blood count (CBC) with automated white blood cell (WBC) differential 
- 19 04:05         









 Blood leukocytes automated count (number/volume)            5.8 10*3/uL       
     4.3-11.0        

 

 Blood erythrocytes automated count (number/volume)            4.00 10*6/uL    
        4.35-5.85        

 

 Venous blood hemoglobin measurement (mass/volume)            13.1 g/dL        
    13.3-17.7        

 

 Blood hematocrit (volume fraction)            39 %            40-54        

 

 Automated erythrocyte mean corpuscular volume            98 [foz_us]          
  80-99        

 

 Automated erythrocyte mean corpuscular hemoglobin (mass per erythrocyte)      
      33 pg            25-34        

 

 Automated erythrocyte mean corpuscular hemoglobin concentration measurement (
mass/volume)            34 g/dL            32-36        

 

 Automated erythrocyte distribution width ratio            16.6 %            
10.0-14.5        

 

 Automated blood platelet count (count/volume)            237 10*3/uL          
  130-400        

 

 Automated blood platelet mean volume measurement            9.4 [foz_us]      
      7.4-10.4        

 

 Automated blood neutrophils/100 leukocytes            93 %            42-75   
     

 

 Automated blood lymphocytes/100 leukocytes            6 %            12-44    
    

 

 Blood monocytes/100 leukocytes            1 %            0-12        

 

 Automated blood eosinophils/100 leukocytes            0 %            0-10     
   

 

 Automated blood basophils/100 leukocytes            0 %            0-10        

 

 Blood neutrophils automated count (number/volume)            5.4 10*3         
   1.8-7.8        

 

 Blood lymphocytes automated count (number/volume)            0.4 10*3         
   1.0-4.0        

 

 Blood monocytes automated count (number/volume)            0.1 10*3            
0.0-1.0        

 

 Automated eosinophil count            0.0 10*3/uL            0.0-0.3        

 

 Automated blood basophil count (count/volume)            0.0 10*3/uL          
  0.0-0.1        









 Comprehensive metabolic panel - 19 04:05         









 Serum or plasma sodium measurement (moles/volume)            139 mmol/L       
     135-145        

 

 Serum or plasma potassium measurement (moles/volume)            4.3 mmol/L    
        3.6-5.0        

 

 Serum or plasma chloride measurement (moles/volume)            107 mmol/L     
               

 

 Carbon dioxide            21 mmol/L            21-32        

 

 Serum or plasma anion gap determination (moles/volume)            11 mmol/L   
         5-14        

 

 Serum or plasma urea nitrogen measurement (mass/volume)            21 mg/dL   
         7-18        

 

 Serum or plasma creatinine measurement (mass/volume)            1.35 mg/dL    
        0.60-1.30        

 

 Serum or plasma urea nitrogen/creatinine mass ratio            16             
NRG        

 

 Serum or plasma creatinine measurement with calculation of estimated 
glomerular filtration rate            52             NRG        

 

 Serum or plasma glucose measurement (mass/volume)            157 mg/dL        
            

 

 Serum or plasma calcium measurement (mass/volume)            9.3 mg/dL        
    8.5-10.1        

 

 Serum or plasma total bilirubin measurement (mass/volume)            0.3 mg/dL
            0.1-1.0        

 

 Serum or plasma alkaline phosphatase measurement (enzymatic activity/volume)  
          87 U/L                    

 

 Serum or plasma aspartate aminotransferase measurement (enzymatic activity/
volume)            23 U/L            5-34        

 

 Serum or plasma alanine aminotransferase measurement (enzymatic activity/volume
)            18 U/L            0-55        

 

 Serum or plasma protein measurement (mass/volume)            6.8 g/dL         
   6.4-8.2        

 

 Serum or plasma albumin measurement (mass/volume)            3.8 g/dL         
   3.2-4.5        

 

 CALCIUM CORRECTED            9.5 mg/dL            8.5-10.1        









 Magnesium - 19 04:05         









 Magnesium            2.3 mg/dL            1.8-2.4        









 Capillary blood glucose measurement by glucometer (mass/volume) - 19 11:
08         









 Capillary blood glucose measurement by glucometer (mass/volume)            150 
mg/dL                    









 Capillary blood glucose measurement by glucometer (mass/volume) - 19 16:
12         









 Capillary blood glucose measurement by glucometer (mass/volume)            140 
mg/dL                    









 Capillary blood glucose measurement by glucometer (mass/volume) - 19 19:
55         









 Capillary blood glucose measurement by glucometer (mass/volume)            181 
mg/dL                    









 Capillary blood glucose measurement by glucometer (mass/volume) - 19 05:
51         









 Capillary blood glucose measurement by glucometer (mass/volume)            131 
mg/dL                    









 Complete blood count (CBC) with automated white blood cell (WBC) differential 
- 19 06:15         









 Blood leukocytes automated count (number/volume)            11.9 10*3/uL      
      4.3-11.0        

 

 Blood erythrocytes automated count (number/volume)            4.10 10*6/uL    
        4.35-5.85        

 

 Venous blood hemoglobin measurement (mass/volume)            13.3 g/dL        
    13.3-17.7        

 

 Blood hematocrit (volume fraction)            40 %            40-54        

 

 Automated erythrocyte mean corpuscular volume            98 [foz_us]          
  80-99        

 

 Automated erythrocyte mean corpuscular hemoglobin (mass per erythrocyte)      
      32 pg            25-34        

 

 Automated erythrocyte mean corpuscular hemoglobin concentration measurement (
mass/volume)            33 g/dL            32-36        

 

 Automated erythrocyte distribution width ratio            16.4 %            
10.0-14.5        

 

 Automated blood platelet count (count/volume)            241 10*3/uL          
  130-400        

 

 Automated blood platelet mean volume measurement            9.3 [foz_us]      
      7.4-10.4        

 

 Automated blood neutrophils/100 leukocytes            90 %            42-75   
     

 

 Automated blood lymphocytes/100 leukocytes            5 %            12-44    
    

 

 Blood monocytes/100 leukocytes            6 %            0-12        

 

 Automated blood eosinophils/100 leukocytes            0 %            0-10     
   

 

 Automated blood basophils/100 leukocytes            0 %            0-10        

 

 Blood neutrophils automated count (number/volume)            10.6 10*3        
    1.8-7.8        

 

 Blood lymphocytes automated count (number/volume)            0.6 10*3         
   1.0-4.0        

 

 Blood monocytes automated count (number/volume)            0.7 10*3            
0.0-1.0        

 

 Automated eosinophil count            0.0 10*3/uL            0.0-0.3        

 

 Automated blood basophil count (count/volume)            0.0 10*3/uL          
  0.0-0.1        









 Whole blood basic metabolic panel - 19 06:15         









 Serum or plasma sodium measurement (moles/volume)            141 mmol/L       
     135-145        

 

 Serum or plasma potassium measurement (moles/volume)            4.2 mmol/L    
        3.6-5.0        

 

 Serum or plasma chloride measurement (moles/volume)            106 mmol/L     
               

 

 Carbon dioxide            22 mmol/L            21-32        

 

 Serum or plasma anion gap determination (moles/volume)            13 mmol/L   
         5-14        

 

 Serum or plasma urea nitrogen measurement (mass/volume)            24 mg/dL   
         7-18        

 

 Serum or plasma creatinine measurement (mass/volume)            1.22 mg/dL    
        0.60-1.30        

 

 Serum or plasma urea nitrogen/creatinine mass ratio            20             
NRG        

 

 Serum or plasma creatinine measurement with calculation of estimated 
glomerular filtration rate            58             NRG        

 

 Serum or plasma glucose measurement (mass/volume)            133 mg/dL        
            

 

 Serum or plasma calcium measurement (mass/volume)            10.2 mg/dL       
     8.5-10.1        









 Arterial blood gas measurement - 19 10:47         









 Blood pCO2            36 mm[Hg]            35-45        

 

 Blood pO2            94 mm[Hg]            79-93        

 

 Arterial blood bicarbonate measurement (moles/volume)            25 mmol/L    
        23-27        

 

 Arterial blood base excess by calculation            1.1 mmol/L            -2.5
-2.5        

 

 Arterial blood oxygen saturation measurement            96 %            
        

 

 * Inhaled oxygen flow rate            3L             NRG        

 

 Arterial blood pH measurement with patient temperature correction            
7.45             7.37-7.43        

 

 Arterial blood carbon dioxide, total measurement (moles/volume)            
25.9 mmol/L            21.0-31.0        

 

 Body site            RR             NRG        

 

 Assessment of wrist artery patency prior to arterial puncture            YES-
POS             NRG        

 

 Setting of ventilation mode            NO             NRG        

 

 Measurement of body temperature            98.4             NRG        









 Capillary blood glucose measurement by glucometer (mass/volume) - 19 11:
23         









 Capillary blood glucose measurement by glucometer (mass/volume)            121 
mg/dL                    









 Capillary blood glucose measurement by glucometer (mass/volume) - 19 16:
35         









 Capillary blood glucose measurement by glucometer (mass/volume)            131 
mg/dL                    









 Capillary blood glucose measurement by glucometer (mass/volume) - 19 20:
57         









 Capillary blood glucose measurement by glucometer (mass/volume)            136 
mg/dL                    









 Capillary blood glucose measurement by glucometer (mass/volume) - 19 05:
42         









 Capillary blood glucose measurement by glucometer (mass/volume)            128 
mg/dL                    



                                                                               
                                                                     



Encounters

      





 ACCT No.            Visit Date/Time            Discharge            Status    
        Pt. Type            Provider            Facility            Loc./Unit  
          Complaint        

 

 [id number]            2019 19:40:00            2019 10:00:00    
        DIS            Inpatient            PATRICIO MATIAS, JESSICA COELLO            [facility]            4TH            COPD EXACERBATION,
POSSILBE PNEUMONIA,HPOXIA        

 

 [id number]            2019 07:23:00            2019 23:59:59    
        CLS            Preadmit            NOHELIA AUGUST MD            [facility]            CARD            CAD,DYSPNEA        

 

 [id number]            2019 09:25:00            2019 23:59:59    
        CLS            Outpatient            EDUAR JOHNSON DO            
[facility]            RAD            K21.9 GASTRO-
ESOPHAGEAL REFLUX DISEASE        

 

 [id number]            2019 10:25:00            2019 14:09:00    
        DIS            Inpatient            JACI MATIAS, DONALD SMITH            [facility]            4TH            INFLUENZA SYNDROME RESP 
DISTRESS        

 

 [id number]            2018 08:15:00            2018 23:59:59    
        CLS            Preadmit            ZACH DIEGO APRN            
[facility]            PULM            J44.9 COPD        

 

 [id number]            2018 08:00:00            2018 00:01:00    
        DIS            Outpatient            ZACH DIEGO APRN          
  [facility]            PULM            J44.9 COPD        

 

 [id number]            12/10/2018 09:13:00            12/10/2018 23:59:59    
        CLS            Outpatient            ZACH DIEGO APRN          
  [facility]            RAD            ENLARGED LYMPH 
NODES        

 

 [id number]            2018 08:04:00            2018 00:01:00    
        DIS            Outpatient            ZACH DIEGO APRN          
  [facility]            PULM            J44.9 COPD        

 

 [id number]            2018 11:07:00            2018 23:59:59    
        CLS            Outpatient            EDUAR JOHNSON DO            
[facility]            RAD            HIP PAIN        

 

 [id number]            2018 10:09:00            2018 23:59:59    
        CLS            Outpatient            ZACH DIEGO APRN          
  [facility]            RAD            R59.9 LYMPH NODE 
ENLARGEMENT        

 

 [id number]            2018 06:00:00            2018 12:20:00    
        DIS            Outpatient            MICH COLLIER DO            [facility]            SDC            UMBILICAL HERNIA        

 

 [id number]            2018 09:33:00            2018 10:20:00    
        DIS            Outpatient            MICH COLLIER DO            [facility]            PREOP            UMBILICAL HERNIA      
  

 

 [id number]            2018 12:49:00            2018 15:30:00    
        DIS            Outpatient            MICH COLLIER DO            [facility]            ENDO            REFLUX, 
GASTROINTESTINAL BLEEDING        

 

 [id number]            2018 05:40:00            2018 12:13:00    
        DIS            Outpatient            MICH COLLIER DO            [facility]            PREOP            REFLUX,
GASTROINTESTINAL BLEEDING        

 

 [id number]            2018 14:58:00            04/10/2018 17:50:00    
        DIS            Inpatient            NOHELIA AUGUST MD            [facility]            4TH            SOB        

 

 [id number]            2018 10:39:00            2018 23:59:59    
        CLS            Outpatient            NOHELIA AUGUST MD            [facility]            RAD            CAD,CONFUSION        

 

 [id number]            2017 15:06:00            2017 23:59:59    
        CLS            Outpatient            DANA DILLON DO            [facility]            RT            DYSPNEA R06.02        

 

 [id number]            2017 16:45:00            2017 16:45:00    
        CAN            Preadmit            DANA DILLON DO            [facility]            RT            DYSPNEA,HX OF TOBACCO USE,
MARYANA ON CPAP        

 

 [id number]            2017 08:48:00            2017 15:30:00    
        DIS            Outpatient            NOHELIA AUGUST MD            [facility]            CATH            ANM STRESS TEST,CAD    
    

 

 [id number]            2017 08:13:00            2017 23:59:59    
        CLS            Outpatient            TAE CHRISTIANSEN    
        [facility]            CARD            CAD,MARYANA ON 
CPAP,TIA        

 

 [id number]            2017 08:51:00            2017 15:30:00    
        DIS            Outpatient            MICH COLLIER DO            [facility]            SDC            OCCULT STOOLS        

 

 [id number]            2016 05:47:00            2016 13:50:00    
        DIS            Outpatient            MICH COLLIER DO            [facility]            PREOP            OCCULT STOOLS        

 

 [id number]            2016 10:15:00            2016 23:59:59    
        CLS            Preadmit            ELIZABETH DAVISEDUAR            
[facility]            PULM            COPD        

 

 [id number]            2016 10:15:00            2016 00:01:00    
        DIS            Outpatient            ELIZABETH EDUAR DAVIS            
[facility]            PULM            COPD        

 

 [id number]            2016 14:31:00            2016 23:59:59    
        CLS            Outpatient            JOHNSON EDUAR DAVIS            
[facility]            RAD            ABD PAIN        

 

 [id number]            2015 14:00:00            2016 13:50:00    
        DIS            Inpatient            ELIZABETH DAVISEDUAR            
[facility]            4TH            PNEUMONIA        

 

 [id number]            2015 09:57:00            2015 10:38:00    
        DIS            Outpatient            BELKIS WEST MD            [facility]            REHAB            CERVICAL SPONDYLOSIS  
      

 

 [id number]            06/15/2015 14:36:00            06/15/2015 23:59:59    
        CLS            Outpatient            ARLENE SAENZ DO            
[facility]            RAD            DDD        

 

 [id number]            2015 09:53:00            2015 11:51:00    
        DIS            Outpatient            ARLENE SAENZ DO            
[facility]            REHAB            S/P R SHLD SCOPE 
WITH SAD AND DEBRIDEMENT        

 

 [id number]            2015 12:27:00            2015 23:59:59    
        CLS            Outpatient            ARLENE SAENZ DO            
[facility]            RIGHT SHOULDER TORN 
ROTATOR CUFF        

 

 [id number]            2015 06:15:00            2015 23:59:59    
        CLS            Outpatient            ARLENE SAENZ DO            
[facility]            PREOP            RIGHT SHOULDER 
TORN ROTATOR CUFF        

 

 [id number]            2015 08:58:00            2015 11:36:00    
        DIS            Outpatient            ARLENE SAENZ DO            
[facility]            RIGHT SHOULDER 
ROTATOR CUFF TEAR        

 

 [id number]            2015 05:50:00            2015 23:59:59    
        CLS            Outpatient            ARLENE SAENZ DO            
[facility]            PREOP            RIGHT SHOULDER 
ROTATOR CUFF TEAR        

 

 [id number]            2015 12:14:00            2015 23:59:59    
        CLS            Outpatient            NOHELIA AUGUST MD            [facility]            CARD            CAD,MATT,DYSPNEA        

 

 [id number]            2015 09:05:00            2015 23:59:59    
        CLS            Outpatient            NOHELIA AUGUST MD            [facility]            CARD            CAD        

 

 [id number]            12/10/2014 05:50:00            12/10/2014 23:59:59    
        CLS            Outpatient            ARLENE SAENZ DO            
[facility]            PREOP            RIGHT SHOULDER 
ROTATOR  CUFF TEAR        

 

 [id number]            2014 17:15:00            2014 10:00:00    
        DIS            Inpatient            EDUAR JOHNSON DO            
[facility]            4TH            TIA CONFUSION 
DIFFICULT WORD FINDING        

 

 [id number]            2014 13:45:00            2014 11:55:00    
        DIS            Outpatient            ARLENE SAENZ DO            
[facility]            REHAB            R SHOULDER 
ADHESIVE CAPSULITIS        

 

 [id number]            2014 07:52:00            2014 23:59:59    
        CLS            Outpatient            ARLENE SAENZ DO            
[facility]            RAD            RT SHOULDER 
ADHEISIVE CAPSULITIS        

 

 [id number]            11/10/2014 12:37:00            11/10/2014 23:59:59    
        CLS            Outpatient            LETTY DAVIS ARLENE LINDA            
[facility]            RAD            RT SHOULDER 
ADHEISIVE CAPSULITIS        

 

 [id number]            09/10/2014 10:59:00            09/10/2014 23:59:59    
        CLS            Outpatient            DANA DILLON DO            [facility]            RAD            DYSPNEA,PE,RENAL FAILURE
        

 

 [id number]            2014 13:57:00            2014 15:35:00    
        DIS            Inpatient            SHIRLENE MCDONALD MD            [facility]            IRF            WEAKNESS        

 

 [id number]            2014 10:19:00            2014 13:15:00    
        DIS            Inpatient            EDUAR JOHNSON DO            
[facility]            ICU            PROBABLE RL 
PNEUMONIA        

 

 [id number]            2014 12:50:00            2014 23:59:59    
        CLS            Outpatient            EDUAR JOHNSON DO            
[facility]            SDC            OSTEO        

 

 [id number]            12/10/2019 10:15:00                         PEN       
     Preadmit            ZACH DIEGO            [facility]            RAD            DYSPNEA,NICOTINE DEPENDENCE,
PLEURAL EFFUSION        

 

 [id number]            2016 10:15:00                                   
   Document Registration                                                       
     

 

 [id number]            2015 13:24:00                                   
   Document Registration                                                       
     

 

 [id number]            2013 13:05:00                                   
   Document Registration                                                       
     

 

 [id number]            2012 12:32:00                                   
   Document Registration                                                       
     

 

 [id number]            03/15/2011 12:55:00                                   
   Document Registration                                                       
     

 

 [id number]            2015 09:57:51                         ACT            
Document Registration
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
## 2019-03-18 NOTE — NUR
EDUAR OSORIO admitted to room 432-1, with an admitting diagnosis of COPD Exacerbation, 
bibasilar infiltrates, and sepsis, on 03/18/19 from ED via stretcher, accompanied by 
.EDUAR OSORIO introduced to surroundings, call light, bed controls, phone, TV, 
temperature control, lights, meal times, smoking policy, visitor policy, side rail policy, 
bathrooms and showers.  Patient Rights given to patient in the handbook. EDUAR OSORIO 
verbalizes understanding that [facility] is not responsible for the loss or damage to any 
personal effects or valuables that are kept in the patients posession during their 
hospitalization. EDUAR OSORIO verbalizes understanding of Interdisciplinary Patient 
Education. Patient and/or family were informed about the Rapid Response Team and its 
purpose. Plan of care discussed with patient, no questions or concerns at this time.
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
## 2019-03-18 NOTE — XMS REPORT
Continuity of Care Document

 Created on: 2019



EDUAR OSORIO

External Reference #: [id number]

: 1945

Sex: Male



Demographics







 Address  [address] [place]  [address]

 

 Home Phone  [phone] x

 

 Preferred Language  Unknown

 

 Marital Status  Unknown

 

 Advent Affiliation  Unknown

 

 Race  Unknown

 

 Ethnic Group  Unknown





Author







 Author  [facility]

 

 Organization  [facility]

 

 Address  Unknown

 

 Phone  Unavailable



              



Allergies

      





 Active            Description            Code            Type            
Severity            Reaction            Onset            Reported/Identified   
         Relationship to Patient            Clinical Status        

 

 Yes            No Known Drug Allergies            [id number]            Drug 
Allergy            Unknown            N/A                         2018   
                               



                  



Medications

      



There is no data.                  



Problems

      





 Date Dx Coded            Attending            Type            Code            
Diagnosis            Diagnosed By        

 

 2014            EDUAR JOHNSON DO            Ot            038.9   
         SEPTICEMIA NOS                     

 

 2014            EDUAR JOHNSON DO            Ot            250.00  
          DIAB NIEVES WO COMPL, TYPE II OR UNSPEC TY                     

 

 2014            EDUAR JOHNSON DO            Ot            272.4   
         HYPERLIPIDEMIA NEC/NOS                     

 

 2014            EDUAR JOHNSON DO            Ot            276.52  
          HYPOVOLEMIA                     

 

 2014            DEUAR JOHNSON DO            Ot            305.1   
         TOBACCO USE DISORDER                     

 

 2014            EDUAR JOHNSON DO            Ot            414.01  
          CORONARY ATHEROSCLEROSIS OF NATIVE CORON                     

 

 2014            EDUAR JOHNSON DO            Ot            486     
       PNEUMONIA, ORGANISM NOS                     

 

 2014            EDUAR JOHNSON DO            Ot            510.9   
         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                     

 

 2014            EDUAR JOHNSON DO            Ot            511.9   
         PLEURAL EFFUSION NOS                     

 

 2014            EDUAR JOHNSON DO            Ot            518.81  
          ACUTE RESPIRATORY FAILURE                     

 

 2014            EDUAR JOHNSON DO            Ot            584.9   
         ACUTE RENAL FAILURE, UNSPECIFIED                     

 

 2014            EDUAR JOHNSON DO            Ot            785.50  
          SHOCK NOS                     

 

 2014            EDUAR JOHNSON DO, Ot            785.52  
          SEPTIC SHOCK                     

 

 2014            EDUAR JOHNSON DO            Ot            995.92  
          SEVERE SEPSIS                     

 

 2014            EDUAR JOHNSON DO, Ot            V45.82  
          PERCUTANEOUS TRANSLUM CORON ANGIOPLASTY                      

 

 2014            HARRY MATIAS, SHIRLENE DAVID            Ot            266.2        
    B-COMPLEX DEFIC NEC                     

 

 2014            SHIRLENE MCDONALD MD            Ot            268.9        
    VITAMIN D DEFICIENCY NOS                     

 

 2014            SHIRLENE MCDONALD MD            Ot            272.4        
    HYPERLIPIDEMIA NEC/NOS                     

 

 2014            SHIRLENE MCDONALD MD E            Ot            274.9        
    GOUT NOS                     

 

 2014            SHIRLENE MCDONALD MD            Ot            305.1        
    TOBACCO USE DISORDER                     

 

 2014            SHIRLENE MCDONALD MD            Ot            401.9        
    HYPERTENSION NOS                     

 

 2014            HARRY MATIAS, SHIRLENE E            Ot            564.00       
     UNSPEC CONSTIPATION                     

 

 2014            SHIRLENE MCDONALD MD E            Ot            722.11       
     THORACIC DISC DISPLACMNT                     

 

 2014            SHIRLENE MCDONALD MD            Ot            733.00       
     OSTEOPOROSIS NOS                     

 

 2014            SHIRLENE MCDONALD MD E            Ot            780.79       
     OTH MALAISE FATIGUE                     

 

 2014            SHIRLENE MCDONALD MD E            Ot            790.29       
     OTHER ABNORMAL GLUCOSE                     

 

 2014            SHIRLENE MCDONALD MD            Ot            V57.89       
     REHABILITATION PROC NEC                     

 

 2014            LETTY DO ARLENE F            Ot            726.0    
                              

 

 2014            LETTY DO, ARLENE F            Ot            718.91   
                               

 

 2014            LETTY DO ARLENE F            Ot            726.0    
                              

 

 2014            LETTY DAVIS ARLENE F            Ot            726.0    
        ADHESIVE CAPSULIT SHLDER                     

 

 2014            LETTY DAVIS ARLENE F            Ot            V57.1    
        PHYSICAL THERAPY NEC                     

 

 2014            LETTY DAVIS ARLENE F            Ot            718.91   
                               

 

 2014            LETTY DAVIS ARLENE F            Ot            726.0    
                              

 

 2014            EDUAR JOHNSON DO            Ot            250.00  
          DIAB NIEVES WO COMPL, TYPE II OR UNSPEC TY                     

 

 2014            EDUAR JOHNSON DO            Ot            266.2   
         B-COMPLEX DEFIC NEC                     

 

 2014            EDUAR JOHNSON DO            Ot            268.9   
         VITAMIN D DEFICIENCY NOS                     

 

 2014            EDUAR JOHNSON DO            Ot            272.4   
         HYPERLIPIDEMIA NEC/NOS                     

 

 2014            EDUAR JOHNSON DO            Ot            403.90  
          HYPTNSV CHR KID DIS, UNSPEC, W CHR KD ST                     

 

 2014            EDUAR JOHNSON DO            Ot            414.01  
          CORONARY ATHEROSCLEROSIS OF NATIVE CORON                     

 

 2014            EDUAR JOHNSON DO            Ot            435.9   
         TRANS CEREB ISCHEMIA NOS                     

 

 2014            EDUAR JOHNSON DO            Ot            496     
       CHR AIRWAY OBSTRUCT NEC                     

 

 2014            EDUAR JOHNSON DO            Ot            585.2   
         CHRONIC KIDNEY DISEASE, STAGE II (MILD)                     

 

 2014            EDUAR JOHNSON DO            Ot            722.11  
          THORACIC DISC DISPLACMNT                     

 

 2014            EDUAR JOHNSON DO            Ot            799.02  
          HYPOXEMIA                     

 

 2014            EDUAR JOHNSON DO            Ot            V45.82  
          PERCUTANEOUS TRANSLUM CORON ANGIOPLASTY                      

 

 2014            LETTY DAVIS ARLENE MCKEON            Ot            718.91   
                               

 

 2014            LETTY DAVIS ARLENE MCKEON            Ot            726.0    
                              

 

 2015                         Ot            733.00                       
           

 

 2015                         Ot            733.00                       
           

 

 2015                         Ot            733.00                       
           

 

 2015            EDUAR JOHNSON DO            Ot            733.00  
                                

 

 2015            DANA DILLON DO            Ot            492.8       
                           

 

 2015            DANA DILLON DO            Ot            511.9       
                           

 

 2015            DANA DILLON DO            Ot            586         
                         

 

 2015            LETTY DAVIS ARLENE MCKEON            Ot            726.0    
                              

 

 2015            LETTY DAVIS ARLENE MCKEON            Ot            718.91   
                               

 

 2015            LETTY DAVIS ARLENE MCKEON            Ot            726.0    
                              

 

 2015            LETTY  ARLENE MCKEON            Ot            840.4    
                              

 

 2015            LETTY  ARLENE MCKEON            Ot            E000.8   
                               

 

 2015            LETTY  ARLENE MCKEON            Ot            E928.9   
                               

 

 2015            LETTY DAVIS ARLENE MCKEON            Ot            V72.84   
                               

 

 2015            LETTY ARLENE DAVIS            Ot            840.4    
        SPRAIN ROTATOR CUFF                     

 

 2015            LETTY DAVIS ARLENE MCKEON            Ot            E000.8   
         OTHER EXTERNAL CAUSE STATUS                     

 

 2015            LETTY DAVIS ARLENE MCKEON            Ot            E928.9   
         ACCIDENT NOS                     

 

 2015            LETTY DAIVS ARLENE MCKEON            Ot            V58.61   
         ANTICOAGULANTS,LT,CURRENT USE                     

 

 2015            LETTY  ARLENE MCKEON            Ot            V64.1    
        NO PROC/CONTRAINDICATION                     

 

 2015            LETTY DAVIS ARLENE MCKEON            Ot            715.31   
         LOC OSTEOARTH NOS-SHLDER                     

 

 2015            LETTY DAVIS ARLENE MCKEON            Ot            727.61   
         ROTATOR CUFF RUPTURE                     

 

 2015            LETTY DAVIS ARLENE MCKEON            Ot            840.7    
        (SLAP) SUPERIOR GLENOID LABRUM LESIONS                     

 

 2015            NOHELIA AUGUST MD            Ot            414.01      
                            

 

 2015            NOHELIA AUGUST MD            Ot            786.09      
                            

 

 2015                         Ot            733.00                       
           

 

 2015                         Ot            733.00                       
           

 

 2015                         Ot            733.00                       
           

 

 2015            EDUAR JOHNSON DO            Ot            733.00  
                                

 

 2015            DANA DILLON DO            Ot            492.8       
                           

 

 2015            WILMA DANA DAVIS            Ot            511.9       
                           

 

 2015            WILMA DAVIS DANA M            Ot            586         
                         

 

 2015            LETTY DO, ARLENE MCKEON            Ot            726.0    
                              

 

 2015            LETTY DO, ARLENE F            Ot            718.91   
                               

 

 2015            LETTY DO, ARLENE F            Ot            726.0    
                              

 

 2015            LETTY DO, ARLENE F            Ot            840.4    
                              

 

 2015            LETTY DO, ARLENE MCKEON            Ot            E000.8   
                               

 

 2015            LETTY DO ARLENE MCKEON            Ot            E928.9   
                               

 

 2015            LETTY DO ARLENE MCKEON            Ot            V72.84   
                               

 

 2015            LETTY DO ARLENE MCKEON            Ot            727.61   
                               

 

 2015            LETTY DO ARLENE MCKEON            Ot            V72.84   
                               

 

 2015            NOHELIA AUGUST MD            Ot            414.00      
                            

 

 2015            MATA MATIAS, NOHELIA CLARK            Ot            424.0       
                           

 

 2015            NOHELIA AUGUST MD            Ot            433.10      
                            

 

 2015            NOHELIA AUGUST MD            Ot            786.09      
                            

 

 2015            NOHELIA AUGUST MD            Ot            414.01      
                            

 

 2015            NOHELIA AUGUST MD            Ot            786.09      
                            

 

 2015            LETTY DO ARLENE MCKEON            Ot            840.4    
                              

 

 2015            LETTY DO ARLENE MCKEON            Ot            E000.8   
                               

 

 2015            LETTY DO ARLENE MCKEON            Ot            E928.9   
                               

 

 2015            LETTY DO, ARLENE F            Ot            V72.84   
                               

 

 2015            NOHELIA AUGUST MD            Ot            414.01      
                            

 

 2015            NOHELIA AUGUST MD            Ot            786.09      
                            

 

 2015            LETTY DO ARLENE MCKEON            Ot            719.41   
         JOINT PAIN-SHLDER                     

 

 2015            LETTY DO ARLENE F            Ot            V57.1    
        PHYSICAL THERAPY NEC                     

 

 2015            LETTY DO ARLENE F            Ot            V58.49   
         OTHER SPECIFIED AFTERCARE FOLLOWING SURG                     

 

 2015            LETTY DO, ARLENE MCKEON            Ot            840.4    
                              

 

 2015            LETTY DO, ARLENE MCKEON            Ot            E000.8   
                               

 

 2015            LETTY DO, ARLENE MCKEON            Ot            E928.9   
                               

 

 2015            LETTY DO, ARLENE MCKEON            Ot            V72.84   
                               

 

 2015            LETTY DO, ARLENE MCKEON            Ot            840.4    
                              

 

 2015            LETTY DO, ARLENE MCKEON            Ot            E000.8   
                               

 

 2015            LETTY DO, ARLENE MCKEON            Ot            E928.9   
                               

 

 2015            LETTY DO, ARLENE MCKEON            Ot            V72.84   
                               

 

 2015            LETTY DO, ARLENE MCKEON            Ot            727.61   
                               

 

 2015            LETTY DO, ARLENE MCKEON            Ot            V72.84   
                               

 

 2015            LETTY DO, ARLENE MCKEON            Ot            727.61   
                               

 

 2015            LETTY DO, ARLENE MCKEON            Ot            V72.84   
                               

 

 2015                         Ot            733.00                       
           

 

 2015                         Ot            733.00                       
           

 

 2015            LETTY DO, ARLENE MCKEON            Ot            722.4    
                              

 

 2015            LETTY DO, ARLENE MCKEON            Ot            722.4    
                              

 

 2015            BELKIS WEST MD            Ot            721.0        
                          

 

 2015            BELKIS WEST MD            Ot            V57.1        
                          

 

 2015            BELKIS WEST MD            Ot            721.0        
                          

 

 2015            BELKIS WEST MD            Ot            V57.1        
                          

 

 2015            BELKIS WEST MD            Ot            721.0        
    CERVICAL SPONDYLOSIS                     

 

 2015            BELKIS WEST MD            Ot            V57.1        
    PHYSICAL THERAPY NEC                     

 

 2015            EDUAR JOHNSON DO            Ot            A41.9   
                               

 

 2015            JOHNSONEDUAR HILL DO            Ot            E53.8   
                               

 

 2015            JOHNSONEDUAR HILL DO            Ot            E55.9   
                               

 

 2015            JOHNSONEDUAR HILL DO            Ot            E78.5   
                               

 

 2015            EDUAR JOHNSON DO            Ot            F17.200 
                                 

 

 2015            EDUAR JOHNSON DO            Ot            I12.9   
                               

 

 2015            JOHNSONEDUAR HILL DO            Ot            J15.1   
                               

 

 2015            JOHNSONEDUAR HILL DO            Ot            J15.4   
                               

 

 2015            EDUAR JOHNSON DO            Ot            J44.0   
                               

 

 2015            JOHNSON DO, EDUAR CLARK            Ot            J44.1   
                               

 

 2015            JOHNSON DO, EDUAR CLARK            Ot            M51.14  
                                

 

 2015            JOHNSON DO, EDUAR CLARK            Ot            N18.2   
                               

 

 2015            JOHNSON DO, EDUAR CLARK            Ot            R90.82  
                                

 

 2015            JOHNSON DO, EDUAR CLARK            Ot            Z66     
                             

 

 2015            JOHNSON DO, EDUAR CLARK            Ot            A41.9   
                               

 

 2015            JOHNSON DO, EDUAR CLARK            Ot            E53.8   
                               

 

 2015            JOHNSON DO, EDUAR CLARK            Ot            E55.9   
                               

 

 2015            JOHNSON DO, EDUAR CLARK            Ot            E78.5   
                               

 

 2015            JOHNSON DO, EDUAR CLARK            Ot            F17.200 
                                 

 

 2015            JOHNSON DO, EDUAR CLARK            Ot            I12.9   
                               

 

 2015            JOHNSON DO, EDUAR CLARK            Ot            J15.1   
                               

 

 2015            JOHNSON DO, EDUAR CLARK            Ot            J15.4   
                               

 

 2015            JOHNSON DO, EDUAR CLARK            Ot            J44.0   
                               

 

 2015            JOHNSON DO, EDUAR CLARK            Ot            J44.1   
                               

 

 2015            JOHNSON DO, EDUAR CLARK            Ot            M51.14  
                                

 

 2015            JOHNSON DO, EDUAR CLARK            Ot            N18.2   
                               

 

 2015            JOHNSON DO, EDUAR CLARK            Ot            R90.82  
                                

 

 2015            JOHNSON DO, EDUAR CLARK            Ot            Z66     
                             

 

 2016            JOHNSON DO, EDUAR CLARK            Ot            A41.9   
                               

 

 2016            JOHNSON DO, EDUAR CLARK            Ot            E53.8   
                               

 

 2016            JOHNSON DO, EDUAR CLARK            Ot            E55.9   
                               

 

 2016            JOHNSON DO, EDUAR CLARK            Ot            E78.5   
                               

 

 2016            JOHNSON DO, EDUAR CLARK            Ot            F17.200 
                                 

 

 2016            JOHNSON DO, EDUAR CLARK            Ot            I12.9   
                               

 

 2016            JOHNSON DO, EDUAR CLARK            Ot            J15.1   
                               

 

 2016            JOHNSON DO EDUAR CLARK            Ot            J15.4   
                               

 

 2016            JOHNSON DO, EDUAR CLARK            Ot            J44.0   
                               

 

 2016            JOHNSON DO, EDUAR CLARK            Ot            J44.1   
                               

 

 2016            JOHNSON DO, EDUAR CLARK            Ot            M51.14  
                                

 

 2016            JOHNSON DO, EDUAR CLARK            Ot            N18.2   
                               

 

 2016            JOHNSON DO, EDUAR CLARK            Ot            R90.82  
                                

 

 2016            JOHNSON DO, EDUAR CLARK            Ot            Z66     
                             

 

 2016            JOHNSON DO, EDUAR CLARK            Ot            A41.9   
                               

 

 2016            JOHNSON DO, EDUAR CLARK            Ot            E53.8   
                               

 

 2016            JOHNSON DO, EDUAR CLARK            Ot            E55.9   
                               

 

 2016            JOHNSON DO, EDUAR CLARK            Ot            E78.5   
                               

 

 2016            JOHNSON DO, EDUAR CLARK            Ot            F17.200 
                                 

 

 2016            JOHNSON DO, EDUAR CLARK            Ot            I12.9   
                               

 

 2016            JOHNSON DO, EDUAR CLARK            Ot            J15.1   
                               

 

 2016            JOHNSON DO, EDUAR CLARK            Ot            J15.4   
                               

 

 2016            JOHNSON DO, EDUAR CLARK            Ot            J44.0   
                               

 

 2016            JOHNSON DO, EDUAR CLARK            Ot            J44.1   
                               

 

 2016            JOHNSON DO, EDUAR CLARK            Ot            M51.14  
                                

 

 2016            JOHNSON DO, EDUAR CLARK            Ot            N18.2   
                               

 

 2016            JOHNSON DO, EDUAR CLARK            Ot            R90.82  
                                

 

 2016            JOHNSON DO, EDUAR CLARK            Ot            Z66     
                             

 

 2016            JOHNSON DO, EDUAR CLARK            Ot            A41.9   
                               

 

 2016            JOHNSON DO, EDUAR CLARK            Ot            E53.8   
                               

 

 2016            JOHNSON DO, EDUAR CLARK            Ot            E55.9   
                               

 

 2016            JOHNSON DO, EDUAR CLARK            Ot            E78.5   
                               

 

 2016            JOHNSON DO, EDUAR CLARK            Ot            F17.200 
                                 

 

 2016            JOHNSON DO, EDUAR CLARK            Ot            I12.9   
                               

 

 2016            JOHNSON DO, EDUAR CLARK            Ot            J15.1   
                               

 

 2016            JOHNSON DO, EDUAR CLARK            Ot            J15.4   
                               

 

 2016            JOHNSON DO, EDUAR CLARK            Ot            J44.0   
                               

 

 2016            JOHNSON DO, EDUAR CLARK            Ot            J44.1   
                               

 

 2016            JOHNSON DO, EDUAR CLARK            Ot            M51.14  
                                

 

 2016            JOHNSON DO, EDUAR CLARK            Ot            N18.2   
                               

 

 2016            JOHNSON DO, EDUAR CLARK            Ot            R90.82  
                                

 

 2016            JOHNSON DO, EDUAR CLARK            Ot            Z66     
                             

 

 2016            JOHNSON DO, EDUAR CLARK            Ot            A41.9   
                               

 

 2016            JOHNSON DO, EDUAR CLARK            Ot            E53.8   
                               

 

 2016            JOHNSON DO, EDUAR CLARK            Ot            E55.9   
                               

 

 2016            JOHNSON DO, EDUAR CLARK            Ot            E78.5   
                               

 

 2016            JOHNSON DO, EDUAR CLARK            Ot            F17.200 
                                 

 

 2016            JOHNSON DO, EDUAR CLARK            Ot            I12.9   
                               

 

 2016            JOHNSON DO, EDUAR CLARK            Ot            J15.1   
                               

 

 2016            JOHNSON DO, EDUAR CLARK            Ot            J15.4   
                               

 

 2016            JOHNSON DO, EDUAR CLARK            Ot            J44.0   
                               

 

 2016            JOHNSON DO, EDUAR CLARK            Ot            J44.1   
                               

 

 2016            JOHNSON DO, EDUAR CLARK            Ot            M51.14  
                                

 

 2016            JOHNSON DO, EDUAR CLARK            Ot            N18.2   
                               

 

 2016            JOHNSON DO, EDUAR CLARK            Ot            R90.82  
                                

 

 2016            JOHNSON DO, EDUAR CLARK            Ot            Z66     
                             

 

 2016            JOHNSON DO, EDUAR CLARK            Ot            A41.9   
                               

 

 2016            JOHNSON DO, EDUAR CLARK            Ot            E53.8   
                               

 

 2016            JOHNSON DO, EDUAR CLARK            Ot            E55.9   
                               

 

 2016            JOHNSON DO, EDUAR CLARK            Ot            E78.5   
                               

 

 2016            JOHNSON DO, EDUAR CLARK            Ot            F17.200 
                                 

 

 2016            JOHNSON DO, EDUAR CLARK            Ot            I12.9   
                               

 

 2016            JOHNSON DO, EDUAR CLARK            Ot            J15.1   
                               

 

 2016            JOHNSON DO, EDUAR CLARK            Ot            J15.4   
                               

 

 2016            JOHNSON DO, EDUAR CLARK            Ot            J44.0   
                               

 

 2016            JOHNSON DO, EDUAR CLARK            Ot            J44.1   
                               

 

 2016            JOHNSON DO, EDUAR CLARK            Ot            M51.14  
                                

 

 2016            JOHNSON DO, EDUAR CLARK            Ot            N18.2   
                               

 

 2016            JOHNSON DO, EDUAR CLARK            Ot            R90.82  
                                

 

 2016            JOHNSON DO, EDUAR CLARK            Ot            Z66     
                             

 

 2016            JOHNSON DO, EDUAR CLARK            Ot            A41.9   
                               

 

 2016            JOHNSON DO, EDUAR CLARK            Ot            E53.8   
                               

 

 2016            JOHNSON DO, EDUAR CLARK            Ot            E55.9   
                               

 

 2016            JOHNSON DO, EDUAR CLARK            Ot            E78.5   
                               

 

 2016            JOHNSON DO, EDUAR CLARK            Ot            F17.200 
                                 

 

 2016            JOHNSON DO, EDUAR CLARK            Ot            I12.9   
                               

 

 2016            JOHNSON DO, EDUAR CLARK            Ot            J15.1   
                               

 

 2016            JOHNSON DO, EDUAR CLARK            Ot            J15.4   
                               

 

 2016            JOHNSON DO, EDUAR CLARK            Ot            J44.0   
                               

 

 2016            JOHNSON DO, EDUAR CLARK            Ot            J44.1   
                               

 

 2016            JOHNSON DO, EDUAR CLARK            Ot            M51.14  
                                

 

 2016            JOHNSON DO, EDUAR CLARK            Ot            N18.2   
                               

 

 2016            JOHNSON DO, EDUAR CLARK            Ot            R90.82  
                                

 

 2016            JOHNSON DO, EDUAR CLARK            Ot            Z66     
                             

 

 2016            JOHNSON DO, EDUAR CLARK            Ot            A41.9   
                               

 

 2016            JOHNSON DO, EDUAR CLARK            Ot            E53.8   
                               

 

 2016            JOHNSON DO, EDUAR CLARK            Ot            E55.9   
                               

 

 2016            JOHNSON DO, EDUAR CLARK            Ot            E78.5   
                               

 

 2016            JOHNSON DO, EDUAR CLARK            Ot            F17.200 
                                 

 

 2016            JOHNSON DO, EDUAR CLARK            Ot            I12.9   
                               

 

 2016            JOHNSON DO, EDUAR CLARK            Ot            J15.1   
                               

 

 2016            JOHNSON DO, EDUAR CLARK            Ot            J15.4   
                               

 

 2016            JOHNSON DO, EDUAR CLARK            Ot            J44.0   
                               

 

 2016            JOHNSON DO, EDUAR CLARK            Ot            J44.1   
                               

 

 2016            JOHNSON DO, EDUAR CLARK            Ot            M51.14  
                                

 

 2016            JOHNSON DO, EDUAR CLARK            Ot            N18.2   
                               

 

 2016            JOHNSON DO, EDUAR CLARK            Ot            R90.82  
                                

 

 2016            JOHNSON DO, EDUAR CLARK            Ot            Z66     
                             

 

 2016            JOHNSON DO, DEUAR CLARK            Ot            A41.9   
         SEPSIS, UNSPECIFIED ORGANISM                     

 

 2016            EDUAR JOHNSON DO, Ot            E11.22  
          TYPE 2 DIABETES MELLITUS W DIABETIC                      

 

 2016            EDUAR JOHNSON DO, Ot            E53.8   
         DEFICIENCY OF OTHER SPECIFIED B GROUP VI                     

 

 2016            EDUAR JOHNSON DO, Ot            E55.9   
         VITAMIN D DEFICIENCY, UNSPECIFIED                     

 

 2016            EDUAR JOHNSON DO, Ot            E78.5   
         HYPERLIPIDEMIA, UNSPECIFIED                     

 

 2016            EDUAR JOHNSON DO, Ot            F17.210 
           NICOTINE DEPENDENCE, CIGARETTES, UNCOMPL                     

 

 2016            EDUAR JOHNSON DO, Ot            I12.9   
         HYPERTENSIVE CHRONIC KIDNEY DISEASE W ST                     

 

 2016            EDUAR JOHNSON DO, Ot            I25.10  
          ATHSCL HEART DISEASE OF NATIVE CORONARY                      

 

 2016            EDUAR JOHNSON DO, Ot            J15.1   
         PNEUMONIA DUE TO PSEUDOMONAS                     

 

 2016            EDUAR JOHNSON DO, Ot            J15.4   
         PNEUMONIA DUE TO OTHER STREPTOCOCCI                     

 

 2016            EDUAR JOHNSON DO, Ot            J44.0   
         CHRONIC OBSTRUCTIVE PULMON DISEASE W ACU                     

 

 2016            EDUAR JOHNSON DO, Ot            J44.1   
         CHRONIC OBSTRUCTIVE PULMONARY DISEASE W                      

 

 2016            EDUAR JOHNSON DO, Ot            M51.14  
          INTVRT DISC DISORDERS W RADICULOPATHY, T                     

 

 2016            EDUAR JOHNSON DO, Ot            N18.2   
         CHRONIC KIDNEY DISEASE, STAGE 2 (MILD)                     

 

 2016            EDUAR JOHNSON DO, Ot            R90.82  
          WHITE MATTER DISEASE, UNSPECIFIED                     

 

 2016            EDUAR JOHNSON DO, Ot            Z66     
       DO NOT RESUSCITATE                     

 

 2016                         Ot            733.00                       
           

 

 2016                         Ot            733.00                       
           

 

 2016                         Ot            733.00                       
           

 

 2016            EDUAR JOHNSON DO, Ot            733.00  
                                

 

 2016            DANA DILLON DO            Ot            492.8       
                           

 

 2016            DANA DILLON DO            Ot            511.9       
                           

 

 2016            DANA DILLON DO            Ot            586         
                         

 

 2016            ARLENE SAENZ DO            Ot            726.0    
                              

 

 2016            ARLENE SAENZ DO            Ot            718.91   
                               

 

 2016            ARLENE SAENZ DO            Ot            726.0    
                              

 

 2016            ARLENE SAENZ DO            Ot            840.4    
                              

 

 2016            LETTY DO, ARLENE MCKEON            Ot            E000.8   
                               

 

 2016            LETTY DO, ARLENE MCKEON            Ot            E928.9   
                               

 

 2016            LETTY DO, ARLENE MCKEON            Ot            V72.84   
                               

 

 2016            LETTY DO, ARLENE MCKEON            Ot            727.61   
                               

 

 2016            LETTY DO, ARLENE MCKEON            Ot            V72.84   
                               

 

 2016            MATA MATIAS, NOHELIA CLARK            Ot            414.00      
                            

 

 2016            MATA MATIAS, NOHELIA J            Ot            424.0       
                           

 

 2016            MATA MATIAS, NOHELIA J            Ot            433.10      
                            

 

 2016            MATA MATIAS, NOHELIA J            Ot            786.09      
                            

 

 2016            MATA MATIAS, NOHELIA CLARK            Ot            414.01      
                            

 

 2016            MATA MATIAS, NOHELIA CLARK            Ot            786.09      
                            

 

 2016            LETTY DO, ARLENE MCKEON            Ot            840.4    
                              

 

 2016            LETTY DO, ARLENE MCKEON            Ot            E000.8   
                               

 

 2016            LETTY DO, ARLENE MCKEON            Ot            E928.9   
                               

 

 2016            LETTY DO, ARLENE MCKEON            Ot            V72.84   
                               

 

 2016                         Ot            733.00                       
           

 

 2016            LETTY DO, ARLENE MCKEON            Ot            722.4    
                              

 

 2016                         Ot            J44.9                        
          

 

 2016            EDUAR JOHNSON DO            Ot            J44.9   
                               

 

 2016                         Ot            J44.9                        
          

 

 2016            JOHNSONEDUAR HILL DO            Ot            K59.00  
                                

 

 2016                         Ot            J44.9                        
          

 

 2016            EDUAR JOHNSON DO            Ot            K59.00  
                                

 

 2016            EDUAR JOHNSON DO            Ot            J44.9   
                               

 

 2016            EDUAR JOHNSON DO            Ot            J44.9   
                               

 

 2016            JOHNSONEDUAR HILL DO            Ot            J44.9   
         CHRONIC OBSTRUCTIVE PULMONARY DISEASE, U                     

 

 2016            EDUAR JOHNSON DO            Ot            J44.9   
         CHRONIC OBSTRUCTIVE PULMONARY DISEASE, U                     

 

 2016            MICH COLLIER DO            Ot            R19.5       
     OTHER FECAL ABNORMALITIES                     

 

 2016            MICH COLLIER DO            Ot            Z01.818     
       ENCOUNTER FOR OTHER PREPROCEDURAL EXAMIN                     

 

 2016            MICH COLLIER DO            Ot            R19.5       
     OTHER FECAL ABNORMALITIES                     

 

 2016            MICH COLLIER DO            Ot            Z01.818     
       ENCOUNTER FOR OTHER PREPROCEDURAL EXAMIN                     

 

 2017                         Ot            733.00            
OSTEOPOROSIS NOS                     

 

 2017                         Ot            733.00            
OSTEOPOROSIS NOS                     

 

 2017            EDUAR JOHNSON DO            Ot            733.00  
          OSTEOPOROSIS NOS                     

 

 2017            DANA DILLON DO            Ot            492.8       
     EMPHYSEMA NEC                     

 

 2017            DANA DILLON DO            Ot            511.9       
     PLEURAL EFFUSION NOS                     

 

 2017            DANA DILLON DO            Ot            586         
   RENAL FAILURE NOS                     

 

 2017            LETTY DO ARLENE MCKEON            Ot            726.0    
        ADHESIVE CAPSULIT SHLDER                     

 

 2017            LETTY DO ARLENE MCKEON            Ot            718.91   
         JT DERANGMENT NOS-SHLDER                     

 

 2017            LETTY DO ARLENE MCKEON            Ot            726.0    
        ADHESIVE CAPSULIT SHLDER                     

 

 2017            LETTY DAVIS ARLENE MCKEON            Ot            840.4    
        SPRAIN ROTATOR CUFF                     

 

 2017            LETTY DAVIS ARLENE MCKEON            Ot            E000.8   
         OTHER EXTERNAL CAUSE STATUS                     

 

 2017            LETTY DAVIS ARLENE MCKEON            Ot            E928.9   
         ACCIDENT NOS                     

 

 2017            LETTY DO, ARLENE MCKEON            Ot            V72.84   
         EXAM PRE-OPERATIVE NOS                     

 

 2017            LETTY DAVIS ARLENE MCKEON            Ot            727.61   
         ROTATOR CUFF RUPTURE                     

 

 2017            LETTY DAVIS ARLENE MCKEON            Ot            V72.84   
         EXAM PRE-OPERATIVE NOS                     

 

 2017            MATA MATIAS, NOHELIA CLARK            Ot            414.00      
      CORON ATHEROSCLER NOS TYPE VESSEL, NATIV                     

 

 2017            NOHELIA AUGUST MD            Ot            424.0       
     MITRAL VALVE DISORDER                     

 

 2017            NOHELIA AUGUST MD            Ot            433.10      
      CAROTID ARTERY OCCLUSION W O CEREBRAL IN                     

 

 2017            NOHELIA AUGUST MD            Ot            786.09      
      RESPIRATORY ABNORM NEC                     

 

 2017            NOHELIA AUGUST MD            Ot            414.01      
      CORONARY ATHEROSCLEROSIS OF NATIVE CORON                     

 

 2017            NOHELIA AUGUST MD            Ot            786.09      
      RESPIRATORY ABNORM NEC                     

 

 2017            LETTY DAVIS ARLENE MCKEON            Ot            840.4    
        SPRAIN ROTATOR CUFF                     

 

 2017            LETTY DAVIS ARLENE MCKEON            Ot            E000.8   
         OTHER EXTERNAL CAUSE STATUS                     

 

 2017            LETTY DAVIS ARLENE MCKEON            Ot            E928.9   
         ACCIDENT NOS                     

 

 2017            ARLENE SAENZ DO            Ot            V72.84   
         EXAM PRE-OPERATIVE NOS                     

 

 2017                         Ot            733.00            
OSTEOPOROSIS NOS                     

 

 2017            ARLENE SAENZ DO            Ot            722.4    
        CERVICAL DISC DEGEN                     

 

 2017                         Ot            J44.9            CHRONIC 
OBSTRUCTIVE PULMONARY DISEASE, U                     

 

 2017            JOHNSONLIZ DAVIS EDUAR EDUARDO            Ot            K59.00  
          CONSTIPATION, UNSPECIFIED                     

 

 2017            JOHNSONEDUAR HILL DO            Ot            J44.9   
         CHRONIC OBSTRUCTIVE PULMONARY DISEASE, U                     

 

 2017            COLLIERMICH GALEANO DO            Ot            K59.00      
      CONSTIPATION, UNSPECIFIED                     

 

 2017            COLLIER MICH DAVIS            Ot            R19.5       
     OTHER FECAL ABNORMALITIES                     

 

 2017            COLLIERMICH GALEANO DO            Ot            Z86.010     
       PERSONAL HISTORY OF COLONIC POLYPS                     

 

 2017            COLLIERMICH GALEANO DO            Ot            K59.00      
      CONSTIPATION, UNSPECIFIED                     

 

 2017            COLLIER MICH DAVIS            Ot            R19.5       
     OTHER FECAL ABNORMALITIES                     

 

 2017            MICH COLLIER DO            Ot            Z86.010     
       PERSONAL HISTORY OF COLONIC POLYPS                     

 

 2017            TAE CHRISTIANSEN            Ot            
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                     

 

 2017            TAE CHRISTIANSEN            Ot            
G47.33            OBSTRUCTIVE SLEEP APNEA (ADULT) (PEDIATR                     

 

 2017            TAE CHRISTIANSEN            Ot            
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                      

 

 2017            TAE CHRISTINASEN            Ot            
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                     

 

 2017            TAE CHRISTIANSEN            Ot            
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                     

 

 2017            TAE CHRISTIANSEN            Ot            
G47.33            OBSTRUCTIVE SLEEP APNEA (ADULT) (PEDIATR                     

 

 2017            TAE CHRISTIANSEN            Ot            
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                      

 

 2017            TAE CHRISTIANSEN            Ot            
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                     

 

 2017            MATA MATIAS, NOHELIA CLARK            Ot            E78.5       
     HYPERLIPIDEMIA, UNSPECIFIED                     

 

 2017            NOHELIA AUGUST MD            Ot            G47.33      
      OBSTRUCTIVE SLEEP APNEA (ADULT) (PEDIATR                     

 

 2017            NOHELIA AUGUST MD            Ot            I10         
   ESSENTIAL (PRIMARY) HYPERTENSION                     

 

 2017            NOHELIA AUGUST MD, Ot            I25.10      
      ATHSCL HEART DISEASE OF NATIVE CORONARY                      

 

 2017            NOHELIA AUGUST MD, Ot            I25.84      
      CORONARY ATHEROSCLEROSIS DUE TO CALCIFIE                     

 

 2017            NOHELIA AUGUST MD            Ot            I34.0       
     NONRHEUMATIC MITRAL (VALVE) INSUFFICIENC                     

 

 2017            NOHELIA AUGUST MD            Ot            I65.23      
      OCCLUSION AND STENOSIS OF BILATERAL MCKEON                     

 

 2017            NOHELIA AUGUST MD            Ot            R94.39      
      ABNORMAL RESULT OF OTHER CARDIOVASCULAR                      

 

 2017            NOHELIA AUGUST MD, Ot            Z72.0       
     TOBACCO USE                     

 

 2017            NOHELIA AUGUST MD, Ot            Z79.899     
       OTHER LONG TERM (CURRENT) DRUG THERAPY                     

 

 2017            NOHELIA AUGUST MD, Ot            Z86.73      
      PRSNL HX OF TIA (TIA), AND CEREB INFRC W                     

 

 2017            NOHELIA AUGUST MD            Ot            Z95.5       
     PRESENCE OF CORONARY ANGIOPLASTY IMPLANT                     

 

 2017            TAE CHRISTIANSEN            Ot            
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                     

 

 2017            TAE CHRISTIANSEN            Ot            
G47.33            OBSTRUCTIVE SLEEP APNEA (ADULT) (PEDIATR                     

 

 2017            TAE CHRISTIANSEN            Ot            
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                      

 

 2017            TAE CHRISTIANSEN            Ot            
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                     

 

 2017            TAE CHRISTIANSEN            Ot            
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                     

 

 2017            TAE CHRISTIANSEN            Ot            
G47.33            OBSTRUCTIVE SLEEP APNEA (ADULT) (PEDIATR                     

 

 2017            TAE CHRISTIANSEN            Ot            
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                      

 

 2017            TAE CHRISTIANSEN            Ot            
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                     

 

 2017            NOHELIA AUGUST MD            Ot            E78.5       
     HYPERLIPIDEMIA, UNSPECIFIED                     

 

 2017            NOHELIA AUGUST MD, Ot            G47.33      
      OBSTRUCTIVE SLEEP APNEA (ADULT) (PEDIATR                     

 

 2017            NOHELIA AUGUST MD            Ot            I10         
   ESSENTIAL (PRIMARY) HYPERTENSION                     

 

 2017            NOHELIA AUGUST MD, Ot            I25.10      
      ATHSCL HEART DISEASE OF NATIVE CORONARY                      

 

 2017            NOHELIA AUGUTS MD, Ot            I25.84      
      CORONARY ATHEROSCLEROSIS DUE TO CALCIFIE                     

 

 2017            NOHELIA AUGUST MD            Ot            I34.0       
     NONRHEUMATIC MITRAL (VALVE) INSUFFICIENC                     

 

 2017            NOHELIA AUGUST MD, Ot            I65.23      
      OCCLUSION AND STENOSIS OF BILATERAL MCKEON                     

 

 2017            NOHELIA AUGUST MD            Ot            R94.39      
      ABNORMAL RESULT OF OTHER CARDIOVASCULAR                      

 

 2017            NOHELIA AUGUST MD, Ot            Z72.0       
     TOBACCO USE                     

 

 2017            NOHELIA AUGUST MD, Ot            Z79.899     
       OTHER LONG TERM (CURRENT) DRUG THERAPY                     

 

 2017            NOHELIA AUGUST MD, Ot            Z86.73      
      PRSNL HX OF TIA (TIA), AND CEREB INFRC W                     

 

 2017            NOHELIA AUGUST MD            Ot            Z95.5       
     PRESENCE OF CORONARY ANGIOPLASTY IMPLANT                     

 

 2017            DANA DILLON DO            Ot            R06.02      
      SHORTNESS OF BREATH                     

 

 2017            DANA DILLON DO            Ot            Z87.891     
       PERSONAL HISTORY OF NICOTINE DEPENDENCE                     

 

 2017            DANA DILLON DO            Ot            R06.02      
      SHORTNESS OF BREATH                     

 

 2017            DANA DILLON DO            Ot            Z87.891     
       PERSONAL HISTORY OF NICOTINE DEPENDENCE                     

 

 2017            DANA DILLON DO            Ot            R06.02      
      SHORTNESS OF BREATH                     

 

 2017            DANA DILLON DO            Ot            Z87.891     
       PERSONAL HISTORY OF NICOTINE DEPENDENCE                     

 

 2017            DANA DILLON DO            Ot            R06.02      
      SHORTNESS OF BREATH                     

 

 2017            DANA DILLON DO            Ot            Z87.891     
       PERSONAL HISTORY OF NICOTINE DEPENDENCE                     

 

 2017            DANA DILLON DO            Ot            R06.02      
      SHORTNESS OF BREATH                     

 

 2017            DANA DILLON DO            Ot            Z87.891     
       PERSONAL HISTORY OF NICOTINE DEPENDENCE                     

 

 2017            DANA DILLON DO            Ot            R06.02      
      SHORTNESS OF BREATH                     

 

 2017            DANA DILLON DO            Ot            Z87.891     
       PERSONAL HISTORY OF NICOTINE DEPENDENCE                     

 

 2017            DANA DILLON DO            Ot            R06.02      
      SHORTNESS OF BREATH                     

 

 2017            DANA DILLON DO            Ot            Z87.891     
       PERSONAL HISTORY OF NICOTINE DEPENDENCE                     

 

 2017            DANA DILLON DO            Ot            R06.02      
      SHORTNESS OF BREATH                     

 

 2017            DANA DILLON DO            Ot            Z87.891     
       PERSONAL HISTORY OF NICOTINE DEPENDENCE                     

 

 2018                         Ot            733.00            
OSTEOPOROSIS NOS                     

 

 2018            JOHNSONEDUAR HILL DO            Ot            733.00  
          OSTEOPOROSIS NOS                     

 

 2018            DANA DILLON DO            Ot            492.8       
     EMPHYSEMA NEC                     

 

 2018            DANA DILLON DO            Ot            511.9       
     PLEURAL EFFUSION NOS                     

 

 2018            WILMADANA BELTRÁN DO            Ot            586         
   RENAL FAILURE NOS                     

 

 2018            LETTY DO ARLENE MCKEON            Ot            726.0    
        ADHESIVE CAPSULIT SHLDER                     

 

 2018            LETTY DO ARLENE MCKEON            Ot            718.91   
         JT DERANGMENT NOS-SHLDER                     

 

 2018            LETTY DO ARLENE MCKEON            Ot            726.0    
        ADHESIVE CAPSULIT SHLDER                     

 

 2018            LETTY DO ARLENE MCKEON            Ot            840.4    
        SPRAIN ROTATOR CUFF                     

 

 2018            LETTY DO ARLENE MCKEON            Ot            E000.8   
         OTHER EXTERNAL CAUSE STATUS                     

 

 2018            LETTY DO ARLENE MCKEON            Ot            E928.9   
         ACCIDENT NOS                     

 

 2018            LETTY DAVIS ARLENE MCKEON            Ot            V72.84   
         EXAM PRE-OPERATIVE NOS                     

 

 2018            LETTY DO ARLENE MCKEON            Ot            727.61   
         ROTATOR CUFF RUPTURE                     

 

 2018            LETTY DAVIS ARLENE MCKEON            Ot            V72.84   
         EXAM PRE-OPERATIVE NOS                     

 

 2018            MATA MATIAS, NOHELIA CLARK            Ot            414.00      
      CORON ATHEROSCLER NOS TYPE VESSEL, NATIV                     

 

 2018            NOHELIA AUGUST MD            Ot            424.0       
     MITRAL VALVE DISORDER                     

 

 2018            NOHELIA AUGUST MD            Ot            433.10      
      CAROTID ARTERY OCCLUSION W O CEREBRAL IN                     

 

 2018            NOHELIA AUGUST MD            Ot            786.09      
      RESPIRATORY ABNORM NEC                     

 

 2018            NOHELIA AUGUST MD            Ot            414.01      
      CORONARY ATHEROSCLEROSIS OF NATIVE CORON                     

 

 2018            NOHELIA AUGUST MD            Ot            786.09      
      RESPIRATORY ABNORM NEC                     

 

 2018            ARLENE SAENZ DO            Ot            840.4    
        SPRAIN ROTATOR CUFF                     

 

 2018            ARLENE SAENZ DO            Ot            E000.8   
         OTHER EXTERNAL CAUSE STATUS                     

 

 2018            ARLENE SAENZ DO            Ot            E928.9   
         ACCIDENT NOS                     

 

 2018            ARLENE SAENZ DO            Ot            V72.84   
         EXAM PRE-OPERATIVE NOS                     

 

 2018                         Ot            733.00            
OSTEOPOROSIS NOS                     

 

 2018            ARLENE ASENZ DO            Ot            722.4    
        CERVICAL DISC DEGEN                     

 

 2018                         Ot            J44.9            CHRONIC 
OBSTRUCTIVE PULMONARY DISEASE, U                     

 

 2018            EDUAR JOHNSON DO            Ot            K59.00  
          CONSTIPATION, UNSPECIFIED                     

 

 2018            EDURA JOHNSON DO, Ot            J44.9   
         CHRONIC OBSTRUCTIVE PULMONARY DISEASE, U                     

 

 2018            TAE CHRISTIANSEN Ot            
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                     

 

 2018            TAE CHRISTIANSEN Ot            
G47.33            OBSTRUCTIVE SLEEP APNEA (ADULT) (PEDIATR                     

 

 2018            TAE CHRISTIANSEN Ot            
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                      

 

 2018            TAE CHRISTIANSEN            Ot            
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                     

 

 2018            DANA DILLON DO            Ot            R06.02      
      SHORTNESS OF BREATH                     

 

 2018            DANA DILLON DO            Ot            Z87.891     
       PERSONAL HISTORY OF NICOTINE DEPENDENCE                     

 

 2018            NOHELIA AUGUST MD, Ot            G47.33      
      OBSTRUCTIVE SLEEP APNEA (ADULT) (PEDIATR                     

 

 2018            NOHELIA AUGUST MD, Ot            I25.10      
      ATHSCL HEART DISEASE OF NATIVE CORONARY                      

 

 2018            NOHELIA AUGUST MD, Ot            I34.0       
     NONRHEUMATIC MITRAL (VALVE) INSUFFICIENC                     

 

 2018            NOHELIA AUGUST MD, Ot            I71.2       
     THORACIC AORTIC ANEURYSM, WITHOUT RUPTUR                     

 

 2018            NOHELIA AUGUST MD            Ot            J43.9       
     EMPHYSEMA, UNSPECIFIED                     

 

 2018            NOHELIA AUGUST MD            Ot            N20.0       
     CALCULUS OF KIDNEY                     

 

 2018            NOHELIA AUGUST MD            Ot            R41.0       
     DISORIENTATION, UNSPECIFIED                     

 

 2018            NOHELIA AUGUST MD            Ot            W19.XXXA    
        UNSPECIFIED FALL, INITIAL ENCOUNTER                     

 

 2018            NOHELIA AUGUST MD, Ot            Z72.0       
     TOBACCO USE                     

 

 2018            EDUAR JOHNSON DO            Ot            J44.9   
         CHRONIC OBSTRUCTIVE PULMONARY DISEASE, U                     

 

 04/10/2018            NOHELIA AUGUST MD, Ot            D64.9       
     ANEMIA, UNSPECIFIED                     

 

 04/10/2018            NOHELIA AUGUST MD, Ot            E78.5       
     HYPERLIPIDEMIA, UNSPECIFIED                     

 

 04/10/2018            NOHELIA AUGUST MD            Ot            F17.210     
       NICOTINE DEPENDENCE, CIGARETTES, UNCOMPL                     

 

 04/10/2018            NOHELIA AUGUST MD, Ot            G47.9       
     SLEEP DISORDER, UNSPECIFIED                     

 

 04/10/2018            NOHELIA AUGUST MD            Ot            I10         
   ESSENTIAL (PRIMARY) HYPERTENSION                     

 

 04/10/2018            NOHELIA AUGUST MD, Ot            I25.10      
      ATHSCL HEART DISEASE OF NATIVE CORONARY                      

 

 04/10/2018            NOHELIA AUGUST MD, Ot            I65.29      
      OCCLUSION AND STENOSIS OF UNSPECIFIED CA                     

 

 04/10/2018            NOHELIA AUGUST MD, Ot            J44.9       
     CHRONIC OBSTRUCTIVE PULMONARY DISEASE, U                     

 

 04/10/2018            NOHELIA AUGUST MD            Ot            K63.89      
      OTHER SPECIFIED DISEASES OF INTESTINE                     

 

 04/10/2018            NOHELIA AUGUST MD            Ot            N28.9       
     DISORDER OF KIDNEY AND URETER, UNSPECIFI                     

 

 04/10/2018            NOHELIA AUGUST MD            Ot            R06.02      
      SHORTNESS OF BREATH                     

 

 04/10/2018            NOHELIA AUGUST MD            Ot            R19.7       
     DIARRHEA, UNSPECIFIED                     

 

 04/10/2018            NOHELIA AUGUST MD            Ot            R21         
   RASH AND OTHER NONSPECIFIC SKIN ERUPTION                     

 

 04/10/2018            NOHELIA AUGUST MD            Ot            R53.83      
      OTHER FATIGUE                     

 

 04/10/2018            NOHELIA AUGUST MD, Ot            Z79.899     
       OTHER LONG TERM (CURRENT) DRUG THERAPY                     

 

 04/10/2018            NOHELIA AUGUST MD            Ot            Z86.73      
      PRSNL HX OF TIA (TIA), AND CEREB INFRC W                     

 

 2018                         Ot            733.00            
OSTEOPOROSIS NOS                     

 

 2018            EDUAR JOHNSON DO            Ot            733.00  
          OSTEOPOROSIS NOS                     

 

 2018            DANA DILLON DO            Ot            492.8       
     EMPHYSEMA NEC                     

 

 2018            DANA DILLON DO            Ot            511.9       
     PLEURAL EFFUSION NOS                     

 

 2018            WILMA DO, DANA M            Ot            586         
   RENAL FAILURE NOS                     

 

 2018            ARLENE SAENZ DO            Ot            726.0    
        ADHESIVE CAPSULIT SHLDER                     

 

 2018            ARLENE SAENZ DO            Ot            718.91   
         JT DERANGMENT NOS-SHLDER                     

 

 2018            LETTY DAVIS, ARLENE MCKEON            Ot            726.0    
        ADHESIVE CAPSULIT SHLDER                     

 

 2018            ARLENE SAENZ DO            Ot            840.4    
        SPRAIN ROTATOR CUFF                     

 

 2018            ARLENE SAENZ DO            Ot            E000.8   
         OTHER EXTERNAL CAUSE STATUS                     

 

 2018            ARLENE SAENZ DO            Ot            E928.9   
         ACCIDENT NOS                     

 

 2018            ARLENE SAENZ DO            Ot            V72.84   
         EXAM PRE-OPERATIVE NOS                     

 

 2018            ARLENE SAENZ DO            Ot            727.61   
         ROTATOR CUFF RUPTURE                     

 

 2018            ARLENE SAENZ DO            Ot            V72.84   
         EXAM PRE-OPERATIVE NOS                     

 

 2018            MATA MATIAS, NOHELIA CLARK            Ot            414.00      
      CORON ATHEROSCLER NOS TYPE VESSEL, NATIV                     

 

 2018            NOHELIA AUGUST MD            Ot            424.0       
     MITRAL VALVE DISORDER                     

 

 2018            MATA MATIAS, NOHELIA CLARK            Ot            433.10      
      CAROTID ARTERY OCCLUSION W O CEREBRAL IN                     

 

 2018            MATA MATIAS, NOHELIA CLARK            Ot            786.09      
      RESPIRATORY ABNORM NEC                     

 

 2018            MATA MATIAS, NOHELIA CLARK            Ot            414.01      
      CORONARY ATHEROSCLEROSIS OF NATIVE CORON                     

 

 2018            NOHELIA AUGUST MD            Ot            786.09      
      RESPIRATORY ABNORM NEC                     

 

 2018            LETTY DAVIS ARLENE MCKEON            Ot            840.4    
        SPRAIN ROTATOR CUFF                     

 

 2018            LETTY DAVIS ARLENE MCKEON            Ot            E000.8   
         OTHER EXTERNAL CAUSE STATUS                     

 

 2018            ARLENE SAENZ DO            Ot            E928.9   
         ACCIDENT NOS                     

 

 2018            ARLENE SAENZ DO            Ot            V72.84   
         EXAM PRE-OPERATIVE NOS                     

 

 2018                         Ot            733.00            
OSTEOPOROSIS NOS                     

 

 2018            LETTY DAVIS ARLENE MCKEON            Ot            722.4    
        CERVICAL DISC DEGEN                     

 

 2018                         Ot            J44.9            CHRONIC 
OBSTRUCTIVE PULMONARY DISEASE, U                     

 

 2018            EDUAR JOHNSON DO            Ot            K59.00  
          CONSTIPATION, UNSPECIFIED                     

 

 2018            EDUAR JOHNSON DO            Ot            J44.9   
         CHRONIC OBSTRUCTIVE PULMONARY DISEASE, U                     

 

 2018            NIMESH NAILS, TAE COELLO            Ot            
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                     

 

 2018            TAE CHRISTIANSEN Ot            
G47.33            OBSTRUCTIVE SLEEP APNEA (ADULT) (PEDIATR                     

 

 2018            TAE CHRISTIANSEN            Ot            
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                      

 

 2018            TAE CHRISTIANSEN            Ot            
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                     

 

 2018            DANA DILLON DO            Ot            R06.02      
      SHORTNESS OF BREATH                     

 

 2018            DANA DILLON DO            Ot            Z87.891     
       PERSONAL HISTORY OF NICOTINE DEPENDENCE                     

 

 2018            NOHELIA AUGUST MD, Ot            G47.33      
      OBSTRUCTIVE SLEEP APNEA (ADULT) (PEDIATR                     

 

 2018            NOHELIA AUGUST MD, Ot            I25.10      
      ATHSCL HEART DISEASE OF NATIVE CORONARY                      

 

 2018            NOHELIA AUGUST MD            Ot            I34.0       
     NONRHEUMATIC MITRAL (VALVE) INSUFFICIENC                     

 

 2018            NOHELIA AUGUST MD            Ot            I71.2       
     THORACIC AORTIC ANEURYSM, WITHOUT RUPTUR                     

 

 2018            NOHELIA AUGUST MD, Ot            J43.9       
     EMPHYSEMA, UNSPECIFIED                     

 

 2018            NOHELIA AUGUST MD            Ot            N20.0       
     CALCULUS OF KIDNEY                     

 

 2018            NOHELIA AUGUST MD            Ot            R41.0       
     DISORIENTATION, UNSPECIFIED                     

 

 2018            NOHELIA AUGUST MD            Ot            W19.XXXA    
        UNSPECIFIED FALL, INITIAL ENCOUNTER                     

 

 2018            NOHELIA AUGUST MD            Ot            Z72.0       
     TOBACCO USE                     

 

 2018            MICH COLLIER DO            Ot            R93.5       
     ABN FINDINGS ON DX IMAGING OF ABD REGION                     

 

 2018            MICH COLLIER DO            Ot            Z01.818     
       ENCOUNTER FOR OTHER PREPROCEDURAL EXAMIN                     

 

 2018            NOHELIA AUGUST MD, Ot            G47.33      
      OBSTRUCTIVE SLEEP APNEA (ADULT) (PEDIATR                     

 

 2018            NOHELIA AUGUST MD            Ot            I25.10      
      ATHSCL HEART DISEASE OF NATIVE CORONARY                      

 

 2018            NOHELIA AUGUST MD            Ot            I34.0       
     NONRHEUMATIC MITRAL (VALVE) INSUFFICIENC                     

 

 2018            NOHELIA AUGUST MD            Ot            I71.2       
     THORACIC AORTIC ANEURYSM, WITHOUT RUPTUR                     

 

 2018            MATA MATIAS, NOHELIA CLARK            Ot            J43.9       
     EMPHYSEMA, UNSPECIFIED                     

 

 2018            NOHELIA AUGUST MD            Ot            N20.0       
     CALCULUS OF KIDNEY                     

 

 2018            NOHELIA AUGUST MD            Ot            R41.0       
     DISORIENTATION, UNSPECIFIED                     

 

 2018            NOHELIA AUGUST MD            Ot            W19.XXXA    
        UNSPECIFIED FALL, INITIAL ENCOUNTER                     

 

 2018            NOHELIA AUGUST MD            Ot            Z72.0       
     TOBACCO USE                     

 

 2018                         Ot            733.00            
OSTEOPOROSIS NOS                     

 

 2018            JOHNSONEDUAR HILL DO            Ot            733.00  
          OSTEOPOROSIS NOS                     

 

 2018            DANA DILLON DO            Ot            492.8       
     EMPHYSEMA NEC                     

 

 2018            DANA DILLON DO            Ot            511.9       
     PLEURAL EFFUSION NOS                     

 

 2018            DANA DILLON DO            Ot            586         
   RENAL FAILURE NOS                     

 

 2018            LETTY , ARLENE F            Ot            726.0    
        ADHESIVE CAPSULIT SHLDER                     

 

 2018            LETTY , ARLENE F            Ot            718.91   
         JT DERANGMENT NOS-SHLDER                     

 

 2018            LETTY , ARLENE LINDA            Ot            726.0    
        ADHESIVE CAPSULIT SHLDER                     

 

 2018            LETTY , ARLENE F            Ot            840.4    
        SPRAIN ROTATOR CUFF                     

 

 2018            LETTY DAVIS, ARLENE F            Ot            E000.8   
         OTHER EXTERNAL CAUSE STATUS                     

 

 2018            LETTY DO ARLEEN LINDA            Ot            E928.9   
         ACCIDENT NOS                     

 

 2018            LETTY , ARLENE MCKEON            Ot            V72.84   
         EXAM PRE-OPERATIVE NOS                     

 

 2018            LETTY DO ARLENE F            Ot            727.61   
         ROTATOR CUFF RUPTURE                     

 

 2018            LETTY DO ARLENE LINDA            Ot            V72.84   
         EXAM PRE-OPERATIVE NOS                     

 

 2018            NOHELIA AUGUST MD            Ot            414.00      
      CORON ATHEROSCLER NOS TYPE VESSEL, NATIV                     

 

 2018            NOHELIA AUGUST MD            Ot            424.0       
     MITRAL VALVE DISORDER                     

 

 2018            NOHELIA AUGUST MD            Ot            433.10      
      CAROTID ARTERY OCCLUSION W O CEREBRAL IN                     

 

 2018            NOHELIA AUGUST MD            Ot            786.09      
      RESPIRATORY ABNORM NEC                     

 

 2018            NOHELIA AUGUST MD            Ot            414.01      
      CORONARY ATHEROSCLEROSIS OF NATIVE CORON                     

 

 2018            NOHELIA AUGUST MD            Ot            786.09      
      RESPIRATORY ABNORM NEC                     

 

 2018            ARLENE SAENZ DO            Ot            840.4    
        SPRAIN ROTATOR CUFF                     

 

 2018            ARLENE SAENZ DO            Ot            E000.8   
         OTHER EXTERNAL CAUSE STATUS                     

 

 2018            ARLENE SAENZ DO            Ot            E928.9   
         ACCIDENT NOS                     

 

 2018            ARLENE SAENZ DO            Ot            V72.84   
         EXAM PRE-OPERATIVE NOS                     

 

 2018                         Ot            733.00            
OSTEOPOROSIS NOS                     

 

 2018            ARLENE SAENZ DO            Ot            722.4    
        CERVICAL DISC DEGEN                     

 

 2018                         Ot            J44.9            CHRONIC 
OBSTRUCTIVE PULMONARY DISEASE, U                     

 

 2018            EDUAR JOHNSON DO            Ot            K59.00  
          CONSTIPATION, UNSPECIFIED                     

 

 2018            EDUAR JOHNSON DO, Ot            J44.9   
         CHRONIC OBSTRUCTIVE PULMONARY DISEASE, U                     

 

 2018            TAE CHRISTIANSEN            Ot            
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                     

 

 2018            TAE CHRISTIANSEN Ot            
G47.33            OBSTRUCTIVE SLEEP APNEA (ADULT) (PEDIATR                     

 

 2018            TAE CHRISTIANSEN Ot            
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                      

 

 2018            TAE CHRISTIANSEN            Ot            
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                     

 

 2018            DANA DILLON DO            Ot            R06.02      
      SHORTNESS OF BREATH                     

 

 2018            DANA DILLON DO            Ot            Z87.891     
       PERSONAL HISTORY OF NICOTINE DEPENDENCE                     

 

 2018            NOHELIA AUGUST MD, Ot            G47.33      
      OBSTRUCTIVE SLEEP APNEA (ADULT) (PEDIATR                     

 

 2018            NOHELIA AUGUST MD, Ot            I25.10      
      ATHSCL HEART DISEASE OF NATIVE CORONARY                      

 

 2018            NOHELIA AUGUST MD            Ot            I34.0       
     NONRHEUMATIC MITRAL (VALVE) INSUFFICIENC                     

 

 2018            NOHELIA AUGUST MD            Ot            I71.2       
     THORACIC AORTIC ANEURYSM, WITHOUT RUPTUR                     

 

 2018            NOHELIA AUGUST MD            Ot            J43.9       
     EMPHYSEMA, UNSPECIFIED                     

 

 2018            NOHELIA AUGUST MD            Ot            N20.0       
     CALCULUS OF KIDNEY                     

 

 2018            NOHELIA AUGUST MD            Ot            R41.0       
     DISORIENTATION, UNSPECIFIED                     

 

 2018            MATA MATIAS, NOHELIA CLARK            Ot            W19.XXXA    
        UNSPECIFIED FALL, INITIAL ENCOUNTER                     

 

 2018            MATA MATIAS, NOHELIA CLARK            Ot            Z72.0       
     TOBACCO USE                     

 

 2018                         Ot            733.00            
OSTEOPOROSIS NOS                     

 

 2018            JOHNSON DO EDUAR CLARK            Ot            733.00  
          OSTEOPOROSIS NOS                     

 

 2018            DANA DILLON DO            Ot            492.8       
     EMPHYSEMA NEC                     

 

 2018            DANA DILLON DO M            Ot            511.9       
     PLEURAL EFFUSION NOS                     

 

 2018            DANA DILLON DO            Ot            586         
   RENAL FAILURE NOS                     

 

 2018            LETTY DO, ARLENE F            Ot            726.0    
        ADHESIVE CAPSULIT SHLDER                     

 

 2018            LETTY DO, ARLENE F            Ot            718.91   
         JT DERANGMENT NOS-SHLDER                     

 

 2018            LETTY DO, ARLENE F            Ot            726.0    
        ADHESIVE CAPSULIT SHLDER                     

 

 2018            LETTY DO, ARLENE F            Ot            840.4    
        SPRAIN ROTATOR CUFF                     

 

 2018            LETTY , ARLENE F            Ot            E000.8   
         OTHER EXTERNAL CAUSE STATUS                     

 

 2018            LETTY DO, ARLENE F            Ot            E928.9   
         ACCIDENT NOS                     

 

 2018            LETTY DO, ARLENE LINDA            Ot            V72.84   
         EXAM PRE-OPERATIVE NOS                     

 

 2018            LETTY , ARLENE F            Ot            727.61   
         ROTATOR CUFF RUPTURE                     

 

 2018            LETTY , ARLENE F            Ot            V72.84   
         EXAM PRE-OPERATIVE NOS                     

 

 2018            MATA MATIAS, NOHELIA CLARK            Ot            414.00      
      CORON ATHEROSCLER NOS TYPE VESSEL, NATIV                     

 

 2018            NOHELIA AUGUST MD            Ot            424.0       
     MITRAL VALVE DISORDER                     

 

 2018            NOHELIA AUGUST MD            Ot            433.10      
      CAROTID ARTERY OCCLUSION W O CEREBRAL IN                     

 

 2018            NOHELIA AUGUST MD            Ot            786.09      
      RESPIRATORY ABNORM NEC                     

 

 2018            NOHELIA AUGUST MD            Ot            414.01      
      CORONARY ATHEROSCLEROSIS OF NATIVE CORON                     

 

 2018            NOHELIA AUGUST MD            Ot            786.09      
      RESPIRATORY ABNORM NEC                     

 

 2018            LETTY DO ARLENE LINDA            Ot            840.4    
        SPRAIN ROTATOR CUFF                     

 

 2018            LETTY , ARLENE F            Ot            E000.8   
         OTHER EXTERNAL CAUSE STATUS                     

 

 2018            LETTY DO, ARLENE F            Ot            E928.9   
         ACCIDENT NOS                     

 

 2018            LETTY ARLENE            Ot            V72.84   
         EXAM PRE-OPERATIVE NOS                     

 

 2018                         Ot            733.00            
OSTEOPOROSIS NOS                     

 

 2018            ARLENE SAENZ DO            Ot            722.4    
        CERVICAL DISC DEGEN                     

 

 2018                         Ot            J44.9            CHRONIC 
OBSTRUCTIVE PULMONARY DISEASE, U                     

 

 2018            EDUAR JOHNSON DO            Ot            K59.00  
          CONSTIPATION, UNSPECIFIED                     

 

 2018            EDUAR JOHNSON DO            Ot            J44.9   
         CHRONIC OBSTRUCTIVE PULMONARY DISEASE, U                     

 

 2018            TAE CHRISTIANSEN Ot            
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                     

 

 2018            TAE CHRISTIANSEN Ot            
G47.33            OBSTRUCTIVE SLEEP APNEA (ADULT) (PEDIATR                     

 

 2018            TAE CHRISTIANSEN Ot            
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                      

 

 2018            TAE CHRISTIANSEN            Ot            
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                     

 

 2018            DANA DILLON DO            Ot            R06.02      
      SHORTNESS OF BREATH                     

 

 2018            DANA DILLON DO            Ot            Z87.891     
       PERSONAL HISTORY OF NICOTINE DEPENDENCE                     

 

 2018            NOHELIA AUGUST MD, Ot            G47.33      
      OBSTRUCTIVE SLEEP APNEA (ADULT) (PEDIATR                     

 

 2018            NOHELIA AUGUST MD, Ot            I25.10      
      ATHSCL HEART DISEASE OF NATIVE CORONARY                      

 

 2018            NOHELIA AUGUST MD            Ot            I34.0       
     NONRHEUMATIC MITRAL (VALVE) INSUFFICIENC                     

 

 2018            NOHELIA AUGUST MD, Ot            I71.2       
     THORACIC AORTIC ANEURYSM, WITHOUT RUPTUR                     

 

 2018            NOHELIA AUGUST MD, Ot            J43.9       
     EMPHYSEMA, UNSPECIFIED                     

 

 2018            NOHELIA AUGUST MD            Ot            N20.0       
     CALCULUS OF KIDNEY                     

 

 2018            NOHELIA AUGUST MD, Ot            R41.0       
     DISORIENTATION, UNSPECIFIED                     

 

 2018            NOHELIA AUGUST MD            Ot            W19.XXXA    
        UNSPECIFIED FALL, INITIAL ENCOUNTER                     

 

 2018            NOHELIA AUGUST MD, Ot            Z72.0       
     TOBACCO USE                     

 

 2018            MICH COLLIER DO            Ot            R93.5       
     ABN FINDINGS ON DX IMAGING OF ABD REGION                     

 

 2018            MICH COLLIER DO            Ot            Z01.818     
       ENCOUNTER FOR OTHER PREPROCEDURAL EXAMIN                     

 

 2018                         Ot            733.00            
OSTEOPOROSIS NOS                     

 

 2018            ELIZABETH DAVIS EDUAR CLARK            Ot            733.00  
          OSTEOPOROSIS NOS                     

 

 2018            DANA DILLON DO            Ot            492.8       
     EMPHYSEMA NEC                     

 

 2018            DANA DILLON DO            Ot            511.9       
     PLEURAL EFFUSION NOS                     

 

 2018            DANA DILLON DO            Ot            586         
   RENAL FAILURE NOS                     

 

 2018            LETTY , ARLENE MCKEON            Ot            726.0    
        ADHESIVE CAPSULIT SHLDER                     

 

 2018            LETTY , ARLENE MCKEON            Ot            718.91   
         JT DERANGMENT NOS-SHLDER                     

 

 2018            LETTY DO ARLENE MCKEON            Ot            726.0    
        ADHESIVE CAPSULIT SHLDER                     

 

 2018            LETTY , ARLENE MCKEON            Ot            840.4    
        SPRAIN ROTATOR CUFF                     

 

 2018            LETTY DO ARLENE MCKEON            Ot            E000.8   
         OTHER EXTERNAL CAUSE STATUS                     

 

 2018            LETTY DO ARLENE MCKEON            Ot            E928.9   
         ACCIDENT NOS                     

 

 2018            LETTY , ARLENE MCKEON            Ot            V72.84   
         EXAM PRE-OPERATIVE NOS                     

 

 2018            LETTY DO ARLENE MCKEON            Ot            727.61   
         ROTATOR CUFF RUPTURE                     

 

 2018            LETTY DO ARLENE MCKEON            Ot            V72.84   
         EXAM PRE-OPERATIVE NOS                     

 

 2018            NOHELIA AUGUST MD            Ot            414.00      
      CORON ATHEROSCLER NOS TYPE VESSEL, NATIV                     

 

 2018            NOHELIA AUGUST MD            Ot            424.0       
     MITRAL VALVE DISORDER                     

 

 2018            NOHELIA AUGUST MD            Ot            433.10      
      CAROTID ARTERY OCCLUSION W O CEREBRAL IN                     

 

 2018            NOHELIA AUGUST MD            Ot            786.09      
      RESPIRATORY ABNORM NEC                     

 

 2018            NOHELIA AUGUST MD            Ot            414.01      
      CORONARY ATHEROSCLEROSIS OF NATIVE CORON                     

 

 2018            NOHELIA AUGUST MD            Ot            786.09      
      RESPIRATORY ABNORM NEC                     

 

 2018            ARLENE SAENZ DO            Ot            840.4    
        SPRAIN ROTATOR CUFF                     

 

 2018            LETTY DO ARLENE MCKEON            Ot            E000.8   
         OTHER EXTERNAL CAUSE STATUS                     

 

 2018            LETTY DO ARLENE MCKEON            Ot            E928.9   
         ACCIDENT NOS                     

 

 2018            LETTY DO ARLENE MCKEON            Ot            V72.84   
         EXAM PRE-OPERATIVE NOS                     

 

 2018                         Ot            733.00            
OSTEOPOROSIS NOS                     

 

 2018            ARLENE SAENZ DO            Ot            722.4    
        CERVICAL DISC DEGEN                     

 

 2018                         Ot            J44.9            CHRONIC 
OBSTRUCTIVE PULMONARY DISEASE, U                     

 

 2018            EDUAR JOHNSON DO            Ot            K59.00  
          CONSTIPATION, UNSPECIFIED                     

 

 2018            EDUAR JOHNSON DO            Ot            J44.9   
         CHRONIC OBSTRUCTIVE PULMONARY DISEASE, U                     

 

 2018            TAE CHRISTIANSEN            Ot            
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                     

 

 2018            TAE CHRISTIANSEN Ot            
G47.33            OBSTRUCTIVE SLEEP APNEA (ADULT) (PEDIATR                     

 

 2018            TAE CHRISTIANSEN Ot            
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                      

 

 2018            TAE CHRISTIANSEN            Ot            
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                     

 

 2018            DANA DILLON DO            Ot            R06.02      
      SHORTNESS OF BREATH                     

 

 2018            DANA DILLON DO            Ot            Z87.891     
       PERSONAL HISTORY OF NICOTINE DEPENDENCE                     

 

 2018            NOHELIA AUGUST MD, Ot            G47.33      
      OBSTRUCTIVE SLEEP APNEA (ADULT) (PEDIATR                     

 

 2018            NOHELIA AUGUST MD, Ot            I25.10      
      ATHSCL HEART DISEASE OF NATIVE CORONARY                      

 

 2018            NOHELIA AUGUST MD, Ot            I34.0       
     NONRHEUMATIC MITRAL (VALVE) INSUFFICIENC                     

 

 2018            NOHELIA AUGUST MD, Ot            I71.2       
     THORACIC AORTIC ANEURYSM, WITHOUT RUPTUR                     

 

 2018            NOHELIA AUGUST MD, Ot            J43.9       
     EMPHYSEMA, UNSPECIFIED                     

 

 2018            NOHELIA AUGUST MD, Ot            N20.0       
     CALCULUS OF KIDNEY                     

 

 2018            NOHELIA AUGUST MD, Ot            R41.0       
     DISORIENTATION, UNSPECIFIED                     

 

 2018            NOHELIA AUGUST MD            Ot            W19.XXXA    
        UNSPECIFIED FALL, INITIAL ENCOUNTER                     

 

 2018            NOHELIA AUGUST MD, Ot            Z72.0       
     TOBACCO USE                     

 

 2018            MICH COLLIER DO            Ot            E03.9       
     HYPOTHYROIDISM, UNSPECIFIED                     

 

 2018            MICH COLLIER DO            Ot            E11.22      
      TYPE 2 DIABETES MELLITUS W DIABETIC                      

 

 2018            MICH COLLIER DO            Ot            F17.210     
       NICOTINE DEPENDENCE, CIGARETTES, UNCOMPL                     

 

 2018            MICH COLLIER DO            Ot            F32.9       
     MAJOR DEPRESSIVE DISORDER, SINGLE EPISOD                     

 

 2018            MICH COLLIER DO            Ot            G47.33      
      OBSTRUCTIVE SLEEP APNEA (ADULT) (PEDIATR                     

 

 2018            MICH COLLIER DO            Ot            I12.0       
     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                     

 

 2018            MICH COLLIER DO            Ot            I25.10      
      ATHSCL HEART DISEASE OF NATIVE CORONARY                      

 

 2018            MICH COLLIER DO            Ot            J43.9       
     EMPHYSEMA, UNSPECIFIED                     

 

 2018            MICH COLLIER DO            Ot            K21.9       
     GASTRO-ESOPHAGEAL REFLUX DISEASE WITHOUT                     

 

 2018            MICH COLLIER DO            Ot            K44.9       
     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                     

 

 2018            MICH COLLIER DO            Ot            N18.6       
     END STAGE RENAL DISEASE                     

 

 2018            MICH COLLIER DO            Ot            Z79.899     
       OTHER LONG TERM (CURRENT) DRUG THERAPY                     

 

 2018            MICH COLLIER DO            Ot            Z86.73      
      PRSNL HX OF TIA (TIA), AND CEREB INFRC W                     

 

 2018            MICH COLLIER DO            Ot            Z95.5       
     PRESENCE OF CORONARY ANGIOPLASTY IMPLANT                     

 

 2018            MICH COLLIER DO            Ot            Z99.2       
     DEPENDENCE ON RENAL DIALYSIS                     

 

 2018            MICH COLLIER DO            Ot            E03.9       
     HYPOTHYROIDISM, UNSPECIFIED                     

 

 2018            MICH COLLIER DO            Ot            E11.22      
      TYPE 2 DIABETES MELLITUS W DIABETIC                      

 

 2018            MICH COLLIER DO            Ot            F17.210     
       NICOTINE DEPENDENCE, CIGARETTES, UNCOMPL                     

 

 2018            MICH COLLIER DO            Ot            F32.9       
     MAJOR DEPRESSIVE DISORDER, SINGLE EPISOD                     

 

 2018            MICH COLLIER DO            Ot            G47.33      
      OBSTRUCTIVE SLEEP APNEA (ADULT) (PEDIATR                     

 

 2018            MICH COLLIER DO            Ot            I12.0       
     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                     

 

 2018            MICH COLLIER DO            Ot            I25.10      
      ATHSCL HEART DISEASE OF NATIVE CORONARY                      

 

 2018            MICH COLLIER DO            Ot            J43.9       
     EMPHYSEMA, UNSPECIFIED                     

 

 2018            MICH COLLIER DO            Ot            K21.9       
     GASTRO-ESOPHAGEAL REFLUX DISEASE WITHOUT                     

 

 2018            MICH COLLIER DO            Ot            K44.9       
     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                     

 

 2018            MICH COLLIER DO            Ot            N18.6       
     END STAGE RENAL DISEASE                     

 

 2018            COLLIER MICH DAVIS            Ot            Z79.899     
       OTHER LONG TERM (CURRENT) DRUG THERAPY                     

 

 2018            MICH COLLIER DO            Ot            Z86.73      
      PRSNL HX OF TIA (TIA), AND CEREB INFRC W                     

 

 2018            MICH COLLIER DO            Ot            Z95.5       
     PRESENCE OF CORONARY ANGIOPLASTY IMPLANT                     

 

 2018            MICH COLLIER DO            Ot            Z99.2       
     DEPENDENCE ON RENAL DIALYSIS                     

 

 2018            NOHELIA AUGUST MD            Ot            G47.33      
      OBSTRUCTIVE SLEEP APNEA (ADULT) (PEDIATR                     

 

 2018            NOHELIA AUGUST MD            Ot            I25.10      
      ATHSCL HEART DISEASE OF NATIVE CORONARY                      

 

 2018            NOHELIA AUGUST MD            Ot            I34.0       
     NONRHEUMATIC MITRAL (VALVE) INSUFFICIENC                     

 

 2018            NOHELIA AUGUST MD            Ot            I71.2       
     THORACIC AORTIC ANEURYSM, WITHOUT RUPTUR                     

 

 2018            NOHELIA AUGUST MD            Ot            J43.9       
     EMPHYSEMA, UNSPECIFIED                     

 

 2018            NOHELIA AUGUST MD            Ot            N20.0       
     CALCULUS OF KIDNEY                     

 

 2018            NOHELIA AUGUST MD            Ot            R41.0       
     DISORIENTATION, UNSPECIFIED                     

 

 2018            NOHELIA AUGUST MD            Ot            W19.XXXA    
        UNSPECIFIED FALL, INITIAL ENCOUNTER                     

 

 2018            NOHELIA AUGUST MD            Ot            Z72.0       
     TOBACCO USE                     

 

 2018            NOHELIA AUGUST MD            Ot            G47.33      
      OBSTRUCTIVE SLEEP APNEA (ADULT) (PEDIATR                     

 

 2018            NOHELIA AUGUST MD            Ot            I25.10      
      ATHSCL HEART DISEASE OF NATIVE CORONARY                      

 

 2018            NOHELIA AUGUST MD            Ot            I34.0       
     NONRHEUMATIC MITRAL (VALVE) INSUFFICIENC                     

 

 2018            NOHELIA AUGUST MD            Ot            I71.2       
     THORACIC AORTIC ANEURYSM, WITHOUT RUPTUR                     

 

 2018            NOHELIA AUGUST MD            Ot            J43.9       
     EMPHYSEMA, UNSPECIFIED                     

 

 2018            NOHELIA AUGUST MD            Ot            N20.0       
     CALCULUS OF KIDNEY                     

 

 2018            NOHELIA AUGUST MD            Ot            R41.0       
     DISORIENTATION, UNSPECIFIED                     

 

 2018            NOHELIA AUGUST MD            Ot            W19.XXXA    
        UNSPECIFIED FALL, INITIAL ENCOUNTER                     

 

 2018            NOHELIA AUGUST MD            Ot            Z72.0       
     TOBACCO USE                     

 

 2018                         Ot            733.00            
OSTEOPOROSIS NOS                     

 

 2018            JOHNSONEDUAR HILL DO            Ot            733.00  
          OSTEOPOROSIS NOS                     

 

 2018            DANA DILLON DO            Ot            492.8       
     EMPHYSEMA NEC                     

 

 2018            DANA DILLON DO            Ot            511.9       
     PLEURAL EFFUSION NOS                     

 

 2018            DANA DILLON DO            Ot            586         
   RENAL FAILURE NOS                     

 

 2018            LETTY DO, ARLENE MCKEON            Ot            726.0    
        ADHESIVE CAPSULIT SHLDER                     

 

 2018            LETTY DO, ARLENE MCKEON            Ot            718.91   
         JT DERANGMENT NOS-SHLDER                     

 

 2018            LETTY DO, ARLENE MCKEON            Ot            726.0    
        ADHESIVE CAPSULIT SHLDER                     

 

 2018            LETTY DO, ARLENE MCKEON            Ot            840.4    
        SPRAIN ROTATOR CUFF                     

 

 2018            LETTY DO, ARLENE MCKEON            Ot            E000.8   
         OTHER EXTERNAL CAUSE STATUS                     

 

 2018            LETTY  ARLENE MCKEON            Ot            E928.9   
         ACCIDENT NOS                     

 

 2018            LETTY DO, ARLENE MCKEON            Ot            V72.84   
         EXAM PRE-OPERATIVE NOS                     

 

 2018            LETTY  ARLENE MCKEON            Ot            727.61   
         ROTATOR CUFF RUPTURE                     

 

 2018            LETTY DO ARLENE             Ot            V72.84   
         EXAM PRE-OPERATIVE NOS                     

 

 2018            NOHELIA AUGUST MD            Ot            414.00      
      CORON ATHEROSCLER NOS TYPE VESSEL, NATIV                     

 

 2018            NOHELIA AUGUST MD            Ot            424.0       
     MITRAL VALVE DISORDER                     

 

 2018            NOHELIA AUGUST MD            Ot            433.10      
      CAROTID ARTERY OCCLUSION W O CEREBRAL IN                     

 

 2018            NOHELIA AUGUST MD            Ot            786.09      
      RESPIRATORY ABNORM NEC                     

 

 2018            NOHELIA AUGUST MD            Ot            414.01      
      CORONARY ATHEROSCLEROSIS OF NATIVE CORON                     

 

 2018            NOHELIA AUGUST MD            Ot            786.09      
      RESPIRATORY ABNORM NEC                     

 

 2018            ARLENE SAENZ DO            Ot            840.4    
        SPRAIN ROTATOR CUFF                     

 

 2018            ARLENE SAENZ DO            Ot            E000.8   
         OTHER EXTERNAL CAUSE STATUS                     

 

 2018            ARLENE SAENZ DO            Ot            E928.9   
         ACCIDENT NOS                     

 

 2018            ARLENE SAENZ DO            Ot            V72.84   
         EXAM PRE-OPERATIVE NOS                     

 

 2018                         Ot            733.00            
OSTEOPOROSIS NOS                     

 

 2018            ARLENE SAENZ DO            Ot            722.4    
        CERVICAL DISC DEGEN                     

 

 2018                         Ot            J44.9            CHRONIC 
OBSTRUCTIVE PULMONARY DISEASE, U                     

 

 2018            EDUAR JOHNSON DO            Ot            K59.00  
          CONSTIPATION, UNSPECIFIED                     

 

 2018            EDUAR JOHNSON DO            Ot            J44.9   
         CHRONIC OBSTRUCTIVE PULMONARY DISEASE, U                     

 

 2018            TAE CHRISTIANSEN            Ot            
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                     

 

 2018            TAE CHRISTIANSEN Ot            
G47.33            OBSTRUCTIVE SLEEP APNEA (ADULT) (PEDIATR                     

 

 2018            TAE CHRISTIANSEN Ot            
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                      

 

 2018            TAE CHRISTIANSEN            Ot            
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                     

 

 2018            DANA DILLON DO            Ot            R06.02      
      SHORTNESS OF BREATH                     

 

 2018            DANA DILLON DO            Ot            Z87.891     
       PERSONAL HISTORY OF NICOTINE DEPENDENCE                     

 

 2018            NOHELIA AUGUST MD, Ot            G47.33      
      OBSTRUCTIVE SLEEP APNEA (ADULT) (PEDIATR                     

 

 2018            NOHELIA AUGUST MD, Ot            I25.10      
      ATHSCL HEART DISEASE OF NATIVE CORONARY                      

 

 2018            NOHELIA AUGUST MD, Ot            I34.0       
     NONRHEUMATIC MITRAL (VALVE) INSUFFICIENC                     

 

 2018            NOHELIA AUGUST MD, Ot            I71.2       
     THORACIC AORTIC ANEURYSM, WITHOUT RUPTUR                     

 

 2018            NOHELIA AUGUST MD            Ot            J43.9       
     EMPHYSEMA, UNSPECIFIED                     

 

 2018            NOHELIA AUGUST MD, Ot            N20.0       
     CALCULUS OF KIDNEY                     

 

 2018            NOHELIA AUGUST MD, Ot            R41.0       
     DISORIENTATION, UNSPECIFIED                     

 

 2018            NOHELIA AUGUST MD            Ot            W19.XXXA    
        UNSPECIFIED FALL, INITIAL ENCOUNTER                     

 

 2018            MATA MATIAS, NOHELIA CLARK            Ot            Z72.0       
     TOBACCO USE                     

 

 2018            MICH COLLIER DO            Ot            K42.9       
     UMBILICAL HERNIA WITHOUT OBSTRUCTION OR                      

 

 2018            MICH COLLIER DO            Ot            Z01.812     
       ENCOUNTER FOR PREPROCEDURAL LABORATORY E                     

 

 2018            MICH COLLIER DO            Ot            Z11.2       
     ENCOUNTER FOR SCREENING FOR OTHER BACTER                     

 

 2018            MICH COLLIER DO            Ot            K42.9       
     UMBILICAL HERNIA WITHOUT OBSTRUCTION OR                      

 

 2018            MICH COLLIER DO            Ot            Z01.812     
       ENCOUNTER FOR PREPROCEDURAL LABORATORY E                     

 

 2018            MICH COLLIER DO            Ot            Z11.2       
     ENCOUNTER FOR SCREENING FOR OTHER BACTER                     

 

 2018            MICH COLLIER DO            Ot            F17.210     
       NICOTINE DEPENDENCE, CIGARETTES, UNCOMPL                     

 

 2018            MICH COLLIER DO            Ot            G47.33      
      OBSTRUCTIVE SLEEP APNEA (ADULT) (PEDIATR                     

 

 2018            MICH COLLIER DO            Ot            I08.1       
     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                     

 

 2018            MICH COLLIER DO            Ot            I10         
   ESSENTIAL (PRIMARY) HYPERTENSION                     

 

 2018            MICH COLLIER DO            Ot            I25.10      
      ATHSCL HEART DISEASE OF NATIVE CORONARY                      

 

 2018            MICH COLLIER DO            Ot            J44.9       
     CHRONIC OBSTRUCTIVE PULMONARY DISEASE, U                     

 

 2018            MICH COLLIER DO            Ot            J45.909     
       UNSPECIFIED ASTHMA, UNCOMPLICATED                     

 

 2018            MICH COLLIER DO            Ot            K42.0       
     UMBILICAL HERNIA WITH OBSTRUCTION, WITHO                     

 

 2018            MICH COLLIER DO            Ot            Z79.02      
      LONG TERM (CURRENT) USE OF ANTITHROMBOTI                     

 

 2018            MICH COLLIER DO            Ot            Z79.899     
       OTHER LONG TERM (CURRENT) DRUG THERAPY                     

 

 2018            MICH COLLIER DO            Ot            F17.210     
       NICOTINE DEPENDENCE, CIGARETTES, UNCOMPL                     

 

 2018            MICH COLLIER DO            Ot            G47.33      
      OBSTRUCTIVE SLEEP APNEA (ADULT) (PEDIATR                     

 

 2018            MICH COLLIER DO            Ot            I08.1       
     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                     

 

 2018            MICH COLLIER DO            Ot            I10         
   ESSENTIAL (PRIMARY) HYPERTENSION                     

 

 2018            MICH COLLIER DO            Ot            I25.10      
      ATHSCL HEART DISEASE OF NATIVE CORONARY                      

 

 2018            MICH COLLIER DO            Ot            J44.9       
     CHRONIC OBSTRUCTIVE PULMONARY DISEASE, U                     

 

 2018            MICH COLLIER DO            Ot            J45.909     
       UNSPECIFIED ASTHMA, UNCOMPLICATED                     

 

 2018            MICH COLLIER DO            Ot            K42.0       
     UMBILICAL HERNIA WITH OBSTRUCTION, WITHO                     

 

 2018            MICH COLLIER DO            Ot            Z79.02      
      LONG TERM (CURRENT) USE OF ANTITHROMBOTI                     

 

 2018            MICH COLLIER DO            Ot            Z79.899     
       OTHER LONG TERM (CURRENT) DRUG THERAPY                     

 

 2018            MICH COLLIER DO            Ot            F17.210     
       NICOTINE DEPENDENCE, CIGARETTES, UNCOMPL                     

 

 2018            MICH COLLIER DO            Ot            G47.33      
      OBSTRUCTIVE SLEEP APNEA (ADULT) (PEDIATR                     

 

 2018            MICH COLLIER DO            Ot            I08.1       
     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                     

 

 2018            MICH COLLIER DO            Ot            I10         
   ESSENTIAL (PRIMARY) HYPERTENSION                     

 

 2018            MICH COLLIER DO            Ot            I25.10      
      ATHSCL HEART DISEASE OF NATIVE CORONARY                      

 

 2018            MICH COLLIER DO            Ot            J44.9       
     CHRONIC OBSTRUCTIVE PULMONARY DISEASE, U                     

 

 2018            MICH COLLIER DO            Ot            J45.909     
       UNSPECIFIED ASTHMA, UNCOMPLICATED                     

 

 2018            MICH COLLIER DO            Ot            K42.0       
     UMBILICAL HERNIA WITH OBSTRUCTION, WITHO                     

 

 2018            MICH COLLIER DO            Ot            Z79.02      
      LONG TERM (CURRENT) USE OF ANTITHROMBOTI                     

 

 2018            MICH COLLIER DO            Ot            Z79.899     
       OTHER LONG TERM (CURRENT) DRUG THERAPY                     

 

 2018            ZACH DIEGO            Ot            J44.9 
           CHRONIC OBSTRUCTIVE PULMONARY DISEASE, U                     

 

 2018            ZACH DIEGO APRN            Ot            R59.0 
           LOCALIZED ENLARGED LYMPH NODES                     

 

 07/10/2018            ZACH DIEGO            Ot            J44.9 
           CHRONIC OBSTRUCTIVE PULMONARY DISEASE, U                     

 

 07/10/2018            ZACH DIEGO APRN            Ot            R59.0 
           LOCALIZED ENLARGED LYMPH NODES                     

 

 2018            ZACH DIEGO APRN            Ot            J44.9 
           CHRONIC OBSTRUCTIVE PULMONARY DISEASE, U                     

 

 2018            JOHNATHONZACH KRAFT EDWARDO            Ot            R59.0 
           LOCALIZED ENLARGED LYMPH NODES                     

 

 2018                         Ot            733.00            
OSTEOPOROSIS NOS                     

 

 2018            JOHNSON EDUAR DAVIS            Ot            733.00  
          OSTEOPOROSIS NOS                     

 

 2018            WILMA  DANA SMITH            Ot            492.8       
     EMPHYSEMA NEC                     

 

 2018            WILMA  DANA SMITH            Ot            511.9       
     PLEURAL EFFUSION NOS                     

 

 2018            WILMA DAVIS DANA SMITH            Ot            586         
   RENAL FAILURE NOS                     

 

 2018            LETTY DO, ARLENE LINDA            Ot            726.0    
        ADHESIVE CAPSULIT SHLDER                     

 

 2018            LETTY DO, ARLENE MCKEON            Ot            718.91   
         JT DERANGMENT NOS-SHLDER                     

 

 2018            LETTY , ARLENE MCKEON            Ot            726.0    
        ADHESIVE CAPSULIT SHLDER                     

 

 2018            LETTY DO, ARLENE MCKEON            Ot            840.4    
        SPRAIN ROTATOR CUFF                     

 

 2018            LETTY DO, ARLENE LINDA            Ot            E000.8   
         OTHER EXTERNAL CAUSE STATUS                     

 

 2018            LETTY DO, ARLENE MCKEON            Ot            E928.9   
         ACCIDENT NOS                     

 

 2018            [facility], ARLENE MCKEON            Ot            V72.84   
         EXAM PRE-OPERATIVE NOS                     

 

 2018            LETTY , ARLENE MCKEON            Ot            727.61   
         ROTATOR CUFF RUPTURE                     

 

 2018            [facility], ARLENE             Ot            V72.84   
         EXAM PRE-OPERATIVE NOS                     

 

 2018            MATA MATIAS, NOHELIA CLARK            Ot            414.00      
      CORON ATHEROSCLER NOS TYPE VESSEL, NATIV                     

 

 2018            NOHELIA AUGUST MD            Ot            424.0       
     MITRAL VALVE DISORDER                     

 

 2018            NOHELIA AUGUST MD            Ot            433.10      
      CAROTID ARTERY OCCLUSION W O CEREBRAL IN                     

 

 2018            NOHELIA AUGUST MD            Ot            786.09      
      RESPIRATORY ABNORM NEC                     

 

 2018            NOHELIA AUGUST MD            Ot            414.01      
      CORONARY ATHEROSCLEROSIS OF NATIVE CORON                     

 

 2018            NOHELIA AUGUST MD            Ot            786.09      
      RESPIRATORY ABNORM NEC                     

 

 2018            LETTY  ARLENE MCKEON            Ot            840.4    
        SPRAIN ROTATOR CUFF                     

 

 2018            LETTY  ARLENE MCKEON            Ot            E000.8   
         OTHER EXTERNAL CAUSE STATUS                     

 

 2018            LETTY , ARLENE LINDA            Ot            E928.9   
         ACCIDENT NOS                     

 

 2018            LETTY , ARLENE LINDA            Ot            V72.84   
         EXAM PRE-OPERATIVE NOS                     

 

 2018                         Ot            733.00            
OSTEOPOROSIS NOS                     

 

 2018            ARLENE SAENZ DO            Ot            722.4    
        CERVICAL DISC DEGEN                     

 

 2018                         Ot            J44.9            CHRONIC 
OBSTRUCTIVE PULMONARY DISEASE, U                     

 

 2018            EDUAR JOHNSON DO            Ot            K59.00  
          CONSTIPATION, UNSPECIFIED                     

 

 2018            EDUAR JOHNSON DO            Ot            J44.9   
         CHRONIC OBSTRUCTIVE PULMONARY DISEASE, U                     

 

 2018            TAE CHRISTIANSEN            Ot            
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                     

 

 2018            TAE CHRISTIANSEN            Ot            
G47.33            OBSTRUCTIVE SLEEP APNEA (ADULT) (PEDIATR                     

 

 2018            TAE CHRISTIANSEN            Ot            
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                      

 

 2018            TAE CHRISTIANSEN            Ot            
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                     

 

 2018            DANA DILLON DO            Ot            R06.02      
      SHORTNESS OF BREATH                     

 

 2018            DANA DILLON DO            Ot            Z87.891     
       PERSONAL HISTORY OF NICOTINE DEPENDENCE                     

 

 2018            NOHELIA AUGUST MD            Ot            G47.33      
      OBSTRUCTIVE SLEEP APNEA (ADULT) (PEDIATR                     

 

 2018            NOHELIA AUGUST MD            Ot            I25.10      
      ATHSCL HEART DISEASE OF NATIVE CORONARY                      

 

 2018            NOHELIA AUGUST MD            Ot            I34.0       
     NONRHEUMATIC MITRAL (VALVE) INSUFFICIENC                     

 

 2018            NOHELIA AUGUST MD            Ot            I71.2       
     THORACIC AORTIC ANEURYSM, WITHOUT RUPTUR                     

 

 2018            NOHELIA AUGUST MD            Ot            J43.9       
     EMPHYSEMA, UNSPECIFIED                     

 

 2018            NOHELIA AUGUST MD            Ot            N20.0       
     CALCULUS OF KIDNEY                     

 

 2018            NOHELIA AUGUST MD            Ot            R41.0       
     DISORIENTATION, UNSPECIFIED                     

 

 2018            NOHELIA AUGUST MD            Ot            W19.XXXA    
        UNSPECIFIED FALL, INITIAL ENCOUNTER                     

 

 2018            NOHELIA AUGUST MD            Ot            Z72.0       
     TOBACCO USE                     

 

 2018            ZACH DIEGO            Ot            J44.9 
           CHRONIC OBSTRUCTIVE PULMONARY DISEASE, U                     

 

 2018            ZACH DIEGO            Ot            R09.02
            HYPOXEMIA                     

 

 2018            ZACH DIEGO APRN            Ot            J44.9 
           CHRONIC OBSTRUCTIVE PULMONARY DISEASE, U                     

 

 2018            ZACH DIEGO APRN            Ot            R59.0 
           LOCALIZED ENLARGED LYMPH NODES                     

 

 2018            EDUAR JOHNSON DO            Ot            M16.0   
         BILATERAL PRIMARY OSTEOARTHRITIS OF HIP                     

 

 2018            ELIZABETH DAVIS, EDUAR CLARK            Ot            M47.817 
           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                     

 

 2018            EDUAR JOHNSON DO            Ot            M16.0   
         BILATERAL PRIMARY OSTEOARTHRITIS OF HIP                     

 

 2018            JOHNSON DO, EDUAR CLARK            Ot            M47.817 
           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                     

 

 2018            ZACH DIEGO APRN            Ot            J44.9 
           CHRONIC OBSTRUCTIVE PULMONARY DISEASE, U                     

 

 2018            ZACH DIEGO APRN            Ot            R09.02
            HYPOXEMIA                     

 

 2018            ZACH DIEGO APRN            Ot            J44.9 
           CHRONIC OBSTRUCTIVE PULMONARY DISEASE, U                     

 

 2018            ZACH DIEGO APRN            Ot            R09.02
            HYPOXEMIA                     

 

 2018            ZACH DIEGO APRN            Ot            J44.9 
           CHRONIC OBSTRUCTIVE PULMONARY DISEASE, U                     

 

 2018            ZACH DEIGO APRN            Ot            R09.02
            HYPOXEMIA                     

 

 2018            ZACH DIEGO APRN            Ot            J44.9 
           CHRONIC OBSTRUCTIVE PULMONARY DISEASE, U                     

 

 2018            ZACH DIEGO APRN            Ot            R09.02
            HYPOXEMIA                     

 

 2018            ZACH DIEGO APRN            Ot            J44.9 
           CHRONIC OBSTRUCTIVE PULMONARY DISEASE, U                     

 

 2018            ZACH DIEGO APRN            Ot            R09.02
            HYPOXEMIA                     

 

 2018            ZACH DIEGO APRN            Ot            J44.9 
           CHRONIC OBSTRUCTIVE PULMONARY DISEASE, U                     

 

 2018            ZACH DIEGO APRN            Ot            R09.02
            HYPOXEMIA                     

 

 2018            ZACH DIEGO APRN            Ot            J44.9 
           CHRONIC OBSTRUCTIVE PULMONARY DISEASE, U                     

 

 2018            ZACH DIEGO APRN            Ot            R09.02
            HYPOXEMIA                     

 

 2018            ZACH DIEGO APRN            Ot            J44.9 
           CHRONIC OBSTRUCTIVE PULMONARY DISEASE, U                     

 

 2018            ZACH DIEGO APRN            Ot            R09.02
            HYPOXEMIA                     

 

 2018            JOHNATHON, ZACH E APRN            Ot            J44.9 
           CHRONIC OBSTRUCTIVE PULMONARY DISEASE, U                     

 

 2018            NEELAM DIEGOINE E APRN            Ot            R09.02
            HYPOXEMIA                     

 

 2018            NEELAM DIEGOINE E APRN            Ot            J44.9 
           CHRONIC OBSTRUCTIVE PULMONARY DISEASE, U                     

 

 2018            NEELAM DIEGOINE E APRN            Ot            R09.02
            HYPOXEMIA                     

 

 2018            NEELAM DIEGOINE E APRN            Ot            J44.9 
           CHRONIC OBSTRUCTIVE PULMONARY DISEASE, U                     

 

 2018            JOHNATHONNEELAM KRAFTINE E APRN            Ot            R09.02
            HYPOXEMIA                     

 

 2018            JOHNATHON, ZACH E APRN            Ot            J44.9 
           CHRONIC OBSTRUCTIVE PULMONARY DISEASE, U                     

 

 2018            NEELAM DIEGOINE E APRN            Ot            R09.02
            HYPOXEMIA                     

 

 2018            NEELAM DIEGOINE E APRN            Ot            J44.9 
           CHRONIC OBSTRUCTIVE PULMONARY DISEASE, U                     

 

 2018            NEELAM DIEGOINE E APRN            Ot            R09.02
            HYPOXEMIA                     

 

 2018            ZACH DIEGO E APRN            Ot            J44.9 
           CHRONIC OBSTRUCTIVE PULMONARY DISEASE, U                     

 

 2018            NEELAM DIEGOINE E APRN            Ot            R09.02
            HYPOXEMIA                     

 

 2018            NEELAM DIEGOINE E APRN            Ot            J44.9 
           CHRONIC OBSTRUCTIVE PULMONARY DISEASE, U                     

 

 2018            NEELAM DIEGOINE E APRN            Ot            R09.02
            HYPOXEMIA                     

 

 2018            NEELAM DIEGOINE E APRN            Ot            J44.9 
           CHRONIC OBSTRUCTIVE PULMONARY DISEASE, U                     

 

 2018            NEELAM DIEGOINE E APRN            Ot            R09.02
            HYPOXEMIA                     

 

 10/21/2018            ZACH DIEGO E APRN            Ot            J44.9 
           CHRONIC OBSTRUCTIVE PULMONARY DISEASE, U                     

 

 10/21/2018            NEELAM DIEGOINE E APRN            Ot            R09.02
            HYPOXEMIA                     

 

 2018            JOHNATHON ZACH E APRN            Ot            J44.9 
           CHRONIC OBSTRUCTIVE PULMONARY DISEASE, U                     

 

 2018            NEELAM DIEGOINE FRANKIE APRN            Ot            R09.02
            HYPOXEMIA                     

 

 2018            ZACH DIEGO APRN            Ot            I25.10
            ATHSCL HEART DISEASE OF NATIVE CORONARY                      

 

 2018            NEELAM DIEGOINE E APRN            Ot            I71.9 
           AORTIC ANEURYSM OF UNSPECIFIED SITE, WIT                     

 

 2018            ZACH DIEGO E APRN            Ot            J43.9 
           EMPHYSEMA, UNSPECIFIED                     

 

 2018            ZACH DIEGO APRN            Ot            R59.9 
           ENLARGED LYMPH NODES, UNSPECIFIED                     

 

 2018            ZACH DIEGO APRN            Ot            
Z87.891            PERSONAL HISTORY OF NICOTINE DEPENDENCE                     

 

 2018            ZACH DIEGO APRN            Ot            J44.9 
           CHRONIC OBSTRUCTIVE PULMONARY DISEASE, U                     

 

 2018            ZACH DIEGO APRN            Ot            R09.02
            HYPOXEMIA                     

 

 2018            ZACH DIEGO APRN            Ot            J44.9 
           CHRONIC OBSTRUCTIVE PULMONARY DISEASE, U                     

 

 2018            ZACH DIEGO APRN            Ot            R09.02
            HYPOXEMIA                     

 

 2018            ZACH DIEGO APRN            Ot            J44.9 
           CHRONIC OBSTRUCTIVE PULMONARY DISEASE, U                     

 

 2018            ZACH DIEGO APRN            Ot            R09.02
            HYPOXEMIA                     

 

 2019            ZACH DIEGO APRN            Ot            I25.10
            ATHSCL HEART DISEASE OF NATIVE CORONARY                      

 

 2019            ZAHC DIEGO APRBRITNEY            Ot            I71.9 
           AORTIC ANEURYSM OF UNSPECIFIED SITE, WIT                     

 

 2019            ZACH DIEGO APRN            Ot            J43.9 
           EMPHYSEMA, UNSPECIFIED                     

 

 2019            ZACH DIEGO APRN            Ot            R59.9 
           ENLARGED LYMPH NODES, UNSPECIFIED                     

 

 2019            ZACH DIEGO APRN            Ot            
Z87.891            PERSONAL HISTORY OF NICOTINE DEPENDENCE                     

 

 2019            DONALD BEATTY MD            Ot            A41.9       
     SEPSIS, UNSPECIFIED ORGANISM                     

 

 2019            DONALD BEATTY MD            Ot            E89.0       
     POSTPROCEDURAL HYPOTHYROIDISM                     

 

 2019            DONALD BEATTY MD            Ot            F17.210     
       NICOTINE DEPENDENCE, CIGARETTES, UNCOMPL                     

 

 2019            DONALD BEATTY MD            Ot            F32.9       
     MAJOR DEPRESSIVE DISORDER, SINGLE EPISOD                     

 

 2019            DONALD BEATTY MD            Ot            G47.30      
      SLEEP APNEA, UNSPECIFIED                     

 

 2019            DONALD BEATTY MD            Ot            H40.9       
     UNSPECIFIED GLAUCOMA                     

 

 2019            DONALD BEATTY MD            Ot            H93.19      
      TINNITUS, UNSPECIFIED EAR                     

 

 2019            DONALD BEATTY MD            Ot            I10         
   ESSENTIAL (PRIMARY) HYPERTENSION                     

 

 2019            DONALD BEATTY MD            Ot            I25.10      
      ATHSCL HEART DISEASE OF NATIVE CORONARY                      

 

 2019            DONALD BEATTY MD            Ot            J18.9       
     PNEUMONIA, UNSPECIFIED ORGANISM                     

 

 2019            DONALD BEATTY MD, Ot            J30.2       
     OTHER SEASONAL ALLERGIC RHINITIS                     

 

 2019            DONALD BEATTY MD, Ot            J43.9       
     EMPHYSEMA, UNSPECIFIED                     

 

 2019            DONALD BEATTY MD            Ot            K21.9       
     GASTRO-ESOPHAGEAL REFLUX DISEASE WITHOUT                     

 

 2019            DONALD BEATTY MD, Ot            K46.9       
     UNSPECIFIED ABDOMINAL HERNIA WITHOUT OBS                     

 

 2019            DONALD BEATTY MD, Ot            K59.09      
      OTHER CONSTIPATION                     

 

 2019            DONALD BEATTY MD            Ot            M19.91      
      PRIMARY OSTEOARTHRITIS, UNSPECIFIED SITE                     

 

 2019            DONALD BEATTY MD, Ot            M54.9       
     DORSALGIA, UNSPECIFIED                     

 

 2019            DONALD BEATTY MD            Ot            M81.0       
     AGE-RELATED OSTEOPOROSIS W/O CURRENT PAT                     

 

 2019            DONALD BEATTY MD, Ot            N17.9       
     ACUTE KIDNEY FAILURE, UNSPECIFIED                     

 

 2019            DONALD BEATTY MD            Ot            R09.02      
      HYPOXEMIA                     

 

 2019            DONALD BEATTY MD            Ot            Z66         
   DO NOT RESUSCITATE                     

 

 2019            DONALD BEATTY MD            Ot            Z86.73      
      PRSNL HX OF TIA (TIA), AND CEREB INFRC W                     

 

 2019            DONALD BEATTY MD            Ot            Z91.5       
     PERSONAL HISTORY OF SELF-HARM                     

 

 2019            DONALD BEATTY MD            Ot            Z95.5       
     PRESENCE OF CORONARY ANGIOPLASTY IMPLANT                     

 

 2019            DONALD BEATTY MD            Ot            A41.9       
     SEPSIS, UNSPECIFIED ORGANISM                     

 

 2019            DONALD BEATTY MD            Ot            E89.0       
     POSTPROCEDURAL HYPOTHYROIDISM                     

 

 2019            DONALD BEATTY MD            Ot            F17.210     
       NICOTINE DEPENDENCE, CIGARETTES, UNCOMPL                     

 

 2019            DONALD BEATTY MD            Ot            F32.9       
     MAJOR DEPRESSIVE DISORDER, SINGLE EPISOD                     

 

 2019            DONALD BEATTY MD            Ot            G47.30      
      SLEEP APNEA, UNSPECIFIED                     

 

 2019            DONALD BEATTY MD            Ot            H40.9       
     UNSPECIFIED GLAUCOMA                     

 

 2019            DONALD BEATTY MD            Ot            H93.19      
      TINNITUS, UNSPECIFIED EAR                     

 

 2019            DONALD BEATTY MD            Ot            I10         
   ESSENTIAL (PRIMARY) HYPERTENSION                     

 

 2019            DONALD BEATTY MD            Ot            I25.10      
      ATHSCL HEART DISEASE OF NATIVE CORONARY                      

 

 2019            DONALD BEATTY MD            Ot            J18.9       
     PNEUMONIA, UNSPECIFIED ORGANISM                     

 

 2019            DONALD BEATTY MD            Ot            J30.2       
     OTHER SEASONAL ALLERGIC RHINITIS                     

 

 2019            DONALD BEATTY MD            Ot            J43.9       
     EMPHYSEMA, UNSPECIFIED                     

 

 2019            DONALD BEATTY MD            Ot            K21.9       
     GASTRO-ESOPHAGEAL REFLUX DISEASE WITHOUT                     

 

 2019            DONADL BEATTY MD            Ot            K46.9       
     UNSPECIFIED ABDOMINAL HERNIA WITHOUT OBS                     

 

 2019            DONALD BEATTY MD            Ot            K59.09      
      OTHER CONSTIPATION                     

 

 2019            DONALD BEATTY MD            Ot            M19.91      
      PRIMARY OSTEOARTHRITIS, UNSPECIFIED SITE                     

 

 2019            DONALD BEATTY MD            Ot            M54.9       
     DORSALGIA, UNSPECIFIED                     

 

 2019            DONALD BEATTY MD            Ot            M81.0       
     AGE-RELATED OSTEOPOROSIS W/O CURRENT PAT                     

 

 2019            DONALD BEATTY MD            Ot            N17.9       
     ACUTE KIDNEY FAILURE, UNSPECIFIED                     

 

 2019            DONALD BETATY MD            Ot            R09.02      
      HYPOXEMIA                     

 

 2019            DONALD BEATTY MD            Ot            Z66         
   DO NOT RESUSCITATE                     

 

 2019            DONALD BEATTY MD            Ot            Z86.73      
      PRSNL HX OF TIA (TIA), AND CEREB INFRC W                     

 

 2019            DONALD BEATTY MD            Ot            Z91.5       
     PERSONAL HISTORY OF SELF-HARM                     

 

 2019            DONALD BEATTY MD            Ot            Z95.5       
     PRESENCE OF CORONARY ANGIOPLASTY IMPLANT                     

 

 2019            ZACH DIEGO            Ot            I25.10
            ATHSCL HEART DISEASE OF NATIVE CORONARY                      

 

 2019            ZACH DIEGO            Ot            I71.9 
           AORTIC ANEURYSM OF UNSPECIFIED SITE, WIT                     

 

 2019            ZACH DIEGO            Ot            J43.9 
           EMPHYSEMA, UNSPECIFIED                     

 

 2019            ZACH DIEGO            Ot            R59.9 
           ENLARGED LYMPH NODES,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 aminotransferase measurement (enzymatic activity/
volume)            13 U/L            5-34        

 

 Serum or plasma alanine aminotransferase measurement (enzymatic activity/volume
)            16 U/L            0-55        

 

 Serum or plasma protein measurement (mass/volume)            5.9 g/dL         
   6.4-8.2        

 

 Serum or plasma albumin measurement (mass/volume)            3.8 g/dL         
   3.2-4.5        









 Serum or plasma lithium measurement (moles/volume) - 18 15:25         









 BNP level            23.9 pg/mL            <100.0        









 Serum or plasma troponin i.cardiac measurement (mass/volume) - 18 15:25 
        









 Serum or plasma troponin i.cardiac measurement (mass/volume)            < ng/
mL            <0.30        









 THYROID STIMULATING HORMONE - 18 15:25         









 THYROID STIMULATING HORMONE            1.75 u[iU]/mL            0.35-4.94     
   









 Automated blood complete blood count (hemogram) panel - 04/10/18 05:46         









 Blood leukocytes automated count (number/volume)            6.2 10*3/uL       
     4.3-11.0        

 

 Blood erythrocytes automated count (number/volume)            2.66 10*6/uL    
        4.35-5.85        

 

 Venous blood hemoglobin measurement (mass/volume)            9.3 g/dL         
   13.3-17.7        

 

 Blood hematocrit (volume fraction)            28 %            40-54        

 

 Automated erythrocyte mean corpuscular volume            105 [foz_us]         
   80-99        

 

 Automated erythrocyte mean corpuscular hemoglobin (mass per erythrocyte)      
      35 pg            25-34        

 

 Automated erythrocyte mean corpuscular hemoglobin concentration measurement (
mass/volume)            34 g/dL            32-36        

 

 Automated erythrocyte distribution width ratio            13.9 %            
10.0-14.5        

 

 Automated blood platelet count (count/volume)            200 10*3/uL          
  130-400        

 

 Automated blood platelet mean volume measurement            9.7 [foz_us]      
      7.4-10.4        









 Comprehensive metabolic panel - 04/10/18 05:46         









 Serum or plasma sodium measurement (moles/volume)            141 mmol/L       
     135-145        

 

 Serum or plasma potassium measurement (moles/volume)            4.0 mmol/L    
        3.6-5.0        

 

 Serum or plasma chloride measurement (moles/volume)            113 mmol/L     
               

 

 Carbon dioxide            24 mmol/L            21-32        

 

 Serum or plasma anion gap determination (moles/volume)            4 mmol/L    
        5-14        

 

 Serum or plasma urea nitrogen measurement (mass/volume)            36 mg/dL   
         7-18        

 

 Serum or plasma creatinine measurement (mass/volume)            1.20 mg/dL    
        0.60-1.30        

 

 Serum or plasma urea nitrogen/creatinine mass ratio            30             
NRG        

 

 Serum or plasma creatinine measurement with calculation of estimated 
glomerular filtration rate            60             NRG        

 

 Serum or plasma glucose measurement (mass/volume)            94 mg/dL         
           

 

 Serum or plasma calcium measurement (mass/volume)            8.6 mg/dL        
    8.5-10.1        

 

 Serum or plasma total bilirubin measurement (mass/volume)            0.5 mg/dL
            0.1-1.0        

 

 Serum or plasma alkaline phosphatase measurement (enzymatic activity/volume)  
          46 U/L                    

 

 Serum or plasma aspartate aminotransferase measurement (enzymatic activity/
volume)            13 U/L            5-34        

 

 Serum or plasma alanine aminotransferase measurement (enzymatic activity/volume
)            14 U/L            0-55        

 

 Serum or plasma protein measurement (mass/volume)            5.3 g/dL         
   6.4-8.2        

 

 Serum or plasma albumin measurement (mass/volume)            3.4 g/dL         
   3.2-4.5        









 Whole blood hemoglobin and hematocrit panel - 04/10/18 11:45         









 Venous blood hemoglobin measurement (mass/volume)            8.9 g/dL         
   13.3-17.7        

 

 Blood hematocrit (volume fraction)            27 %            40-54        









 Complete blood count (CBC) with automated white blood cell (WBC) differential 
- 18 10:15         









 Blood leukocytes automated count (number/volume)            6.9 10*3/uL       
     4.3-11.0        

 

 Blood erythrocytes automated count (number/volume)            3.44 10*6/uL    
        4.35-5.85        

 

 Venous blood hemoglobin measurement (mass/volume)            11.2 g/dL        
    13.3-17.7        

 

 Blood hematocrit (volume fraction)            34 %            40-54        

 

 Automated erythrocyte mean corpuscular volume            99 [foz_us]          
  80-99        

 

 Automated erythrocyte mean corpuscular hemoglobin (mass per erythrocyte)      
      33 pg            25-34        

 

 Automated erythrocyte mean corpuscular hemoglobin concentration measurement (
mass/volume)            33 g/dL            32-36        

 

 Automated erythrocyte distribution width ratio            14.7 %            
10.0-14.5        

 

 Automated blood platelet count (count/volume)            268 10*3/uL          
  130-400        

 

 Automated blood platelet mean volume measurement            9.1 [foz_us]      
      7.4-10.4        

 

 Automated blood neutrophils/100 leukocytes            70 %            42-75   
     

 

 Automated blood lymphocytes/100 leukocytes            17 %            12-44   
     

 

 Blood monocytes/100 leukocytes            8 %            0-12        

 

 Automated blood eosinophils/100 leukocytes            4 %            0-10     
   

 

 Automated blood basophils/100 leukocytes            0 %            0-10        

 

 Blood neutrophils automated count (number/volume)            4.8 10*3         
   1.8-7.8        

 

 Blood lymphocytes automated count (number/volume)            1.2 10*3         
   1.0-4.0        

 

 Blood monocytes automated count (number/volume)            0.6 10*3            
0.0-1.0        

 

 Automated eosinophil count            0.3 10*3/uL            0.0-0.3        

 

 Automated blood basophil count (count/volume)            0.0 10*3/uL          
  0.0-0.1        









 Whole blood basic metabolic panel - 18 10:15         









 Serum or plasma sodium measurement (moles/volume)            140 mmol/L       
     135-145        

 

 Serum or plasma potassium measurement (moles/volume)            4.4 mmol/L    
        3.6-5.0        

 

 Serum or plasma chloride measurement (moles/volume)            108 mmol/L     
               

 

 Carbon dioxide            22 mmol/L            21-32        

 

 Serum or plasma anion gap determination (moles/volume)            10 mmol/L   
         5-14        

 

 Serum or plasma urea nitrogen measurement (mass/volume)            14 mg/dL   
         7-18        

 

 Serum or plasma creatinine measurement (mass/volume)            1.29 mg/dL    
        0.60-1.30        

 

 Serum or plasma urea nitrogen/creatinine mass ratio            11             
NRG        

 

 Serum or plasma creatinine measurement with calculation of estimated 
glomerular filtration rate            55             NRG        

 

 Serum or plasma glucose measurement (mass/volume)            97 mg/dL         
           

 

 Serum or plasma calcium measurement (mass/volume)            10.5 mg/dL       
     8.5-10.1        









 Methicillin resistant Staphylococcus aureus (MRSA) screening culture -  10:15         









 MRSA SCREEN RESULT            MRSA ISOLATED             NRG        









 Influenza virus A and B antigen detection - 19 11:57         









 FLU RESULT            NEGATIVE FOR INFLUENZA A AND B ANTIGENS BY IA           
  NRG        









 Arterial blood gas measurement - 19 12:14         









 Blood pCO2            36 mm[Hg]            35-45        

 

 Blood pO2            74 mm[Hg]            79-93        

 

 Arterial blood bicarbonate measurement (moles/volume)            23 mmol/L    
        23-27        

 

 Arterial blood base excess by calculation            -0.9 mmol/L            -
2.5-2.5        

 

 Arterial blood oxygen saturation measurement            91 %            
        

 

 * Inhaled oxygen flow rate            ROOM AIR             NRG        

 

 Arterial blood pH measurement with patient temperature correction            
7.42             7.37-7.43        

 

 Arterial blood carbon dioxide, total measurement (moles/volume)            
23.5 mmol/L            21.0-31.0        

 

 Body site            L RAD             NRG        

 

 Assessment of wrist artery patency prior to arterial puncture            YES-
POS             NRG        

 

 Setting of ventilation mode            NO             NRG        

 

 Measurement of body temperature            102.3             NRG        









 Complete blood count (CBC) with automated white blood cell (WBC) differential 
- 19 13:15         









 Blood leukocytes automated count (number/volume)            16.2 10*3/uL      
      4.3-11.0        

 

 Blood erythrocytes automated count (number/volume)            3.89 10*6/uL    
        4.35-5.85        

 

 Venous blood hemoglobin measurement (mass/volume)            12.6 g/dL        
    13.3-17.7        

 

 Blood hematocrit (volume fraction)            38 %            40-54        

 

 Automated erythrocyte mean corpuscular volume            98 [foz_us]          
  80-99        

 

 Automated erythrocyte mean corpuscular hemoglobin (mass per erythrocyte)      
      32 pg            25-34        

 

 Automated erythrocyte mean corpuscular hemoglobin concentration measurement (
mass/volume)            33 g/dL            32-36        

 

 Automated erythrocyte distribution width ratio            16.2 %            
10.0-14.5        

 

 Automated blood platelet count (count/volume)            207 10*3/uL          
  130-400        

 

 Automated blood platelet mean volume measurement            9.6 [foz_us]      
      7.4-10.4        

 

 Automated blood neutrophils/100 leukocytes            88 %            42-75   
     

 

 Automated blood lymphocytes/100 leukocytes            7 %            12-44    
    

 

 Blood monocytes/100 leukocytes            5 %            0-12        

 

 Automated blood eosinophils/100 leukocytes            0 %            0-10     
   

 

 Automated blood basophils/100 leukocytes            0 %            0-10        

 

 Blood neutrophils automated count (number/volume)            14.2 10*3        
    1.8-7.8        

 

 Blood lymphocytes automated count (number/volume)            1.1 10*3         
   1.0-4.0        

 

 Blood monocytes automated count (number/volume)            0.9 10*3            
0.0-1.0        

 

 Automated eosinophil count            0.0 10*3/uL            0.0-0.3        

 

 Automated blood basophil count (count/volume)            0.0 10*3/uL          
  0.0-0.1        









 Blood lactic acid measurement (moles/volume) - 19 13:15         









 Blood lactic acid measurement (moles/volume)            0.80 mmol/L            
0.50-2.00        









 Comprehensive metabolic panel - 19 13:15         









 Serum or plasma sodium measurement (moles/volume)            137 mmol/L       
     135-145        

 

 Serum or plasma potassium measurement (moles/volume)            4.0 mmol/L    
        3.6-5.0        

 

 Serum or plasma chloride measurement (moles/volume)            105 mmol/L     
               

 

 Carbon dioxide            24 mmol/L            21-32        

 

 Serum or plasma anion gap determination (moles/volume)            8 mmol/L    
        5-14        

 

 Serum or plasma urea nitrogen measurement (mass/volume)            19 mg/dL   
         7-18        

 

 Serum or plasma creatinine measurement (mass/volume)            1.33 mg/dL    
        0.60-1.30        

 

 Serum or plasma urea nitrogen/creatinine mass ratio            14             
NRG        

 

 Serum or plasma creatinine measurement with calculation of estimated 
glomerular filtration rate            53             NRG        

 

 Serum or plasma glucose measurement (mass/volume)            111 mg/dL        
            

 

 Serum or plasma calcium measurement (mass/volume)            9.3 mg/dL        
    8.5-10.1        

 

 Serum or plasma total bilirubin measurement (mass/volume)            1.1 mg/dL
            0.1-1.0        

 

 Serum or plasma alkaline phosphatase measurement (enzymatic activity/volume)  
          86 U/L                    

 

 Serum or plasma aspartate aminotransferase measurement (enzymatic activity/
volume)            15 U/L            5-34        

 

 Serum or plasma alanine aminotransferase measurement (enzymatic activity/volume
)            17 U/L            0-55        

 

 Serum or plasma protein measurement (mass/volume)            6.8 g/dL         
   6.4-8.2        

 

 Serum or plasma albumin measurement (mass/volume)            3.8 g/dL         
   3.2-4.5        

 

 CALCIUM CORRECTED            9.5 mg/dL            8.5-10.1        









 Blood manual differential performed detection - 19 13:15         









 Blood monocytes/100 leukocytes            6 %            NRG        

 

 Manual blood segmented neutrophils/100 leukocytes            77 %            
NRG        

 

 Blood band neutrophils/100 leukocytes            12 %            NRG        

 

 Manual blood lymphocytes/100 leukocytes            4 %            NRG        

 

 Manual eosinophils/100 leukocytes in nose            0 %            NRG        

 

 Manual blood basophils/100 leukocytes            1 %            NRG        

 

 Blood anisocytosis detection by light microscopy            SLIGHT             
NRG        









 Bacterial blood culture - 19 13:15         









 Bacterial blood culture            NG             NRG        









 Bacterial blood culture - 19 13:25         









 Bacterial blood culture            NG             NRG        









 Sputum Gram stain - 19 15:45         









 Sputum Gram stain            605.             NRG        









 Bacterial sputum culture - 19 15:45         









 FREE TEXT EXTERNAL            ID REPORTED 19 16:05             NRG        

 

 QUANTITY OF GROWTH            .             NRG        

 

 FREE TEXT ENTRY 2            SENSITIVITY REPORTED 19 09:05             NRG
        

 

 Bacterial sputum culture            USUAL RESP             NRG        









 RML Sensitivity Panel - 19 15:45         









 Gentamicin susceptibility test by minimum inhibitory concentration            <
=            NRG        

 

 Trimethoprim/sulfamethoxazole susceptibility test by minimum 
inhibitoryconcentration            S             NRG        

 

 Levofloxacin susceptibility test by minimum inhibitory concentration          
  >             NRG        

 

 Ampicillin susceptibility test by minimum inhibitory concentration            <
=            NRG        

 

 Cefazolin susceptibility test by minimum inhibitory concentration            2
             NRG        

 

 Ceftriaxone susceptibility test by minimum inhibitory concentration            
<=            NRG        

 

 Piperacillin/tazobactam susceptibility test by minimum inhibitory 
concentration            =            NRG        

 

 Ciprofloxacin susceptibility test by minimum inhibitory concentration         
   >             NRG        

 

 Meropenem susceptibility test by minimum inhibitory concentration            <
=            NRG        

 

 Amoxicillin and clavulanate potassium susc LIAM            <=            NRG   
     

 

 Imipenem susceptibility test by minimum inhibitory concentration            <=
            NRG        









 Complete blood count (CBC) with automated white blood cell (WBC) differential 
- 19 05:20         









 Blood leukocytes automated count (number/volume)            12.6 10*3/uL      
      4.3-11.0        

 

 Blood erythrocytes automated count (number/volume)            3.79 10*6/uL    
        4.35-5.85        

 

 Venous blood hemoglobin measurement (mass/volume)            12.4 g/dL        
    13.3-17.7        

 

 Blood hematocrit (volume fraction)            37 %            40-54        

 

 Automated erythrocyte mean corpuscular volume            99 [foz_us]          
  80-99        

 

 Automated erythrocyte mean corpuscular hemoglobin (mass per erythrocyte)      
      33 pg            25-34        

 

 Automated erythrocyte mean corpuscular hemoglobin concentration measurement (
mass/volume)            33 g/dL            32-36        

 

 Automated erythrocyte distribution width ratio            16.6 %            
10.0-14.5        

 

 Automated blood platelet count (count/volume)            217 10*3/uL          
  130-400        

 

 Automated blood platelet mean volume measurement            9.8 [foz_us]      
      7.4-10.4        

 

 Automated blood neutrophils/100 leukocytes            83 %            42-75   
     

 

 Automated blood lymphocytes/100 leukocytes            10 %            12-44   
     

 

 Blood monocytes/100 leukocytes            7 %            0-12        

 

 Automated blood eosinophils/100 leukocytes            1 %            0-10     
   

 

 Automated blood basophils/100 leukocytes            0 %            0-10        

 

 Blood neutrophils automated count (number/volume)            10.4 10*3        
    1.8-7.8        

 

 Blood lymphocytes automated count (number/volume)            1.3 10*3         
   1.0-4.0        

 

 Blood monocytes automated count (number/volume)            0.8 10*3            
0.0-1.0        

 

 Automated eosinophil count            0.1 10*3/uL            0.0-0.3        

 

 Automated blood basophil count (count/volume)            0.0 10*3/uL          
  0.0-0.1        









 Whole blood basic metabolic panel - 19 05:20         









 Serum or plasma sodium measurement (moles/volume)            137 mmol/L       
     135-145        

 

 Serum or plasma potassium measurement (moles/volume)            4.0 mmol/L    
        3.6-5.0        

 

 Serum or plasma chloride measurement (moles/volume)            105 mmol/L     
               

 

 Carbon dioxide            19 mmol/L            21-32        

 

 Serum or plasma anion gap determination (moles/volume)            13 mmol/L   
         5-14        

 

 Serum or plasma urea nitrogen measurement (mass/volume)            19 mg/dL   
         7-18        

 

 Serum or plasma creatinine measurement (mass/volume)            1.36 mg/dL    
        0.60-1.30        

 

 Serum or plasma urea nitrogen/creatinine mass ratio            14             
NRG        

 

 Serum or plasma creatinine measurement with calculation of estimated 
glomerular filtration rate            52             NRG        

 

 Serum or plasma glucose measurement (mass/volume)            98 mg/dL         
           

 

 Serum or plasma calcium measurement (mass/volume)            9.3 mg/dL        
    8.5-10.1        









 PT panel in platelet poor plasma by coagulation assay - 19 05:30         









 Prothrombin time (PT) in platelet poor plasma by coagulation assay            
16.4 s            12.2-14.7        

 

 INR in platelet poor plasma or blood by coagulation assay            1.3      
       0.8-1.4        









 Complete blood count (CBC) with automated white blood cell (WBC) differential 
- 19 06:03         









 Blood leukocytes automated count (number/volume)            8.1 10*3/uL       
     4.3-11.0        

 

 Blood erythrocytes automated count (number/volume)            3.67 10*6/uL    
        4.35-5.85        

 

 Venous blood hemoglobin measurement (mass/volume)            12.1 g/dL        
    13.3-17.7        

 

 Blood hematocrit (volume fraction)            37 %            40-54        

 

 Automated erythrocyte mean corpuscular volume            100 [foz_us]         
   80-99        

 

 Automated erythrocyte mean corpuscular hemoglobin (mass per erythrocyte)      
      33 pg            25-34        

 

 Automated erythrocyte mean corpuscular hemoglobin concentration measurement (
mass/volume)            33 g/dL            32-36        

 

 Automated erythrocyte distribution width ratio            16.2 %            
10.0-14.5        

 

 Automated blood platelet count (count/volume)            209 10*3/uL          
  130-400        

 

 Automated blood platelet mean volume measurement            9.7 [foz_us]      
      7.4-10.4        

 

 Automated blood neutrophils/100 leukocytes            77 %            42-75   
     

 

 Automated blood lymphocytes/100 leukocytes            13 %            12-44   
     

 

 Blood monocytes/100 leukocytes            8 %            0-12        

 

 Automated blood eosinophils/100 leukocytes            2 %            0-10     
   

 

 Automated blood basophils/100 leukocytes            0 %            0-10        

 

 Blood neutrophils automated count (number/volume)            6.2 10*3         
   1.8-7.8        

 

 Blood lymphocytes automated count (number/volume)            1.0 10*3         
   1.0-4.0        

 

 Blood monocytes automated count (number/volume)            0.7 10*3            
0.0-1.0        

 

 Automated eosinophil count            0.2 10*3/uL            0.0-0.3        

 

 Automated blood basophil count (count/volume)            0.0 10*3/uL          
  0.0-0.1        









 Whole blood basic metabolic panel - 19 06:03         









 Serum or plasma sodium measurement (moles/volume)            139 mmol/L       
     135-145        

 

 Serum or plasma potassium measurement (moles/volume)            4.0 mmol/L    
        3.6-5.0        

 

 Serum or plasma chloride measurement (moles/volume)            108 mmol/L     
               

 

 Carbon dioxide            21 mmol/L            21-32        

 

 Serum or plasma anion gap determination (moles/volume)            10 mmol/L   
         5-14        

 

 Serum or plasma urea nitrogen measurement (mass/volume)            18 mg/dL   
         7-18        

 

 Serum or plasma creatinine measurement (mass/volume)            1.33 mg/dL    
        0.60-1.30        

 

 Serum or plasma urea nitrogen/creatinine mass ratio            14             
NRG        

 

 Serum or plasma creatinine measurement with calculation of estimated 
glomerular filtration rate            53             NRG        

 

 Serum or plasma glucose measurement (mass/volume)            147 mg/dL        
            

 

 Serum or plasma calcium measurement (mass/volume)            8.9 mg/dL        
    8.5-10.1        









 Complete blood count (CBC) with automated white blood cell (WBC) differential 
- 19 18:19         









 Blood leukocytes automated count (number/volume)            9.5 10*3/uL       
     4.3-11.0        

 

 Blood erythrocytes automated count (number/volume)            4.01 10*6/uL    
        4.35-5.85        

 

 Venous blood hemoglobin measurement (mass/volume)            13.2 g/dL        
    13.3-17.7        

 

 Blood hematocrit (volume fraction)            39 %            40-54        

 

 Automated erythrocyte mean corpuscular volume            98 [foz_us]          
  80-99        

 

 Automated erythrocyte mean corpuscular hemoglobin (mass per erythrocyte)      
      33 pg            25-34        

 

 Automated erythrocyte mean corpuscular hemoglobin concentration measurement (
mass/volume)            34 g/dL            32-36        

 

 Automated erythrocyte distribution width ratio            16.6 %            
10.0-14.5        

 

 Automated blood platelet count (count/volume)            264 10*3/uL          
  130-400        

 

 Automated blood platelet mean volume measurement            9.3 [foz_us]      
      7.4-10.4        

 

 Automated blood neutrophils/100 leukocytes            87 %            42-75   
     

 

 Automated blood lymphocytes/100 leukocytes            5 %            12-44    
    

 

 Blood monocytes/100 leukocytes            7 %            0-12        

 

 Automated blood eosinophils/100 leukocytes            1 %            0-10     
   

 

 Automated blood basophils/100 leukocytes            0 %            0-10        

 

 Blood neutrophils automated count (number/volume)            8.3 10*3         
   1.8-7.8        

 

 Blood lymphocytes automated count (number/volume)            0.4 10*3         
   1.0-4.0        

 

 Blood monocytes automated count (number/volume)            0.7 10*3            
0.0-1.0        

 

 Automated eosinophil count            0.1 10*3/uL            0.0-0.3        

 

 Automated blood basophil count (count/volume)            0.0 10*3/uL          
  0.0-0.1        









 PT panel in platelet poor plasma by coagulation assay - 19 18:19         









 Prothrombin time (PT) in platelet poor plasma by coagulation assay            
14.7 s            12.2-14.7        

 

 INR in platelet poor plasma or blood by coagulation assay            1.2      
       0.8-1.4        









 Activated partial thromboplastin time (aPTT) in platelet poor plasma 
bycoagulation assay - 19 18:19         









 Activated partial thromboplastin time (aPTT) in platelet poor plasma 
bycoagulation assay            32 s            24-35        









 Influenza virus A and B antigen detection - 19 18:19         









 FLU RESULT            NEGATIVE FOR INFLUENZA A AND B ANTIGENS BY IA           
  [facility] [place]        









 Blood manual differential performed detection - 19 18:19         









 Blood monocytes/100 leukocytes            3 %            NRG        

 

 Manual blood segmented neutrophils/100 leukocytes            84 %            
NRG        

 

 Blood band neutrophils/100 leukocytes            7 %            NRG        

 

 Manual blood lymphocytes/100 leukocytes            6 %            NRG        

 

 Blood erythrocyte morphology finding identification            NORMAL         
    NRG        









 Blood lactic acid measurement (moles/volume) - 19 18:19         









 Blood lactic acid measurement (moles/volume)            0.92 mmol/L            
0.50-2.00        









 Serum or plasma lithium measurement (moles/volume) - 19 18:19         









 BNP level            67.8 pg/mL            <100.0        









 Comprehensive metabolic panel - 19 18:19         









 Serum or plasma sodium measurement (moles/volume)            136 mmol/L       
     135-145        

 

 Serum or plasma potassium measurement (moles/volume)            4.2 mmol/L    
        3.6-5.0        

 

 Serum or plasma chloride measurement (moles/volume)            105 mmol/L     
               

 

 Carbon dioxide            21 mmol/L            21-32        

 

 Serum or plasma anion gap determination (moles/volume)            10 mmol/L   
         5-14        

 

 Serum or plasma urea nitrogen measurement (mass/volume)            18 mg/dL   
         7-18        

 

 Serum or plasma creatinine measurement (mass/volume)            1.42 mg/dL    
        0.60-1.30        

 

 Serum or plasma urea nitrogen/creatinine mass ratio            13             
NRG        

 

 Serum or plasma creatinine measurement with calculation of estimated 
glomerular filtration rate            49             NRG        

 

 Serum or plasma glucose measurement (mass/volume)            111 mg/dL        
            

 

 Serum or plasma calcium measurement (mass/volume)            9.9 mg/dL        
    8.5-10.1        

 

 Serum or plasma total bilirubin measurement (mass/volume)            0.4 mg/dL
            0.1-1.0        

 

 Serum or plasma alkaline phosphatase measurement (enzymatic activity/volume)  
          105 U/L                    

 

 Serum or plasma aspartate aminotransferase measurement (enzymatic activity/
volume)            21 U/L            5-34        

 

 Serum or plasma alanine aminotransferase measurement (enzymatic activity/volume
)            19 U/L            0-55        

 

 Serum or plasma protein measurement (mass/volume)            7.4 g/dL         
   6.4-8.2        

 

 Serum or plasma albumin measurement (mass/volume)            4.1 g/dL         
   3.2-4.5        

 

 CALCIUM CORRECTED            9.8 mg/dL            8.5-10.1        









 Magnesium - 19 18:19         









 Magnesium            1.5 mg/dL            1.8-2.4        









 Serum or plasma troponin i.cardiac measurement (mass/volume) - 19 18:19 
        









 Serum or plasma troponin i.cardiac measurement (mass/volume)            < ng/
mL            <0.028        









 Bacterial blood culture - 19 18:19         









 Bacterial blood culture            NG             NRG        









 Complete urinalysis with reflex to culture - 19 18:24         









 Urine color determination            YELLOW             NRG        

 

 Urine clarity determination            SLIGHTLY CLOUDY             NRG        

 

 Urine pH measurement by test strip            5             5-9        

 

 Specific gravity of urine by test strip            1.020             1.016-
1.022        

 

 Urine protein assay by test strip, semi-quantitative            NEGATIVE      
       NEGATIVE        

 

 Urine glucose detection by automated test strip            NEGATIVE           
  NEGATIVE        

 

 Erythrocytes detection in urine sediment by light microscopy            1+    
         NEGATIVE        

 

 Urine ketones detection by automated test strip            NEGATIVE           
  NEGATIVE        

 

 Urine nitrite detection by test strip            NEGATIVE             NEGATIVE
        

 

 Urine total bilirubin detection by test strip            NEGATIVE             
NEGATIVE        

 

 Urine urobilinogen measurement by automated test strip (mass/volume)          
  NORMAL             NORMAL        

 

 Urine leukocyte esterase detection by dipstick            1+             
NEGATIVE        

 

 Automated urine sediment erythrocyte count by microscopy (number/high power 
field)             [HPF]            NRG        

 

 Automated urine sediment leukocyte count by microscopy (number/high power field
)             [HPF]            NRG        

 

 Bacteria detection in urine sediment by light microscopy            TRACE     
        NRG        

 

 Crystals detection in urine sediment by light microscopy            NONE      
       NRG        

 

 Casts detection in urine sediment by light microscopy            NONE         
    NRG        

 

 Mucus detection in urine sediment by light microscopy            NEGATIVE     
        NRG        

 

 Complete urinalysis with reflex to culture            CULTURE PENDING         
    NRG        









 Bacterial urine culture - 19 18:24         









 Bacterial urine culture            NG             NRG        









 Bacterial blood culture - 19 18:40         









 Bacterial blood culture            NG             NRG        









 Sputum Gram stain - 19 20:56         









 Sputum Gram stain            2-, 1605             [facility] [place]        









 Bacterial sputum culture - 19 20:56         









 QUANTITY OF GROWTH            .             NRG        

 

 Bacterial sputum culture            USUAL RESP             NRG        









 Capillary blood glucose measurement by glucometer (mass/volume) - 19 21:
44         









 Capillary blood glucose measurement by glucometer (mass/volume)            124 
mg/dL                    









 Complete blood count (CBC) with automated white blood cell (WBC) differential 
- 19 04:05         









 Blood leukocytes automated count (number/volume)            5.8 10*3/uL       
     4.3-11.0        

 

 Blood erythrocytes automated count (number/volume)            4.00 10*6/uL    
        4.35-5.85        

 

 Venous blood hemoglobin measurement (mass/volume)            13.1 g/dL        
    13.3-17.7        

 

 Blood hematocrit (volume fraction)            39 %            40-54        

 

 Automated erythrocyte mean corpuscular volume            98 [foz_us]          
  80-99        

 

 Automated erythrocyte mean corpuscular hemoglobin (mass per erythrocyte)      
      33 pg            25-34        

 

 Automated erythrocyte mean corpuscular hemoglobin concentration measurement (
mass/volume)            34 g/dL            32-36        

 

 Automated erythrocyte distribution width ratio            16.6 %            
10.0-14.5        

 

 Automated blood platelet count (count/volume)            237 10*3/uL          
  130-400        

 

 Automated blood platelet mean volume measurement            9.4 [foz_us]      
      7.4-10.4        

 

 Automated blood neutrophils/100 leukocytes            93 %            42-75   
     

 

 Automated blood lymphocytes/100 leukocytes            6 %            12-44    
    

 

 Blood monocytes/100 leukocytes            1 %            0-12        

 

 Automated blood eosinophils/100 leukocytes            0 %            0-10     
   

 

 Automated blood basophils/100 leukocytes            0 %            0-10        

 

 Blood neutrophils automated count (number/volume)            5.4 10*3         
   1.8-7.8        

 

 Blood lymphocytes automated count (number/volume)            0.4 10*3         
   1.0-4.0        

 

 Blood monocytes automated count (number/volume)            0.1 10*3            
0.0-1.0        

 

 Automated eosinophil count            0.0 10*3/uL            0.0-0.3        

 

 Automated blood basophil count (count/volume)            0.0 10*3/uL          
  0.0-0.1        









 Comprehensive metabolic panel - 19 04:05         









 Serum or plasma sodium measurement (moles/volume)            139 mmol/L       
     135-145        

 

 Serum or plasma potassium measurement (moles/volume)            4.3 mmol/L    
        3.6-5.0        

 

 Serum or plasma chloride measurement (moles/volume)            107 mmol/L     
               

 

 Carbon dioxide            21 mmol/L            21-32        

 

 Serum or plasma anion gap determination (moles/volume)            11 mmol/L   
         5-14        

 

 Serum or plasma urea nitrogen measurement (mass/volume)            21 mg/dL   
         7-18        

 

 Serum or plasma creatinine measurement (mass/volume)            1.35 mg/dL    
        0.60-1.30        

 

 Serum or plasma urea nitrogen/creatinine mass ratio            16             
NRG        

 

 Serum or plasma creatinine measurement with calculation of estimated 
glomerular filtration rate            52             NRG        

 

 Serum or plasma glucose measurement (mass/volume)            157 mg/dL        
            

 

 Serum or plasma calcium measurement (mass/volume)            9.3 mg/dL        
    8.5-10.1        

 

 Serum or plasma total bilirubin measurement (mass/volume)            0.3 mg/dL
            0.1-1.0        

 

 Serum or plasma alkaline phosphatase measurement (enzymatic activity/volume)  
          87 U/L                    

 

 Serum or plasma aspartate aminotransferase measurement (enzymatic activity/
volume)            23 U/L            5-34        

 

 Serum or plasma alanine aminotransferase measurement (enzymatic activity/volume
)            18 U/L            0-55        

 

 Serum or plasma protein measurement (mass/volume)            6.8 g/dL         
   6.4-8.2        

 

 Serum or plasma albumin measurement (mass/volume)            3.8 g/dL         
   3.2-4.5        

 

 CALCIUM CORRECTED            9.5 mg/dL            8.5-10.1        









 Magnesium - 19 04:05         









 Magnesium            2.3 mg/dL            1.8-2.4        









 Capillary blood glucose measurement by glucometer (mass/volume) - 19 11:
08         









 Capillary blood glucose measurement by glucometer (mass/volume)            150 
mg/dL                    









 Capillary blood glucose measurement by glucometer (mass/volume) - 19 16:
12         









 Capillary blood glucose measurement by glucometer (mass/volume)            140 
mg/dL                    









 Capillary blood glucose measurement by glucometer (mass/volume) - 19 19:
55         









 Capillary blood glucose measurement by glucometer (mass/volume)            181 
mg/dL                    









 Capillary blood glucose measurement by glucometer (mass/volume) - 19 05:
51         









 Capillary blood glucose measurement by glucometer (mass/volume)            131 
mg/dL                    









 Complete blood count (CBC) with automated white blood cell (WBC) differential 
- 19 06:15         









 Blood leukocytes automated count (number/volume)            11.9 10*3/uL      
      4.3-11.0        

 

 Blood erythrocytes automated count (number/volume)            4.10 10*6/uL    
        4.35-5.85        

 

 Venous blood hemoglobin measurement (mass/volume)            13.3 g/dL        
    13.3-17.7        

 

 Blood hematocrit (volume fraction)            40 %            40-54        

 

 Automated erythrocyte mean corpuscular volume            98 [foz_us]          
  80-99        

 

 Automated erythrocyte mean corpuscular hemoglobin (mass per erythrocyte)      
      32 pg            25-34        

 

 Automated erythrocyte mean corpuscular hemoglobin concentration measurement (
mass/volume)            33 g/dL            32-36        

 

 Automated erythrocyte distribution width ratio            16.4 %            
10.0-14.5        

 

 Automated blood platelet count (count/volume)            241 10*3/uL          
  130-400        

 

 Automated blood platelet mean volume measurement            9.3 [foz_us]      
      7.4-10.4        

 

 Automated blood neutrophils/100 leukocytes            90 %            42-75   
     

 

 Automated blood lymphocytes/100 leukocytes            5 %            12-44    
    

 

 Blood monocytes/100 leukocytes            6 %            0-12        

 

 Automated blood eosinophils/100 leukocytes            0 %            0-10     
   

 

 Automated blood basophils/100 leukocytes            0 %            0-10        

 

 Blood neutrophils automated count (number/volume)            10.6 10*3        
    1.8-7.8        

 

 Blood lymphocytes automated count (number/volume)            0.6 10*3         
   1.0-4.0        

 

 Blood monocytes automated count (number/volume)            0.7 10*3            
0.0-1.0        

 

 Automated eosinophil count            0.0 10*3/uL            0.0-0.3        

 

 Automated blood basophil count (count/volume)            0.0 10*3/uL          
  0.0-0.1        









 Whole blood basic metabolic panel - 19 06:15         









 Serum or plasma sodium measurement (moles/volume)            141 mmol/L       
     135-145        

 

 Serum or plasma potassium measurement (moles/volume)            4.2 mmol/L    
        3.6-5.0        

 

 Serum or plasma chloride measurement (moles/volume)            106 mmol/L     
               

 

 Carbon dioxide            22 mmol/L            21-32        

 

 Serum or plasma anion gap determination (moles/volume)            13 mmol/L   
         5-14        

 

 Serum or plasma urea nitrogen measurement (mass/volume)            24 mg/dL   
         7-18        

 

 Serum or plasma creatinine measurement (mass/volume)            1.22 mg/dL    
        0.60-1.30        

 

 Serum or plasma urea nitrogen/creatinine mass ratio            20             
NRG        

 

 Serum or plasma creatinine measurement with calculation of estimated 
glomerular filtration rate            58             NRG        

 

 Serum or plasma glucose measurement (mass/volume)            133 mg/dL        
            

 

 Serum or plasma calcium measurement (mass/volume)            10.2 mg/dL       
     8.5-10.1        









 Arterial blood gas measurement - 19 10:47         









 Blood pCO2            36 mm[Hg]            35-45        

 

 Blood pO2            94 mm[Hg]            79-93        

 

 Arterial blood bicarbonate measurement (moles/volume)            25 mmol/L    
        23-27        

 

 Arterial blood base excess by calculation            1.1 mmol/L            -2.5
-2.5        

 

 Arterial blood oxygen saturation measurement            96 %            
        

 

 * Inhaled oxygen flow rate            3L             NRG        

 

 Arterial blood pH measurement with patient temperature correction            
7.45             7.37-7.43        

 

 Arterial blood carbon dioxide, total measurement (moles/volume)            
25.9 mmol/L            21.0-31.0        

 

 Body site            RR             NRG        

 

 Assessment of wrist artery patency prior to arterial puncture            YES-
POS             NRG        

 

 Setting of ventilation mode            NO             NRG        

 

 Measurement of body temperature            98.4             NRG        









 Capillary blood glucose measurement by glucometer (mass/volume) - 19 11:
23         









 Capillary blood glucose measurement by glucometer (mass/volume)            121 
mg/dL                    









 Capillary blood glucose measurement by glucometer (mass/volume) - 19 16:
35         









 Capillary blood glucose measurement by glucometer (mass/volume)            131 
mg/dL                    









 Capillary blood glucose measurement by glucometer (mass/volume) - 19 20:
57         









 Capillary blood glucose measurement by glucometer (mass/volume)            136 
mg/dL                    









 Capillary blood glucose measurement by glucometer (mass/volume) - 19 05:
42         









 Capillary blood glucose measurement by glucometer (mass/volume)            128 
mg/dL                    



                                                                               
                                                                     



Encounters

      





 ACCT No.            Visit Date/Time            Discharge            Status    
        Pt. Type            Provider            Facility            Loc./Unit  
          Complaint        

 

 [id number]            2019 19:40:00            2019 10:00:00    
        DIS            Inpatient            PATRICIO MATIAS, JESSICA COELLO            [facility]            4TH            COPD EXACERBATION,
POSSILBE PNEUMONIA,HPOXIA        

 

 [id number]            2019 07:23:00            2019 23:59:59    
        CLS            Preadmit            NOHELIA AUGUST MD            [facility]            CARD            CAD,DYSPNEA        

 

 [id number]            2019 09:25:00            2019 23:59:59    
        CLS            Outpatient            EDUAR JOHNSON DO            
[facility]            RAD            K21.9 GASTRO-
ESOPHAGEAL REFLUX DISEASE        

 

 [id number]            2019 10:25:00            2019 14:09:00    
        DIS            Inpatient            JACI MATIAS, DONALD SMITH            [facility]            4TH            INFLUENZA SYNDROME RESP 
DISTRESS        

 

 [id number]            2018 08:15:00            2018 23:59:59    
        CLS            Preadmit            ZACH DIEGO APRN            
[facility]            PULM            J44.9 COPD        

 

 [id number]            2018 08:00:00            2018 00:01:00    
        DIS            Outpatient            ZACH DIEGO APRN          
  [facility]            PULM            J44.9 COPD        

 

 [id number]            12/10/2018 09:13:00            12/10/2018 23:59:59    
        CLS            Outpatient            ZACH DIEGO APRN          
  [facility]            RAD            ENLARGED LYMPH 
NODES        

 

 [id number]            2018 08:04:00            2018 00:01:00    
        DIS            Outpatient            ZACH DIEGO APRN          
  [facility]            PULM            J44.9 COPD        

 

 [id number]            2018 11:07:00            2018 23:59:59    
        CLS            Outpatient            EDUAR JOHNSON DO            
[facility]            RAD            HIP PAIN        

 

 [id number]            2018 10:09:00            2018 23:59:59    
        CLS            Outpatient            ZACH DIEGO APRN          
  [facility]            RAD            R59.9 LYMPH NODE 
ENLARGEMENT        

 

 [id number]            2018 06:00:00            2018 12:20:00    
        DIS            Outpatient            MICH COLLIER DO            [facility]            SDC            UMBILICAL HERNIA        

 

 [id number]            2018 09:33:00            2018 10:20:00    
        DIS            Outpatient            MICH COLLIER DO            [facility]            PREOP            UMBILICAL HERNIA      
  

 

 [id number]            2018 12:49:00            2018 15:30:00    
        DIS            Outpatient            MICH COLLIER DO            [facility]            ENDO            REFLUX, 
GASTROINTESTINAL BLEEDING        

 

 [id number]            2018 05:40:00            2018 12:13:00    
        DIS            Outpatient            MICH COLLIER DO            [facility]            PREOP            REFLUX,
GASTROINTESTINAL BLEEDING        

 

 [id number]            2018 14:58:00            04/10/2018 17:50:00    
        DIS            Inpatient            NOHELIA AUGUST MD            [facility]            4TH            SOB        

 

 [id number]            2018 10:39:00            2018 23:59:59    
        CLS            Outpatient            NOHELIA AUGUST MD            [facility]            RAD            CAD,CONFUSION        

 

 [id number]            2017 15:06:00            2017 23:59:59    
        CLS            Outpatient            DANA DILLON DO            [facility]            RT            DYSPNEA R06.02        

 

 [id number]            2017 16:45:00            2017 16:45:00    
        CAN            Preadmit            DANA DILLON DO            [facility]            RT            DYSPNEA,HX OF TOBACCO USE,
MARYANA ON CPAP        

 

 [id number]            2017 08:48:00            2017 15:30:00    
        DIS            Outpatient            NOHELIA AUGUST MD            [facility]            CATH            ANM STRESS TEST,CAD    
    

 

 [id number]            2017 08:13:00            2017 23:59:59    
        CLS            Outpatient            TAE CHRISTIANSEN    
        [facility]            CARD            CAD,MARYANA ON 
CPAP,TIA        

 

 [id number]            2017 08:51:00            2017 15:30:00    
        DIS            Outpatient            MICH COLLIER DO            [facility]            SDC            OCCULT STOOLS        

 

 [id number]            2016 05:47:00            2016 13:50:00    
        DIS            Outpatient            MICH COLLIER DO            [facility]            PREOP            OCCULT STOOLS        

 

 [id number]            2016 10:15:00            2016 23:59:59    
        CLS            Preadmit            ELIZABETH DAVISEDUAR            
[facility]            PULM            COPD        

 

 [id number]            2016 10:15:00            2016 00:01:00    
        DIS            Outpatient            ELIZABETH EDUAR DAVIS            
[facility]            PULM            COPD        

 

 [id number]            2016 14:31:00            2016 23:59:59    
        CLS            Outpatient            JOHNSON EDUAR DAVIS            
[facility]            RAD            ABD PAIN        

 

 [id number]            2015 14:00:00            2016 13:50:00    
        DIS            Inpatient            ELIZABETH DAVISEDUAR            
[facility]            4TH            PNEUMONIA        

 

 [id number]            2015 09:57:00            2015 10:38:00    
        DIS            Outpatient            BELKIS WEST MD            [facility] 
[facility]            REHAB            CERVICAL SPONDYLOSIS  
      

 

 [id number]            06/15/2015 14:36:00            06/15/2015 23:59:59    
        CLS            Outpatient            ARLENE SAENZ DO            
[facility]            RAD            DDD        

 

 [id number]            2015 09:53:00            2015 11:51:00    
        DIS            Outpatient            ARLENE SAENZ DO            
[facility]            REHAB            S/P R SHLD SCOPE 
WITH SAD AND DEBRIDEMENT        

 

 [id number]            2015 12:27:00            2015 23:59:59    
        CLS            Outpatient            ARLENE SAENZ DO            
[facility]            RIGHT SHOULDER TORN 
ROTATOR CUFF        

 

 [id number]            2015 06:15:00            2015 23:59:59    
        CLS            Outpatient            ARLENE SAENZ DO            
[facility]            PREOP            RIGHT SHOULDER 
TORN ROTATOR CUFF        

 

 [id number]            2015 08:58:00            2015 11:36:00    
        DIS            Outpatient            ARLENE SAENZ DO            
[facility]            RIGHT SHOULDER 
ROTATOR CUFF TEAR        

 

 [id number]            2015 05:50:00            2015 23:59:59    
        CLS            Outpatient            ARLENE SAENZ DO            
[facility]            PREOP            RIGHT SHOULDER 
ROTATOR CUFF TEAR        

 

 [id number]            2015 12:14:00            2015 23:59:59    
        CLS            Outpatient            NOHELIA AUGUST MD            [facility] 
[facility]            CARD            CAD,MATT,DYSPNEA        

 

 [id number]            2015 09:05:00            2015 23:59:59    
        CLS            Outpatient            NOHELIA AUGUST MD            [facility] 
[facility]            CARD            CAD        

 

 [id number]            12/10/2014 05:50:00            12/10/2014 23:59:59    
        CLS            Outpatient            ARLENE SAENZ DO            
[facility]            PREOP            RIGHT SHOULDER 
ROTATOR  CUFF TEAR        

 

 [id number]            2014 17:15:00            2014 10:00:00    
        DIS            Inpatient            EDUAR JOHNSON DO            
[facility]            4TH            TIA CONFUSION 
DIFFICULT WORD FINDING        

 

 [id number]            2014 13:45:00            2014 11:55:00    
        DIS            Outpatient            ARLENE SAENZ DO            
[facility]            REHAB            R SHOULDER 
ADHESIVE CAPSULITIS        

 

 [id number]            2014 07:52:00            2014 23:59:59    
        CLS            Outpatient            ARLENE SAENZ DO            
[facility]            RAD            RT SHOULDER 
ADHEISIVE CAPSULITIS        

 

 [id number]            11/10/2014 12:37:00            11/10/2014 23:59:59    
        CLS            Outpatient            LETTY DAVIS ARLENE LINDA            
[facility]            RAD            RT SHOULDER 
ADHEISIVE CAPSULITIS        

 

 [id number]            09/10/2014 10:59:00            09/10/2014 23:59:59    
        CLS            Outpatient            DANA DILLON DO            [facility] 
[facility]            RAD            DYSPNEA,PE,RENAL FAILURE
        

 

 [id number]            2014 13:57:00            2014 15:35:00    
        DIS            Inpatient            SHIRLENE MCDONALD MD            [facility]            IRF            WEAKNESS        

 

 [id number]            2014 10:19:00            2014 13:15:00    
        DIS            Inpatient            EDUAR JOHNSON DO            
[facility]            ICU            PROBABLE RL 
PNEUMONIA        

 

 [id number]            2014 12:50:00            2014 23:59:59    
        CLS            Outpatient            EDUAR JOHNSON DO            
[facility]            SDC            OSTEO        

 

 [id number]            12/10/2019 10:15:00                         PEN       
     Preadmit            ZACH DIEGO            [facility]            RAD            DYSPNEA,NICOTINE DEPENDENCE,
PLEURAL EFFUSION        

 

 [id number]            2016 10:15:00                                   
   Document Registration                                                       
     

 

 [id number]            2015 13:24:00                                   
   Document Registration                                                       
     

 

 [id number]            2013 13:05:00                                   
   Document Registration                                                       
     

 

 [id number]            2012 12:32:00                                   
   Document Registration                                                       
     

 

 [id number]            03/15/2011 12:55:00                                   
   Document Registration                                                       
     

 

 [id number]            2015 09:57:51                         [place]            
Document Registration

## 2019-03-19 VITALS — DIASTOLIC BLOOD PRESSURE: 62 MMHG | SYSTOLIC BLOOD PRESSURE: 118 MMHG

## 2019-03-19 VITALS — DIASTOLIC BLOOD PRESSURE: 58 MMHG | SYSTOLIC BLOOD PRESSURE: 104 MMHG

## 2019-03-19 VITALS — DIASTOLIC BLOOD PRESSURE: 59 MMHG | SYSTOLIC BLOOD PRESSURE: 113 MMHG

## 2019-03-19 VITALS — DIASTOLIC BLOOD PRESSURE: 59 MMHG | SYSTOLIC BLOOD PRESSURE: 110 MMHG

## 2019-03-19 VITALS — DIASTOLIC BLOOD PRESSURE: 74 MMHG | SYSTOLIC BLOOD PRESSURE: 132 MMHG

## 2019-03-19 VITALS — SYSTOLIC BLOOD PRESSURE: 146 MMHG | DIASTOLIC BLOOD PRESSURE: 83 MMHG

## 2019-03-19 VITALS — DIASTOLIC BLOOD PRESSURE: 59 MMHG | SYSTOLIC BLOOD PRESSURE: 108 MMHG

## 2019-03-19 VITALS — SYSTOLIC BLOOD PRESSURE: 116 MMHG | DIASTOLIC BLOOD PRESSURE: 56 MMHG

## 2019-03-19 VITALS — DIASTOLIC BLOOD PRESSURE: 68 MMHG | SYSTOLIC BLOOD PRESSURE: 115 MMHG

## 2019-03-19 LAB
ALBUMIN SERPL-MCNC: 3.5 GM/DL (ref 3.2–4.5)
ALP SERPL-CCNC: 94 U/L (ref 40–136)
ALT SERPL-CCNC: 12 U/L (ref 0–55)
BASOPHILS # BLD AUTO: 0 10^3/UL (ref 0–0.1)
BASOPHILS NFR BLD AUTO: 0 % (ref 0–10)
BILIRUB SERPL-MCNC: 0.6 MG/DL (ref 0.1–1)
BUN/CREAT SERPL: 16
CALCIUM SERPL-MCNC: 9.3 MG/DL (ref 8.5–10.1)
CHLORIDE SERPL-SCNC: 107 MMOL/L (ref 98–107)
CO2 SERPL-SCNC: 21 MMOL/L (ref 21–32)
CREAT SERPL-MCNC: 1.48 MG/DL (ref 0.6–1.3)
EOSINOPHIL # BLD AUTO: 0 10^3/UL (ref 0–0.3)
EOSINOPHIL NFR BLD AUTO: 0 % (ref 0–10)
ERYTHROCYTE [DISTWIDTH] IN BLOOD BY AUTOMATED COUNT: 15.5 % (ref 10–14.5)
GFR SERPLBLD BASED ON 1.73 SQ M-ARVRAT: 47 ML/MIN
GLUCOSE SERPL-MCNC: 189 MG/DL (ref 70–105)
HCT VFR BLD CALC: 37 % (ref 40–54)
HGB BLD-MCNC: 12.2 G/DL (ref 13.3–17.7)
LYMPHOCYTES # BLD AUTO: 0.4 X 10^3 (ref 1–4)
LYMPHOCYTES NFR BLD AUTO: 3 % (ref 12–44)
MANUAL DIFFERENTIAL PERFORMED BLD QL: NO
MCH RBC QN AUTO: 32 PG (ref 25–34)
MCHC RBC AUTO-ENTMCNC: 33 G/DL (ref 32–36)
MCV RBC AUTO: 98 FL (ref 80–99)
MONOCYTES # BLD AUTO: 0.1 X 10^3 (ref 0–1)
MONOCYTES NFR BLD AUTO: 1 % (ref 0–12)
NEUTROPHILS # BLD AUTO: 13.3 X 10^3 (ref 1.8–7.8)
NEUTROPHILS NFR BLD AUTO: 96 % (ref 42–75)
PLATELET # BLD: 237 10^3/UL (ref 130–400)
PMV BLD AUTO: 9.4 FL (ref 7.4–10.4)
POTASSIUM SERPL-SCNC: 4.1 MMOL/L (ref 3.6–5)
PROT SERPL-MCNC: 6.6 GM/DL (ref 6.4–8.2)
SODIUM SERPL-SCNC: 136 MMOL/L (ref 135–145)
WBC # BLD AUTO: 13.8 10^3/UL (ref 4.3–11)

## 2019-03-19 RX ADMIN — IPRATROPIUM BROMIDE AND ALBUTEROL SULFATE SCH ML: .5; 3 SOLUTION RESPIRATORY (INHALATION) at 07:15

## 2019-03-19 RX ADMIN — METHYLPREDNISOLONE SODIUM SUCCINATE SCH MG: 125 INJECTION, POWDER, FOR SOLUTION INTRAMUSCULAR; INTRAVENOUS at 18:49

## 2019-03-19 RX ADMIN — Medication SCH ML: at 13:56

## 2019-03-19 RX ADMIN — METHYLPREDNISOLONE SODIUM SUCCINATE SCH MG: 125 INJECTION, POWDER, FOR SOLUTION INTRAMUSCULAR; INTRAVENOUS at 05:36

## 2019-03-19 RX ADMIN — IPRATROPIUM BROMIDE AND ALBUTEROL SULFATE SCH ML: .5; 3 SOLUTION RESPIRATORY (INHALATION) at 18:40

## 2019-03-19 RX ADMIN — ACETAMINOPHEN PRN MG: 500 TABLET ORAL at 15:56

## 2019-03-19 RX ADMIN — METHYLPREDNISOLONE SODIUM SUCCINATE SCH MG: 125 INJECTION, POWDER, FOR SOLUTION INTRAMUSCULAR; INTRAVENOUS at 13:20

## 2019-03-19 RX ADMIN — GUAIFENESIN SCH MG: 600 TABLET, EXTENDED RELEASE ORAL at 09:10

## 2019-03-19 RX ADMIN — Medication SCH ML: at 22:53

## 2019-03-19 RX ADMIN — IPRATROPIUM BROMIDE AND ALBUTEROL SULFATE SCH ML: .5; 3 SOLUTION RESPIRATORY (INHALATION) at 14:56

## 2019-03-19 RX ADMIN — ACETAMINOPHEN PRN MG: 500 TABLET ORAL at 10:45

## 2019-03-19 RX ADMIN — IPRATROPIUM BROMIDE AND ALBUTEROL SULFATE SCH ML: .5; 3 SOLUTION RESPIRATORY (INHALATION) at 10:46

## 2019-03-19 RX ADMIN — NICOTINE SCH MG: 21 PATCH, EXTENDED RELEASE TRANSDERMAL at 09:10

## 2019-03-19 RX ADMIN — GENTAMICIN SULFATE SCH MLS/HR: 40 INJECTION, SOLUTION INTRAMUSCULAR; INTRAVENOUS at 08:10

## 2019-03-19 RX ADMIN — SODIUM CHLORIDE SCH MLS/HR: 900 INJECTION INTRAVENOUS at 20:40

## 2019-03-19 RX ADMIN — Medication SCH ML: at 05:36

## 2019-03-19 RX ADMIN — GUAIFENESIN SCH MG: 600 TABLET, EXTENDED RELEASE ORAL at 20:39

## 2019-03-19 RX ADMIN — IPRATROPIUM BROMIDE AND ALBUTEROL SULFATE SCH ML: .5; 3 SOLUTION RESPIRATORY (INHALATION) at 22:54

## 2019-03-19 RX ADMIN — IPRATROPIUM BROMIDE AND ALBUTEROL SULFATE SCH ML: .5; 3 SOLUTION RESPIRATORY (INHALATION) at 03:06

## 2019-03-19 NOTE — DIAGNOSTIC IMAGING REPORT
INDICATION: Basilar infiltrates 



COMPARISON: 02/28/2019



FINDINGS:

Single view of the chest demonstrates cardiac enlargement with

unchanged interstitial infiltrates likely chronic. There is no

pneumothorax, effusion or consolidation. Osseous structures are

stable.



 IMPRESSION: Stable cardiac enlargement with bilateral

interstitial infiltrates.



Dictated by: 



  Dictated on workstation # [id number]

## 2019-03-19 NOTE — NUR
Supervisor consulted about gentamycin one time dose order for pt. This RN advised to pass on 
to dayshift as this medication must be mixed by pharmacy. Will pass on in shift report to 
day RN.

## 2019-03-19 NOTE — NUR
GENTAMICIN RANDOM LEVEL = 5.2 (BETWEEN 9 - 10 HR LEVEL) OK TO CONTINUE CURRENT

DOSE AND FREQUENCY Father/Mother

## 2019-03-19 NOTE — HISTORY & PHYSICAL-HOSPITALIST
History of Present Illness


HPI/Chief Complaint


The patient is a 73-year-old white male well-known to me.  He has had multiple 

previous admissions for lung related problems.  He has finally reached the 

point where he is ready to attempt this ceasing his cigarette habit.  He was 

last admitted here on 19.  He also had an admission in January.  He 

reports that last night after having a relatively good day for him and while at 

home alone he sat down and suddenly felt extremely ill.  His wife returned home 

to find him unable to get up out of the chair.  On arrival here his temperature 

was 102.2.  His white blood count was 14.7.  His creatinine was 1.4 his UA was 

negative.  His chest x-ray showed a similar interstitial fibrosis pattern as 

compared to his last admission.


Source:  patient


Date Seen


3/19/19


Time Seen by a Provider:  15:02


Attending Physician


Aly Curry MD


PCP


Walker Velasquez DO


Referring Physician





Date of Admission


Mar 18, 2019 at 22:40





Home Medications & Allergies


Home Medications


Reviewed patient Home Medication Reconciliation performed by pharmacy 

medication reconciliations technician and/or nursing.


Patients Allergies have been reviewed.





Allergies





Allergies


Coded Allergies


  No Known Drug Allergies (Verified18)








Past Medical-Social-Family Hx


Patient Social History


Alcohol Use:  Denies Use


Recreational Drug Use:  No


Smoking Status:  Current Everyday Smoker (1 PPD)


Type Used:  Cigarettes


Recent Foreign Travel:  No


Contact w/other who traveled:  No


Recent Hopitalizations:  Yes


Recent Infectious Disease Expo:  No





Immunizations Up To Date


Tetanus Booster (TDap):  More than 5yrs


Date of Pneumonia Vaccine:  Aug 1, 2015


Date of Influenza Vaccine:  2018





Seasonal Allergies


Seasonal Allergies:  Yes





Past Medical History


Surgeries:  Appendectomy, Cardiac, Coronary Stent, Gallbladder, Neurological, 

Orthopedic, Vascular Surgery


Currently Using CPAP:  Yes


Currently Using BIPAP:  No


Cardiac:  Coronary Artery Disease, High Cholesterol, Hypertension


Neurological:  Stroke, TIA, Traumatic Brain Injury


Reproductive:  No


Sexually Transmitted Disease:  No


HIV/AIDS:  No


Genitourinary:  Renal Failure


Gastrointestinal:  Abdominal Hernia, Gastroesophageal Reflux, Gastrointestinal 

Bleed, Chronic Constipation


Musculoskeletal:  Degenerate Disk Disease, Osteoporosis, Arthritis, Back Injury

, Chronic Back Pain


Endocrine:  Hypothyroidsim, Diabetes, Non-Insulin dep


HEENT:  Tinnitis, Eye Injury, Glaucoma


Loss of Vision:  Right


Hearing Impairment:  Hard of Hearing


Psychosocial:  Depression


History of Blood Disorders:  No


Adverse Reaction to Blood Barrera:  No





Family History





Alcoholism


  19 FATHER, 


Cancer


Family history: Asthma


  19 MOTHER


Family history: Osteoporosis


  19 MOTHER


Hearing loss


  19 MOTHER


No Pertinent Family Hx





Physical Exam


Physical Exam


Vital Signs





Vital Signs - First Documented








 3/18/19 3/18/19 3/19/19





 21:06 23:46 00:28


 


Temp 102.2  


 


Pulse 85  


 


Resp 24  


 


B/P (MAP) 146/83 (104)  


 


O2 Delivery Room Air  


 


O2 Flow Rate  3.00 


 


FiO2   21





Capillary Refill : Less Than 3 Seconds


Height, Weight, BMI


Height: 5'11.00"


Weight: 225lbs. 5.0oz. 102.239947la; 31.3 BMI


Method:Stated





Results


Results/Procedures


Labs


Laboratory Tests


3/18/19 21:24








3/19/19 05:20








Patient resulted labs reviewed.





Clinical Quality Measures


DVT/VTE Risk/Contraindication:


Risk Factor Score Per Nursin


RFS Level Per Nursing on Admit:  4+=Very High











ALY CURRY MD Mar 19, 2019 15:05

## 2019-03-19 NOTE — NUR
HAD A LIST FAXED OVER FROM THE [facility] AND WENT OVER THAT WITH THE PATIENT. HE VERIFIED 
HOW HE TAKES THEM AND STATES NOTHING HAS CHANGED SINCE HE LAST VISIT EXCEPT HE COMPLETED THE 
ANTIBIOTICS AND PROBIOTIC. HE ALSO STATES HIS ASPIRIN WAS DECREASED FROM 325MG TO 81MG 
DAILY.



[facility] FOR DETAILS

## 2019-03-19 NOTE — NUR
PTD GENTAMCIN

LABS: SCR 1.48 (1.44)

PLAN: 7MG/KG BASED ON ADJUSTED BODY WEIGHT (86KG) ~ 600MG IV Q24 HOURS, WE WILL CHECK A 10 
HOUR RANDOM LEVEL AND ADJUST BASED ON EFREN NOMOGRAM.  PATIENT DID NOT RECEIVE 100MG IV 
FOR AFTER HOURS DOSING (ON CALL PHARMACIST NOT NOTIFIED).

## 2019-03-20 VITALS — SYSTOLIC BLOOD PRESSURE: 130 MMHG | DIASTOLIC BLOOD PRESSURE: 68 MMHG

## 2019-03-20 VITALS — SYSTOLIC BLOOD PRESSURE: 120 MMHG | DIASTOLIC BLOOD PRESSURE: 64 MMHG

## 2019-03-20 VITALS — DIASTOLIC BLOOD PRESSURE: 72 MMHG | SYSTOLIC BLOOD PRESSURE: 138 MMHG

## 2019-03-20 VITALS — DIASTOLIC BLOOD PRESSURE: 63 MMHG | SYSTOLIC BLOOD PRESSURE: 121 MMHG

## 2019-03-20 VITALS — DIASTOLIC BLOOD PRESSURE: 74 MMHG | SYSTOLIC BLOOD PRESSURE: 128 MMHG

## 2019-03-20 RX ADMIN — ACETAMINOPHEN PRN MG: 500 TABLET ORAL at 00:58

## 2019-03-20 RX ADMIN — SIMVASTATIN SCH MG: 10 TABLET, FILM COATED ORAL at 20:37

## 2019-03-20 RX ADMIN — LEVOTHYROXINE SODIUM SCH MCG: 25 TABLET ORAL at 18:28

## 2019-03-20 RX ADMIN — IPRATROPIUM BROMIDE AND ALBUTEROL SULFATE SCH ML: .5; 3 SOLUTION RESPIRATORY (INHALATION) at 19:40

## 2019-03-20 RX ADMIN — NICOTINE SCH MG: 21 PATCH, EXTENDED RELEASE TRANSDERMAL at 09:26

## 2019-03-20 RX ADMIN — GENTAMICIN SULFATE SCH MLS/HR: 40 INJECTION, SOLUTION INTRAMUSCULAR; INTRAVENOUS at 09:26

## 2019-03-20 RX ADMIN — IPRATROPIUM BROMIDE AND ALBUTEROL SULFATE SCH ML: .5; 3 SOLUTION RESPIRATORY (INHALATION) at 07:46

## 2019-03-20 RX ADMIN — GUAIFENESIN SCH MG: 600 TABLET, EXTENDED RELEASE ORAL at 20:37

## 2019-03-20 RX ADMIN — GABAPENTIN SCH MG: 300 CAPSULE ORAL at 20:37

## 2019-03-20 RX ADMIN — OMEGA-3 FATTY ACIDS CAP 1000 MG SCH MG: 1000 CAP at 13:05

## 2019-03-20 RX ADMIN — Medication SCH ML: at 14:35

## 2019-03-20 RX ADMIN — FAMOTIDINE SCH MG: 20 TABLET, FILM COATED ORAL at 20:37

## 2019-03-20 RX ADMIN — SUCRALFATE SCH GM: 1 TABLET ORAL at 20:37

## 2019-03-20 RX ADMIN — IPRATROPIUM BROMIDE AND ALBUTEROL SULFATE SCH ML: .5; 3 SOLUTION RESPIRATORY (INHALATION) at 02:03

## 2019-03-20 RX ADMIN — IPRATROPIUM BROMIDE AND ALBUTEROL SULFATE SCH ML: .5; 3 SOLUTION RESPIRATORY (INHALATION) at 16:54

## 2019-03-20 RX ADMIN — FLUTICASONE PROPIONATE AND SALMETEROL XINAFOATE SCH PUFF: 115; 21 AEROSOL, METERED RESPIRATORY (INHALATION) at 19:40

## 2019-03-20 RX ADMIN — AZITHROMYCIN SCH MG: 250 TABLET, FILM COATED ORAL at 15:44

## 2019-03-20 RX ADMIN — Medication SCH ML: at 05:38

## 2019-03-20 RX ADMIN — Medication SCH ML: at 20:38

## 2019-03-20 RX ADMIN — IPRATROPIUM BROMIDE AND ALBUTEROL SULFATE SCH ML: .5; 3 SOLUTION RESPIRATORY (INHALATION) at 11:05

## 2019-03-20 RX ADMIN — GUAIFENESIN SCH MG: 600 TABLET, EXTENDED RELEASE ORAL at 09:26

## 2019-03-20 RX ADMIN — LEVOTHYROXINE SODIUM SCH MCG: 0.11 TABLET ORAL at 18:27

## 2019-03-20 RX ADMIN — LATANOPROST SCH DROP: 50 SOLUTION/ DROPS OPHTHALMIC at 20:37

## 2019-03-20 RX ADMIN — AZITHROMYCIN SCH MG: 250 TABLET, FILM COATED ORAL at 09:26

## 2019-03-20 RX ADMIN — SODIUM CHLORIDE SCH MLS/HR: 900 INJECTION INTRAVENOUS at 20:37

## 2019-03-20 RX ADMIN — DOCUSATE SODIUM SCH MG: 100 CAPSULE ORAL at 20:37

## 2019-03-20 RX ADMIN — SUCRALFATE SCH GM: 1 TABLET ORAL at 16:49
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
## 2019-03-21 VITALS — SYSTOLIC BLOOD PRESSURE: 121 MMHG | DIASTOLIC BLOOD PRESSURE: 73 MMHG

## 2019-03-21 VITALS — SYSTOLIC BLOOD PRESSURE: 99 MMHG | DIASTOLIC BLOOD PRESSURE: 62 MMHG

## 2019-03-21 VITALS — DIASTOLIC BLOOD PRESSURE: 66 MMHG | SYSTOLIC BLOOD PRESSURE: 119 MMHG

## 2019-03-21 VITALS — DIASTOLIC BLOOD PRESSURE: 68 MMHG | SYSTOLIC BLOOD PRESSURE: 119 MMHG

## 2019-03-21 VITALS — SYSTOLIC BLOOD PRESSURE: 122 MMHG | DIASTOLIC BLOOD PRESSURE: 68 MMHG

## 2019-03-21 VITALS — SYSTOLIC BLOOD PRESSURE: 122 MMHG | DIASTOLIC BLOOD PRESSURE: 71 MMHG

## 2019-03-21 LAB
ALBUMIN SERPL-MCNC: 3.2 GM/DL (ref 3.2–4.5)
ALP SERPL-CCNC: 67 U/L (ref 40–136)
ALT SERPL-CCNC: 12 U/L (ref 0–55)
ARTERIAL PATENCY WRIST A: (no result)
BASE EXCESS STD BLDA CALC-SCNC: 1.3 MMOL/L (ref -2.5–2.5)
BASOPHILS # BLD AUTO: 0 10^3/UL (ref 0–0.1)
BASOPHILS NFR BLD AUTO: 0 % (ref 0–10)
BDY SITE: (no result)
BILIRUB SERPL-MCNC: 0.4 MG/DL (ref 0.1–1)
BODY TEMPERATURE: 98.5
BUN/CREAT SERPL: 25
CALCIUM SERPL-MCNC: 9.2 MG/DL (ref 8.5–10.1)
CHLORIDE SERPL-SCNC: 108 MMOL/L (ref 98–107)
CO2 BLDA CALC-SCNC: 26.1 MMOL/L (ref 21–31)
CO2 SERPL-SCNC: 19 MMOL/L (ref 21–32)
CREAT SERPL-MCNC: 1.24 MG/DL (ref 0.6–1.3)
EOSINOPHIL # BLD AUTO: 0 10^3/UL (ref 0–0.3)
EOSINOPHIL NFR BLD AUTO: 0 % (ref 0–10)
ERYTHROCYTE [DISTWIDTH] IN BLOOD BY AUTOMATED COUNT: 15.9 % (ref 10–14.5)
GFR SERPLBLD BASED ON 1.73 SQ M-ARVRAT: 57 ML/MIN
GLUCOSE SERPL-MCNC: 105 MG/DL (ref 70–105)
HCT VFR BLD CALC: 36 % (ref 40–54)
HGB BLD-MCNC: 12 G/DL (ref 13.3–17.7)
INHALED O2 FLOW RATE: 2 L/MIN
LYMPHOCYTES # BLD AUTO: 2.1 X 10^3 (ref 1–4)
LYMPHOCYTES NFR BLD AUTO: 19 % (ref 12–44)
MANUAL DIFFERENTIAL PERFORMED BLD QL: NO
MCH RBC QN AUTO: 32 PG (ref 25–34)
MCHC RBC AUTO-ENTMCNC: 33 G/DL (ref 32–36)
MCV RBC AUTO: 97 FL (ref 80–99)
MONOCYTES # BLD AUTO: 0.7 X 10^3 (ref 0–1)
MONOCYTES NFR BLD AUTO: 7 % (ref 0–12)
NEUTROPHILS # BLD AUTO: 8.3 X 10^3 (ref 1.8–7.8)
NEUTROPHILS NFR BLD AUTO: 75 % (ref 42–75)
PCO2 BLDA: 37 MMHG (ref 35–45)
PH BLDA: 7.45 [PH] (ref 7.37–7.43)
PLATELET # BLD: 234 10^3/UL (ref 130–400)
PMV BLD AUTO: 9.5 FL (ref 7.4–10.4)
PO2 BLDA: 73 MMHG (ref 79–93)
POTASSIUM SERPL-SCNC: 4.2 MMOL/L (ref 3.6–5)
PROT SERPL-MCNC: 6 GM/DL (ref 6.4–8.2)
SAO2 % BLDA FROM PO2: 95 % (ref 94–100)
SODIUM SERPL-SCNC: 136 MMOL/L (ref 135–145)
VENTILATION MODE VENT: NO
WBC # BLD AUTO: 11.2 10^3/UL (ref 4.3–11)

## 2019-03-21 PROCEDURE — 0BD78ZX EXTRACTION OF LEFT MAIN BRONCHUS, VIA NATURAL OR ARTIFICIAL OPENING ENDOSCOPIC, DIAGNOSTIC: ICD-10-PCS | Performed by: INTERNAL MEDICINE

## 2019-03-21 PROCEDURE — 0B9D8ZX DRAINAGE OF RIGHT MIDDLE LUNG LOBE, VIA NATURAL OR ARTIFICIAL OPENING ENDOSCOPIC, DIAGNOSTIC: ICD-10-PCS | Performed by: INTERNAL MEDICINE

## 2019-03-21 PROCEDURE — 0BD38ZX EXTRACTION OF RIGHT MAIN BRONCHUS, VIA NATURAL OR ARTIFICIAL OPENING ENDOSCOPIC, DIAGNOSTIC: ICD-10-PCS | Performed by: INTERNAL MEDICINE

## 2019-03-21 RX ADMIN — SIMVASTATIN SCH MG: 10 TABLET, FILM COATED ORAL at 21:41

## 2019-03-21 RX ADMIN — IPRATROPIUM BROMIDE AND ALBUTEROL SULFATE SCH ML: .5; 3 SOLUTION RESPIRATORY (INHALATION) at 19:23

## 2019-03-21 RX ADMIN — DOCUSATE SODIUM SCH MG: 100 CAPSULE ORAL at 21:38

## 2019-03-21 RX ADMIN — Medication SCH ML: at 05:25

## 2019-03-21 RX ADMIN — Medication SCH ML: at 13:10

## 2019-03-21 RX ADMIN — SUCRALFATE SCH GM: 1 TABLET ORAL at 21:38

## 2019-03-21 RX ADMIN — LEVOTHYROXINE SODIUM SCH MCG: 25 TABLET ORAL at 17:21

## 2019-03-21 RX ADMIN — OMEGA-3 FATTY ACIDS CAP 1000 MG SCH MG: 1000 CAP at 05:25

## 2019-03-21 RX ADMIN — ACETAMINOPHEN PRN MG: 500 TABLET ORAL at 15:48

## 2019-03-21 RX ADMIN — SUCRALFATE SCH GM: 1 TABLET ORAL at 15:45

## 2019-03-21 RX ADMIN — LEVOTHYROXINE SODIUM SCH MCG: 0.11 TABLET ORAL at 17:21

## 2019-03-21 RX ADMIN — PANTOPRAZOLE SODIUM SCH MG: 40 TABLET, DELAYED RELEASE ORAL at 08:44

## 2019-03-21 RX ADMIN — GENTAMICIN SULFATE SCH MLS/HR: 40 INJECTION, SOLUTION INTRAMUSCULAR; INTRAVENOUS at 08:40

## 2019-03-21 RX ADMIN — GUAIFENESIN SCH MG: 600 TABLET, EXTENDED RELEASE ORAL at 08:44

## 2019-03-21 RX ADMIN — NICOTINE SCH MG: 21 PATCH, EXTENDED RELEASE TRANSDERMAL at 08:44

## 2019-03-21 RX ADMIN — FAMOTIDINE SCH MG: 20 TABLET, FILM COATED ORAL at 21:38

## 2019-03-21 RX ADMIN — ASPIRIN SCH MG: 81 TABLET ORAL at 08:44

## 2019-03-21 RX ADMIN — FLUTICASONE PROPIONATE AND SALMETEROL XINAFOATE SCH PUFF: 115; 21 AEROSOL, METERED RESPIRATORY (INHALATION) at 19:24

## 2019-03-21 RX ADMIN — AMLODIPINE BESYLATE SCH MG: 5 TABLET ORAL at 08:44

## 2019-03-21 RX ADMIN — FAMOTIDINE SCH MG: 20 TABLET, FILM COATED ORAL at 08:44

## 2019-03-21 RX ADMIN — OXYCODONE HYDROCHLORIDE AND ACETAMINOPHEN SCH MG: 500 TABLET ORAL at 05:25

## 2019-03-21 RX ADMIN — IPRATROPIUM BROMIDE AND ALBUTEROL SULFATE SCH ML: .5; 3 SOLUTION RESPIRATORY (INHALATION) at 07:31

## 2019-03-21 RX ADMIN — FLUTICASONE PROPIONATE AND SALMETEROL XINAFOATE SCH PUFF: 115; 21 AEROSOL, METERED RESPIRATORY (INHALATION) at 07:31

## 2019-03-21 RX ADMIN — LATANOPROST SCH DROP: 50 SOLUTION/ DROPS OPHTHALMIC at 21:38

## 2019-03-21 RX ADMIN — IPRATROPIUM BROMIDE AND ALBUTEROL SULFATE SCH ML: .5; 3 SOLUTION RESPIRATORY (INHALATION) at 15:13

## 2019-03-21 RX ADMIN — GABAPENTIN SCH MG: 300 CAPSULE ORAL at 21:38

## 2019-03-21 RX ADMIN — GUAIFENESIN SCH MG: 600 TABLET, EXTENDED RELEASE ORAL at 21:38

## 2019-03-21 RX ADMIN — GABAPENTIN SCH MG: 300 CAPSULE ORAL at 08:44

## 2019-03-21 RX ADMIN — Medication SCH ML: at 21:41

## 2019-03-21 RX ADMIN — SUCRALFATE SCH GM: 1 TABLET ORAL at 05:25

## 2019-03-21 RX ADMIN — SODIUM CHLORIDE SCH MLS/HR: 900 INJECTION INTRAVENOUS at 21:38

## 2019-03-21 RX ADMIN — SUCRALFATE SCH GM: 1 TABLET ORAL at 10:56

## 2019-03-21 RX ADMIN — Medication SCH UNIT: at 05:25

## 2019-03-21 NOTE — PULMONARY PROGRESS NOTE
Subjective


Time Seen by a Provider:  10:24


Subjective/Events-last exam


Persistent SOB No productive cough.





Sepsis Event


Evaluation


Height, Weight, BMI


Height: 5'11.00"


Weight: 226lbs. 6.0oz. 102.630028oq; 31.3 BMI


Method:Stated





Focused Exam


Lactate Level


3/18/19 21:24: Lactic Acid Level 1.62





Exam


Exam





Vital Signs








  Date Time  Temp Pulse Resp B/P (MAP) Pulse Ox O2 Delivery O2 Flow Rate FiO2


 


3/21/19 08:45      Nasal Cannula 4.00 


 


3/21/19 08:00 97.0 54 18 121/73 (89) 91 Nasal Cannula 3.00 


 


3/21/19 07:33   18     


 


3/21/19 07:00  95      


 


3/21/19 06:52     95 Room Air  


 


3/21/19 04:00 98.4 60 16 119/68 (85) 96 NIV CPAP 4.00 


 


3/21/19 01:00  77      


 


3/21/19 00:00 98.3 67 16 119/66 (83) 95 NIV CPAP 4.00 


 


3/20/19 20:15 99.4 75 18 121/63 (82) 94 Nasal Cannula 3.00 


 


3/20/19 20:00      Nasal Cannula 4.00 


 


3/20/19 19:40     96 Nasal Cannula 4.00 


 


3/20/19 19:00  76      


 


3/20/19 16:50 98.9 77 18 128/74 (92) 93 Nasal Cannula 4.00 


 


3/20/19 16:30 97.6       


 


3/20/19 13:00  87      


 


3/20/19 12:00 97.6 87 16 138/72 (94) 91 Nasal Cannula 4.00 


 


3/20/19 11:05     94 Nasal Cannula 4.00 














I & O 


 


 3/21/19





 07:00


 


Intake Total 1725 ml


 


Balance 1725 ml








Height & Weight


Height: 5'11.00"


Weight: 226lbs. 6.0oz. 102.826067mb; 31.3 BMI


Method:Stated


General Appearance:  No Apparent Distress, WD/WN, Anxious, Chronically ill


HEENT:  PERRL/EOMI, Normal ENT Inspection, Pharynx Normal


Neck:  Full Range of Motion, Normal Inspection, Non Tender, Supple


Respiratory:  Chest Non Tender, No Accessory Muscle Use, No Respiratory Distress

, Crackles, Decreased Breath Sounds


Cardiovascular:  Regular Rate, Rhythm, No Edema, No Gallop


Capillary Refill:  Less Than 3 Seconds


Gastrointestinal:  normal bowel sounds, non tender, soft


Extremity:  Normal Capillary Refill, Normal Inspection, Normal Range of Motion, 

Non Tender, No Calf Tenderness, No Pedal Edema


Neurologic/Psychiatric:  Alert, Oriented x3


Skin:  Normal Color


Lymphatic:  No Adenopathy





Results


Lab


Laboratory Tests


3/21/19 06:33











Assessment/Plan


Assessment/Plan


Recurrent PNA with sepsis - prelim Sputum is growing Ecoli 


   -Continue Abx and await final cultures


   -IVF


   -Pan cultures


   - bronchoscopy today . 


Tobacco use


   -Education 


Severe oxygen dependent COPD


   -noel TABOR JASON M DO Mar 21, 2019 10:25

## 2019-03-21 NOTE — DIAGNOSTIC IMAGING REPORT
INDICATION: Post bronchoscopy.



COMPARISON: 03/18/2019.



The heart size is stable. The lungs are clear. No pneumothorax is

identified, status post bronchoscopy. There is no effusion.



IMPRESSION: No evidence of pneumothorax.



Dictated by: 



  Dictated on workstation # [id number]

## 2019-03-21 NOTE — PULMONARY PROCEDURES
Pulmonary Procedures


Date of Procedure


Date of Service:  Mar 21, 2019


Bronch


Bronchoscopy with Bilateral washes, brush trino x2, RML bronchoalveolar lavage 

(BAL), bronchial washes and, brushes.





Preop DX ILD, recurrent PNA, MLA 





Postop DX: same. No endobronchial lesion. 





Complications: none





After informed consent obtained and formal time out pt was sedated using 

Fentanyl and Versed. Bronchoscope was advanced through the nare and vocal 

cords.  1% lidocaine was used to anesthetize vocal cords, epiglottis, trino, 

and left/right main stem bronchus.  An anatomical tour was undertaken down to 

the segmental bronchi bilaterally. No endobronchial lesions noted. Bronchoscopy 

with Bilateral washes, brush trino x2, RML bronchoalveolar lavage (BAL), 

bronchial washes and, brushes were obtained. Pt tolerated procedure well. No 

complications noted. Stat CXR is pending.











DANA DILLON DO Mar 21, 2019 10:27

## 2019-03-21 NOTE — DIAGNOSTIC IMAGING REPORT
INDICATION: Fluoroscopy for bronchoscopy.



Fluoroscopy was provided for Dr. Stapleton during bronchoscopy. 22

seconds of fluoroscopy was utilized.



IMPRESSION: Fluoroscopy during bronchoscopy.



Dictated by: 



  Dictated on workstation # [id number]

## 2019-03-22 VITALS — SYSTOLIC BLOOD PRESSURE: 104 MMHG | DIASTOLIC BLOOD PRESSURE: 69 MMHG

## 2019-03-22 VITALS — DIASTOLIC BLOOD PRESSURE: 83 MMHG | SYSTOLIC BLOOD PRESSURE: 120 MMHG

## 2019-03-22 VITALS — SYSTOLIC BLOOD PRESSURE: 100 MMHG | DIASTOLIC BLOOD PRESSURE: 61 MMHG

## 2019-03-22 RX ADMIN — PANTOPRAZOLE SODIUM SCH MG: 40 TABLET, DELAYED RELEASE ORAL at 10:03

## 2019-03-22 RX ADMIN — GUAIFENESIN SCH MG: 600 TABLET, EXTENDED RELEASE ORAL at 10:03

## 2019-03-22 RX ADMIN — Medication SCH ML: at 06:26

## 2019-03-22 RX ADMIN — GABAPENTIN SCH MG: 300 CAPSULE ORAL at 10:03

## 2019-03-22 RX ADMIN — FAMOTIDINE SCH MG: 20 TABLET, FILM COATED ORAL at 10:03

## 2019-03-22 RX ADMIN — SUCRALFATE SCH GM: 1 TABLET ORAL at 10:04

## 2019-03-22 RX ADMIN — ASPIRIN SCH MG: 81 TABLET ORAL at 10:08

## 2019-03-22 RX ADMIN — IPRATROPIUM BROMIDE AND ALBUTEROL SULFATE SCH ML: .5; 3 SOLUTION RESPIRATORY (INHALATION) at 09:39

## 2019-03-22 RX ADMIN — AZITHROMYCIN SCH MG: 250 TABLET, FILM COATED ORAL at 10:03

## 2019-03-22 RX ADMIN — Medication SCH UNIT: at 06:25

## 2019-03-22 RX ADMIN — AMLODIPINE BESYLATE SCH MG: 5 TABLET ORAL at 10:03

## 2019-03-22 RX ADMIN — OXYCODONE HYDROCHLORIDE AND ACETAMINOPHEN SCH MG: 500 TABLET ORAL at 06:25

## 2019-03-22 RX ADMIN — SUCRALFATE SCH GM: 1 TABLET ORAL at 06:25

## 2019-03-22 RX ADMIN — NICOTINE SCH MG: 21 PATCH, EXTENDED RELEASE TRANSDERMAL at 10:02

## 2019-03-22 RX ADMIN — FLUTICASONE PROPIONATE AND SALMETEROL XINAFOATE SCH PUFF: 115; 21 AEROSOL, METERED RESPIRATORY (INHALATION) at 09:39

## 2019-03-22 RX ADMIN — OMEGA-3 FATTY ACIDS CAP 1000 MG SCH MG: 1000 CAP at 06:25

## 2019-03-22 RX ADMIN — ACETAMINOPHEN PRN MG: 500 TABLET ORAL at 04:12

## 2019-03-22 NOTE — DISCHARGE SUMMARY-HOSPITALIST
Diagnosis/Chief Complaint


Date of Admission


Mar 18, 2019 at 22:40


Date of Discharge





Discharge Date:  Mar 22, 2019


Discharge Diagnosis





(1) Infection of drug-resistant bacteria


Status:  Acute


(2) Sepsis


Status:  Resolved


(3) Fever


Status:  Resolved


(4) Hypothyroidism


Status:  Chronic


(5) Tobacco abuse


Status:  Chronic


(6) Essential (primary) hypertension


Status:  Chronic


(7) COPD (chronic obstructive pulmonary disease)


Status:  Chronic


(8) COPD exacerbation


Status:  Acute





Discharge Summary


Discharge Physical Exam


Allergies:  


Coded Allergies:  


     No Known Drug Allergies (Verified , 18)


Vitals & I&Os





Vital Signs








  Date Time  Temp Pulse Resp B/P (MAP) Pulse Ox O2 Delivery O2 Flow Rate FiO2


 


3/22/19 09:40     92 Nasal Cannula 2.00 


 


3/22/19 08:00 97.6 56 18 120/83 (95)    


 


3/19/19 03:07        92








General Appearance:  No Apparent Distress, WD/WN, Chronically ill


Respiratory:  Chest Non Tender, Lungs Clear, Normal Breath Sounds, No Accessory 

Muscle Use, No Respiratory Distress


Cardiovascular:  Regular Rate, Rhythm, No Edema, No Gallop, No JVD, No Murmur, 

Normal Peripheral Pulses


Neurologic/Psychiatric:  Alert, Oriented x3, No Motor/Sensory Deficits, Normal 

Mood/Affect





Hospital Course


Was the Problem List Reviewed?:  Yes


Hospital course: Patient had an uneventful hospital course he was admitted 

placed on empiric antibiotics of cefepime and Zithromax and monitor closely.  

Dr. Stapleton was consulted.  He underwent a bronchoscopy due to the recurrent 

nature of the pneumonia which revealed Escherichia coli so sensitivity was 

reviewed patient was deemed stable for discharge placed on Omnicef for an 

additional 7 days.


Labs (last 24 hrs)





Microbiology


3/18/19 Blood Culture - Preliminary, Resulted


          No growth


3/20/19 MRSA Screen - Final, Complete


          MRSA not isolated


3/18/19 Urine Culture - Final, Complete


          NO GROWTH


Patient resulted labs reviewed.





Discussion & Recommendations


Discharge Planning:  <30 minutes discharge planning





Discharge


Home Medications:





Active Scripts


Active


Cefdinir 300 Mg Capsule 300 Mg PO BID


Tums (Calcium Carbonate) 300 Mg Tab.chew 300 Mg PO Q2HR PRN 1 Days


Reported


Aspirin EC (Aspirin) 81 Mg Tablet.dr 81 Mg PO DAILY


Protonix (Pantoprazole Sodium) 40 Mg Tablet.dr 40 Mg PO DAILY


Zantac (Ranitidine HCl) 150 Mg Tablet 150 Mg PO BID


Excedrin Extra Strength Caplet (Aspirin/Acetaminophen/Caffeine) 1 Each Tablet 1 

Tab PO BID PRN


Cyanocobalamin Injection (Cyanocobalamin) 1,000 Mcg/Ml Inj 1,000 Mcg IM MONTHLY


     TAKES THE 1ST WEEK OF MONTH-DAUGHTER GIVES AND DAY VARIES


Vitamin C (Ascorbate Calcium) 500 Mg Tablet 500 Mg PO DAILY


Vitamin D3 (Cholecalciferol (Vitamin D3)) 1,000 Unit Tablet 1,000 Unit PO DAILY


Fish Oil 1,200 mg Softgel (Omega-3 Fatty Acids/Fish Oil) 1 Each Capsule 1,200 

Mg PO DAILY


Lovastatin 40 Mg Tablet 20 Mg PO HS


     TAKE 1/2 OF 40MG TAB


Carafate (Sucralfate) 1 Gm Tablet 1 Gm PO ACHS


Gabapentin 300 Mg Capsule 300 Mg PO BID


Latanoprost 2.5 Ml Drops 1 Drop OU HS


Levothyroxine Sodium 137 Mcg Tablet 137 Mcg PO 1800


Iprat-Albut 0.5-3(2.5) mg/3 ml (Ipratropium/Albuterol Sulfate) 3 Ml Ampul.neb 3 

Ml NEB TID


Colace (Docusate Sodium) 100 Mg Capsule 100 Mg PO HS


Symbicort 160-4.5 Mcg Inhaler (Budesonide/Formoterol Fumarate) 10.2 Gm 

Hfa.aer.ad 2 Puff IH BID


Amlodipine Besylate 10 Mg Tablet 5 Mg PO DAILY


     TAKES 1/2 (10 MG) TABLET





Instructions to patient/family


Please see electronic discharge instructions given to patient.





Clinical Quality Measures


DVT/VTE Risk/Contraindication:


Risk Factor Score Per Nursin


RFS Level Per Nursing on Admit:  4+=Very High





Problem Qualifiers





(1) Sepsis:  


Sepsis type:  sepsis due to unspecified organism  Qualified Codes:  A41.9 - 

Sepsis, unspecified organism


(2) Fever:  


Fever type:  unspecified  Qualified Codes:  R50.9 - Fever, unspecified


(3) Hypothyroidism:  


Hypothyroidism type:  acquired  Qualified Codes:  E03.9 - Hypothyroidism, 

unspecified








SHANNON HERNANDEZ DO Mar 22, 2019 10:44

## 2019-03-22 NOTE — NUR
PRIOR TO A.M. MEDICATIONS PULSE WAS 94 AND B/P /67

-------------------------------------------------------------------------------

Addendum: 03/22/19 at 1137 by SUNITHA LAKHANI RN

-------------------------------------------------------------------------------

THIS RN GAVE WRONG PULSE AND B/P , THE ACTUAL B/P /83 AND PULSE WAS 56

## 2019-05-31 ENCOUNTER — HOSPITAL ENCOUNTER (OUTPATIENT)
Dept: HOSPITAL 75 - ER | Age: 74
Setting detail: OBSERVATION
LOS: 1 days | Discharge: HOME | End: 2019-06-01
Attending: INTERNAL MEDICINE | Admitting: INTERNAL MEDICINE
Payer: MEDICARE

## 2019-05-31 VITALS — WEIGHT: 224.38 LBS | HEIGHT: 70 IN | BODY MASS INDEX: 32.12 KG/M2

## 2019-05-31 VITALS — SYSTOLIC BLOOD PRESSURE: 122 MMHG | DIASTOLIC BLOOD PRESSURE: 109 MMHG

## 2019-05-31 VITALS — SYSTOLIC BLOOD PRESSURE: 108 MMHG | DIASTOLIC BLOOD PRESSURE: 104 MMHG

## 2019-05-31 VITALS — SYSTOLIC BLOOD PRESSURE: 133 MMHG | DIASTOLIC BLOOD PRESSURE: 73 MMHG

## 2019-05-31 VITALS — SYSTOLIC BLOOD PRESSURE: 133 MMHG | DIASTOLIC BLOOD PRESSURE: 81 MMHG

## 2019-05-31 VITALS — SYSTOLIC BLOOD PRESSURE: 116 MMHG | DIASTOLIC BLOOD PRESSURE: 68 MMHG

## 2019-05-31 DIAGNOSIS — E11.9: ICD-10-CM

## 2019-05-31 DIAGNOSIS — M81.0: ICD-10-CM

## 2019-05-31 DIAGNOSIS — Z79.82: ICD-10-CM

## 2019-05-31 DIAGNOSIS — K59.09: ICD-10-CM

## 2019-05-31 DIAGNOSIS — I25.10: ICD-10-CM

## 2019-05-31 DIAGNOSIS — I10: ICD-10-CM

## 2019-05-31 DIAGNOSIS — F17.210: ICD-10-CM

## 2019-05-31 DIAGNOSIS — Z87.820: ICD-10-CM

## 2019-05-31 DIAGNOSIS — R27.0: Primary | ICD-10-CM

## 2019-05-31 DIAGNOSIS — E78.00: ICD-10-CM

## 2019-05-31 DIAGNOSIS — E03.9: ICD-10-CM

## 2019-05-31 DIAGNOSIS — J43.9: ICD-10-CM

## 2019-05-31 DIAGNOSIS — Z95.5: ICD-10-CM

## 2019-05-31 DIAGNOSIS — K21.9: ICD-10-CM

## 2019-05-31 LAB
ALBUMIN SERPL-MCNC: 4.2 GM/DL (ref 3.2–4.5)
ALP SERPL-CCNC: 93 U/L (ref 40–136)
ALT SERPL-CCNC: 17 U/L (ref 0–55)
APTT BLD: 33 SEC (ref 24–35)
APTT PPP: YELLOW S
BACTERIA #/AREA URNS HPF: NEGATIVE /HPF
BASOPHILS # BLD AUTO: 0 10^3/UL (ref 0–0.1)
BASOPHILS NFR BLD AUTO: 0 % (ref 0–10)
BILIRUB SERPL-MCNC: 0.6 MG/DL (ref 0.1–1)
BILIRUB UR QL STRIP: NEGATIVE
BUN/CREAT SERPL: 11
CALCIUM SERPL-MCNC: 10.7 MG/DL (ref 8.5–10.1)
CHLORIDE SERPL-SCNC: 108 MMOL/L (ref 98–107)
CO2 SERPL-SCNC: 27 MMOL/L (ref 21–32)
CREAT SERPL-MCNC: 1.49 MG/DL (ref 0.6–1.3)
D DIMER PPP FEU-MCNC: 1.17 UG/ML (ref 0–0.49)
EOSINOPHIL # BLD AUTO: 0.2 10^3/UL (ref 0–0.3)
EOSINOPHIL NFR BLD AUTO: 3 % (ref 0–10)
ERYTHROCYTE [DISTWIDTH] IN BLOOD BY AUTOMATED COUNT: 15.7 % (ref 10–14.5)
FIBRINOGEN PPP-MCNC: CLEAR MG/DL
GFR SERPLBLD BASED ON 1.73 SQ M-ARVRAT: 46 ML/MIN
GLUCOSE SERPL-MCNC: 102 MG/DL (ref 70–105)
GLUCOSE UR STRIP-MCNC: NEGATIVE MG/DL
HCT VFR BLD CALC: 41 % (ref 40–54)
HGB BLD-MCNC: 13.8 G/DL (ref 13.3–17.7)
INR PPP: 1.1 (ref 0.8–1.4)
KETONES UR QL STRIP: NEGATIVE
LEUKOCYTE ESTERASE UR QL STRIP: NEGATIVE
LYMPHOCYTES # BLD AUTO: 1.7 X 10^3 (ref 1–4)
LYMPHOCYTES NFR BLD AUTO: 24 % (ref 12–44)
MANUAL DIFFERENTIAL PERFORMED BLD QL: NO
MCH RBC QN AUTO: 32 PG (ref 25–34)
MCHC RBC AUTO-ENTMCNC: 34 G/DL (ref 32–36)
MCV RBC AUTO: 96 FL (ref 80–99)
MONOCYTES # BLD AUTO: 0.5 X 10^3 (ref 0–1)
MONOCYTES NFR BLD AUTO: 8 % (ref 0–12)
NEUTROPHILS # BLD AUTO: 4.4 X 10^3 (ref 1.8–7.8)
NEUTROPHILS NFR BLD AUTO: 65 % (ref 42–75)
NITRITE UR QL STRIP: NEGATIVE
PH UR STRIP: 5 [PH] (ref 5–9)
PLATELET # BLD: 234 10^3/UL (ref 130–400)
PMV BLD AUTO: 9.1 FL (ref 7.4–10.4)
POTASSIUM SERPL-SCNC: 3.9 MMOL/L (ref 3.6–5)
PROT SERPL-MCNC: 7 GM/DL (ref 6.4–8.2)
PROT UR QL STRIP: NEGATIVE
PROTHROMBIN TIME: 14.7 SEC (ref 12.2–14.7)
RBC #/AREA URNS HPF: (no result) /HPF
SODIUM SERPL-SCNC: 141 MMOL/L (ref 135–145)
SP GR UR STRIP: 1.01 (ref 1.02–1.02)
SQUAMOUS #/AREA URNS HPF: (no result) /HPF
UROBILINOGEN UR-MCNC: NORMAL MG/DL
WBC # BLD AUTO: 6.8 10^3/UL (ref 4.3–11)
WBC #/AREA URNS HPF: (no result) /HPF

## 2019-05-31 PROCEDURE — 70496 CT ANGIOGRAPHY HEAD: CPT

## 2019-05-31 PROCEDURE — 82962 GLUCOSE BLOOD TEST: CPT

## 2019-05-31 PROCEDURE — 85730 THROMBOPLASTIN TIME PARTIAL: CPT

## 2019-05-31 PROCEDURE — 80061 LIPID PANEL: CPT

## 2019-05-31 PROCEDURE — 70450 CT HEAD/BRAIN W/O DYE: CPT

## 2019-05-31 PROCEDURE — 85379 FIBRIN DEGRADATION QUANT: CPT

## 2019-05-31 PROCEDURE — 85610 PROTHROMBIN TIME: CPT

## 2019-05-31 PROCEDURE — 84484 ASSAY OF TROPONIN QUANT: CPT

## 2019-05-31 PROCEDURE — 80053 COMPREHEN METABOLIC PANEL: CPT

## 2019-05-31 PROCEDURE — 71045 X-RAY EXAM CHEST 1 VIEW: CPT

## 2019-05-31 PROCEDURE — 81000 URINALYSIS NONAUTO W/SCOPE: CPT

## 2019-05-31 PROCEDURE — 93041 RHYTHM ECG TRACING: CPT

## 2019-05-31 PROCEDURE — 70498 CT ANGIOGRAPHY NECK: CPT

## 2019-05-31 PROCEDURE — 93005 ELECTROCARDIOGRAM TRACING: CPT

## 2019-05-31 PROCEDURE — 85025 COMPLETE CBC W/AUTO DIFF WBC: CPT

## 2019-05-31 PROCEDURE — 36415 COLL VENOUS BLD VENIPUNCTURE: CPT

## 2019-05-31 PROCEDURE — 96360 HYDRATION IV INFUSION INIT: CPT

## 2019-05-31 RX ADMIN — SODIUM CHLORIDE, SODIUM LACTATE, POTASSIUM CHLORIDE, AND CALCIUM CHLORIDE SCH MLS/HR: 600; 310; 30; 20 INJECTION, SOLUTION INTRAVENOUS at 18:27

## 2019-05-31 NOTE — NUR
pt rolled in  bed and linens straightened.   linda lang,  remain soft and dry.

-------------------------------------------------------------------------------

Addendum: 05/31/19 at 2315 by CHARLEE STEINBERG RN

-------------------------------------------------------------------------------

wrong pt
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
## 2019-05-31 NOTE — NUR
rosina removed.   

-------------------------------------------------------------------------------

Addendum: 05/31/19 at 2315 by CHARLEE STEINBERG RN

-------------------------------------------------------------------------------

wrong pt
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
## 2019-05-31 NOTE — DIAGNOSTIC IMAGING REPORT
PROCEDURE: CT head wo r/o stroke.



TECHNIQUE: Multiple contiguous axial images were obtained through

the brain without the use of intravenous contrast. Auto Exposure

Controls were utilized during the CT exam to meet ALARA standards

for radiation dose reduction. 



INDICATION: Stroke and left arm weakness.



COMPARISON: Correlation is made with prior head CT from

12/03/2014.



FINDINGS: Ventricles and sulci appear stable. There is moderate

periventricular hypodensity noted consistent with chronic

microvascular ischemia. Encephalomalacia in bilateral frontal

lobes, largest on the right is similar to prior exam. There is no

sulcal effacement or midline shift. No acute intra-axial or

extra-axial hemorrhage is detected. Cisterns are patent. There

are postsurgical changes of bifrontal craniotomy.



IMPRESSION: Chronic changes, as described. No acute intracranial

process is detected.



Results were called to the emergency department prior to this

dictation.



Dictated by: 



  Dictated on workstation # [id number]

## 2019-05-31 NOTE — DIAGNOSTIC IMAGING REPORT
INDICATION: Left arm weakness.



TIME OF EXAM: 3:26 PM



CORRELATION with prior chest radiograph from 03/21/2019.



FINDINGS:  

The heart size is stable. Central vascularity is slightly

prominent which may be owing to mild congestion. No overt failure

is seen. There is no effusion or pneumothorax.



IMPRESSION:

Mild central congestion.



Dictated by: 



  Dictated on workstation # [id number]

## 2019-05-31 NOTE — NUR
EDUAR OSORIO admitted to room CU4, with an admitting diagnosis of TIA VS CVA, LEFT ARM 
ATAXIA, on 05/31/19 from ER via CART, accompanied by ER STAFF.EDUAR OSORIO introduced to 
surroundings, call light, bed controls, phone, TV, temperature control, lights, meal times, 
smoking policy, visitor policy, side rail policy, bathrooms and showers.  Patient Rights 
given to patient in the handbook.EDUAR OSORIO verbalizes understanding that [facility] 
is not responsible for the loss or damage to any personal effects or valuables that are kept 
in the patients possession during their hospitalization.  EDUAR OSORIO verbalizes 
understanding of Interdisciplinary Patient Education. Patient and/or family were informed 
about the Rapid Response Team and its purpose.

## 2019-05-31 NOTE — DIAGNOSTIC IMAGING REPORT
PROCEDURE: CT angiography of the head and CT angiography of the

neck with and without contrast.



TECHNIQUE: Contiguous noncontrast images were obtained from the

skull base through the vertex. After intravenous contrast

administration, helical CT angiography of the neck was performed.

Source data was reformatted into multiple MIP projections.

Delayed post contrast acquisition was also obtained. Auto

Exposure Controls were utilized during the CT exam to meet ALARA

standards for radiation dose reduction. 



INDICATION: Left arm weakness.



FINDINGS: There is a three-vessel branching pattern to the aortic

arch. The right common carotid artery demonstrates some mild

plaquing but no stenosis is seen. There is moderate plaquing at

the right carotid bifurcation. The right internal carotid artery

demonstrates significant calcified plaque at the carotid siphon

but no high-grade stenosis is seen. No filling defect or

thromboembolism in the right middle cerebral artery is detected.

A right anterior cerebral artery is also widely patent.



On the left, the common carotid artery is tortuous and

demonstrates diffuse plaquing but no high-grade stenosis is seen.

Carotid bifurcation is unremarkable. The left internal carotid

artery also shows heavy calcified plaque at the siphon but no

high-grade stenosis is seen. The left middle cerebral artery

appears patent and without evidence of thromboembolism. Left

anterior cerebral artery is also patent. Bilateral posterior

cerebral arteries are patent. The basilar is unremarkable. The

right vertebral artery is very small. The left vertebral artery

is dominant. No identifiable flow in the proximal half of the

right vertebral artery is identified with only a string of flow

in the distal right vertebral artery. Postsurgical changes of

bilateral frontal craniotomies.



IMPRESSION: Bilateral carotid plaque. There are no findings to

suggest thromboemboli within either middle, anterior or posterior

cerebral arteries. The right vertebral artery is very small and

nonvisualized in the proximal one-half with only minimal contrast

seen in the distal right vertebral artery, likely retrograde. The

rationality cannot be evaluated with CT.



Results were discussed with the emergency department prior to

this dictation.



Dictated by: 



  Dictated on workstation # [id number]

## 2019-06-01 VITALS — SYSTOLIC BLOOD PRESSURE: 138 MMHG | DIASTOLIC BLOOD PRESSURE: 92 MMHG

## 2019-06-01 VITALS — SYSTOLIC BLOOD PRESSURE: 113 MMHG | DIASTOLIC BLOOD PRESSURE: 79 MMHG

## 2019-06-01 VITALS — DIASTOLIC BLOOD PRESSURE: 82 MMHG | SYSTOLIC BLOOD PRESSURE: 124 MMHG

## 2019-06-01 VITALS — SYSTOLIC BLOOD PRESSURE: 124 MMHG | DIASTOLIC BLOOD PRESSURE: 82 MMHG

## 2019-06-01 VITALS — SYSTOLIC BLOOD PRESSURE: 123 MMHG | DIASTOLIC BLOOD PRESSURE: 70 MMHG

## 2019-06-01 LAB
CHOLEST SERPL-MCNC: 114 MG/DL (ref ?–200)
HDLC SERPL-MCNC: 42 MG/DL (ref 40–60)
TRIGL SERPL-MCNC: 84 MG/DL (ref ?–150)
VLDLC SERPL CALC-MCNC: 17 MG/DL (ref 5–40)

## 2019-06-01 RX ADMIN — ACETAMINOPHEN PRN MG: 325 TABLET ORAL at 00:44

## 2019-06-01 RX ADMIN — SODIUM CHLORIDE, SODIUM LACTATE, POTASSIUM CHLORIDE, AND CALCIUM CHLORIDE SCH MLS/HR: 600; 310; 30; 20 INJECTION, SOLUTION INTRAVENOUS at 02:53

## 2019-06-01 RX ADMIN — ACETAMINOPHEN PRN MG: 325 TABLET ORAL at 08:25

## 2019-06-01 RX ADMIN — SODIUM CHLORIDE, SODIUM LACTATE, POTASSIUM CHLORIDE, AND CALCIUM CHLORIDE SCH MLS/HR: 600; 310; 30; 20 INJECTION, SOLUTION INTRAVENOUS at 09:30

## 2019-06-01 NOTE — DISCHARGE INST-STROKE W/WO TPA
Discharge Inst-Stroke w/wo TPA


Discharge Medications


New, Converted or Re-Newed RX:  RX Given to Pt/Family





Patient Instructions


Add Plavix to your aspirin.  Begin the Plavix today.  On June 22 you may 

discontinue the aspirin.


Resume usual activities at home.  As previously described to repeated finger 

exercises on the left.


You will need an MRI to complete the stroke treatment process.  This will need 

to be arranged for Monday or Tuesday as an outpatient.





Resume your previous medications and activities.  These are detailed on the 

discharge sequence.


If increase in symptoms return to Hospital.


Additional Follow Up:  Yes





Activity & Diet


Discharge Diet:  No Restrictions


Activity as Tolerated:  Yes











JESSICA CURRY MD              Jun 1, 2019 14:34

## 2019-06-01 NOTE — PROGRESS NOTE-HOSPITALIST
Progress Note


Progress Notes/Assess & Plan


Date Seen


6/1/19


Time Seen by Provider:  14:00


Assessment & Plan


Discussed the question of the addition of Plavix to the patient's daily aspirin.

 The [facility] stroke team responded that they currently are doing dual therapy for 

21 days and then discontinuing the aspirin.











JESSICA CURRY MD              Jun 1, 2019 14:31

## 2019-06-01 NOTE — HISTORY & PHYSICAL-HOSPITALIST
History of Present Illness


HPI/Chief Complaint


The patient is a 73-year-old white male known to me through previous admissions 

for exacerbation of COPD and through his wife who is a friend of my wife.  He 

presented to the emergency room yesterday morning with a complaint of numbness 

and incoordination of his left arm.  He is right-handed.  He reported this was 

noted on arising yesterday.  His time of arising was approximately 0630.  He was

evaluated and assigned and an NIH stroke score of 1.  CT scan of the head and CT

angio gram were negative.  This morning he reports the absence of numbness.  He 

still has incoordination to the hand.


Date Seen


19


Time Seen by a Provider:  13:46


Attending Physician


Aly Curry MD


PCP


Walker Velasquez DO


Referring Physician





Date of Admission


May 31, 2019 at 15:02





Home Medications & Allergies


Home Medications


Reviewed patient Home Medication Reconciliation performed by pharmacy medication

reconciliations technician and/or nursing.


Patients Allergies have been reviewed.





Allergies





Allergies


Coded Allergies


  No Known Drug Allergies ([id number])








Past Medical-Social-Family Hx


Past Med/Social Hx:  Reviewed Nursing Past Med/Soc Hx


Patient Social History


Alcohol Use:  Past History


Recreational Drug Use:  No


Smoking Status:  Current Everyday Smoker


Type Used:  Cigarettes


Recent Foreign Travel:  No


Contact w/other who traveled:  No


Recent Hopitalizations:  Yes


Recent Infectious Disease Expo:  No





Immunizations Up To Date


Tetanus Booster (TDap):  More than 5yrs


Date of Pneumonia Vaccine:  May 1, 2014


Date of Influenza Vaccine:  2018





Seasonal Allergies


Seasonal Allergies:  Yes





Past Medical History


Surgeries:  Appendectomy, Cardiac, Coronary Stent, Gallbladder, Neurological, 

Orthopedic, Vascular Surgery


Currently Using CPAP:  Yes


Currently Using BIPAP:  No


Cardiac:  Coronary Artery Disease, High Cholesterol, Hypertension


Neurological:  Stroke, TIA, Traumatic Brain Injury


Reproductive:  No


Sexually Transmitted Disease:  No


HIV/AIDS:  No


Genitourinary:  Renal Failure


Gastrointestinal:  Abdominal Hernia, Gastroesophageal Reflux, Gastrointestinal 

Bleed, Chronic Constipation


Musculoskeletal:  Degenerate Disk Disease, Osteoporosis, Arthritis, Back Injury,

Chronic Back Pain


Endocrine:  Hypothyroidsim, Diabetes, Non-Insulin dep


HEENT:  Tinnitis, Eye Injury, Glaucoma


Loss of Vision:  Right


Hearing Impairment:  Hard of Hearing


Psychosocial:  Depression


History of Blood Disorders:  No


Adverse Reaction to Blood Barrera:  No





Family History





Alcoholism


  19 FATHER, 


Cancer


Family history: Asthma


  19 MOTHER


Family history: Osteoporosis


  19 MOTHER


Hearing loss


  19 MOTHER


No Pertinent Family Hx





Review of Systems


Constitutional:  see HPI


EENTM:  no symptoms reported


Respiratory:  cough, dyspnea on exertion, short of breath


Cardiovascular:  no symptoms reported, other (past history of angiography and 

stenting)


Gastrointestinal:  no symptoms reported


Genitourinary:  no symptoms reported


Musculoskeletal:  see HPI


Skin:  no symptoms reported


Psychiatric/Neurological:  No Symptoms Reported





Physical Exam


Physical Exam


Vital Signs





Vital Signs - First Documented








 19





 14:06


 


Temp 98.5


 


Pulse 84


 


Resp 16


 


B/P (MAP) 132/91 (105)


 


Pulse Ox 94


 


O2 Delivery Room Air





Capillary Refill : Less Than 3 Seconds


Height, Weight, BMI


Height: 5'10.00"


Weight: 224lbs. 6.0oz. 101.755160kz; 31.2 BMI


Method:Stated


General Appearance:  No Apparent Distress, WD/WN


HEENT:  Normal ENT Inspection


Neck:  Normal Inspection


Respiratory:  Decreased Breath Sounds (distant breath sounds with scattered 

rhonchi)


Cardiovascular:  Regular Rate, Rhythm


Gastrointestinal:  Normal Bowel Sounds, No Organomegaly


Neurologic/Psychiatric:  Alert, Oriented x3, CNs II-XII Norm as Tested


Skin:  Normal Color, Warm/Dry


Lymphatic:  No Adenopathy (the patient exhibited normal  and biceps 

function.  His attempt at alternating finger to thumb movements produced only a 

clenching motion)





Results


Results/Procedures


Labs


Laboratory Tests


19 14:14








Patient resulted labs reviewed.





Assessment/Plan


Admission Diagnosis


TIA versus CVA with involvement of left upper extremity.  2.ASHD with previous 

stenting.  3.moderately severe to severe COPD


Admission Status:  Observation





Clinical Quality Measures


DVT/VTE Risk/Contraindication:


Risk Factor Score Per Nursin


RFS Level Per Nursing on Admit:  4+=Very High





Stroke:


Date of last known well:  May 31, 2019


Time of last known well:  14:22











ALY CURRY MD              2019 13:51

## 2019-06-10 NOTE — PHYSICIAN QUERY-FINAL DX
Clinic Account Progress/Dx


Physician Query:


Please give diagnosis


Date of Service





May 31, 2019 at 14:07











KASANDRA DIGGS                    Kike 10, 2019 09:08

## 2019-07-08 NOTE — PHYSICIAN QUERY-FINAL DX
Final Diagnosis


Give Final Diagnosis


Please give Final Diagnosis











KASANDRA DIGGS                     Jul 8, 2019 14:03

## 2019-08-30 ENCOUNTER — HOSPITAL ENCOUNTER (OUTPATIENT)
Dept: HOSPITAL 75 - CATH | Age: 74
End: 2019-08-30
Attending: INTERNAL MEDICINE
Payer: MEDICARE

## 2019-08-30 VITALS — SYSTOLIC BLOOD PRESSURE: 134 MMHG | DIASTOLIC BLOOD PRESSURE: 99 MMHG

## 2019-08-30 VITALS — BODY MASS INDEX: 32.07 KG/M2 | WEIGHT: 224 LBS | HEIGHT: 70 IN

## 2019-08-30 VITALS — DIASTOLIC BLOOD PRESSURE: 103 MMHG | SYSTOLIC BLOOD PRESSURE: 169 MMHG

## 2019-08-30 DIAGNOSIS — I25.10: ICD-10-CM

## 2019-08-30 DIAGNOSIS — I48.0: ICD-10-CM

## 2019-08-30 DIAGNOSIS — I10: ICD-10-CM

## 2019-08-30 DIAGNOSIS — G47.33: ICD-10-CM

## 2019-08-30 DIAGNOSIS — Z86.010: ICD-10-CM

## 2019-08-30 DIAGNOSIS — E83.42: ICD-10-CM

## 2019-08-30 DIAGNOSIS — J90: ICD-10-CM

## 2019-08-30 DIAGNOSIS — E78.5: ICD-10-CM

## 2019-08-30 DIAGNOSIS — I08.1: ICD-10-CM

## 2019-08-30 DIAGNOSIS — Z79.02: ICD-10-CM

## 2019-08-30 DIAGNOSIS — Z80.9: ICD-10-CM

## 2019-08-30 DIAGNOSIS — J43.9: ICD-10-CM

## 2019-08-30 DIAGNOSIS — Z87.891: ICD-10-CM

## 2019-08-30 DIAGNOSIS — I63.9: Primary | ICD-10-CM

## 2019-08-30 NOTE — IMPLANTATION OF LOOP MONITOR
Implant of Loop Monitior


IMPLANTATION OF LOOP MONITOR REPORT


 


DATE OF PROCEDURE:  8/30/19 





PREOP DIAGNOSIS:


cryptogenic stroke





POSTOP DIAGNOSIS:


cryptogenic stroke


 


PROCEDURE DETAILS:


The patient is a 73  male with history of paroxysmal atrial fibrillation 

requiring long-term surveillance. Therefore implantable loop recorder was 

discussed and agreed with the patient. Informed consent was taken. All risks and

complications were discussed at length. The patient was draped and prepped in 

the usual sterile fashion. Local anesthesia was lidocaine, which was given in 

the substernal area close to the 4th intercostal space.  Loop monitor [employer] 

with serial number [id number] was implanted according to the protocol. Steri-

Strips were placed at the end of the procedure. There were no complications and 

the patient tolerated the procedure well. The device was interrogated with a 

voltage of.  





ANESTHESIA:


Local anesthesia with lidocaine.





COMPLICATIONS:


None





CONTRAST/FLUOROSCOPY: 


None


 


CONCLUSION:


successful implantation of loop recorder with no complication





FINAL DIAGNOSIS:


Cryptogenic stroke


Hypertension


Hyperlipidemia











NOHELIA AUGUST MD              Aug 30, 2019 10:02

## 2019-09-03 ENCOUNTER — HOSPITAL ENCOUNTER (OUTPATIENT)
Dept: HOSPITAL 75 - RAD | Age: 74
End: 2019-09-03
Attending: INTERNAL MEDICINE
Payer: MEDICARE

## 2019-09-03 DIAGNOSIS — I25.10: Primary | ICD-10-CM

## 2019-09-03 DIAGNOSIS — I65.29: ICD-10-CM

## 2019-09-03 PROCEDURE — 71046 X-RAY EXAM CHEST 2 VIEWS: CPT

## 2019-09-03 NOTE — DIAGNOSTIC IMAGING REPORT
INDICATION: Coronary artery disease, dyspnea on exertion, and

transient ischemic attack.



PA and lateral views of the chest are obtained with comparison

made to study of 05/31/2019.



FINDINGS: Overall heart size and pulmonary vascularity remain

within normal limits. There are prominent perihilar interstitial

markings which may reflect mild atelectasis or scarring.

Monitoring device overlies the left chest. There is no

pneumothorax or significant pleural fluid.



IMPRESSION: Mild linear parahilar atelectasis or scarring without

other evidence of acute abnormality in the thorax.



Dictated by: 



  Dictated on workstation # [id number]

## 2019-09-17 ENCOUNTER — HOSPITAL ENCOUNTER (OUTPATIENT)
Dept: HOSPITAL 75 - REHAB | Age: 74
LOS: 1 days | Discharge: HOME | End: 2019-09-18
Attending: INTERNAL MEDICINE
Payer: MEDICARE

## 2019-09-17 DIAGNOSIS — I69.354: Primary | ICD-10-CM

## 2019-10-01 ENCOUNTER — HOSPITAL ENCOUNTER (OUTPATIENT)
Dept: HOSPITAL 75 - RAD FS | Age: 74
End: 2019-10-01
Attending: INTERNAL MEDICINE
Payer: MEDICARE

## 2019-10-01 DIAGNOSIS — J18.9: Primary | ICD-10-CM

## 2019-10-01 PROCEDURE — 71046 X-RAY EXAM CHEST 2 VIEWS: CPT

## 2019-10-01 NOTE — DIAGNOSTIC IMAGING REPORT
INDICATION: Patient was diagnosed with pneumonia a week ago.



COMPARISON STUDY: Chest from September 3.



FINDINGS: Frontal and lateral views of the chest again

demonstrates some chronic infiltrates or fibrosis in the left

lung base.  Heart size and vascularity are normal.  There are no

pleural effusions.  Cardiac monitor device is in place.



IMPRESSION: Stable chest.



Dictated by: 



  Dictated on workstation # [id number]

## 2019-11-06 ENCOUNTER — HOSPITAL ENCOUNTER (OUTPATIENT)
Dept: HOSPITAL 75 - REHAB | Age: 74
LOS: 41 days | Discharge: HOME | End: 2019-12-17
Attending: INTERNAL MEDICINE
Payer: MEDICARE

## 2019-11-06 ENCOUNTER — HOSPITAL ENCOUNTER (OUTPATIENT)
Dept: HOSPITAL 75 - RT | Age: 74
End: 2019-11-06
Attending: NURSE PRACTITIONER
Payer: MEDICARE

## 2019-11-06 DIAGNOSIS — I25.10: ICD-10-CM

## 2019-11-06 DIAGNOSIS — G47.33: ICD-10-CM

## 2019-11-06 DIAGNOSIS — R59.9: ICD-10-CM

## 2019-11-06 DIAGNOSIS — Z87.828: ICD-10-CM

## 2019-11-06 DIAGNOSIS — F17.200: ICD-10-CM

## 2019-11-06 DIAGNOSIS — I63.9: Primary | ICD-10-CM

## 2019-11-06 DIAGNOSIS — J44.9: Primary | ICD-10-CM

## 2019-11-06 DIAGNOSIS — J90: ICD-10-CM

## 2019-11-06 LAB
BUN/CREAT SERPL: 13
CREAT SERPL-MCNC: 1.59 MG/DL (ref 0.6–1.3)
GFR SERPLBLD BASED ON 1.73 SQ M-ARVRAT: 43 ML/MIN

## 2019-11-06 PROCEDURE — 36415 COLL VENOUS BLD VENIPUNCTURE: CPT

## 2019-11-06 PROCEDURE — 94729 DIFFUSING CAPACITY: CPT

## 2019-11-06 PROCEDURE — 94726 PLETHYSMOGRAPHY LUNG VOLUMES: CPT

## 2019-11-06 PROCEDURE — 82565 ASSAY OF CREATININE: CPT

## 2019-11-06 PROCEDURE — 84520 ASSAY OF UREA NITROGEN: CPT

## 2019-11-06 PROCEDURE — 94060 EVALUATION OF WHEEZING: CPT

## 2019-11-25 ENCOUNTER — HOSPITAL ENCOUNTER (OUTPATIENT)
Dept: HOSPITAL 75 - RAD | Age: 74
End: 2019-11-25
Attending: INTERNAL MEDICINE
Payer: MEDICARE

## 2019-11-25 DIAGNOSIS — N28.1: ICD-10-CM

## 2019-11-25 DIAGNOSIS — R13.13: ICD-10-CM

## 2019-11-25 DIAGNOSIS — N18.3: Primary | ICD-10-CM

## 2019-11-25 PROCEDURE — 74230 X-RAY XM SWLNG FUNCJ C+: CPT

## 2019-11-25 PROCEDURE — 76770 US EXAM ABDO BACK WALL COMP: CPT

## 2019-11-25 NOTE — DIAGNOSTIC IMAGING REPORT
EXAMINATION: Modified barium swallow.



INDICATION: Dysphagia.



This exam was performed in the presence of a speech pathologist,

Keri. The patient was given barium in different substances to

swallow. This included thin, nectar, honey, applesauce, banana,

ham, and barium on cracker. The patient was able to swallow the

contrast material without difficulty. There was some pooling in

the valleculae, but there was no aspiration or penetration.



IMPRESSION: There is no evidence for aspiration or penetration.



Dictated by: 



  Dictated on workstation # [id number]

## 2019-11-25 NOTE — DIAGNOSTIC IMAGING REPORT
PROCEDURE: US Renal Bilateral.



TECHNIQUE: Multiple real-time grayscale images were obtained over

the kidneys in various projections bilaterally.



INDICATION: Chronic kidney disease.



FINDINGS: There are no prior renal ultrasound examinations

available for comparison.



Both kidneys are identified. The right kidney measures 9.9 x 4.6

x 5.4 cm while the left kidney is estimated to be 10.3 x 5.2 x 6

cm. There is no evidence for a solid mass involving either

kidney; however, there are multiple cysts arising from the right

kidney. These measure less than 2 cm in size and have a generally

benign appearance. There is no cyst formation involving the left

kidney; however, does appear to be an 8 mm echogenic focus within

the left kidney. This finding is suspicious for nephrolithiasis.

In reviewing the CT chest exam of 11/06/2019, there also appear

to be an 8 mm nonobstructive calculus within the right kidney.

That finding is difficult to appreciate on this exam.



The renal cortices are somewhat thin but normal in echogenicity.

There is no sign of chronic medical renal disease.



The bladder is only partially filled and consequently not well

evaluated. There is no obvious bladder abnormality evident. The

ureteral jets are not identified.



IMPRESSION:

1. There is no evidence for a solid renal mass or for an acute

abnormality of either kidney.

2. There are several small benign-appearing cysts associated with

the right kidney. There is also a question of nephrolithiasis

bilaterally.

3. The urinary bladder shows no definite abnormality.



Dictated by: 



  Dictated on workstation # [id number]

## 2019-12-27 ENCOUNTER — HOSPITAL ENCOUNTER (OUTPATIENT)
Dept: HOSPITAL 75 - PREOP | Age: 74
Discharge: HOME | End: 2019-12-27
Attending: SURGERY
Payer: MEDICARE

## 2019-12-27 VITALS — WEIGHT: 200.18 LBS | HEIGHT: 72.05 IN | BODY MASS INDEX: 27.11 KG/M2

## 2019-12-27 DIAGNOSIS — Z01.818: Primary | ICD-10-CM

## 2020-03-16 ENCOUNTER — HOSPITAL ENCOUNTER (OUTPATIENT)
Dept: HOSPITAL 75 - RAD | Age: 75
End: 2020-03-16
Attending: INTERNAL MEDICINE
Payer: MEDICARE

## 2020-03-16 DIAGNOSIS — J18.1: Primary | ICD-10-CM

## 2020-03-16 DIAGNOSIS — B37.0: ICD-10-CM

## 2020-03-16 PROCEDURE — 71046 X-RAY EXAM CHEST 2 VIEWS: CPT

## 2020-03-16 NOTE — DIAGNOSTIC IMAGING REPORT
INDICATION: Cough.



TECHNIQUE: PA and lateral views of the chest are obtained with

comparison made to study of 10/01/2019.



FINDINGS: Heart size and pulmonary vascularity are within normal

limits. There has been an increase in mixed interstitial and

alveolar density in the right perihilar region. There is

predominantly linear density in the left base which is similar to

previous examination. No pneumothorax is identified. There is no

significant pleural fluid.



IMPRESSION: Probable background fibrotic findings with increasing

right perihilar pneumonitis or possible atypical pneumonia.



Dictated by: 



  Dictated on workstation # [id number]

## 2020-03-17 NOTE — PROGRESS NOTE-HOSPITALIST
Subjective


HPI/CC On Admission


Date Seen by Provider:  Mar 20, 2019


Time Seen by Provider:  09:45


The patient is a 73-year-old white male well-known to me.  He has had multiple 

previous admissions for lung related problems.  He has finally reached the 

point where he is ready to attempt this ceasing his cigarette habit.  He was 

last admitted here on 19.  He also had an admission in January.  He 

reports that last night after having a relatively good day for him and while at 

home alone he sat down and suddenly felt extremely ill.  His wife returned home 

to find him unable to get up out of the chair.  On arrival here his temperature 

was 102.2.  His white blood count was 14.7.  His creatinine was 1.4 his UA was 

negative.  His chest x-ray showed a similar interstitial fibrosis pattern as 

compared to his last admission.


Subjective/Events-last exam


Pt doing better but still having cough. 


Smoking cessation discussed. 


Wife at the bedside. 


Bronchoscopy scheduled for tomorrow per Dr. Stapleton. 


Gram negative on sputum culture. 


Had done well but had fever and chills after he left the hospital last week. 


This sort of issue continues to be a problem. 


I reviewed and restarted all home medications.





Review of Systems


General:  Fatigue, Malaise


Pulmonary:  Dyspnea, Cough





Focused Exam


Lactate Level


3/18/19 21:24: Lactic Acid Level 1.62








Objective


Exam


Vital Signs





Vital Signs








  Date Time  Temp Pulse Resp B/P (MAP) Pulse Ox O2 Delivery O2 Flow Rate FiO2


 


3/20/19 20:15 99.4 75 18 121/63 (82) 94 Nasal Cannula 3.00 


 


3/19/19 03:07        92





Capillary Refill : Less Than 3 Seconds


General Appearance:  No Apparent Distress, WD/WN, Chronically ill, Obese


Respiratory:  Chest Non Tender, No Accessory Muscle Use, No Respiratory Distress

, Crackles, Decreased Breath Sounds


Cardiovascular:  Regular Rate, Rhythm, No Edema, No Gallop, No JVD, No Murmur, 

Normal Peripheral Pulses


Extremity:  Normal Capillary Refill, Normal Inspection, Normal Range of Motion, 

Non Tender, No Calf Tenderness, No Pedal Edema


Neurologic/Psychiatric:  Alert, Oriented x3, No Motor/Sensory Deficits, Normal 

Mood/Affect





Results/Procedures


Lab


Patient resulted labs reviewed.





Assessment/Plan


Assessment and Plan


Assess & Plan/Chief Complaint


Assessment:


Recurrent pneumonia presumed resistant organism likely Pseudomonas


CAD


AF


CRI


MARYANA





Plan:


Bronchoscopy in morning


Abx


Await sputum Cx





Diagnosis/Problems


Diagnosis/Problems





(1) Infection of drug-resistant bacteria


Status:  Acute


(2) Sepsis


Status:  Resolved


Qualifiers:  


   Sepsis type:  sepsis due to unspecified organism  Qualified Codes:  A41.9 - 

Sepsis, unspecified organism


(3) Fever


Status:  Resolved


Qualifiers:  


   Fever type:  unspecified  Qualified Codes:  R50.9 - Fever, unspecified


Resolution Date/Time:  3/20/19 @ 21:04


(4) Hypothyroidism


Status:  Chronic


Qualifiers:  


   Hypothyroidism type:  acquired  Qualified Codes:  E03.9 - Hypothyroidism, 

unspecified


(5) Tobacco abuse


Status:  Chronic


(6) Essential (primary) hypertension


Status:  Chronic


(7) COPD (chronic obstructive pulmonary disease)


Status:  Chronic


(8) COPD exacerbation


Status:  Acute





Clinical Quality Measures


DVT/VTE Risk/Contraindication:


Risk Factor Score Per Nursin


RFS Level Per Nursing on Admit:  4+=Very High











SHANNON HERNANDEZ DO Mar 20, 2019 09:13
Subjective


HPI/CC On Admission


Date Seen by Provider:  Mar 21, 2019


Time Seen by Provider:  09:15


The patient is a 73-year-old white male well-known to me.  He has had multiple 

previous admissions for lung related problems.  He has finally reached the 

point where he is ready to attempt this ceasing his cigarette habit.  He was 

last admitted here on 19.  He also had an admission in January.  He 

reports that last night after having a relatively good day for him and while at 

home alone he sat down and suddenly felt extremely ill.  His wife returned home 

to find him unable to get up out of the chair.  On arrival here his temperature 

was 102.2.  His white blood count was 14.7.  His creatinine was 1.4 his UA was 

negative.  His chest x-ray showed a similar interstitial fibrosis pattern as 

compared to his last admission.


Subjective/Events-last exam


Pt is doing very well, he is on Cefepime and Zithromax


Had bronchoscopy today by Dr. Stapleton


Needs a bm so initiated meds


E. coli on sputum culture so will await sensitivities


Likely DC home tomorrow





Review of Systems


General:  Fatigue





Focused Exam


Lactate Level








Objective


Exam


Vital Signs





Vital Signs








  Date Time  Temp Pulse Resp B/P (MAP) Pulse Ox O2 Delivery O2 Flow Rate FiO2


 


3/21/19 20:00 97.3 68 20 99/62 (74) 93 Nasal Cannula 3.00 


 


3/19/19 03:07        92





Capillary Refill : Less Than 3 Seconds


General Appearance:  No Apparent Distress, WD/WN, Chronically ill, Obese


Respiratory:  Chest Non Tender, Lungs Clear, Normal Breath Sounds, No Accessory 

Muscle Use, No Respiratory Distress


Cardiovascular:  Regular Rate, Rhythm, No Edema, No Gallop, No JVD, No Murmur, 

Normal Peripheral Pulses


Extremity:  Normal Capillary Refill, Normal Inspection, Normal Range of Motion, 

Non Tender, No Calf Tenderness, No Pedal Edema


Neurologic/Psychiatric:  Alert, Oriented x3, No Motor/Sensory Deficits, Normal 

Mood/Affect





Results/Procedures


Lab


Laboratory Tests


3/21/19 06:33








Patient resulted labs reviewed.





Assessment/Plan


Assessment and Plan


Assess & Plan/Chief Complaint


Assessment:


Recurrent pneumonia presumed resistant organism likely Pseudomonas


CAD


AF


CRI


MARYANA





Plan:


Bronchoscopy with Cx


Abx


Await sputum Cx





Diagnosis/Problems


Diagnosis/Problems





(1) Infection of drug-resistant bacteria


Status:  Acute


(2) Sepsis


Status:  Resolved


Qualifiers:  


   Sepsis type:  sepsis due to unspecified organism  Qualified Codes:  A41.9 - 

Sepsis, unspecified organism


(3) Fever


Status:  Resolved


Qualifiers:  


   Fever type:  unspecified  Qualified Codes:  R50.9 - Fever, unspecified


Resolution Date/Time:  3/20/19 @ 21:04


(4) Hypothyroidism


Status:  Chronic


Qualifiers:  


   Hypothyroidism type:  acquired  Qualified Codes:  E03.9 - Hypothyroidism, 

unspecified


(5) Tobacco abuse


Status:  Chronic


(6) Essential (primary) hypertension


Status:  Chronic


(7) COPD (chronic obstructive pulmonary disease)


Status:  Chronic


(8) COPD exacerbation


Status:  Acute





Clinical Quality Measures


DVT/VTE Risk/Contraindication:


Risk Factor Score Per Nursin


RFS Level Per Nursing on Admit:  4+=Very High











SHANNON HERNANDEZ DO Mar 21, 2019 09:41
ACP

## 2020-06-23 ENCOUNTER — HOSPITAL ENCOUNTER (OUTPATIENT)
Dept: HOSPITAL 75 - RAD | Age: 75
End: 2020-06-23
Attending: NURSE PRACTITIONER
Payer: MEDICARE

## 2020-06-23 DIAGNOSIS — R91.8: ICD-10-CM

## 2020-06-23 DIAGNOSIS — G47.33: ICD-10-CM

## 2020-06-23 DIAGNOSIS — K44.9: ICD-10-CM

## 2020-06-23 DIAGNOSIS — Z72.0: ICD-10-CM

## 2020-06-23 DIAGNOSIS — J43.9: Primary | ICD-10-CM

## 2020-06-23 DIAGNOSIS — J98.4: ICD-10-CM

## 2020-06-23 DIAGNOSIS — K22.8: ICD-10-CM

## 2020-06-23 PROCEDURE — 71250 CT THORAX DX C-: CPT

## 2020-06-23 NOTE — DIAGNOSTIC IMAGING REPORT
PROCEDURE: CT chest without contrast.



TECHNIQUE: Multiple contiguous axial images were obtained through

the chest without the use of intravenous contrast. Auto Exposure

Controls were utilized during the CT exam to meet ALARA standards

for radiation dose reduction. 



INDICATION: Dyspnea. History of coronary artery stent.



CORRELATION STUDY: 11/06/2019



FINDINGS: Loop recorder device over the superior left chest.

Heart size is within normal limits. Scattered areas of coronary

artery calcification and apparent stents are present. Trace

pericardial effusion. Thoracic aortic contour unremarkable with

scattered wall calcification. No suggestion for pathologically

enlarged mediastinal lymph nodes with a few small shotty

subcentimeter lymph nodes present. Small hiatal hernia is

present. There is circumferential wall thickening in the low

esophagus just proximal to the hernia. A small amount of fluid

may be reflective of reflux.



Lung fields do demonstrate areas of advanced emphysematous

changes. Disproportionately greater involving the upper lobe

distribution, right greater than left. Scattered areas of likely

fibrosis are noted. There has been development of slightly more

prominent interstitial markings, particularly of the right lung

base with slight tree-in-bud type appearance, could reflect a

superimposed atypical-type pneumonia. Asymmetric mucosal

thickening and irregularity of several mid to peripheral bronchi,

articularly the right lung.



There is a slightly more focal area of parenchymal irregularity

in the central aspect of the right upper lobe (image 42/series

3). Additional one at the right lower lobe just above the

diaphragm (image 108 series 3).



Solid nodule posterior basilar aspect of the right upper lobe,

adjacent to the fissure plane, 5 mm in size, appears slightly

more prominent (image 76 series 3).



Visualized portion of the upper abdomen demonstrates

nonobstructing right renal stones. Small indeterminate exophytic

nodule, right mid kidney. Prominent calcification of the

abdominal aorta. Cholecystectomy changes.



Accentuated thoracic kyphosis, mildly compressed midthoracic

vertebral bodies, stable.



IMPRESSION:

1. Advanced emphysematous changes about the lung parenchyma.

Development of tree-in-bud type infiltrates of the right lower

lobe, and to a lesser degree, the basilar aspect right middle

lobe suspect for superimposed infectious/inflammatory etiology.

Additional areas of scattered prominent peribronchial thickening.

2. A slightly more focal area of scarring, likely within the

right upper lobe and right lung base. This does have a somewhat

groundglass opacity and the possibility of early neoplasm should

not be excluded at this time. Additionally, slightly more

prominent small nodule basilar aspect right upper lobe.

3. Small hiatal hernia. Wall thickening of the low esophagus.

Could be reflective of a component of esophagitis given suggested

reflux. However, correlation for any additional symptoms to

suggest the possibility of infiltrative neoplasm not excluded.

4. Follow-up CT imaging of the chest at 3-6 months would be

recommended.



Dictated by: 



  Dictated on workstation # [id number]

## 2020-06-24 ENCOUNTER — HOSPITAL ENCOUNTER (OUTPATIENT)
Dept: HOSPITAL 75 - RAD | Age: 75
End: 2020-06-24
Attending: PHYSICIAN ASSISTANT
Payer: MEDICARE

## 2020-06-24 DIAGNOSIS — R06.09: ICD-10-CM

## 2020-06-24 DIAGNOSIS — G47.33: ICD-10-CM

## 2020-06-24 DIAGNOSIS — I25.10: Primary | ICD-10-CM

## 2020-06-24 DIAGNOSIS — I63.9: ICD-10-CM

## 2020-06-24 DIAGNOSIS — I65.29: ICD-10-CM

## 2020-06-24 LAB
ALBUMIN SERPL-MCNC: 3.8 GM/DL (ref 3.2–4.5)
ALP SERPL-CCNC: 81 U/L (ref 40–136)
ALT SERPL-CCNC: 15 U/L (ref 0–55)
BILIRUB SERPL-MCNC: 0.6 MG/DL (ref 0.1–1)
BUN/CREAT SERPL: 10
CALCIUM SERPL-MCNC: 9.5 MG/DL (ref 8.5–10.1)
CHLORIDE SERPL-SCNC: 108 MMOL/L (ref 98–107)
CHOLEST SERPL-MCNC: 125 MG/DL (ref ?–200)
CO2 SERPL-SCNC: 23 MMOL/L (ref 21–32)
CREAT SERPL-MCNC: 1.53 MG/DL (ref 0.6–1.3)
GFR SERPLBLD BASED ON 1.73 SQ M-ARVRAT: 45 ML/MIN
GLUCOSE SERPL-MCNC: 97 MG/DL (ref 70–105)
HDLC SERPL-MCNC: 49 MG/DL (ref 40–60)
POTASSIUM SERPL-SCNC: 4.2 MMOL/L (ref 3.6–5)
PROT SERPL-MCNC: 7 GM/DL (ref 6.4–8.2)
SODIUM SERPL-SCNC: 139 MMOL/L (ref 135–145)
TRIGL SERPL-MCNC: 88 MG/DL (ref ?–150)
VLDLC SERPL CALC-MCNC: 18 MG/DL (ref 5–40)

## 2020-06-24 PROCEDURE — 36415 COLL VENOUS BLD VENIPUNCTURE: CPT

## 2020-06-24 PROCEDURE — 80061 LIPID PANEL: CPT

## 2020-06-24 PROCEDURE — 80053 COMPREHEN METABOLIC PANEL: CPT

## 2020-06-24 PROCEDURE — 71275 CT ANGIOGRAPHY CHEST: CPT

## 2020-06-24 NOTE — DIAGNOSTIC IMAGING REPORT
PROCEDURE: 

CT angiography of the chest with contrast.



TECHNIQUE: 

Multiple contiguous axial images were obtained through the chest

after uneventful bolus administration of intravenous contrast. 3D

reconstructed CTA MIP acquisitions were also performed. Auto

Exposure Controls were utilized during the CT exam to meet ALARA

standards for radiation dose reduction. 

 

INDICATION:   

Cough, shortness of air, history of pneumonia.



COMPARISON:  

Th study is compared to that from 06/23/2020 which was a

nonenhanced exam.



FINDINGS:

There are no intraluminal pulmonary arterial filling defects.

There is no pulmonary arterial embolus. The thoracic aorta is

patent and nonaneurysmal. Some heterogeneous air trapping and

features of centrilobular emphysema as chronic findings are

noted. The somewhat tree-in-bud nodular infiltrate in the right

middle lobe and right lower lobe infrahilar has resolved in the

interim. No adverse interval development or acute infiltrate at

followup. No pleural or pericardial effusion. There is no

empyema. There are coronary arterial atherosclerotic vascular

calcifications and a small chronic hiatal hernia. 



IMPRESSION: 

1. Resolution of the right basilar tree-in-bud nodular

infiltrates. Background COPD noted with nonaneurysmal

atherosclerosis.



2. No PE or other acute appearing abnormalities.



Dictated by: 



  Dictated on workstation # [id number]

## 2020-12-31 ENCOUNTER — HOSPITAL ENCOUNTER (OUTPATIENT)
Dept: HOSPITAL 75 - RAD | Age: 75
End: 2020-12-31
Attending: NURSE PRACTITIONER
Payer: MEDICARE

## 2020-12-31 DIAGNOSIS — I25.10: ICD-10-CM

## 2020-12-31 DIAGNOSIS — J43.8: Primary | ICD-10-CM

## 2020-12-31 LAB
BUN/CREAT SERPL: 13
CREAT SERPL-MCNC: 1.44 MG/DL (ref 0.6–1.3)
GFR SERPLBLD BASED ON 1.73 SQ M-ARVRAT: 48 ML/MIN

## 2020-12-31 PROCEDURE — 71260 CT THORAX DX C+: CPT

## 2020-12-31 PROCEDURE — 84520 ASSAY OF UREA NITROGEN: CPT

## 2020-12-31 PROCEDURE — 82565 ASSAY OF CREATININE: CPT

## 2020-12-31 PROCEDURE — 36415 COLL VENOUS BLD VENIPUNCTURE: CPT

## 2020-12-31 NOTE — DIAGNOSTIC IMAGING REPORT
PROCEDURE: CT chest with contrast only.



TECHNIQUE: Multiple contiguous axial images were obtained through

the chest after administration of intravenous contrast. Auto

Exposure Controls were utilized during the CT exam to meet ALARA

standards for radiation dose reduction. 



INDICATION:  COPD.



The previous CTA chest exam of 06/24/2020 noted COPD but failed

to show any sign of an acute cardiopulmonary abnormality. On this

exam the emphysematous changes involving both lungs are again

noted and do not appear to have changed significantly. In the

interval since the prior study a very small alveolar/interstitial

infiltrate has developed along the medial aspect of the right

upper lobe (images 50-57 of 165). This could be secondary to mild

pneumonia/atelectasis.



There is no other evidence for pneumonia and there is no sign of

a pleural effusion. The pleural-based and parenchymal nodules

seen on the prior study are again evident and no different.



The heart size is within normal limits and stable when compared

to the prior exam. Extensive coronary artery calcifications are

again noted. The ascending aorta is aneurysmally dilated

measuring 4.5 x 4.6 cm in maximum AP and transverse diameters. On

the prior exam the ascending aorta measures 4.4 x 4.5 cm. There

is no evidence for dissection.



The pulmonary arteries were not well opacified but there is no

definite defect within the pulmonary arteries to indicate a

pulmonary embolus. The 1.9 x 3.2 cm soft tissue density in the

right hilum noted on the prior study is again evident and no

different. There is no mediastinal or hilar adenopathy

identified. The thyroid gland was not well-visualized.



The sections through the upper abdomen again show a small hiatal

hernia. There is also a 3 mm nonobstructive calculus within the

right kidney and a 5 mm calculus within the left kidney.



The bone windows are unremarkable for a fracture or for a

destructive lesion. The long-standing compression deformity of T8

seen on the previous study is again evident.



The loop recorder device in the soft tissues of the anterior

chest wall the left seen on the previous exam is again evident

and appears stable in position.



IMPRESSION:

1. There are emphysematous changes described involving both lungs

and the pleural and parenchymal nodules seen on the prior study

are again evident and no different. However in the interval since

the prior exam, a very small alveolar/interstitial infiltrate has

developed along the medial aspect of the right upper lung. This

does raise a question of mild pneumonia/atelectasis in this area.

Clinical follow-up is recommended.

2. There is no acute cardiopulmonary abnormality noted otherwise.

3. There is mild stable aneurysmal dilatation of the ascending

aorta. There is no sign of a dissection.

4. There is no evidence for a pulmonary embolus either.

5. There is coronary artery disease.



Dictated by: 



  Dictated on workstation # [id number]

## 2021-02-03 ENCOUNTER — HOSPITAL ENCOUNTER (EMERGENCY)
Dept: HOSPITAL 75 - ER | Age: 76
Discharge: HOME | End: 2021-02-03
Payer: MEDICARE

## 2021-02-03 VITALS — DIASTOLIC BLOOD PRESSURE: 72 MMHG | SYSTOLIC BLOOD PRESSURE: 136 MMHG

## 2021-02-03 VITALS — BODY MASS INDEX: 58.43 KG/M2 | HEIGHT: 60 IN | WEIGHT: 297.62 LBS

## 2021-02-03 DIAGNOSIS — J43.9: ICD-10-CM

## 2021-02-03 DIAGNOSIS — Z79.890: ICD-10-CM

## 2021-02-03 DIAGNOSIS — E03.9: ICD-10-CM

## 2021-02-03 DIAGNOSIS — E05.00: ICD-10-CM

## 2021-02-03 DIAGNOSIS — K59.09: ICD-10-CM

## 2021-02-03 DIAGNOSIS — Z79.51: ICD-10-CM

## 2021-02-03 DIAGNOSIS — Z79.82: ICD-10-CM

## 2021-02-03 DIAGNOSIS — Z79.02: ICD-10-CM

## 2021-02-03 DIAGNOSIS — I25.10: ICD-10-CM

## 2021-02-03 DIAGNOSIS — E78.00: ICD-10-CM

## 2021-02-03 DIAGNOSIS — K29.00: Primary | ICD-10-CM

## 2021-02-03 DIAGNOSIS — Z79.899: ICD-10-CM

## 2021-02-03 DIAGNOSIS — Z95.5: ICD-10-CM

## 2021-02-03 DIAGNOSIS — I10: ICD-10-CM

## 2021-02-03 DIAGNOSIS — K21.9: ICD-10-CM

## 2021-02-03 DIAGNOSIS — Z86.73: ICD-10-CM

## 2021-02-03 DIAGNOSIS — Z87.01: ICD-10-CM

## 2021-02-03 DIAGNOSIS — E11.9: ICD-10-CM

## 2021-02-03 LAB
ALBUMIN SERPL-MCNC: 3.8 GM/DL (ref 3.2–4.5)
ALP SERPL-CCNC: 69 U/L (ref 40–136)
ALT SERPL-CCNC: 20 U/L (ref 0–55)
APTT PPP: YELLOW S
BACTERIA #/AREA URNS HPF: NEGATIVE /HPF
BASOPHILS # BLD AUTO: 0 10^3/UL (ref 0–0.1)
BASOPHILS NFR BLD AUTO: 0 % (ref 0–10)
BILIRUB SERPL-MCNC: 1 MG/DL (ref 0.1–1)
BILIRUB UR QL STRIP: NEGATIVE
BUN/CREAT SERPL: 13
CALCIUM SERPL-MCNC: 8.9 MG/DL (ref 8.5–10.1)
CHLORIDE SERPL-SCNC: 104 MMOL/L (ref 98–107)
CO2 SERPL-SCNC: 22 MMOL/L (ref 21–32)
CREAT SERPL-MCNC: 1.38 MG/DL (ref 0.6–1.3)
EOSINOPHIL # BLD AUTO: 0 10^3/UL (ref 0–0.3)
EOSINOPHIL NFR BLD AUTO: 0 % (ref 0–10)
EOSINOPHIL NFR BLD MANUAL: 1 %
FIBRINOGEN PPP-MCNC: CLEAR MG/DL
GFR SERPLBLD BASED ON 1.73 SQ M-ARVRAT: 50 ML/MIN
GLUCOSE SERPL-MCNC: 103 MG/DL (ref 70–105)
GLUCOSE UR STRIP-MCNC: NEGATIVE MG/DL
HCT VFR BLD CALC: 44 % (ref 40–54)
HGB BLD-MCNC: 14.8 G/DL (ref 13.3–17.7)
KETONES UR QL STRIP: NEGATIVE
LEUKOCYTE ESTERASE UR QL STRIP: NEGATIVE
LYMPHOCYTES # BLD AUTO: 0.4 10^3/UL (ref 1–4)
LYMPHOCYTES NFR BLD AUTO: 4 % (ref 12–44)
MANUAL DIFFERENTIAL PERFORMED BLD QL: YES
MCH RBC QN AUTO: 34 PG (ref 25–34)
MCHC RBC AUTO-ENTMCNC: 34 G/DL (ref 32–36)
MCV RBC AUTO: 100 FL (ref 80–99)
MONOCYTES # BLD AUTO: 0.3 10^3/UL (ref 0–1)
MONOCYTES NFR BLD AUTO: 3 % (ref 0–12)
MONOCYTES NFR BLD: 3 %
NEUTROPHILS # BLD AUTO: 9 10^3/UL (ref 1.8–7.8)
NEUTROPHILS NFR BLD AUTO: 92 % (ref 42–75)
NEUTS BAND NFR BLD MANUAL: 69 %
NEUTS BAND NFR BLD: 17 %
NITRITE UR QL STRIP: NEGATIVE
PH UR STRIP: 6 [PH] (ref 5–9)
PLATELET # BLD: 210 10^3/UL (ref 130–400)
PMV BLD AUTO: 9 FL (ref 9–12.2)
POTASSIUM SERPL-SCNC: 5.4 MMOL/L (ref 3.6–5)
PROT SERPL-MCNC: 7.6 GM/DL (ref 6.4–8.2)
PROT UR QL STRIP: NEGATIVE
RBC #/AREA URNS HPF: >100 /HPF
RBC MORPH BLD: NORMAL
SODIUM SERPL-SCNC: 134 MMOL/L (ref 135–145)
SP GR UR STRIP: 1.02 (ref 1.02–1.02)
SQUAMOUS #/AREA URNS HPF: (no result) /HPF
VARIANT LYMPHS NFR BLD MANUAL: 10 %
WBC # BLD AUTO: 9.8 10^3/UL (ref 4.3–11)
WBC #/AREA URNS HPF: (no result) /HPF

## 2021-02-03 PROCEDURE — 81000 URINALYSIS NONAUTO W/SCOPE: CPT

## 2021-02-03 PROCEDURE — 85379 FIBRIN DEGRADATION QUANT: CPT

## 2021-02-03 PROCEDURE — 36415 COLL VENOUS BLD VENIPUNCTURE: CPT

## 2021-02-03 PROCEDURE — 71045 X-RAY EXAM CHEST 1 VIEW: CPT

## 2021-02-03 PROCEDURE — 99284 EMERGENCY DEPT VISIT MOD MDM: CPT

## 2021-02-03 PROCEDURE — 85027 COMPLETE CBC AUTOMATED: CPT

## 2021-02-03 PROCEDURE — 83605 ASSAY OF LACTIC ACID: CPT

## 2021-02-03 PROCEDURE — 84145 PROCALCITONIN (PCT): CPT

## 2021-02-03 PROCEDURE — 80053 COMPREHEN METABOLIC PANEL: CPT

## 2021-02-03 PROCEDURE — 86141 C-REACTIVE PROTEIN HS: CPT

## 2021-02-03 PROCEDURE — 87040 BLOOD CULTURE FOR BACTERIA: CPT

## 2021-02-03 PROCEDURE — 87804 INFLUENZA ASSAY W/OPTIC: CPT

## 2021-02-03 PROCEDURE — 71275 CT ANGIOGRAPHY CHEST: CPT

## 2021-02-03 PROCEDURE — 85007 BL SMEAR W/DIFF WBC COUNT: CPT

## 2021-02-03 PROCEDURE — 87635 SARS-COV-2 COVID-19 AMP PRB: CPT

## 2021-02-03 NOTE — DIAGNOSTIC IMAGING REPORT
PROCEDURE: 

CT angiography of the chest with contrast.



TECHNIQUE: 

Multiple contiguous axial images were obtained through the chest

after uneventful bolus administration of intravenous contrast. 3D

reconstructed CTA MIP acquisitions were also performed.

Auto Exposure Controls were utilized during the CT exam to meet

ALARA standards for radiation dose reduction. 

 

INDICATION:  

Elevated d-dimer, shortness of breath, and cough. 



COMPARISON:   

12/31/2020.



FINDINGS:

No pulmonary arterial filling defects or PE identified. The

atherosclerotic aorta is patent, nonaneurysmal, and nonacute.

There is no pleural or pericardial effusion. There is a chronic

small hiatal hernia. There are background changes of

centrilobular emphysema present. The previous mild acute

infiltrate in the anteromedial left upper lobe has resolved. No

adverse development. 



IMPRESSION: 

Negative for PE or other acute abnormalities. Background COPD but

no pneumonia or acute pathology.



Dictated by: 



  Dictated on workstation # [id number]

## 2021-02-03 NOTE — DIAGNOSTIC IMAGING REPORT
INDICATION: Cough and vomiting as well as shortness of breath.



TIME OF EXAM: 12:56 p.m.



COMPARISON: Comparison is made with prior chest from 03/16/2020.



FINDINGS: Lung volumes are slightly diminished, likely owing to

poor inspiration. No significant infiltrate is seen. There is no

effusion or pneumothorax. Heart size is stable.



IMPRESSION: Poor inspiration. No acute cardiopulmonary process is

detected.



Dictated by: 



  Dictated on workstation # [id number]
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
## 2021-04-16 ENCOUNTER — HOSPITAL ENCOUNTER (OUTPATIENT)
Dept: HOSPITAL 75 - RAD | Age: 76
End: 2021-04-16
Attending: INTERNAL MEDICINE
Payer: MEDICARE

## 2021-04-16 DIAGNOSIS — N52.8: ICD-10-CM

## 2021-04-16 DIAGNOSIS — R49.0: ICD-10-CM

## 2021-04-16 DIAGNOSIS — Z72.0: ICD-10-CM

## 2021-04-16 DIAGNOSIS — J44.9: ICD-10-CM

## 2021-04-16 DIAGNOSIS — M43.16: Primary | ICD-10-CM

## 2021-04-16 DIAGNOSIS — N18.30: ICD-10-CM

## 2021-04-16 PROCEDURE — 71046 X-RAY EXAM CHEST 2 VIEWS: CPT

## 2021-04-16 PROCEDURE — 72110 X-RAY EXAM L-2 SPINE 4/>VWS: CPT

## 2021-04-16 NOTE — DIAGNOSTIC IMAGING REPORT
INDICATION: COPD.



TIME OF EXAM: 10:05 AM



COMPARISON is made with prior chest from 02/03/2021.



THe heart size is stable. Monitoring device overlies the left

chest. There are mild interstitial changes, likely chronic. No

parenchymal consolidation, effusion or pneumothorax is detected.



IMPRESSION: No acute cardiopulmonary process is detected.



Dictated by: 



  Dictated on workstation # [id number]

## 2021-04-16 NOTE — DIAGNOSTIC IMAGING REPORT
INDICATION: Chronic low back pain.



TIME OF EXAM: 10:07 AM



AP, lateral, both oblique as well as coned lumbosacral views and

lumbar spine were obtained.



FINDINGS: Curvature is normal. There is grade 1/2

spondylolisthesis of L4 on L5 as well as L5 on S1. Vertebral body

heights are maintained. No acute compression fracture is seen. No

definite pars defects are seen. Aorta is heavily calcified. There

is moderate degenerative disc disease at L4-L5 and L5-S1 levels.

Lower lumbar facet arthropathy is noted.



IMPRESSION: Lower lumbar spondylolisthesis and spondylosis. No

acute bony abnormality is detected.



Dictated by: 



  Dictated on workstation # [id number]

## 2021-05-16 NOTE — DIAGNOSTIC IMAGING REPORT
[facility]  Discharge- Fely aPrra 1968, 47 y o  male MRN: [id number]  Unit/Bed#: 5T DETOX 506-01 Encounter: [id number]  Primary Care Provider: Carrie Jackson DO   Date and time admitted to hospital: 5/14/2021 10:36 AM    Essential hypertension  Assessment & Plan  · Will hold home medications for now and assess blood pressure after treatment of withdrawal  · If continued hypertension after treatment for withdrawal is complete, could then restart home medications  · 5/15/21 - Unsure why metoprolol or hydralazine were previously prescribed  No history of coronary artery disease with a normal stress test in recent year; history of BEN  After discussion with Internal Medicine, Dr Juan Rodríguez, will proceed with amlodipine 5 mg p o  and advanced to 10 mg if needed  05/16/21:   Follow-up as outpatient for further medication adjustment    Alcohol use disorder, severe, dependence ([facility] [address] )  Assessment & Plan  · Treatment of alcohol withdrawal as above  · Case management consult  · Has history of alcohol induced mood disorder and MDD, Bipolar Disorder  Patient is agreeable with speaking with Psychiatry  Will consult Psychiatry and appreciate their expertise and recommendations  · Will offer Naltrexone after acute withdrawal treated  · Daily thiamine and folate  · 5/15/21 - currently improved on alcohol withdrawal protocol          * Alcohol-induced mood disorder ([facility] [address] )  Assessment & Plan  · Appreciate psychiatry recommendations:  Mirtazapine and topiramate    Tobacco abuse  Assessment & Plan  · Nicotine patch  · Encourage cessation        Discharging Physician / Practitioner: Karen Pedersen DO  PCP: Carrie Jackson DO  Admission Date:   Admission Orders (From admission, onward)     Ordered        05/14/21 1046  Inpatient Admission  Once                   Discharge Date: 05/16/21    Resolved Problems  Date Reviewed: 5/16/2021          Resolved    Alcohol withdrawal ([facility] [address] ) 5/16/2021 INDICATION: Pneumonia and COPD.



Comparison made with prior examination from  2/27/19.



FINDINGS: There is cardiomegaly. There is mild central venous

congestion. There is no pleural effusion or pneumothorax. The

mediastinum is unremarkable. 



IMPRESSION: Cardiomegaly and mild central venous congestion with

some minimal patchy bibasal infiltrates.



Dictated by: 



  Dictated on workstation # [id number] Resolved by  Zeyad Grace PA-C    Overview Signed 5/14/2021 11:15 AM by Star Coburn MD     Patient presents in alcohol withdrawal   Last drink was at 10PM last night  Received diazepam, 10 mg IV in the emergency department this morning  Has prior history of alcohol withdrawal, no prior history of withdrawal seizures or DT's  Nausea and vomiting 5/16/2021     Resolved by  Zeyad Grace PA-C    Overview Signed 5/14/2021 11:25 AM by Star Coburn MD     Likely secondary to daily ethanol use  Patient does note that he is feeling hungry now  Abdominal cramping 5/16/2021     Resolved by  Zeyad Grace PA-C    Overview Addendum 5/14/2021 11:26 AM by Star Coburn MD     Mild upper abdominal cramping associated with nausea and vomiting  Has been on pantoprazole in the past, likely for GERD  No tenderness on exam          Dehydration 5/16/2021     Resolved by  Zeyad Grace PA-C    Overview Signed 5/14/2021 11:34 AM by Star Coburn MD     Patient has had difficulty tolerating PO due to abdominal cramping, nausea and vomiting  Patient appears dehydrated and CBC shows hemoconcentration  Hypokalemia 5/16/2021     Resolved by  Zeyad Grace PA-C          Consultations During Hospital Stay:  · Psychiatry    Procedures Performed:   · Not applicable    Significant Findings / Test Results:   · Hepatomegaly    Incidental Findings:   · Not applicable     Test Results Pending at Discharge (will require follow up): · Not applicable     Outpatient Tests Requested:  · Applicable    Complications:  Medical    Reason for Admission:  Alcohol withdrawal    Hospital Course: Deana Chen is a 47 y o  male patient who originally presented to the hospital on 5/14/2021 due to acute alcohol withdrawal   His alcohol withdrawal was stabilized via protocol  He had persistent hypertension    With assistance of Internal Medicine, he was started on amlodipine and prior medications were discontinued (metoprolol and hydralazine)  Patient was discharged to rehab facility  Please see above list of diagnoses and related plan for additional information  Condition at Discharge: good     Discharge Day Visit / Exam:     * Please refer to separate progress note for these details *    Discussion with Family:  Not applicable    Discharge instructions/Information to patient and family:   See after visit summary for information provided to patient and family  Provisions for Follow-Up Care:  See after visit summary for information related to follow-up care and any pertinent home health orders  Disposition:     Home           Discharge Statement:  I spent 24 minutes discharging the patient  This time was spent on the day of discharge  I had direct contact with the patient on the day of discharge  Greater than 50% of the total time was spent examining patient, answering all patient questions, arranging and discussing plan of care with patient as well as directly providing post-discharge instructions  Additional time then spent on discharge activities  Discharge Medications:  See after visit summary for reconciled discharge medications provided to patient and family        ** Please Note: This note has been constructed using a voice recognition system **

## 2021-06-16 ENCOUNTER — HOSPITAL ENCOUNTER (OUTPATIENT)
Dept: HOSPITAL 75 - RAD | Age: 76
End: 2021-06-16
Attending: OTOLARYNGOLOGY
Payer: MEDICARE

## 2021-06-16 ENCOUNTER — HOSPITAL ENCOUNTER (OUTPATIENT)
Dept: HOSPITAL 75 - RAD | Age: 76
End: 2021-06-16
Attending: NURSE PRACTITIONER
Payer: MEDICARE

## 2021-06-16 DIAGNOSIS — Z87.828: ICD-10-CM

## 2021-06-16 DIAGNOSIS — F17.200: ICD-10-CM

## 2021-06-16 DIAGNOSIS — J43.9: Primary | ICD-10-CM

## 2021-06-16 DIAGNOSIS — Z98.890: ICD-10-CM

## 2021-06-16 DIAGNOSIS — J38.01: Primary | ICD-10-CM

## 2021-06-16 DIAGNOSIS — Z86.79: ICD-10-CM

## 2021-06-16 LAB
BUN/CREAT SERPL: 11
BUN/CREAT SERPL: 11
CREAT SERPL-MCNC: 1.36 MG/DL (ref 0.6–1.3)
CREAT SERPL-MCNC: 1.36 MG/DL (ref 0.6–1.3)
GFR SERPLBLD BASED ON 1.73 SQ M-ARVRAT: 51 ML/MIN
GFR SERPLBLD BASED ON 1.73 SQ M-ARVRAT: 51 ML/MIN

## 2021-06-16 PROCEDURE — 84520 ASSAY OF UREA NITROGEN: CPT

## 2021-06-16 PROCEDURE — 70491 CT SOFT TISSUE NECK W/DYE: CPT

## 2021-06-16 PROCEDURE — 36415 COLL VENOUS BLD VENIPUNCTURE: CPT

## 2021-06-16 PROCEDURE — 71260 CT THORAX DX C+: CPT

## 2021-06-16 PROCEDURE — 70470 CT HEAD/BRAIN W/O & W/DYE: CPT

## 2021-06-16 PROCEDURE — 82565 ASSAY OF CREATININE: CPT
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
## 2021-06-16 NOTE — DIAGNOSTIC IMAGING REPORT
EXAMINATION: CT chest with intravenous contrast.



TECHNIQUE: Multiple contiguous axial images were obtained through

the chest after the uneventful administration of intravenous

contrast. All CT scans use one or more of the following dose

optimizing techniques: automated exposure control, MA and/or KvP

adjustment based on patient size and exam type or iterative

reconstruction. 



HISTORY: COPD.



COMPARISON: 02/03/2021



FINDINGS: 



There is no edema or pneumonia. No pleural effusion. No

pneumothorax. No suspicious nodules. There is moderate upper lobe

predominant emphysema.



There is no axillary or supraclavicular lymphadenopathy. There is

no mediastinal lymphadenopathy. Esophagus is dilated and

fluid-filled. Heart size is normal. There are severe coronary

artery calcifications.  No pericardial effusion. Aorta is normal

in caliber.



Limited views of the upper abdomen show changes of

cholecystectomy.



There are no suspicious osseous lesions.



IMPRESSION:



1. Moderate upper lobe predominant emphysema, similar to prior

exam.



Dictated by: 



  Dictated on workstation # [id number]

## 2021-08-27 ENCOUNTER — HOSPITAL ENCOUNTER (EMERGENCY)
Dept: HOSPITAL 75 - ER | Age: 76
Discharge: HOME | End: 2021-08-27
Payer: MEDICARE

## 2021-08-27 VITALS — WEIGHT: 205.91 LBS | BODY MASS INDEX: 28.83 KG/M2 | HEIGHT: 70.98 IN

## 2021-08-27 VITALS — DIASTOLIC BLOOD PRESSURE: 66 MMHG | SYSTOLIC BLOOD PRESSURE: 109 MMHG

## 2021-08-27 DIAGNOSIS — E11.9: ICD-10-CM

## 2021-08-27 DIAGNOSIS — E03.9: ICD-10-CM

## 2021-08-27 DIAGNOSIS — Z79.82: ICD-10-CM

## 2021-08-27 DIAGNOSIS — K59.09: ICD-10-CM

## 2021-08-27 DIAGNOSIS — Z79.899: ICD-10-CM

## 2021-08-27 DIAGNOSIS — G47.30: ICD-10-CM

## 2021-08-27 DIAGNOSIS — Z79.51: ICD-10-CM

## 2021-08-27 DIAGNOSIS — M54.9: ICD-10-CM

## 2021-08-27 DIAGNOSIS — Z99.89: ICD-10-CM

## 2021-08-27 DIAGNOSIS — I25.10: ICD-10-CM

## 2021-08-27 DIAGNOSIS — J43.9: ICD-10-CM

## 2021-08-27 DIAGNOSIS — K21.9: ICD-10-CM

## 2021-08-27 DIAGNOSIS — Z20.822: ICD-10-CM

## 2021-08-27 DIAGNOSIS — J18.9: Primary | ICD-10-CM

## 2021-08-27 DIAGNOSIS — R42: ICD-10-CM

## 2021-08-27 DIAGNOSIS — I10: ICD-10-CM

## 2021-08-27 DIAGNOSIS — E78.00: ICD-10-CM

## 2021-08-27 DIAGNOSIS — F32.9: ICD-10-CM

## 2021-08-27 DIAGNOSIS — G89.29: ICD-10-CM

## 2021-08-27 DIAGNOSIS — M81.0: ICD-10-CM

## 2021-08-27 DIAGNOSIS — Z79.890: ICD-10-CM

## 2021-08-27 LAB
ALBUMIN SERPL-MCNC: 3.4 GM/DL (ref 3.2–4.5)
ALP SERPL-CCNC: 84 U/L (ref 40–136)
ALT SERPL-CCNC: 18 U/L (ref 0–55)
APTT PPP: YELLOW S
BACTERIA #/AREA URNS HPF: NEGATIVE /HPF
BASOPHILS # BLD AUTO: 0 10^3/UL (ref 0–0.1)
BASOPHILS NFR BLD AUTO: 0 % (ref 0–10)
BASOPHILS NFR BLD MANUAL: 0 %
BILIRUB SERPL-MCNC: 0.9 MG/DL (ref 0.1–1)
BILIRUB UR QL STRIP: NEGATIVE
BUN/CREAT SERPL: 14
CALCIUM SERPL-MCNC: 9.4 MG/DL (ref 8.5–10.1)
CHLORIDE SERPL-SCNC: 102 MMOL/L (ref 98–107)
CO2 SERPL-SCNC: 23 MMOL/L (ref 21–32)
CREAT SERPL-MCNC: 1.34 MG/DL (ref 0.6–1.3)
EOSINOPHIL # BLD AUTO: 0.1 10^3/UL (ref 0–0.3)
EOSINOPHIL NFR BLD AUTO: 1 % (ref 0–10)
EOSINOPHIL NFR BLD MANUAL: 0 %
FIBRINOGEN PPP-MCNC: CLEAR MG/DL
GFR SERPLBLD BASED ON 1.73 SQ M-ARVRAT: 52 ML/MIN
GLUCOSE SERPL-MCNC: 105 MG/DL (ref 70–105)
GLUCOSE UR STRIP-MCNC: NEGATIVE MG/DL
HCT VFR BLD CALC: 37 % (ref 40–54)
HGB BLD-MCNC: 12.3 G/DL (ref 13.3–17.7)
KETONES UR QL STRIP: (no result)
LEUKOCYTE ESTERASE UR QL STRIP: NEGATIVE
LYMPHOCYTES # BLD AUTO: 0.8 10^3/UL (ref 1–4)
LYMPHOCYTES NFR BLD AUTO: 7 % (ref 12–44)
MAGNESIUM SERPL-MCNC: 1.6 MG/DL (ref 1.6–2.4)
MANUAL DIFFERENTIAL PERFORMED BLD QL: YES
MCH RBC QN AUTO: 34 PG (ref 25–34)
MCHC RBC AUTO-ENTMCNC: 33 G/DL (ref 32–36)
MCV RBC AUTO: 104 FL (ref 80–99)
MONOCYTES # BLD AUTO: 0.9 10^3/UL (ref 0–1)
MONOCYTES NFR BLD AUTO: 8 % (ref 0–12)
MONOCYTES NFR BLD: 7 %
NEUTROPHILS # BLD AUTO: 9.8 10^3/UL (ref 1.8–7.8)
NEUTROPHILS NFR BLD AUTO: 84 % (ref 42–75)
NEUTS BAND NFR BLD MANUAL: 83 %
NEUTS BAND NFR BLD: 0 %
NITRITE UR QL STRIP: NEGATIVE
PH UR STRIP: 5.5 [PH] (ref 5–9)
PLATELET # BLD: 247 10^3/UL (ref 130–400)
PMV BLD AUTO: 9.1 FL (ref 9–12.2)
POTASSIUM SERPL-SCNC: 4 MMOL/L (ref 3.6–5)
PROT SERPL-MCNC: 7.1 GM/DL (ref 6.4–8.2)
PROT UR QL STRIP: NEGATIVE
RBC #/AREA URNS HPF: (no result) /HPF
RBC MORPH BLD: NORMAL
SODIUM SERPL-SCNC: 134 MMOL/L (ref 135–145)
SP GR UR STRIP: 1.02 (ref 1.02–1.02)
SQUAMOUS #/AREA URNS HPF: (no result) /HPF
T4 FREE SERPL-MCNC: 1.21 NG/DL (ref 0.7–1.48)
VARIANT LYMPHS NFR BLD MANUAL: 10 %
WBC # BLD AUTO: 11.7 10^3/UL (ref 4.3–11)
WBC #/AREA URNS HPF: (no result) /HPF

## 2021-08-27 PROCEDURE — 85007 BL SMEAR W/DIFF WBC COUNT: CPT

## 2021-08-27 PROCEDURE — 86141 C-REACTIVE PROTEIN HS: CPT

## 2021-08-27 PROCEDURE — 80053 COMPREHEN METABOLIC PANEL: CPT

## 2021-08-27 PROCEDURE — 84439 ASSAY OF FREE THYROXINE: CPT

## 2021-08-27 PROCEDURE — 84145 PROCALCITONIN (PCT): CPT

## 2021-08-27 PROCEDURE — 96374 THER/PROPH/DIAG INJ IV PUSH: CPT

## 2021-08-27 PROCEDURE — 93041 RHYTHM ECG TRACING: CPT

## 2021-08-27 PROCEDURE — 84443 ASSAY THYROID STIM HORMONE: CPT

## 2021-08-27 PROCEDURE — 84484 ASSAY OF TROPONIN QUANT: CPT

## 2021-08-27 PROCEDURE — 87040 BLOOD CULTURE FOR BACTERIA: CPT

## 2021-08-27 PROCEDURE — 87636 SARSCOV2 & INF A&B AMP PRB: CPT

## 2021-08-27 PROCEDURE — 81000 URINALYSIS NONAUTO W/SCOPE: CPT

## 2021-08-27 PROCEDURE — 93005 ELECTROCARDIOGRAM TRACING: CPT

## 2021-08-27 PROCEDURE — 83880 ASSAY OF NATRIURETIC PEPTIDE: CPT

## 2021-08-27 PROCEDURE — 83735 ASSAY OF MAGNESIUM: CPT

## 2021-08-27 PROCEDURE — 83605 ASSAY OF LACTIC ACID: CPT

## 2021-08-27 PROCEDURE — 85027 COMPLETE CBC AUTOMATED: CPT

## 2021-08-27 PROCEDURE — 36415 COLL VENOUS BLD VENIPUNCTURE: CPT

## 2021-08-27 PROCEDURE — 71046 X-RAY EXAM CHEST 2 VIEWS: CPT
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
## 2021-08-27 NOTE — DIAGNOSTIC IMAGING REPORT
EXAMINATION: Chest 2 view



HISTORY: Cough, SOA



COMPARISON: 04/16/2021



FINDINGS: 



Heart size and pulmonary vasculature are stable. There are patchy

interstitial opacities within the upper lungs and lung bases. No

pleural effusion or pneumothorax. Degenerative changes of the

thoracic spine. Osseous structures are otherwise intact.

Multilevel chronic thoracic compression deformities.



IMPRESSION: 



1. Patchy interstitial opacities within the upper and lower lungs

which can be seen with pulmonary edema or atypical infection.



Dictated by: 



  Dictated on workstation # [id number]

## 2021-09-09 ENCOUNTER — HOSPITAL ENCOUNTER (EMERGENCY)
Dept: HOSPITAL 75 - ER | Age: 76
Discharge: HOME | End: 2021-09-09
Payer: MEDICARE

## 2021-09-09 VITALS — BODY MASS INDEX: 24.69 KG/M2 | WEIGHT: 176.37 LBS | HEIGHT: 70.98 IN

## 2021-09-09 VITALS — DIASTOLIC BLOOD PRESSURE: 72 MMHG | SYSTOLIC BLOOD PRESSURE: 118 MMHG

## 2021-09-09 DIAGNOSIS — I10: ICD-10-CM

## 2021-09-09 DIAGNOSIS — E03.9: ICD-10-CM

## 2021-09-09 DIAGNOSIS — Z86.73: ICD-10-CM

## 2021-09-09 DIAGNOSIS — H40.9: ICD-10-CM

## 2021-09-09 DIAGNOSIS — Z79.82: ICD-10-CM

## 2021-09-09 DIAGNOSIS — R53.81: Primary | ICD-10-CM

## 2021-09-09 DIAGNOSIS — E78.00: ICD-10-CM

## 2021-09-09 DIAGNOSIS — G47.30: ICD-10-CM

## 2021-09-09 DIAGNOSIS — Z79.899: ICD-10-CM

## 2021-09-09 DIAGNOSIS — I25.10: ICD-10-CM

## 2021-09-09 DIAGNOSIS — R29.6: ICD-10-CM

## 2021-09-09 DIAGNOSIS — Z79.01: ICD-10-CM

## 2021-09-09 DIAGNOSIS — E11.9: ICD-10-CM

## 2021-09-09 DIAGNOSIS — K21.9: ICD-10-CM

## 2021-09-09 DIAGNOSIS — Z79.890: ICD-10-CM

## 2021-09-09 DIAGNOSIS — F17.210: ICD-10-CM

## 2021-09-09 DIAGNOSIS — J44.9: ICD-10-CM

## 2021-09-09 LAB
ALBUMIN SERPL-MCNC: 3.4 GM/DL (ref 3.2–4.5)
ALP SERPL-CCNC: 77 U/L (ref 40–136)
ALT SERPL-CCNC: 15 U/L (ref 0–55)
APTT PPP: YELLOW S
BACTERIA #/AREA URNS HPF: (no result) /HPF
BASOPHILS # BLD AUTO: 0.1 10^3/UL (ref 0–0.1)
BASOPHILS NFR BLD AUTO: 1 % (ref 0–10)
BILIRUB SERPL-MCNC: 0.4 MG/DL (ref 0.1–1)
BILIRUB UR QL STRIP: NEGATIVE
BUN/CREAT SERPL: 15
CALCIUM SERPL-MCNC: 9 MG/DL (ref 8.5–10.1)
CHLORIDE SERPL-SCNC: 108 MMOL/L (ref 98–107)
CO2 SERPL-SCNC: 20 MMOL/L (ref 21–32)
CREAT SERPL-MCNC: 1.36 MG/DL (ref 0.6–1.3)
EOSINOPHIL # BLD AUTO: 0.2 10^3/UL (ref 0–0.3)
EOSINOPHIL NFR BLD AUTO: 2 % (ref 0–10)
FIBRINOGEN PPP-MCNC: CLEAR MG/DL
GFR SERPLBLD BASED ON 1.73 SQ M-ARVRAT: 51 ML/MIN
GLUCOSE SERPL-MCNC: 115 MG/DL (ref 70–105)
GLUCOSE UR STRIP-MCNC: NEGATIVE MG/DL
HCT VFR BLD CALC: 38 % (ref 40–54)
HGB BLD-MCNC: 12.5 G/DL (ref 13.3–17.7)
KETONES UR QL STRIP: NEGATIVE
LEUKOCYTE ESTERASE UR QL STRIP: NEGATIVE
LYMPHOCYTES # BLD AUTO: 1.2 10^3/UL (ref 1–4)
LYMPHOCYTES NFR BLD AUTO: 16 % (ref 12–44)
MANUAL DIFFERENTIAL PERFORMED BLD QL: NO
MCH RBC QN AUTO: 34 PG (ref 25–34)
MCHC RBC AUTO-ENTMCNC: 33 G/DL (ref 32–36)
MCV RBC AUTO: 102 FL (ref 80–99)
MONOCYTES # BLD AUTO: 0.5 10^3/UL (ref 0–1)
MONOCYTES NFR BLD AUTO: 6 % (ref 0–12)
NEUTROPHILS # BLD AUTO: 6 10^3/UL (ref 1.8–7.8)
NEUTROPHILS NFR BLD AUTO: 76 % (ref 42–75)
NITRITE UR QL STRIP: NEGATIVE
PH UR STRIP: 6 [PH] (ref 5–9)
PLATELET # BLD: 323 10^3/UL (ref 130–400)
PMV BLD AUTO: 8.9 FL (ref 9–12.2)
POTASSIUM SERPL-SCNC: 4.1 MMOL/L (ref 3.6–5)
PROT SERPL-MCNC: 7.3 GM/DL (ref 6.4–8.2)
PROT UR QL STRIP: NEGATIVE
RBC #/AREA URNS HPF: (no result) /HPF
SODIUM SERPL-SCNC: 139 MMOL/L (ref 135–145)
SP GR UR STRIP: >=1.03 (ref 1.02–1.02)
SQUAMOUS #/AREA URNS HPF: (no result) /HPF
WBC # BLD AUTO: 7.9 10^3/UL (ref 4.3–11)
WBC #/AREA URNS HPF: (no result) /HPF

## 2021-09-09 PROCEDURE — 85025 COMPLETE CBC W/AUTO DIFF WBC: CPT

## 2021-09-09 PROCEDURE — 96374 THER/PROPH/DIAG INJ IV PUSH: CPT

## 2021-09-09 PROCEDURE — 71045 X-RAY EXAM CHEST 1 VIEW: CPT

## 2021-09-09 PROCEDURE — 36415 COLL VENOUS BLD VENIPUNCTURE: CPT

## 2021-09-09 PROCEDURE — 72125 CT NECK SPINE W/O DYE: CPT

## 2021-09-09 PROCEDURE — 80053 COMPREHEN METABOLIC PANEL: CPT

## 2021-09-09 PROCEDURE — 86141 C-REACTIVE PROTEIN HS: CPT

## 2021-09-09 PROCEDURE — 81000 URINALYSIS NONAUTO W/SCOPE: CPT

## 2021-09-09 PROCEDURE — 96375 TX/PRO/DX INJ NEW DRUG ADDON: CPT

## 2021-09-09 PROCEDURE — 70450 CT HEAD/BRAIN W/O DYE: CPT
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
## 2021-09-09 NOTE — DIAGNOSTIC IMAGING REPORT
PROCEDURE: CT head and CT cervical spine without contrast.



TECHNIQUE: Multiple contiguous axial images were obtained through

the brain and cervical spine without the use of intravenous

contrast. Sagittal and coronal reformations through the cervical

spine were then performed. Auto Exposure Controls were utilized

during the CT exam to meet ALARA standards for radiation dose

reduction. 



INDICATION:  Trauma, fall. Altered mental status



COMPARISON: 06/16/2021.



FINDINGS:



Head: No intracranial hyperdense hemorrhage or space-occupying

mass. Stable encephalomalacia in the bilateral frontal regions,

greater on the right with overlying bifrontal craniotomies.

Defects within the outer table of the bilateral frontal sinuses

with associated chronic opacifications are unchanged. No

hydrocephalus. Basilar cisterns are widely patent. No acute

abnormality is appreciated. Bilateral maxillary antrectomies.



Cervical spine: No acute fracture or traumatic malalignment in

the cervical spine. Degenerative straightening is present. No

high-grade spinal stenosis. Degenerative disc disease causes

mild-to-moderate spinal stenosis that is most advanced at C5-C6.

No cervical lymphadenopathy. Lung apices are clear.



IMPRESSION:

1. No acute intracranial process by CT. Stable postoperative

changes in the bilateral frontal region.

2. No acute fracture or traumatic malalignment in the cervical

spine.



Dictated by: 



  Dictated on workstation # [id number]

## 2021-09-09 NOTE — DIAGNOSTIC IMAGING REPORT
INDICATION: Shortness of breath and cough.



EXAMINATION: Frontal chest was obtained at 7:14 p.m. 



COMPARISON: 08/27/2021.



FINDINGS: The heart is mildly enlarged. There is mild central

vascular congestion with chronic appearing increased interstitial

markings. There is no new consolidation, pneumothorax or pleural

fluid.



IMPRESSION: Chronic changes as described above with similar

appearance to 08/27/2021. There is no new abnormality. 



Dictated by: 



  Dictated on workstation # [id number]

## 2021-09-27 ENCOUNTER — HOSPITAL ENCOUNTER (OUTPATIENT)
Dept: HOSPITAL 75 - LABNPT | Age: 76
End: 2021-09-27
Attending: INTERNAL MEDICINE
Payer: COMMERCIAL

## 2021-09-27 DIAGNOSIS — N39.0: Primary | ICD-10-CM

## 2021-09-27 LAB
APTT PPP: YELLOW S
BACTERIA #/AREA URNS HPF: NEGATIVE /HPF
BILIRUB UR QL STRIP: NEGATIVE
FIBRINOGEN PPP-MCNC: CLEAR MG/DL
GLUCOSE UR STRIP-MCNC: NEGATIVE MG/DL
KETONES UR QL STRIP: NEGATIVE
LEUKOCYTE ESTERASE UR QL STRIP: (no result)
NITRITE UR QL STRIP: NEGATIVE
PH UR STRIP: 6 [PH] (ref 5–9)
PROT UR QL STRIP: NEGATIVE
RENAL EPI CELLS #/AREA URNS HPF: (no result) /HPF
SP GR UR STRIP: 1.02 (ref 1.02–1.02)
SQUAMOUS #/AREA URNS HPF: (no result) /HPF

## 2021-09-27 PROCEDURE — 81000 URINALYSIS NONAUTO W/SCOPE: CPT
